# Patient Record
Sex: FEMALE | NOT HISPANIC OR LATINO | Employment: OTHER | URBAN - METROPOLITAN AREA
[De-identification: names, ages, dates, MRNs, and addresses within clinical notes are randomized per-mention and may not be internally consistent; named-entity substitution may affect disease eponyms.]

---

## 2016-11-25 LAB
CHOLEST SERPL-MCNC: 139 MG/DL (ref 0–199)
HDLC SERPL-MCNC: 31 MG/DL
LDLC SERPL CALC-MCNC: 61 MG/DL (ref 0–129)
TRIGL SERPL-MCNC: 237 MG/DL (ref 0–149)

## 2018-02-16 LAB — HBA1C MFR BLD: 6.9 % (ref 4.3–5.6)

## 2018-05-04 LAB — LDLC SERPL CALC-MCNC: 62 MG/D (ref 0–129)

## 2018-05-16 LAB
CREAT SERPL-MCNC: 0.87 MG/DL (ref 0.57–1.11)
GFR SERPL CREATININE-BSD FRML MDRD: >60 ML/MIN/1.73M2
GLUCOSE SERPL-MCNC: 123 MG/DL (ref 65–100)
POTASSIUM SERPL-SCNC: 3.7 MMOL/L (ref 3.5–5)

## 2018-06-08 ENCOUNTER — TRANSFERRED RECORDS (OUTPATIENT)
Dept: HEALTH INFORMATION MANAGEMENT | Facility: CLINIC | Age: 73
End: 2018-06-08

## 2018-06-08 LAB
ALT SERPL-CCNC: 15 U/L (ref 0–55)
HBA1C MFR BLD: 6.7 % (ref 4.3–5.6)
INR PPP: 2.1 (ref 0.9–1.1)
POTASSIUM SERPL-SCNC: 3.7 MMOL/L (ref 3.5–5.1)

## 2018-06-21 ENCOUNTER — RADIANT APPOINTMENT (OUTPATIENT)
Dept: GENERAL RADIOLOGY | Facility: CLINIC | Age: 73
End: 2018-06-21
Attending: PHYSICIAN ASSISTANT
Payer: MEDICARE

## 2018-06-21 ENCOUNTER — OFFICE VISIT (OUTPATIENT)
Dept: ORTHOPEDICS | Facility: CLINIC | Age: 73
End: 2018-06-21
Payer: MEDICARE

## 2018-06-21 VITALS
BODY MASS INDEX: 49.39 KG/M2 | HEIGHT: 59 IN | RESPIRATION RATE: 18 BRPM | TEMPERATURE: 97.3 F | SYSTOLIC BLOOD PRESSURE: 163 MMHG | DIASTOLIC BLOOD PRESSURE: 104 MMHG | WEIGHT: 245 LBS

## 2018-06-21 DIAGNOSIS — M25.562 PAIN IN BOTH KNEES, UNSPECIFIED CHRONICITY: ICD-10-CM

## 2018-06-21 DIAGNOSIS — Z96.652 STATUS POST TOTAL LEFT KNEE REPLACEMENT: ICD-10-CM

## 2018-06-21 DIAGNOSIS — M25.561 PAIN IN BOTH KNEES, UNSPECIFIED CHRONICITY: Primary | ICD-10-CM

## 2018-06-21 DIAGNOSIS — Z96.651 STATUS POST TOTAL RIGHT KNEE REPLACEMENT: ICD-10-CM

## 2018-06-21 DIAGNOSIS — M25.561 PAIN IN BOTH KNEES, UNSPECIFIED CHRONICITY: ICD-10-CM

## 2018-06-21 DIAGNOSIS — M25.562 PAIN IN BOTH KNEES, UNSPECIFIED CHRONICITY: Primary | ICD-10-CM

## 2018-06-21 PROCEDURE — 99213 OFFICE O/P EST LOW 20 MIN: CPT | Performed by: ORTHOPAEDIC SURGERY

## 2018-06-21 PROCEDURE — 73562 X-RAY EXAM OF KNEE 3: CPT | Mod: RT

## 2018-06-21 NOTE — LETTER
6/21/2018         RE: Selvin Rockwell  [address] [place] [id number]        Dear Colleague,    Thank you for referring your patient, Selvin Rockwell, to the [facility]. Please see a copy of my visit note below.    Selvin Rockwell is seen for follow up left total knee arthroplasty from 7/21/2010 and right total knee arthroplasty from 12/12/2012.  She complains of left lateral knee pain.  Pain is especially along the posterolateral hamstrings.  Exercise:  limited walking.    Past Medical History:   Diagnosis Date     Diabetes mellitus, type 2 (H)      HTN (hypertension) 12/5/2012     Hyperlipidemia LDL goal < 100      OA (OSTEOARTHRITIS) OF KNEE - right 7/13/2010     OA (OSTEOARTHRITIS) OF KNEE - right      TACHO (obstructive sleep apnea) 12/7/2012       Past Surgical History:   Procedure Laterality Date     C HAND/FINGER SURGERY UNLISTED       C REMOVAL OF KIDNEY STONE       C TOTAL KNEE ARTHROPLASTY  7/21/10    left     C TOTAL KNEE ARTHROPLASTY  12/12/12    Right     CARPAL TUNNEL RELEASE RT/LT       TONSILLECTOMY         History reviewed. No pertinent family history.    Social History     Social History     Marital status:      Spouse name: N/A     Number of children: N/A     Years of education: N/A     Occupational History     Not on file.     Social History Main Topics     Smoking status: Never Smoker     Smokeless tobacco: Never Used     Alcohol use No     Drug use: No     Sexual activity: Yes     Partners: Male     Birth control/ protection: Post-menopausal     Other Topics Concern     Not on file     Social History Narrative       Current Outpatient Prescriptions   Medication Sig Dispense Refill     aspirin 81 MG tablet Take  by mouth daily.       ATENOLOL 50 MG PO TABS 1 TABLET DAILY       FISH OIL 1000 MG PO CAPS 3 CAPSULES DAILY WITH A MEAL       GLIPIZIDE 5 MG PO TABS 1 TABLET DAILY BEFORE A MEAL       GLUCOSAMINE HCL 1500 MG PO TABS 1 TABLET DAILY        "HYDROCHLOROTHIAZIDE 25 MG PO TABS 1 TABLET DAILY       metFORMIN (GLUCOPHAGE) 500 MG tablet Take 500 mg by mouth 2 times daily (with meals).       mirtazapine (REMERON) 30 MG tablet Take 0.5 mg by mouth At Bedtime.       OTHER MEDICAL SUPPLIES 1 Package by Other route daily. 1 Package 0     oxyCODONE-acetaminophen (PERCOCET) 5-325 MG per tablet Take 1 tablet by mouth every 4 hours as needed for pain. 50 tablet 0     PRAVASTATIN SODIUM 10 MG PO TABS 1 TABLET DAILY         Allergies   Allergen Reactions     Sulfa Drugs        REVIEW OF SYSTEMS:  CONSTITUTIONAL:  NEGATIVE for fever, chills, change in weight, not feeling tired  SKIN:  NEGATIVE for worrisome rashes, no skin lumps, no skin ulcers and no non-healing wounds  EYES:  NEGATIVE for vision changes or irritation.  ENT/MOUTH:  NEGATIVE.  No hearing loss, no hoarseness, no difficulty swallowing.  RESP:  NEGATIVE. No cough or shortness of breath.  BREAST:  NEGATIVE for masses, tenderness or discharge  CV:  NEGATIVE for chest pain, palpitations or peripheral edema  GI:  NEGATIVE for nausea, abdominal pain, heartburn, or change in bowel habits  :  Negative. No dysuria, no hematuria  MUSCULOSKELETAL:  See HPI above  NEURO:  NEGATIVE . No headaches, no dizziness,  no numbness  ENDOCRINE:  NEGATIVE for temperature intolerance, skin/hair changes  HEME/ALLERGY/IMMUNE:  NEGATIVE for bleeding problems  PSYCHIATRIC:  NEGATIVE. no anxiety, no depression.      Xray:  Xray images were independently visualized with the patient.  This shows good position of components of both total knee arthroplasty .  No sign of loosening or wear.     Exam:  Vitals: BP (!) 163/104  Temp 97.3  F (36.3  C)  Resp 18  Ht 1.499 m (4' 11\")  Wt 111.1 kg (245 lb)  BMI 49.48 kg/m2  BMI= Body mass index is 49.48 kg/(m^2).  Range of motion right 0-110 degrees, left 0-110 degrees.  Alignment  Normal.  Stability:   Right       Lateral collateral ligament no laxity       Medial collateral ligament no " laxity  Left:       Lateral collateral ligament no laxity       Medial collateral ligament moderate laxity.  This is new since 2011.  Wound:  Well healed.  Edema: None  Effusion: None  Tenderness: Right None       Left:  Minor - lateral hamstrings.  Sensation, motor, and circulation intact.    Assessment:  right total knee arthroplasty doing well.  Left total knee arthroplasty with medial collateral ligament laxity.  She has lateral hamstrings tenderness which might be related to medial collateral ligament laxity.  Plan:  She should work on strengthening.  We will fit her with a double upright hinged brace.  Resume activity as tolerated.  Return to clinic 2-4 years with xray bilateral knee.  Continue antibiotics for dental work.      Again, thank you for allowing me to participate in the care of your patient.        Sincerely,        Gurinder Martinez MD

## 2018-06-21 NOTE — PROGRESS NOTES
Selvin Rockwell is seen for follow up left total knee arthroplasty from 7/21/2010 and right total knee arthroplasty from 12/12/2012.  She complains of left lateral knee pain.  Pain is especially along the posterolateral hamstrings.  Exercise:  limited walking.    Past Medical History:   Diagnosis Date     Diabetes mellitus, type 2 (H)      HTN (hypertension) 12/5/2012     Hyperlipidemia LDL goal < 100      OA (OSTEOARTHRITIS) OF KNEE - right 7/13/2010     OA (OSTEOARTHRITIS) OF KNEE - right      TACHO (obstructive sleep apnea) 12/7/2012       Past Surgical History:   Procedure Laterality Date     C HAND/FINGER SURGERY UNLISTED       C REMOVAL OF KIDNEY STONE       C TOTAL KNEE ARTHROPLASTY  7/21/10    left     C TOTAL KNEE ARTHROPLASTY  12/12/12    Right     CARPAL TUNNEL RELEASE RT/LT       TONSILLECTOMY         History reviewed. No pertinent family history.    Social History     Social History     Marital status:      Spouse name: N/A     Number of children: N/A     Years of education: N/A     Occupational History     Not on file.     Social History Main Topics     Smoking status: Never Smoker     Smokeless tobacco: Never Used     Alcohol use No     Drug use: No     Sexual activity: Yes     Partners: Male     Birth control/ protection: Post-menopausal     Other Topics Concern     Not on file     Social History Narrative       Current Outpatient Prescriptions   Medication Sig Dispense Refill     aspirin 81 MG tablet Take  by mouth daily.       ATENOLOL 50 MG PO TABS 1 TABLET DAILY       FISH OIL 1000 MG PO CAPS 3 CAPSULES DAILY WITH A MEAL       GLIPIZIDE 5 MG PO TABS 1 TABLET DAILY BEFORE A MEAL       GLUCOSAMINE HCL 1500 MG PO TABS 1 TABLET DAILY       HYDROCHLOROTHIAZIDE 25 MG PO TABS 1 TABLET DAILY       metFORMIN (GLUCOPHAGE) 500 MG tablet Take 500 mg by mouth 2 times daily (with meals).       mirtazapine (REMERON) 30 MG tablet Take 0.5 mg by mouth At Bedtime.       OTHER MEDICAL SUPPLIES 1 Package by  "Other route daily. 1 Package 0     oxyCODONE-acetaminophen (PERCOCET) 5-325 MG per tablet Take 1 tablet by mouth every 4 hours as needed for pain. 50 tablet 0     PRAVASTATIN SODIUM 10 MG PO TABS 1 TABLET DAILY         Allergies   Allergen Reactions     Sulfa Drugs        REVIEW OF SYSTEMS:  CONSTITUTIONAL:  NEGATIVE for fever, chills, change in weight, not feeling tired  SKIN:  NEGATIVE for worrisome rashes, no skin lumps, no skin ulcers and no non-healing wounds  EYES:  NEGATIVE for vision changes or irritation.  ENT/MOUTH:  NEGATIVE.  No hearing loss, no hoarseness, no difficulty swallowing.  RESP:  NEGATIVE. No cough or shortness of breath.  BREAST:  NEGATIVE for masses, tenderness or discharge  CV:  NEGATIVE for chest pain, palpitations or peripheral edema  GI:  NEGATIVE for nausea, abdominal pain, heartburn, or change in bowel habits  :  Negative. No dysuria, no hematuria  MUSCULOSKELETAL:  See HPI above  NEURO:  NEGATIVE . No headaches, no dizziness,  no numbness  ENDOCRINE:  NEGATIVE for temperature intolerance, skin/hair changes  HEME/ALLERGY/IMMUNE:  NEGATIVE for bleeding problems  PSYCHIATRIC:  NEGATIVE. no anxiety, no depression.      Xray:  Xray images were independently visualized with the patient.  This shows good position of components of both total knee arthroplasty .  No sign of loosening or wear.     Exam:  Vitals: BP (!) 163/104  Temp 97.3  F (36.3  C)  Resp 18  Ht 1.499 m (4' 11\")  Wt 111.1 kg (245 lb)  BMI 49.48 kg/m2  BMI= Body mass index is 49.48 kg/(m^2).  Range of motion right 0-110 degrees, left 0-110 degrees.  Alignment  Normal.  Stability:   Right       Lateral collateral ligament no laxity       Medial collateral ligament no laxity  Left:       Lateral collateral ligament no laxity       Medial collateral ligament moderate laxity.  This is new since 2011.  Wound:  Well healed.  Edema: None  Effusion: None  Tenderness: Right None       Left:  Minor - lateral " hamstrings.  Sensation, motor, and circulation intact.    Assessment:  right total knee arthroplasty doing well.  Left total knee arthroplasty with medial collateral ligament laxity.  She has lateral hamstrings tenderness which might be related to medial collateral ligament laxity.  Plan:  She should work on strengthening.  We will fit her with a double upright hinged brace.  Resume activity as tolerated.  Return to clinic 2-4 years with xray bilateral knee.  Continue antibiotics for dental work.

## 2018-06-21 NOTE — MR AVS SNAPSHOT
"              After Visit Summary   6/21/2018    Selvin Rockwell    MRN: [id number]           Patient Information     Date Of Birth          1945        Visit Information        Provider Department      6/21/2018 2:00 PM Gurinder Mratinez MD [facility]        Today's Diagnoses     Pain in both knees, unspecified chronicity    -  1    Status post total right knee replacement        Status post total left knee replacement           Follow-ups after your visit        Who to contact     If you have questions or need follow up information about today's clinic visit or your schedule please contact [facility] directly at [phone].  Normal or non-critical lab and imaging results will be communicated to you by MyChart, letter or phone within 4 business days after the clinic has received the results. If you do not hear from us within 7 days, please contact the clinic through MyChart or phone. If you have a critical or abnormal lab result, we will notify you by phone as soon as possible.  Submit refill requests through [employer] or call your pharmacy and they will forward the refill request to us. Please allow 3 business days for your refill to be completed.          Additional Information About Your Visit        Care EveryWhere ID     This is your Care EveryWhere ID. This could be used by other organizations to access your [facility] medical records  [id number]        Your Vitals Were     Temperature Respirations Height BMI (Body Mass Index)          97.3  F (36.3  C) 18 1.499 m (4' 11\") 49.48 kg/m2         Blood Pressure from Last 3 Encounters:   06/21/18 (!) 163/104   12/05/12 126/80    Weight from Last 3 Encounters:   06/21/18 111.1 kg (245 lb)   01/22/13 112.5 kg (248 lb)   12/20/12 112 kg (247 lb)               Primary Care Provider Office Phone # Fax #    Braulio Altamirano [phone] [address] [facility]  [place] [address]        Equal Access to " Services     [facility]: Hadii aad ku hadkrisschico Michelledoug, waaxda luqadaha, qaybta kaalmada sanjuanacierrapam, davida moralez . So [facility] [phone].    ATENCIÓN: Si habla david, tiene a rosas disposición servicios gratuitos de asistencia lingüística. Llame al [phone].    We comply with applicable federal civil rights laws and [place] laws. We do not discriminate on the basis of race, color, national origin, age, disability, sex, sexual orientation, or gender identity.            Thank you!     Thank you for choosing [facility]  for your care. Our goal is always to provide you with excellent care. Hearing back from our patients is one way we can continue to improve our services. Please take a few minutes to complete the written survey that you may receive in the mail after your visit with us. Thank you!             Your Updated Medication List - Protect others around you: Learn how to safely use, store and throw away your medicines at www.disposemymeds.org.          This list is accurate as of 6/21/18  5:50 PM.  Always use your most recent med list.                   Brand Name Dispense Instructions for use Diagnosis    aspirin 81 MG tablet      Take  by mouth daily.        atenolol 50 MG tablet    TENORMIN     1 TABLET DAILY        fish oil-omega-3 fatty acids 1000 MG capsule      3 CAPSULES DAILY WITH A MEAL        glipiZIDE 5 MG tablet    GLUCOTROL     1 TABLET DAILY BEFORE A MEAL        Glucosamine HCl 1500 MG Tabs      1 TABLET DAILY        hydrochlorothiazide 25 MG tablet    HYDRODIURIL     1 TABLET DAILY        metFORMIN 500 MG tablet    GLUCOPHAGE     Take 500 mg by mouth 2 times daily (with meals).        other medical supplies     1 Package    1 Package by Other route daily.    Hx of total knee arthroplasty       oxyCODONE-acetaminophen 5-325 MG per tablet    PERCOCET    50 tablet    Take 1 tablet by mouth every 4 hours as needed for pain.    Hx of total knee  arthroplasty       pravastatin 10 MG tablet    PRAVACHOL     1 TABLET DAILY        REMERON 30 MG tablet   Generic drug:  mirtazapine      Take 0.5 mg by mouth At Bedtime.

## 2018-07-05 ENCOUNTER — TELEPHONE (OUTPATIENT)
Dept: ORTHOPEDICS | Facility: CLINIC | Age: 73
End: 2018-07-05

## 2018-07-05 NOTE — TELEPHONE ENCOUNTER
Reason for Call:  Switch knee brace    Detailed comments: Daughter is calling. She would like to switch out the left knee brace for a smaller size. The one she has now is too big. Please call.    Phone Number Patient can be reached at: [phone]    Best Time: any    Can we leave a detailed message on this number? YES    Call taken on 7/5/2018 at 11:07 AM by Sarai Wong

## 2018-07-06 NOTE — TELEPHONE ENCOUNTER
Left a message stating that we will be back in the office on Monday 7/9/18 and I will call them then to arrange picking up a new brace.    Connor Alex PA-C  Supervising physician: Gurinder Martinez MD  Dept. of Orthopedics  [facility]

## 2018-07-10 NOTE — PATIENT INSTRUCTIONS
[facility]    If you have any questions regarding to your visit please contact your care team:       Team Red:   Clinic Hours Telephone Number   Dr. Mindy Stevens, NP   7am-7pm  Monday - Thursday   7am-5pm  Fridays  [phone]  (Appointment scheduling available 24/7)    Questions about your recent visit?   Team Line  [phone]   Urgent Care - [place] and [facility] - 11am-9pm Monday-Friday Saturday-Sunday- 9am-5pm   [place] - 5pm-9pm Monday-Friday Saturday-Sunday- 9am-5pm  [phone] - [place]  [phone] - [place]       What options do I have for a visit other than an office visit? We offer electronic visits (e-visits) and telephone visits, when medically appropriate.  Please check with your medical insurance to see if these types of visits are covered, as you will be responsible for any charges that are not paid by your insurance.      You can use [employer] (secure electronic communication) to access to your chart, send your primary care provider a message, or make an appointment. Ask a team member how to get started.     For a price quote for your services, please call our Consumer Price Line at [phone] or our Imaging Cost estimation line at [phone] (for imaging tests).

## 2018-07-10 NOTE — PROGRESS NOTES
SUBJECTIVE:   Selvin Rockwell is a 73 year old female who presents to clinic today for the following health issues:      Chief Complaint   Patient presents with     Musculoskeletal Problem     bilateral calves         Pt come in with enlarged veins in her legs  No swelling  No pain  Pt has known Diabetes and [facility] to [facility] and it is well controlled    Problem list and histories reviewed & adjusted, as indicated.  Additional history: as documented    Patient Active Problem List   Diagnosis     OA (OSTEOARTHRITIS) OF KNEE - right     Status Post Total Knee Replacement - bilateral     TACHO (obstructive sleep apnea)     Hyperlipidemia with target LDL less than 100     Hypertension goal BP (blood pressure) < 140/90     Type 2 diabetes, HbA1c goal < 7% (H)     Morbid obesity (H)     Past Surgical History:   Procedure Laterality Date     C HAND/FINGER SURGERY UNLISTED       C REMOVAL OF KIDNEY STONE       C TOTAL KNEE ARTHROPLASTY  7/21/10    left     C TOTAL KNEE ARTHROPLASTY  12/12/12    Right     CARPAL TUNNEL RELEASE RT/LT       TONSILLECTOMY         Social History   Substance Use Topics     Smoking status: Never Smoker     Smokeless tobacco: Never Used     Alcohol use No     History reviewed. No pertinent family history.      Current Outpatient Prescriptions   Medication Sig Dispense Refill     ATENOLOL 50 MG PO TABS 1 TABLET DAILY       busPIRone (BUSPAR) 10 MG tablet Take 10 mg by mouth 3 times daily       carvedilol (COREG) 6.25 MG tablet Take 6.25 mg by mouth 2 times daily       FISH OIL 1000 MG PO CAPS 3 CAPSULES DAILY WITH A MEAL       isosorbide mononitrate (IMDUR) 30 MG 24 hr tablet Take 30 mg by mouth daily       order for DME Vinh hose stockings 1 Device 0     OTHER MEDICAL SUPPLIES 1 Package by Other route daily. 1 Package 0     PARoxetine (PAXIL) 10 MG tablet Take 10 mg by mouth daily       simvastatin (ZOCOR) 10 MG tablet 10 mg daily       GLIPIZIDE 5 MG PO TABS 1 TABLET DAILY BEFORE A  "MEAL       GLUCOSAMINE HCL 1500 MG PO TABS 1 TABLET DAILY       metFORMIN (GLUCOPHAGE) 500 MG tablet Take 500 mg by mouth 2 times daily (with meals).       Allergies   Allergen Reactions     Sulfa Drugs      Recent Labs   Lab Test  12/05/12   1139   CR  0.95   GFRESTIMATED  59*   GFRESTBLACK  71   POTASSIUM  3.5      BP Readings from Last 3 Encounters:   07/11/18 132/82   06/21/18 (!) 163/104   12/05/12 126/80    Wt Readings from Last 3 Encounters:   07/11/18 205 lb (93 kg)   06/21/18 245 lb (111.1 kg)   01/22/13 248 lb (112.5 kg)                  Labs reviewed in EPIC    Reviewed and updated as needed this visit by clinical staff       Reviewed and updated as needed this visit by Provider         ROS:  CONSTITUTIONAL: NEGATIVE for fever, chills, change in weight  RESP: NEGATIVE for significant cough or SOB  CV: NEGATIVE for chest pain, palpitations or peripheral edema  GI: NEGATIVE for nausea, abdominal pain, heartburn, or change in bowel habits  PSYCHIATRIC: has Depression and anxiety which she says is getting Treated at [facility] and she is getting better    OBJECTIVE:     /82  Pulse 68  Temp 97.1  F (36.2  C) (Oral)  Resp 24  Ht 4' 11\" (1.499 m)  Wt 205 lb (93 kg)  SpO2 96%  Breastfeeding? No  BMI 41.4 kg/m2  Body mass index is 41.4 kg/(m^2).  GENERAL: healthy, alert and no distress  GENERAL: obese  RESP: lungs clear to auscultation - no rales, rhonchi or wheezes  CV: regular rate and rhythm, normal S1 S2, no S3 or S4, no murmur, click or rub, no peripheral edema and peripheral pulses strong  ABDOMEN: soft, nontender, no hepatosplenomegaly, no masses and bowel sounds normal  Bilateral varicose veins    Diagnostic Test Results:  none     ASSESSMENT/PLAN:         BMI:   Estimated body mass index is 41.4 kg/(m^2) as calculated from the following:    Height as of this encounter: 4' 11\" (1.499 m).    Weight as of this encounter: 205 lb (93 kg).   Weight management plan: low anne diet      1. " Varicose veins of legs  Advised keep legs elevated  Vinh Hose stockings  Follow up if not better  - order for DME; Vinh hose stockings  Dispense: 1 Device; Refill: 0    2. Type 2 diabetes mellitus without complication, without long-term current use of insulin (H)  Goes to [facility] and doing well    3. Morbid obesity (H)  As above    4. Screen for colon cancer  Advised   - Fecal colorectal cancer screen (FIT); Future    5. Asymptomatic postmenopausal status  Advised   - DEXA HIP/PELVIS/SPINE - Future; Future    6. Visit for screening mammogram  Advised   Discussed Importance of early detection of Breast and colon cancer  - MA SCREENING DIGITAL BILAT - Future  (s+30); Future  Follow up PMD if not better    Leny Govea MD  [facility]

## 2018-07-11 ENCOUNTER — OFFICE VISIT (OUTPATIENT)
Dept: FAMILY MEDICINE | Facility: CLINIC | Age: 73
End: 2018-07-11
Payer: MEDICARE

## 2018-07-11 VITALS
HEIGHT: 59 IN | OXYGEN SATURATION: 96 % | SYSTOLIC BLOOD PRESSURE: 132 MMHG | WEIGHT: 205 LBS | DIASTOLIC BLOOD PRESSURE: 82 MMHG | RESPIRATION RATE: 24 BRPM | BODY MASS INDEX: 41.33 KG/M2 | TEMPERATURE: 97.1 F | HEART RATE: 68 BPM

## 2018-07-11 DIAGNOSIS — Z23 NEED FOR PROPHYLACTIC VACCINATION AGAINST STREPTOCOCCUS PNEUMONIAE (PNEUMOCOCCUS): ICD-10-CM

## 2018-07-11 DIAGNOSIS — E11.9 TYPE 2 DIABETES MELLITUS WITHOUT COMPLICATION, WITHOUT LONG-TERM CURRENT USE OF INSULIN (H): ICD-10-CM

## 2018-07-11 DIAGNOSIS — Z78.0 ASYMPTOMATIC POSTMENOPAUSAL STATUS: ICD-10-CM

## 2018-07-11 DIAGNOSIS — I83.93 VARICOSE VEINS OF LEGS: Primary | ICD-10-CM

## 2018-07-11 DIAGNOSIS — Z12.31 VISIT FOR SCREENING MAMMOGRAM: ICD-10-CM

## 2018-07-11 DIAGNOSIS — Z12.11 SCREEN FOR COLON CANCER: ICD-10-CM

## 2018-07-11 DIAGNOSIS — Z23 NEED FOR PROPHYLACTIC VACCINATION WITH TETANUS-DIPHTHERIA (TD): ICD-10-CM

## 2018-07-11 DIAGNOSIS — E66.01 MORBID OBESITY (H): ICD-10-CM

## 2018-07-11 PROBLEM — N26.1 ATROPHY OF LEFT KIDNEY: Status: ACTIVE | Noted: 2017-03-27

## 2018-07-11 PROBLEM — I48.0 PAROXYSMAL ATRIAL FIBRILLATION (H): Status: ACTIVE | Noted: 2018-07-11

## 2018-07-11 PROBLEM — K22.0 ACHALASIA: Status: ACTIVE | Noted: 2018-05-02

## 2018-07-11 PROCEDURE — 99213 OFFICE O/P EST LOW 20 MIN: CPT | Performed by: FAMILY MEDICINE

## 2018-07-11 RX ORDER — BUSPIRONE HYDROCHLORIDE 10 MG/1
10 TABLET ORAL 3 TIMES DAILY
COMMUNITY
End: 2019-09-20

## 2018-07-11 RX ORDER — PAROXETINE 10 MG/1
10 TABLET, FILM COATED ORAL DAILY
COMMUNITY
End: 2019-11-29

## 2018-07-11 RX ORDER — CARVEDILOL 6.25 MG/1
6.25 TABLET ORAL 2 TIMES DAILY
COMMUNITY
End: 2019-10-28

## 2018-07-11 RX ORDER — SIMVASTATIN 10 MG
10 TABLET ORAL DAILY
COMMUNITY
Start: 2018-06-25 | End: 2020-05-22

## 2018-07-11 RX ORDER — ISOSORBIDE MONONITRATE 30 MG/1
30 TABLET, EXTENDED RELEASE ORAL DAILY
COMMUNITY
Start: 2018-06-22 | End: 2019-10-14

## 2018-07-11 ASSESSMENT — ANXIETY QUESTIONNAIRES
5. BEING SO RESTLESS THAT IT IS HARD TO SIT STILL: NOT AT ALL
1. FEELING NERVOUS, ANXIOUS, OR ON EDGE: MORE THAN HALF THE DAYS
IF YOU CHECKED OFF ANY PROBLEMS ON THIS QUESTIONNAIRE, HOW DIFFICULT HAVE THESE PROBLEMS MADE IT FOR YOU TO DO YOUR WORK, TAKE CARE OF THINGS AT HOME, OR GET ALONG WITH OTHER PEOPLE: NOT DIFFICULT AT ALL
3. WORRYING TOO MUCH ABOUT DIFFERENT THINGS: NEARLY EVERY DAY
6. BECOMING EASILY ANNOYED OR IRRITABLE: MORE THAN HALF THE DAYS
2. NOT BEING ABLE TO STOP OR CONTROL WORRYING: NEARLY EVERY DAY
7. FEELING AFRAID AS IF SOMETHING AWFUL MIGHT HAPPEN: NOT AT ALL
GAD7 TOTAL SCORE: 12

## 2018-07-11 ASSESSMENT — PATIENT HEALTH QUESTIONNAIRE - PHQ9: 5. POOR APPETITE OR OVEREATING: MORE THAN HALF THE DAYS

## 2018-07-11 NOTE — MR AVS SNAPSHOT
After Visit Summary   7/11/2018    Selvin Rockwell    MRN: [id number]           Patient Information     Date Of Birth          1945        Visit Information        Provider Department      7/11/2018 11:40 AM Leny Govea MD [facility]        Today's Diagnoses     Varicose veins of legs    -  1    Screen for colon cancer        Asymptomatic postmenopausal status        Visit for screening mammogram        Need for prophylactic vaccination against Streptococcus pneumoniae (pneumococcus)        Need for prophylactic vaccination with tetanus-diphtheria (TD)          Care Instructions    [facility]    If you have any questions regarding to your visit please contact your care team:       Team Red:   Clinic Hours Telephone Number   Dr. Mindy Stevens, NP   7am-7pm  Monday - Thursday   7am-5pm  Fridays  [phone]  (Appointment scheduling available 24/7)    Questions about your recent visit?   Team Line  [phone]   Urgent Care - [place] and [facility] - 11am-9pm Monday-Friday Saturday-Sunday- 9am-5pm   [place] - 5pm-9pm Monday-Friday Saturday-Sunday- 9am-5pm  [phone] - [place]  [phone] - [place]       What options do I have for a visit other than an office visit? We offer electronic visits (e-visits) and telephone visits, when medically appropriate.  Please check with your medical insurance to see if these types of visits are covered, as you will be responsible for any charges that are not paid by your insurance.      You can use [employer] (secure electronic communication) to access to your chart, send your primary care provider a message, or make an appointment. Ask a team member how to get started.     For a price quote for your services, please call our Consumer Price Line at [phone] or our Imaging Cost estimation line at [phone] (for imaging tests).               "Follow-ups after your visit        Future tests that were ordered for you today     Open Future Orders        Priority Expected Expires Ordered    DEXA HIP/PELVIS/SPINE - Future Routine  7/10/2019 7/11/2018    MA SCREENING DIGITAL BILAT - Future  (s+30) Routine  7/10/2019 7/11/2018    Fecal colorectal cancer screen (FIT) Routine 8/1/2018 10/3/2018 7/11/2018            Who to contact     If you have questions or need follow up information about today's clinic visit or your schedule please contact [facility] directly at [phone].  Normal or non-critical lab and imaging results will be communicated to you by MyChart, letter or phone within 4 business days after the clinic has received the results. If you do not hear from us within 7 days, please contact the clinic through MyChart or phone. If you have a critical or abnormal lab result, we will notify you by phone as soon as possible.  Submit refill requests through [employer] or call your pharmacy and they will forward the refill request to us. Please allow 3 business days for your refill to be completed.          Additional Information About Your Visit        Care EveryWhere ID     This is your Care EveryWhere ID. This could be used by other organizations to access your [place] medical records  [id number]        Your Vitals Were     Pulse Temperature Respirations Height Pulse Oximetry Breastfeeding?    68 97.1  F (36.2  C) (Oral) 24 4' 11\" (1.499 m) 96% No    BMI (Body Mass Index)                   41.4 kg/m2            Blood Pressure from Last 3 Encounters:   07/11/18 132/82   06/21/18 (!) 163/104   12/05/12 126/80    Weight from Last 3 Encounters:   07/11/18 205 lb (93 kg)   06/21/18 245 lb (111.1 kg)   01/22/13 248 lb (112.5 kg)                 Today's Medication Changes          These changes are accurate as of 7/11/18 12:51 PM.  If you have any questions, ask your nurse or doctor.               Start taking these medicines.        " Dose/Directions    order for DME   Used for:  Varicose veins of legs   Started by:  Leny Govea MD Ted hose stockings   Quantity:  1 Device   Refills:  0            Where to get your medicines      Some of these will need a paper prescription and others can be bought over the counter.  Ask your nurse if you have questions.     Bring a paper prescription for each of these medications     order for DME                Primary Care Provider Office Phone # Fax #    Braulio Altamirano [phone]       [address] [facility] [address]        Equal Access to Services     [facility]: Hadii aad ku hadasho Soomaali, waaxda luqadaha, qaybta kaalmada adeegyada, waxbrittney cooper hayaan sanjuana moralez . So [facility] [phone].    ATENCIÓN: Si habla español, tiene a rosas disposición servicios gratuitos de asistencia lingüística. [facility] [phone].    We comply with applicable federal civil rights laws and [place] laws. We do not discriminate on the basis of race, color, national origin, age, disability, sex, sexual orientation, or gender identity.            Thank you!     Thank you for choosing [facility]  for your care. Our goal is always to provide you with excellent care. Hearing back from our patients is one way we can continue to improve our services. Please take a few minutes to complete the written survey that you may receive in the mail after your visit with us. Thank you!             Your Updated Medication List - Protect others around you: Learn how to safely use, store and throw away your medicines at www.disposemymeds.org.          This list is accurate as of 7/11/18 12:51 PM.  Always use your most recent med list.                   Brand Name Dispense Instructions for use Diagnosis    atenolol 50 MG tablet    TENORMIN     1 TABLET DAILY        busPIRone 10 MG tablet    BUSPAR     Take 10 mg by mouth 3 times daily        carvedilol 6.25 MG tablet    COREG      Take 6.25 mg by mouth 2 times daily        fish oil-omega-3 fatty acids 1000 MG capsule      3 CAPSULES DAILY WITH A MEAL        glipiZIDE 5 MG tablet    GLUCOTROL     1 TABLET DAILY BEFORE A MEAL        Glucosamine HCl 1500 MG Tabs      1 TABLET DAILY        isosorbide mononitrate 30 MG 24 hr tablet    IMDUR     Take 30 mg by mouth daily        metFORMIN 500 MG tablet    GLUCOPHAGE     Take 500 mg by mouth 2 times daily (with meals).        order for DME     1 Device    Vinh hose stockings    Varicose veins of legs       other medical supplies     1 Package    1 Package by Other route daily.    Hx of total knee arthroplasty       PARoxetine 10 MG tablet    PAXIL     Take 10 mg by mouth daily        simvastatin 10 MG tablet    ZOCOR     10 mg daily

## 2018-07-12 ASSESSMENT — PATIENT HEALTH QUESTIONNAIRE - PHQ9: SUM OF ALL RESPONSES TO PHQ QUESTIONS 1-9: 12

## 2018-07-12 ASSESSMENT — ANXIETY QUESTIONNAIRES: GAD7 TOTAL SCORE: 12

## 2018-07-16 ENCOUNTER — TELEPHONE (OUTPATIENT)
Dept: ORTHOPEDICS | Facility: CLINIC | Age: 73
End: 2018-07-16

## 2018-07-16 NOTE — TELEPHONE ENCOUNTER
Daughter calling. She received a knee brace that is not working. Per the medical company ( [employer] - [phone]) is to drop off a new brace and she is to pick it up on Wednesday.     Does she need to make an appointment? Please call and let know

## 2018-07-16 NOTE — TELEPHONE ENCOUNTER
Reason for Call:  Note for knee brace    Detailed comments: caller states that patient will be coming into the clinic either tomorrow or Wednesday to be fitted for a smaller knee brace and that the larger brace can be returned to them with the  and a note attached on this Wednesday. Call if any questions. Thank you.    Phone Number Patient can be reached at: Other phone number:  [phone]    Best Time: 8 am to 4:30 pm    Can we leave a detailed message on this number? YES    Call taken on 7/16/2018 at 3:52 PM by Sarai Wong

## 2018-07-17 NOTE — TELEPHONE ENCOUNTER
The patient's daughter will be picking up the new brace today at the [facility] and will be leaving the old brace that does not fit at the [facility] . Patient has agreed to arrive at 12:45PM. Spoke with Isiah at [facility] that a  will be coming to this clinic to  the brace after the exchange has been made.    PANFILO MortonC  Supervising physician: Gurinder Martinez MD  Dept. of Orthopedics  [facility]

## 2018-07-25 NOTE — TELEPHONE ENCOUNTER
Reason for Call:  Knee brace keeps falling off    Detailed comments: Please call daughter to advise on the next step. Is there a different brace that she can try?    Phone Number Patient can be reached at: [phone] (H)    Best Time: any    Can we leave a detailed message on this number? YES    Call taken on 7/25/2018 at 1:37 PM by Sarai Wong

## 2018-07-30 NOTE — TELEPHONE ENCOUNTER
Tried calling but was unable to reach patient. Left a message with our callback number.    PANFILO MortonC  Supervising physician: Gurinder Martinez MD  Dept. of Orthopedics  [facility]

## 2018-07-31 ENCOUNTER — TELEPHONE (OUTPATIENT)
Dept: ORTHOPEDICS | Facility: CLINIC | Age: 73
End: 2018-07-31

## 2018-07-31 DIAGNOSIS — Z96.652 HISTORY OF TOTAL KNEE ARTHROPLASTY, LEFT: Primary | ICD-10-CM

## 2018-07-31 DIAGNOSIS — M23.8X2 KNEE JOINT LAXITY, LEFT: ICD-10-CM

## 2018-07-31 NOTE — TELEPHONE ENCOUNTER
Reason for Call:  Other returning call    Detailed comments:  Daughter returning your call.  Please give her a call back.    Phone Number Patient can be reached at: [phone]    Best Time:  anytime    Can we leave a detailed message on this number? YES    Call taken on 7/31/2018 at 10:36 AM by Bonnie Martinez

## 2018-07-31 NOTE — TELEPHONE ENCOUNTER
Tried calling but no answer. Left a message with our callback number.    PANFILO MortonC  Supervising physician: Gurinder Martinez MD  Dept. of Orthopedics  [facility]

## 2018-11-08 ENCOUNTER — TELEPHONE (OUTPATIENT)
Dept: FAMILY MEDICINE | Facility: CLINIC | Age: 73
End: 2018-11-08

## 2018-11-08 NOTE — LETTER
November 8, 2018        Selvin Rockwell,  [address] [facility] [address]          Dear Selvin Rockwell      Monitoring and managing your preventative and chronic health conditions are very important to us. Our records indicate that you have not scheduled for Mammogram, Colonoscopy, Diabetic Check and HgbA1C  which was recommended by Dr. Govea      If you have received your health care elsewhere, please call the clinic so the information can be documented in your chart.    Please call [phone] or message us through your [employer] account to schedule an appointment or provide information for your chart.     Feel free to contact us if you have any questions or concerns!    I look forward to seeing you and working with you on your health care needs.     Sincerely,       Your [place] Care Team/HV

## 2018-11-08 NOTE — TELEPHONE ENCOUNTER
Panel Management Review      Patient has the following on her problem list:     Diabetes    ASA: Failed    Last A1C  Lab Results   Component Value Date    A1C 6.7 06/08/2018    A1C 6.9 02/16/2018     A1C tested: Passed    Last LDL:    Lab Results   Component Value Date    CHOL 139 11/25/2016     Lab Results   Component Value Date    HDL 31 11/25/2016     Lab Results   Component Value Date    LDL 62 05/04/2018    LDL 61 11/25/2016     Lab Results   Component Value Date    TRIG 237 11/25/2016     No results found for: CHOLHDLRATIO  No results found for: NHDL    Is the patient on a Statin? YES             Is the patient on Aspirin? NO    Medications     HMG CoA Reductase Inhibitors    simvastatin (ZOCOR) 10 MG tablet          Last three blood pressure readings:  BP Readings from Last 3 Encounters:   07/11/18 132/82   06/21/18 (!) 163/104   12/05/12 126/80       Date of last diabetes office visit:      Tobacco History:     History   Smoking Status     Never Smoker   Smokeless Tobacco     Never Used         Hypertension   Last three blood pressure readings:  BP Readings from Last 3 Encounters:   07/11/18 132/82   06/21/18 (!) 163/104   12/05/12 126/80     Blood pressure: Passed    HTN Guidelines:  Age 18-59 BP range:  Less than 140/90  Age 60-85 with Diabetes:  Less than 140/90  Age 60-85 without Diabetes:  less than 150/90      Composite cancer screening  Chart review shows that this patient is due/due soon for the following Mammogram and Colonoscopy  Summary:    Patient is due/failing the following:   A1C, COLONOSCOPY and MAMMOGRAM    Action needed:   Patient needs office visit for Diabetes.    Type of outreach:    Sent letter.    Questions for provider review:    None                                                                                                                                    Desirae Mendoza CMA on 11/8/2018 at 3:01 PM       Chart routed to NONE .

## 2019-01-04 LAB
CHOLEST SERPL-MCNC: 117 MG/DL (ref 0–199)
HDLC SERPL-MCNC: 36 MG/DL
LDLC SERPL CALC-MCNC: 49 MG/DL (ref 0–129)
NONHDLC SERPL-MCNC: 81 MG/DL (ref 0–159)
TRIGL SERPL-MCNC: 159 MG/DL (ref 0–149)

## 2019-04-24 ENCOUNTER — TRANSFERRED RECORDS (OUTPATIENT)
Dept: HEALTH INFORMATION MANAGEMENT | Facility: CLINIC | Age: 74
End: 2019-04-24

## 2019-04-24 LAB — HBA1C MFR BLD: 6.7 % (ref 0–5.7)

## 2019-05-06 ENCOUNTER — TELEPHONE (OUTPATIENT)
Dept: FAMILY MEDICINE | Facility: CLINIC | Age: 74
End: 2019-05-06

## 2019-05-06 NOTE — LETTER
May 6, 2019          Selvin Rockwell,  [address] [facility] [address]        Dear Selvin Rockwell      Monitoring and managing your preventative and chronic health conditions are very important to us. Our records indicate that you have not scheduled for Mammogram, Colonoscopy, HgbA1C and Annual physical exam  which was recommended by Dr. Govea      If you have received your health care elsewhere, please call the clinic so the information can be documented in your chart.    Please call [phone] or message us through your [employer] account to schedule an appointment or provide information for your chart.     Feel free to contact us if you have any questions or concerns!    I look forward to seeing you and working with you on your health care needs.     Sincerely,       Your [place] Care Team/LW

## 2019-05-06 NOTE — TELEPHONE ENCOUNTER
Panel Management Review      Patient has the following on her problem list:     Hypertension   Last three blood pressure readings:  BP Readings from Last 3 Encounters:   07/11/18 132/82   06/21/18 (!) 163/104   12/05/12 126/80     Blood pressure: FAILED    HTN Guidelines:  Less than 140/90      Composite cancer screening  Chart review shows that this patient is due/due soon for the following Mammogram and Colonoscopy  Summary:    Patient is due/failing the following:   A1C, COLONOSCOPY, MAMMOGRAM and PHYSICAL    Action needed:   Patient needs office visit for physical and a1c. and Patient needs referral/order: colonoscopy, mammogram    Type of outreach:    Sent letter. and Routed to provider    Questions for provider review:    None                                                                                                                                    PEYTON Agustin CMA ([facility])       Chart routed to Provider .

## 2019-07-02 ENCOUNTER — OFFICE VISIT (OUTPATIENT)
Dept: ORTHOPEDICS | Facility: CLINIC | Age: 74
End: 2019-07-02
Payer: MEDICARE

## 2019-07-02 ENCOUNTER — ANCILLARY PROCEDURE (OUTPATIENT)
Dept: GENERAL RADIOLOGY | Facility: CLINIC | Age: 74
End: 2019-07-02
Attending: ORTHOPAEDIC SURGERY
Payer: MEDICARE

## 2019-07-02 VITALS
RESPIRATION RATE: 13 BRPM | OXYGEN SATURATION: 97 % | WEIGHT: 205 LBS | DIASTOLIC BLOOD PRESSURE: 75 MMHG | BODY MASS INDEX: 41.33 KG/M2 | SYSTOLIC BLOOD PRESSURE: 123 MMHG | HEART RATE: 85 BPM | HEIGHT: 59 IN

## 2019-07-02 DIAGNOSIS — Z96.653 STATUS POST TOTAL BILATERAL KNEE REPLACEMENT: Primary | ICD-10-CM

## 2019-07-02 DIAGNOSIS — Z96.653 STATUS POST TOTAL BILATERAL KNEE REPLACEMENT: ICD-10-CM

## 2019-07-02 DIAGNOSIS — M48.061 SPINAL STENOSIS OF LUMBAR REGION, UNSPECIFIED WHETHER NEUROGENIC CLAUDICATION PRESENT: ICD-10-CM

## 2019-07-02 PROCEDURE — 73562 X-RAY EXAM OF KNEE 3: CPT | Mod: RT

## 2019-07-02 PROCEDURE — 99214 OFFICE O/P EST MOD 30 MIN: CPT | Performed by: ORTHOPAEDIC SURGERY

## 2019-07-02 ASSESSMENT — MIFFLIN-ST. JEOR: SCORE: 1340.5

## 2019-07-02 NOTE — PATIENT INSTRUCTIONS
For varicose veins, see Dr Braulio Reinoso.  If legs remain weak, we could order MRI of the low back and send to a spine surgeon.  The knees look good.    We did end up putting in an MRI.  Patient will follow-up with a spine provider.

## 2019-07-02 NOTE — LETTER
7/2/2019         RE: Selvin Rockwell  [address] [facility] [address]        Dear Colleague,    Thank you for referring your patient, Selvin Rockwell, to the [facility]. Please see a copy of my visit note below.    Selvin Rockwell is seen for follow up left total knee arthroplasty from 7/21/2010 and right total knee arthroplasty from 12/12/2012.  She complains of right medial knee pain. Last year she had mainly left lateral knee pain. She reports feeling like the legs are weak and want to give way with walking.  She does not report numbness.  Exercise:  limited walking.    Past Medical History:   Diagnosis Date     Atrophy of left kidney 3/27/2017     Diabetes mellitus, type 2 (H)      HTN (hypertension) 12/5/2012     Hyperlipidemia LDL goal < 100      OA (OSTEOARTHRITIS) OF KNEE - right 7/13/2010     OA (OSTEOARTHRITIS) OF KNEE - right      TACHO (obstructive sleep apnea) 12/7/2012     Paroxysmal atrial fibrillation (H) 7/11/2018       Past Surgical History:   Procedure Laterality Date     C HAND/FINGER SURGERY UNLISTED       C REMOVAL OF KIDNEY STONE       C TOTAL KNEE ARTHROPLASTY  7/21/10    left     C TOTAL KNEE ARTHROPLASTY  12/12/12    Right     CARPAL TUNNEL RELEASE RT/LT       TONSILLECTOMY         History reviewed. No pertinent family history.    Social History     Socioeconomic History     Marital status:      Spouse name: Not on file     Number of children: Not on file     Years of education: Not on file     Highest education level: Not on file   Occupational History     Not on file   Social Needs     Financial resource strain: Not on file     Food insecurity:     Worry: Not on file     Inability: Not on file     Transportation needs:     Medical: Not on file     Non-medical: Not on file   Tobacco Use     Smoking status: Never Smoker     Smokeless tobacco: Never Used   Substance and Sexual Activity     Alcohol use: No     Drug use: No     Sexual  activity: Yes     Partners: Male     Birth control/protection: Post-menopausal   Lifestyle     Physical activity:     Days per week: Not on file     Minutes per session: Not on file     Stress: Not on file   Relationships     Social connections:     Talks on phone: Not on file     Gets together: Not on file     Attends Baptism service: Not on file     Active member of club or organization: Not on file     Attends meetings of clubs or organizations: Not on file     Relationship status: Not on file     Intimate partner violence:     Fear of current or ex partner: Not on file     Emotionally abused: Not on file     Physically abused: Not on file     Forced sexual activity: Not on file   Other Topics Concern     Parent/sibling w/ CABG, MI or angioplasty before 65F 55M? Not Asked   Social History Narrative     Not on file       Current Outpatient Medications   Medication Sig Dispense Refill     ATENOLOL 50 MG PO TABS 1 TABLET DAILY       busPIRone (BUSPAR) 10 MG tablet Take 10 mg by mouth 3 times daily       carvedilol (COREG) 6.25 MG tablet Take 6.25 mg by mouth 2 times daily       FISH OIL 1000 MG PO CAPS 3 CAPSULES DAILY WITH A MEAL       GLIPIZIDE 5 MG PO TABS 1 TABLET DAILY BEFORE A MEAL       GLUCOSAMINE HCL 1500 MG PO TABS 1 TABLET DAILY       isosorbide mononitrate (IMDUR) 30 MG 24 hr tablet Take 30 mg by mouth daily       metFORMIN (GLUCOPHAGE) 500 MG tablet Take 500 mg by mouth 2 times daily (with meals).       order for DME Vinh hose stockings 1 Device 0     OTHER MEDICAL SUPPLIES 1 Package by Other route daily. 1 Package 0     PARoxetine (PAXIL) 10 MG tablet Take 10 mg by mouth daily       simvastatin (ZOCOR) 10 MG tablet 10 mg daily         Allergies   Allergen Reactions     Sulfa Drugs        REVIEW OF SYSTEMS:  CONSTITUTIONAL:  NEGATIVE for fever, chills, change in weight, not feeling tired  SKIN:  NEGATIVE for worrisome rashes, no skin lumps, no skin ulcers and no non-healing wounds  EYES:  NEGATIVE for  "vision changes or irritation.  ENT/MOUTH:  NEGATIVE.  No hearing loss, no hoarseness, no difficulty swallowing.  RESP:  NEGATIVE. No cough or shortness of breath.  BREAST:  NEGATIVE for masses, tenderness or discharge  CV:  NEGATIVE for chest pain, palpitations or peripheral edema  GI:  NEGATIVE for nausea, abdominal pain, heartburn, or change in bowel habits  :  Negative. No dysuria, no hematuria  MUSCULOSKELETAL:  See HPI above  NEURO:  NEGATIVE . No headaches, no dizziness,  no numbness  ENDOCRINE:  NEGATIVE for temperature intolerance, skin/hair changes  HEME/ALLERGY/IMMUNE:  NEGATIVE for bleeding problems  PSYCHIATRIC:  NEGATIVE. no anxiety, no depression.      Xray:  Xray images were independently visualized with the patient.  This shows good position of components of both total knee arthroplasty .  No sign of loosening or wear.     Exam:  Vitals: /75   Pulse 85   Resp 13   Ht 1.499 m (4' 11\")   Wt 93 kg (205 lb)   SpO2 97%   BMI 41.40 kg/m     BMI= Body mass index is 41.4 kg/m .  Range of motion right 0-114 degrees, left 0-116 degrees.  Alignment  Normal.  Stability:   Right       Lateral collateral ligament no laxity       Medial collateral ligament mild laxity.  She has pain with this test.  Left:       Lateral collateral ligament no laxity       Medial collateral ligament moderate laxity.  This is new since 2011.  Wound:  Well healed.  Edema: None  Effusion: None  Tenderness: Right None       Left:  Minor - lateral hamstrings.  Sensation, motor, and circulation intact.  Spine has tenderness across the low back.  She has tight hamstrings on straight leg raise at 70 degrees.  No asymmetry.    Assessment:  bilateral  total knee arthroplasty with medial collateral ligament laxity.  She now has right knee pain.  Last year was left knee.  No xray abnormalities found in either knee.  Left knee sleeve has not helped much.  May have lumbar spinal stenosis as cause of weakness in legs.  Plan:  She " should work on strengthening.  We will order lumbar MRI to look for spinal stenosis.  Probable epidural steroid injection if positive.        Again, thank you for allowing me to participate in the care of your patient.        Sincerely,        Gurinder Martinez MD

## 2019-07-03 NOTE — PROGRESS NOTES
Selvin Rockwell is seen for follow up left total knee arthroplasty from 7/21/2010 and right total knee arthroplasty from 12/12/2012.  She complains of right medial knee pain. Last year she had mainly left lateral knee pain. She reports feeling like the legs are weak and want to give way with walking.  She does not report numbness.  Exercise:  limited walking.    Past Medical History:   Diagnosis Date     Atrophy of left kidney 3/27/2017     Diabetes mellitus, type 2 (H)      HTN (hypertension) 12/5/2012     Hyperlipidemia LDL goal < 100      OA (OSTEOARTHRITIS) OF KNEE - right 7/13/2010     OA (OSTEOARTHRITIS) OF KNEE - right      TACHO (obstructive sleep apnea) 12/7/2012     Paroxysmal atrial fibrillation (H) 7/11/2018       Past Surgical History:   Procedure Laterality Date     C HAND/FINGER SURGERY UNLISTED       C REMOVAL OF KIDNEY STONE       C TOTAL KNEE ARTHROPLASTY  7/21/10    left     C TOTAL KNEE ARTHROPLASTY  12/12/12    Right     CARPAL TUNNEL RELEASE RT/LT       TONSILLECTOMY         History reviewed. No pertinent family history.    Social History     Socioeconomic History     Marital status:      Spouse name: Not on file     Number of children: Not on file     Years of education: Not on file     Highest education level: Not on file   Occupational History     Not on file   Social Needs     Financial resource strain: Not on file     Food insecurity:     Worry: Not on file     Inability: Not on file     Transportation needs:     Medical: Not on file     Non-medical: Not on file   Tobacco Use     Smoking status: Never Smoker     Smokeless tobacco: Never Used   Substance and Sexual Activity     Alcohol use: No     Drug use: No     Sexual activity: Yes     Partners: Male     Birth control/protection: Post-menopausal   Lifestyle     Physical activity:     Days per week: Not on file     Minutes per session: Not on file     Stress: Not on file   Relationships     Social connections:     Talks on phone:  Not on file     Gets together: Not on file     Attends Restorationist service: Not on file     Active member of club or organization: Not on file     Attends meetings of clubs or organizations: Not on file     Relationship status: Not on file     Intimate partner violence:     Fear of current or ex partner: Not on file     Emotionally abused: Not on file     Physically abused: Not on file     Forced sexual activity: Not on file   Other Topics Concern     Parent/sibling w/ CABG, MI or angioplasty before 65F 55M? Not Asked   Social History Narrative     Not on file       Current Outpatient Medications   Medication Sig Dispense Refill     ATENOLOL 50 MG PO TABS 1 TABLET DAILY       busPIRone (BUSPAR) 10 MG tablet Take 10 mg by mouth 3 times daily       carvedilol (COREG) 6.25 MG tablet Take 6.25 mg by mouth 2 times daily       FISH OIL 1000 MG PO CAPS 3 CAPSULES DAILY WITH A MEAL       GLIPIZIDE 5 MG PO TABS 1 TABLET DAILY BEFORE A MEAL       GLUCOSAMINE HCL 1500 MG PO TABS 1 TABLET DAILY       isosorbide mononitrate (IMDUR) 30 MG 24 hr tablet Take 30 mg by mouth daily       metFORMIN (GLUCOPHAGE) 500 MG tablet Take 500 mg by mouth 2 times daily (with meals).       order for DME Vinh hose stockings 1 Device 0     OTHER MEDICAL SUPPLIES 1 Package by Other route daily. 1 Package 0     PARoxetine (PAXIL) 10 MG tablet Take 10 mg by mouth daily       simvastatin (ZOCOR) 10 MG tablet 10 mg daily         Allergies   Allergen Reactions     Sulfa Drugs        REVIEW OF SYSTEMS:  CONSTITUTIONAL:  NEGATIVE for fever, chills, change in weight, not feeling tired  SKIN:  NEGATIVE for worrisome rashes, no skin lumps, no skin ulcers and no non-healing wounds  EYES:  NEGATIVE for vision changes or irritation.  ENT/MOUTH:  NEGATIVE.  No hearing loss, no hoarseness, no difficulty swallowing.  RESP:  NEGATIVE. No cough or shortness of breath.  BREAST:  NEGATIVE for masses, tenderness or discharge  CV:  NEGATIVE for chest pain, palpitations or  "peripheral edema  GI:  NEGATIVE for nausea, abdominal pain, heartburn, or change in bowel habits  :  Negative. No dysuria, no hematuria  MUSCULOSKELETAL:  See HPI above  NEURO:  NEGATIVE . No headaches, no dizziness,  no numbness  ENDOCRINE:  NEGATIVE for temperature intolerance, skin/hair changes  HEME/ALLERGY/IMMUNE:  NEGATIVE for bleeding problems  PSYCHIATRIC:  NEGATIVE. no anxiety, no depression.      Xray:  Xray images were independently visualized with the patient.  This shows good position of components of both total knee arthroplasty .  No sign of loosening or wear.     Exam:  Vitals: /75   Pulse 85   Resp 13   Ht 1.499 m (4' 11\")   Wt 93 kg (205 lb)   SpO2 97%   BMI 41.40 kg/m    BMI= Body mass index is 41.4 kg/m .  Range of motion right 0-114 degrees, left 0-116 degrees.  Alignment  Normal.  Stability:   Right       Lateral collateral ligament no laxity       Medial collateral ligament mild laxity.  She has pain with this test.  Left:       Lateral collateral ligament no laxity       Medial collateral ligament moderate laxity.  This is new since 2011.  Wound:  Well healed.  Edema: None  Effusion: None  Tenderness: Right None       Left:  Minor - lateral hamstrings.  Sensation, motor, and circulation intact.  Spine has tenderness across the low back.  She has tight hamstrings on straight leg raise at 70 degrees.  No asymmetry.    Assessment:  bilateral  total knee arthroplasty with medial collateral ligament laxity.  She now has right knee pain.  Last year was left knee.  No xray abnormalities found in either knee.  Left knee sleeve has not helped much.  May have lumbar spinal stenosis as cause of weakness in legs.  Plan:  She should work on strengthening.  We will order lumbar MRI to look for spinal stenosis.  Probable epidural steroid injection if positive.      "

## 2019-07-11 ENCOUNTER — TELEPHONE (OUTPATIENT)
Dept: ORTHOPEDICS | Facility: CLINIC | Age: 74
End: 2019-07-11

## 2019-07-11 NOTE — TELEPHONE ENCOUNTER
Reason for Call: Request for an order or referral:    Order or referral being requested: referral for an MRI     Date needed: as soon as possible    Has the patient been seen by the PCP for this problem? YES    Additional comments: Patient's daughter called stating she talked to someone about faxing over an order for an MRI, but [facility] hasn't received it.    Phone number Patient can be reached at:  Other phone number: [phone]    Best Time:  anytime    Can we leave a detailed message on this number?  YES    Call taken on 7/11/2019 at 9:21 AM by Laura Terrazas

## 2019-07-11 NOTE — TELEPHONE ENCOUNTER
Order faxed to [facility] [phone]    Dewayne Daigle PA-C  [facility] - Surgery

## 2019-07-12 ENCOUNTER — TELEPHONE (OUTPATIENT)
Dept: FAMILY MEDICINE | Facility: CLINIC | Age: 74
End: 2019-07-12

## 2019-07-12 NOTE — TELEPHONE ENCOUNTER
Panel Management Review      Patient has the following on her problem list:     Diabetes    ASA: Failed    Last A1C  Lab Results   Component Value Date    A1C 6.7 06/08/2018    A1C 6.9 02/16/2018     A1C tested: FAILED    Last LDL:    Lab Results   Component Value Date    CHOL 139 11/25/2016     Lab Results   Component Value Date    HDL 31 11/25/2016     Lab Results   Component Value Date    LDL 62 05/04/2018    LDL 61 11/25/2016     Lab Results   Component Value Date    TRIG 237 11/25/2016     No results found for: CHOLHDLRATIO  No results found for: NHDL    Is the patient on a Statin? YES             Is the patient on Aspirin? NO    Medications     HMG CoA Reductase Inhibitors     simvastatin (ZOCOR) 10 MG tablet             Last three blood pressure readings:  BP Readings from Last 3 Encounters:   07/02/19 123/75   07/11/18 132/82   06/21/18 (!) 163/104       Date of last diabetes office visit: 2018     Tobacco History:     History   Smoking Status     Never Smoker   Smokeless Tobacco     Never Used         Hypertension   Last three blood pressure readings:  BP Readings from Last 3 Encounters:   07/02/19 123/75   07/11/18 132/82   06/21/18 (!) 163/104     Blood pressure: Passed    HTN Guidelines:  Less than 140/90      Composite cancer screening  Chart review shows that this patient is due/due soon for the following Mammogram and Colonoscopy  Summary:    Patient is due/failing the following:   See HM    Action needed:   None patient transferred care. Care everywhere results sent to abstracting.    Type of outreach:    none    Questions for provider review:    None                                                                                                                                    Puja Goodwin MA       Chart routed to none .

## 2019-07-18 ENCOUNTER — TRANSFERRED RECORDS (OUTPATIENT)
Dept: HEALTH INFORMATION MANAGEMENT | Facility: CLINIC | Age: 74
End: 2019-07-18

## 2019-07-23 ENCOUNTER — TRANSFERRED RECORDS (OUTPATIENT)
Dept: HEALTH INFORMATION MANAGEMENT | Facility: CLINIC | Age: 74
End: 2019-07-23

## 2019-08-02 ENCOUNTER — TRANSFERRED RECORDS (OUTPATIENT)
Dept: HEALTH INFORMATION MANAGEMENT | Facility: CLINIC | Age: 74
End: 2019-08-02

## 2019-09-20 ENCOUNTER — OFFICE VISIT (OUTPATIENT)
Dept: FAMILY MEDICINE | Facility: CLINIC | Age: 74
End: 2019-09-20
Payer: MEDICARE

## 2019-09-20 ENCOUNTER — TELEPHONE (OUTPATIENT)
Dept: FAMILY MEDICINE | Facility: CLINIC | Age: 74
End: 2019-09-20

## 2019-09-20 VITALS
BODY MASS INDEX: 42.01 KG/M2 | SYSTOLIC BLOOD PRESSURE: 108 MMHG | TEMPERATURE: 98.5 F | OXYGEN SATURATION: 98 % | HEART RATE: 75 BPM | DIASTOLIC BLOOD PRESSURE: 69 MMHG | WEIGHT: 208 LBS

## 2019-09-20 DIAGNOSIS — I48.0 PAROXYSMAL ATRIAL FIBRILLATION (H): ICD-10-CM

## 2019-09-20 DIAGNOSIS — E11.9 TYPE 2 DIABETES, HBA1C GOAL < 7% (H): ICD-10-CM

## 2019-09-20 DIAGNOSIS — Z79.01 LONG TERM (CURRENT) USE OF ANTICOAGULANTS: ICD-10-CM

## 2019-09-20 DIAGNOSIS — E66.01 MORBID OBESITY (H): ICD-10-CM

## 2019-09-20 DIAGNOSIS — Z79.01 ENCOUNTER FOR CURRENT LONG-TERM USE OF ANTICOAGULANTS: Primary | ICD-10-CM

## 2019-09-20 DIAGNOSIS — K22.0 ACHALASIA: ICD-10-CM

## 2019-09-20 DIAGNOSIS — L30.9 DERMATITIS: ICD-10-CM

## 2019-09-20 DIAGNOSIS — G47.33 OSA (OBSTRUCTIVE SLEEP APNEA): ICD-10-CM

## 2019-09-20 LAB
ANION GAP SERPL CALCULATED.3IONS-SCNC: 10 MMOL/L (ref 3–14)
BUN SERPL-MCNC: 22 MG/DL (ref 7–30)
CALCIUM SERPL-MCNC: 9.2 MG/DL (ref 8.5–10.1)
CHLORIDE SERPL-SCNC: 109 MMOL/L (ref 94–109)
CHOLEST SERPL-MCNC: 125 MG/DL
CO2 SERPL-SCNC: 22 MMOL/L (ref 20–32)
CREAT SERPL-MCNC: 0.9 MG/DL (ref 0.52–1.04)
CREAT UR-MCNC: 114 MG/DL
GFR SERPL CREATININE-BSD FRML MDRD: 64 ML/MIN/{1.73_M2}
GLUCOSE SERPL-MCNC: 101 MG/DL (ref 70–99)
HBA1C MFR BLD: 6.5 % (ref 0–5.6)
HDLC SERPL-MCNC: 46 MG/DL
INR PPP: 2 (ref 0.86–1.14)
LDLC SERPL CALC-MCNC: 49 MG/DL
MICROALBUMIN UR-MCNC: 80 MG/L
MICROALBUMIN/CREAT UR: 70.61 MG/G CR (ref 0–25)
NONHDLC SERPL-MCNC: 79 MG/DL
POTASSIUM SERPL-SCNC: 4.3 MMOL/L (ref 3.4–5.3)
SODIUM SERPL-SCNC: 141 MMOL/L (ref 133–144)
TRIGL SERPL-MCNC: 151 MG/DL
TSH SERPL DL<=0.005 MIU/L-ACNC: 1.04 MU/L (ref 0.4–4)

## 2019-09-20 PROCEDURE — 85610 PROTHROMBIN TIME: CPT | Performed by: NURSE PRACTITIONER

## 2019-09-20 PROCEDURE — 84443 ASSAY THYROID STIM HORMONE: CPT | Performed by: NURSE PRACTITIONER

## 2019-09-20 PROCEDURE — 36415 COLL VENOUS BLD VENIPUNCTURE: CPT | Performed by: NURSE PRACTITIONER

## 2019-09-20 PROCEDURE — 83036 HEMOGLOBIN GLYCOSYLATED A1C: CPT | Performed by: NURSE PRACTITIONER

## 2019-09-20 PROCEDURE — 80061 LIPID PANEL: CPT | Performed by: NURSE PRACTITIONER

## 2019-09-20 PROCEDURE — 99203 OFFICE O/P NEW LOW 30 MIN: CPT | Performed by: NURSE PRACTITIONER

## 2019-09-20 PROCEDURE — 80048 BASIC METABOLIC PNL TOTAL CA: CPT | Performed by: NURSE PRACTITIONER

## 2019-09-20 PROCEDURE — 82043 UR ALBUMIN QUANTITATIVE: CPT | Performed by: NURSE PRACTITIONER

## 2019-09-20 RX ORDER — TRIAMCINOLONE ACETONIDE 1 MG/G
CREAM TOPICAL
Qty: 45 G | Refills: 1 | Status: SHIPPED | OUTPATIENT
Start: 2019-09-20 | End: 2020-11-16

## 2019-09-20 RX ORDER — WARFARIN SODIUM 2 MG/1
3 TABLET ORAL DAILY
Refills: 2 | COMMUNITY
Start: 2019-08-08 | End: 2020-02-20

## 2019-09-20 RX ORDER — OMEPRAZOLE 40 MG/1
CAPSULE, DELAYED RELEASE ORAL
COMMUNITY
Start: 2017-10-10 | End: 2021-04-13

## 2019-09-20 RX ORDER — AMLODIPINE BESYLATE 5 MG/1
5 TABLET ORAL DAILY
Refills: 3 | COMMUNITY
Start: 2019-07-27 | End: 2019-10-14

## 2019-09-20 ASSESSMENT — PAIN SCALES - GENERAL: PAINLEVEL: NO PAIN (0)

## 2019-09-20 NOTE — TELEPHONE ENCOUNTER
Spoke with Daughter Alicia and gave below information from provider.  She is agreeable to plan and appt scheduled with  INR Clinic on Thursday 9/26.    Patient seen in Clinic today by Keily Beckman transferring care from [facility] as her [facility] provider retired. She has been on coumadin which was managed by . She will be transferring to [facility].    ACC Episode created and INR Clinic Referral already placed by PCP.  Tana Parker, RN  Anticoagulation Nurse - [facility]

## 2019-09-20 NOTE — LETTER
[facility]  [address] [place]  [address]  [phone]        September 23, 2019    Selvin Rockwell  [address] [facility] [address]        Dear Selvin,    The results of your recent labs are enclosed.   Your labs appear stable when I try to compare them to labs you had through [facility].   Your A1c is 6.5% which is good. While we want to keep it below 7%, I don't want it to drop too low. Please remember to bring all medications with you to your next appointment so that we know we have an updated medication list.   Please call the clinic if you have any concerns.     Results for orders placed or performed in visit on 09/20/19   INR   Result Value Ref Range    INR 2.00 (H) 0.86 - 1.14   Hemoglobin A1c   Result Value Ref Range    Hemoglobin A1C 6.5 (H) 0 - 5.6 %   Lipid panel reflex to direct LDL Fasting   Result Value Ref Range    Cholesterol 125 <200 mg/dL    Triglycerides 151 (H) <150 mg/dL    HDL Cholesterol 46 (L) >49 mg/dL    LDL Cholesterol Calculated 49 <100 mg/dL    Non HDL Cholesterol 79 <130 mg/dL   Albumin Random Urine Quantitative with Creat Ratio   Result Value Ref Range    Creatinine Urine 114 mg/dL    Albumin Urine mg/L 80 mg/L    Albumin Urine mg/g Cr 70.61 (H) 0 - 25 mg/g Cr   TSH with free T4 reflex   Result Value Ref Range    TSH 1.04 0.40 - 4.00 mU/L   Basic metabolic panel   Result Value Ref Range    Sodium 141 133 - 144 mmol/L    Potassium 4.3 3.4 - 5.3 mmol/L    Chloride 109 94 - 109 mmol/L    Carbon Dioxide 22 20 - 32 mmol/L    Anion Gap 10 3 - 14 mmol/L    Glucose 101 (H) 70 - 99 mg/dL    Urea Nitrogen 22 7 - 30 mg/dL    Creatinine 0.90 0.52 - 1.04 mg/dL    GFR Estimate 64 >60 mL/min/[1.73_m2]    GFR Estimate If Black 74 >60 mL/min/[1.73_m2]    Calcium 9.2 8.5 - 10.1 mg/dL       If you have any questions please call the clinic at [phone].    Sincerely,    Keily ANAND CNP  LMD

## 2019-09-20 NOTE — PATIENT INSTRUCTIONS
You will stay off of the Gabapentin  If the rash is from the Gabapentin, it should be getting better within the next 1-2 weeks    Apply a thin layer of Kenalog cream to rash on bilateral legs twice daily for no longer than 2 weeks    You should be wearing your cpap    Please schedule a visit within a new provider within 1 month to establish care. Bring ALL medications you are taking to that appointment    I referred you to our INR clinic but in the future you will be followed with our INR clinic. There is an INR clinic at [place] as well. Please schedule an appointment

## 2019-09-20 NOTE — PROGRESS NOTES
"Subjective     Selvin Rockwell is a 74 year old female who presents to clinic today for the following health issues:    HPI   Rash  Onset: 1 week    Description:   Location: right ankle and foot  Character: blotchy  Itching (Pruritis): YES    Progression of Symptoms:  improving    Accompanying Signs & Symptoms:  Fever: no   Body aches or joint pain: no   Sore throat symptoms: no   Recent cold symptoms: no     History:   Previous similar rash: no     Precipitating factors:   Exposure to similar rash: no   New exposures: medication Gabapentin when it dose was up from 300 to 600 then her foot started to itch and a rash .   Recent travel: no     Alleviating factors:  Stopped medication  and it is getting better.    Therapies Tried and outcome:     She is a new patient to our clinic  Normally follows with [facility]  Her provider retired and she would like to establish care at our clinic    Follows with [facility] for achalasia, esophagitis and Schatzki ring  She was recently started on gabapentin by GI and after dose increased to 600 mg she developed generalized body itch and rash on bilateral feet   She did not find the medication helpful  She stopped taking this medication 1-2 weeks ago  Generalized body itch has resolved and rash on feet appears to be improving but is still present    Diabetes well controlled on metformin and glipizide    Does not wear cpap daily  Wears \"as needed\" though could not specify what this means  She is on warfarin for Afib  Last INR check may have been in July   She is taking 3 mg daily  Per daughter, she was having INR checked every 6 weeks though review of Care Everywhere shows it was not stable within goal range 2-3    She has some of her medications with her today but not all of them  She declines refills today  She does not know what medications she is on          Patient Active Problem List   Diagnosis     OA (OSTEOARTHRITIS) OF KNEE - right     Status Post Total Knee Replacement " - bilateral     TACHO (obstructive sleep apnea)     Hyperlipidemia with target LDL less than 100     Hypertension goal BP (blood pressure) < 140/90     Type 2 diabetes, HbA1c goal < 7% (H)     Morbid obesity (H)     Achalasia     Adrenal adenoma     Atrophy of left kidney     Calculus of kidney     Chronic low back pain     Gout     Paroxysmal atrial fibrillation (H)     Past Surgical History:   Procedure Laterality Date     C HAND/FINGER SURGERY UNLISTED       C REMOVAL OF KIDNEY STONE       C TOTAL KNEE ARTHROPLASTY  7/21/10    left     C TOTAL KNEE ARTHROPLASTY  12/12/12    Right     CARPAL TUNNEL RELEASE RT/LT       TONSILLECTOMY         Social History     Tobacco Use     Smoking status: Never Smoker     Smokeless tobacco: Never Used   Substance Use Topics     Alcohol use: No     History reviewed. No pertinent family history.      Current Outpatient Medications   Medication Sig Dispense Refill     amLODIPine (NORVASC) 5 MG tablet Take 5 mg by mouth daily  3     carvedilol (COREG) 6.25 MG tablet Take 6.25 mg by mouth 2 times daily       FISH OIL 1000 MG PO CAPS 3 CAPSULES DAILY WITH A MEAL       isosorbide mononitrate (IMDUR) 30 MG 24 hr tablet Take 30 mg by mouth daily       metFORMIN (GLUCOPHAGE) 500 MG tablet Take 500 mg by mouth 2 times daily (with meals).       omeprazole (PRILOSEC) 40 MG DR capsule        PARoxetine (PAXIL) 10 MG tablet Take 10 mg by mouth daily       simvastatin (ZOCOR) 10 MG tablet 10 mg daily       triamcinolone (KENALOG) 0.1 % external cream Apply sparingly to bilateral legs three times daily for 14 days. 45 g 1     warfarin ANTICOAGULANT (COUMADIN) 2 MG tablet Take 3 mg by mouth daily   2     ATENOLOL 50 MG PO TABS 1 TABLET DAILY       GLIPIZIDE 5 MG PO TABS 1 TABLET DAILY BEFORE A MEAL       GLUCOSAMINE HCL 1500 MG PO TABS 1 TABLET DAILY       order for DME Vinh hose stockings (Patient not taking: Reported on 9/20/2019) 1 Device 0     OTHER MEDICAL SUPPLIES 1 Package by Other route  daily. 1 Package 0     BP Readings from Last 3 Encounters:   09/20/19 108/69   07/02/19 123/75   07/11/18 132/82    Wt Readings from Last 3 Encounters:   09/20/19 94.3 kg (208 lb)   07/02/19 93 kg (205 lb)   07/11/18 93 kg (205 lb)                  Reviewed and updated as needed this visit by Provider         Review of Systems   ROS COMP: Constitutional, HEENT, cardiovascular, pulmonary, gi and gu systems are negative, except as otherwise noted.      Objective    /69 (BP Location: Right arm, Patient Position: Chair, Cuff Size: Adult Large)   Pulse 75   Temp 98.5  F (36.9  C) (Oral)   Wt 94.3 kg (208 lb)   SpO2 98%   Breastfeeding? No   BMI 42.01 kg/m    Body mass index is 42.01 kg/m .  Physical Exam   GENERAL: healthy, alert and no distress  RESP: lungs clear to auscultation - no rales, rhonchi or wheezes  CV: regular rate and rhythm, normal S1 S2, no S3 or S4, no murmur, click or rub, moderate non pitting edema bilateral legs   ABDOMEN: soft, nontender, no hepatosplenomegaly, no masses and bowel sounds normal  SKIN: Hyperpigmentation bilateral lower extremities consistent with venous stasis. Few scattered erythematous papules bilateral lower legs, anterior    Diagnostic Test Results:  Labs reviewed in Epic  Results for orders placed or performed in visit on 09/20/19 (from the past 24 hour(s))   INR   Result Value Ref Range    INR 2.00 (H) 0.86 - 1.14   Hemoglobin A1c   Result Value Ref Range    Hemoglobin A1C 6.5 (H) 0 - 5.6 %           Assessment & Plan       ICD-10-CM    1. Encounter for current long-term use of anticoagulants Z79.01 INR CLINIC REFERRAL     INR   2. Paroxysmal atrial fibrillation (H) I48.0 INR CLINIC REFERRAL     INR   3. Morbid obesity (H) E66.01    4. Type 2 diabetes, HbA1c goal < 7% (H) E11.9 Hemoglobin A1c     Lipid panel reflex to direct LDL Fasting     Albumin Random Urine Quantitative with Creat Ratio     TSH with free T4 reflex     Basic metabolic panel   5. TACHO (obstructive  "sleep apnea) G47.33    6. Dermatitis L30.9 triamcinolone (KENALOG) 0.1 % external cream   7. Achalasia K22.0         BMI:   Estimated body mass index is 42.01 kg/m  as calculated from the following:    Height as of 7/2/19: 1.499 m (4' 11\").    Weight as of this encounter: 94.3 kg (208 lb).   Weight management plan: Discussed healthy diet and exercise guidelines      I am not convinced the rash is from Gabapentin. Not a typical drug reaction. Ok to hold off on medication for and should expect it to resolve within next 1-2 weeks  Also consider stasis dermatitis as she has skin changes consistent with venous stasis  Will get baseline labs today to establish care at clinic  Needs to establish with our INR clinic  I advised I will be transferring to our [place] clinic so I recommend finding a provider and scheduling visit to establish care within 1 month      Patient Instructions   You will stay off of the Gabapentin  If the rash is from the Gabapentin, it should be getting better within the next 1-2 weeks    Apply a thin layer of Kenalog cream to rash on bilateral legs twice daily for no longer than 2 weeks    You should be wearing your cpap    Please schedule a visit within a new provider within 1 month to establish care. Bring ALL medications you are taking to that appointment    I referred you to our INR clinic but in the future you will be followed with our INR clinic. There is an INR clinic at [place] as well. Please schedule an appointment       Return in about 1 month (around 10/20/2019) for establish care.    More than 25 minutes spent with patient, more than 50% of which was spent on counseling and coordination of care.    KIKA Jon [facility]      "

## 2019-09-20 NOTE — TELEPHONE ENCOUNTER
Please call patient and let her know that INR is 2.0  I recommend taking 2 tablets today (4 mg) and continue with 3 mg every other day of the week. Please help to schedule first visit with our INR clinic in 1 week.

## 2019-09-23 NOTE — RESULT ENCOUNTER NOTE
[address] [address]  Phone: [phone]  Fax: [phone]      09/23/19    Selvin Rockwell  [address] [facility] [address]      Dear Selivn,    The results of your recent labs are enclosed.  Your labs appear stable when I try to compare them to labs you had through [facility].   Your A1c is 6.5% which is good. While we want to keep it below 7%, I don't want it to drop too low. Please remember to bring all medications with you to your next appointment so that we know we have an updated medication list.   Please call the clinic if you have any concerns.    Sincerely,    KIKA Jon CNP    Your [facility] Care Team

## 2019-09-26 ENCOUNTER — TELEPHONE (OUTPATIENT)
Dept: FAMILY MEDICINE | Facility: CLINIC | Age: 74
End: 2019-09-26

## 2019-09-26 ENCOUNTER — ANTICOAGULATION THERAPY VISIT (OUTPATIENT)
Dept: NURSING | Facility: CLINIC | Age: 74
End: 2019-09-26
Payer: MEDICARE

## 2019-09-26 DIAGNOSIS — M79.89 RIGHT LEG SWELLING: Primary | ICD-10-CM

## 2019-09-26 DIAGNOSIS — I48.0 PAROXYSMAL ATRIAL FIBRILLATION (H): ICD-10-CM

## 2019-09-26 DIAGNOSIS — Z79.01 LONG TERM (CURRENT) USE OF ANTICOAGULANTS: ICD-10-CM

## 2019-09-26 LAB — INR POINT OF CARE: 2.7 (ref 0.86–1.14)

## 2019-09-26 PROCEDURE — 99207 ZZC NO CHARGE NURSE ONLY: CPT

## 2019-09-26 PROCEDURE — 85610 PROTHROMBIN TIME: CPT | Mod: QW

## 2019-09-26 PROCEDURE — 36416 COLLJ CAPILLARY BLOOD SPEC: CPT

## 2019-09-26 NOTE — TELEPHONE ENCOUNTER
I called home number listed for patient, daughter answered, she is with Gem.   She says Gem's leg is exactly the same as it was when Keily saw it on Friday.   Denies spreading area or worsening intensity.  Denies shortness of breath or chest pain.    Discussed with Keily who was passing by during call.   Keily advised she can order US to be done non-emergently in the next day or so; daughter states this would be fine.    Routed to Keily to place order.    Georgina Mcdermott RN  [facility]

## 2019-09-26 NOTE — PROGRESS NOTES
ANTICOAGULATION FOLLOW-UP CLINIC VISIT    Patient Name:  Selvin Rockwell  Date:  2019  Contact Type:  Face to Face    SUBJECTIVE:  Patient Findings     Positives:   Change in medications (med rec done to update list since new patient to ), Other complaints (Right leg quite swollen compared to left, and edemous.)    Comments:   Assessed for S/S bleeding, clotting, medication, diet, health, activity and alcohol changes.          Clinical Outcomes     Negatives:   Major bleeding event, Thromboembolic event, Anticoagulation-related hospital admission, Anticoagulation-related ED visit, Anticoagulation-related fatality    Comments:   Assessed for S/S bleeding, clotting, medication, diet, health, activity and alcohol changes.             OBJECTIVE    INR Protime   Date Value Ref Range Status   2019 2.7 (A) 0.86 - 1.14 Final       ASSESSMENT / PLAN  INR assessment THER    Recheck INR In: 3 WEEKS    INR Location Clinic      Anticoagulation Summary  As of 2019    INR goal:   2.0-3.0   TTR:   --   INR used for dosin.7 (2019)   Warfarin maintenance plan:   3 mg (2 mg x 1.5) every day   Full warfarin instructions:   3 mg every day   Weekly warfarin total:   21 mg   No change documented:   Kiera Casanova [facility]   Plan last modified:   Tana Parker, RN (2019)   Next INR check:   10/17/2019   Target end date:   Indefinite    Indications    Paroxysmal atrial fibrillation (H) [I48.0]  Long term (current) use of anticoagulants [Z79.01]             Anticoagulation Episode Summary     INR check location:   Anticoagulation Clinic    Preferred lab:   [facility]    Send INR reminders to:   [facility]    Comments:   Transfer from       Anticoagulation Care Providers     Provider Role Specialty Phone number    Keily Beckman APRN CNP Responsible Nurse Practitioner - Adult Health [phone]            See the Encounter Report to view Anticoagulation  Flowsheet and Dosing Calendar (Go to Encounters tab in chart review, and find the Anticoagulation Therapy Visit)    New patient to [facility].  Reviewed options for face to face and phone management.  Also did med rec to update med list.  Due to continued swelling on right leg, although redness has improved, message sent to Keily Beckman.  Did review S/S clot with Gem and her daughter.  Also given vascular provider information for [place] (closest location) per daughter's request.     Kiera Casanova [facility]

## 2019-09-26 NOTE — TELEPHONE ENCOUNTER
I just spoke with our INR nurse who saw patient in clinic. Sounds like patient's leg is red, swollen and painful. Concern for DVT as her INR was previously subtherapeutic. Also consider cellulitis. I recommend that she be seen today. If there are no openings in [place] clinics, should be seen in UC to rule out infection

## 2019-09-26 NOTE — TELEPHONE ENCOUNTER
She did not have signs of clot when I saw her in clinic, per patient and daughter, right leg more edematous than left at baseline. If leg unchanged from when I saw her last week, then no concern for cellulitis. And unlikely to have DVT, though INR was subtherapeutic in June. 2.1 last week. Will get US. Please have TC call patient and ok to schedule for tomorrow (Friday)

## 2019-09-30 ENCOUNTER — ANCILLARY PROCEDURE (OUTPATIENT)
Dept: ULTRASOUND IMAGING | Facility: CLINIC | Age: 74
End: 2019-09-30
Attending: NURSE PRACTITIONER
Payer: MEDICARE

## 2019-09-30 ENCOUNTER — TELEPHONE (OUTPATIENT)
Dept: FAMILY MEDICINE | Facility: CLINIC | Age: 74
End: 2019-09-30

## 2019-09-30 DIAGNOSIS — M79.89 RIGHT LEG SWELLING: ICD-10-CM

## 2019-09-30 PROCEDURE — 93971 EXTREMITY STUDY: CPT | Mod: RT

## 2019-09-30 NOTE — TELEPHONE ENCOUNTER
RN called Kat, reviewed US results. Kat verbalized understanding.     Daisy Olivas RN on 9/30/2019 at 2:22 PM

## 2019-09-30 NOTE — TELEPHONE ENCOUNTER
RN reviewed results with patient's daughter, Kat. Kat verbalized understanding.     Daisy Olivas RN on 9/30/2019 at 2:22 PM

## 2019-09-30 NOTE — TELEPHONE ENCOUNTER
Attempt #1 to call patient.     Patient did not answer, RN left voicemail at home number. RN requested patient return call to Nurse Triage line at [phone].     Daisy Olivas RN

## 2019-09-30 NOTE — TELEPHONE ENCOUNTER
Received the following message from Keily ANAND CNP:    Please let her know that US was negative for blood clots

## 2019-09-30 NOTE — TELEPHONE ENCOUNTER
Reason for Call:  Other     Detailed comments: Alicia, patient emergency contact requesting for nurse to call regarding plan of care after ultrasound visit. Please advise.     Phone Number Patient can be reached at: Home number on file [phone] (home)    Best Time: Anytime    Can we leave a detailed message on this number? YES    Call taken on 9/30/2019 at 2:07 PM by Padmini Cantor

## 2019-10-04 ENCOUNTER — OFFICE VISIT (OUTPATIENT)
Dept: FAMILY MEDICINE | Facility: CLINIC | Age: 74
End: 2019-10-04
Payer: MEDICARE

## 2019-10-04 ENCOUNTER — NURSE TRIAGE (OUTPATIENT)
Dept: NURSING | Facility: CLINIC | Age: 74
End: 2019-10-04

## 2019-10-04 VITALS
DIASTOLIC BLOOD PRESSURE: 59 MMHG | BODY MASS INDEX: 42.5 KG/M2 | SYSTOLIC BLOOD PRESSURE: 91 MMHG | HEART RATE: 80 BPM | TEMPERATURE: 98.1 F | WEIGHT: 210.4 LBS

## 2019-10-04 DIAGNOSIS — L03.119 CELLULITIS AND ABSCESS OF LEG: Primary | ICD-10-CM

## 2019-10-04 DIAGNOSIS — L02.419 CELLULITIS AND ABSCESS OF LEG: Primary | ICD-10-CM

## 2019-10-04 LAB
BASOPHILS # BLD AUTO: 0 10E9/L (ref 0–0.2)
BASOPHILS NFR BLD AUTO: 0.1 %
DIFFERENTIAL METHOD BLD: ABNORMAL
EOSINOPHIL # BLD AUTO: 0 10E9/L (ref 0–0.7)
EOSINOPHIL NFR BLD AUTO: 0.1 %
ERYTHROCYTE [DISTWIDTH] IN BLOOD BY AUTOMATED COUNT: 18.4 % (ref 10–15)
HCT VFR BLD AUTO: 32.6 % (ref 35–47)
HGB BLD-MCNC: 9.6 G/DL (ref 11.7–15.7)
LYMPHOCYTES # BLD AUTO: 1.9 10E9/L (ref 0.8–5.3)
LYMPHOCYTES NFR BLD AUTO: 12.5 %
MCH RBC QN AUTO: 21.8 PG (ref 26.5–33)
MCHC RBC AUTO-ENTMCNC: 29.4 G/DL (ref 31.5–36.5)
MCV RBC AUTO: 74 FL (ref 78–100)
MONOCYTES # BLD AUTO: 0.7 10E9/L (ref 0–1.3)
MONOCYTES NFR BLD AUTO: 4.7 %
NEUTROPHILS # BLD AUTO: 12.4 10E9/L (ref 1.6–8.3)
NEUTROPHILS NFR BLD AUTO: 82.6 %
PLATELET # BLD AUTO: 228 10E9/L (ref 150–450)
RBC # BLD AUTO: 4.41 10E12/L (ref 3.8–5.2)
WBC # BLD AUTO: 15 10E9/L (ref 4–11)

## 2019-10-04 PROCEDURE — 85025 COMPLETE CBC W/AUTO DIFF WBC: CPT | Performed by: FAMILY MEDICINE

## 2019-10-04 PROCEDURE — 99214 OFFICE O/P EST MOD 30 MIN: CPT | Performed by: FAMILY MEDICINE

## 2019-10-04 PROCEDURE — 36415 COLL VENOUS BLD VENIPUNCTURE: CPT | Performed by: FAMILY MEDICINE

## 2019-10-04 RX ORDER — CLINDAMYCIN HCL 150 MG
450 CAPSULE ORAL 3 TIMES DAILY
Qty: 90 CAPSULE | Refills: 0 | Status: SHIPPED | OUTPATIENT
Start: 2019-10-04 | End: 2019-10-14

## 2019-10-04 NOTE — LETTER
[facility]   [address] [place]  [address]  [phone]                                   October 7, 2019    Selvin Rockwell  [address] [facility] [address]        Dear Selvin,    Patient given results of hemoglobin and white blood cell count in the office.  This shows an improvement in the white blood cell count and a continued decrease in your hemoglobin    Results for orders placed or performed in visit on 10/04/19   CBC with platelets differential   Result Value Ref Range    WBC 15.0 (H) 4.0 - 11.0 10e9/L    RBC Count 4.41 3.8 - 5.2 10e12/L    Hemoglobin 9.6 (L) 11.7 - 15.7 g/dL    Hematocrit 32.6 (L) 35.0 - 47.0 %    MCV 74 (L) 78 - 100 fl    MCH 21.8 (L) 26.5 - 33.0 pg    MCHC 29.4 (L) 31.5 - 36.5 g/dL    RDW 18.4 (H) 10.0 - 15.0 %    Platelet Count 228 150 - 450 10e9/L    % Neutrophils 82.6 %    % Lymphocytes 12.5 %    % Monocytes 4.7 %    % Eosinophils 0.1 %    % Basophils 0.1 %    Absolute Neutrophil 12.4 (H) 1.6 - 8.3 10e9/L    Absolute Lymphocytes 1.9 0.8 - 5.3 10e9/L    Absolute Monocytes 0.7 0.0 - 1.3 10e9/L    Absolute Eosinophils 0.0 0.0 - 0.7 10e9/L    Absolute Basophils 0.0 0.0 - 0.2 10e9/L    Diff Method Automated Method    If you have any questions please call the clinic at [phone]    Sincerely,    Yvon Leonardo MD  bmd

## 2019-10-04 NOTE — PROGRESS NOTES
Subjective     Selvin Rockwell is a 74 year old female who presents to clinic today for the following health issues:    HPI     Right Lower leg swelling follow up  Had U/S done 9/30/19  Negative for DVT     Past Medical History:   Diagnosis Date     Atrophy of left kidney 3/27/2017     Diabetes mellitus, type 2 (H)      HTN (hypertension) 12/5/2012     Hyperlipidemia LDL goal < 100      OA (OSTEOARTHRITIS) OF KNEE - right 7/13/2010     OA (OSTEOARTHRITIS) OF KNEE - right      TACHO (obstructive sleep apnea) 12/7/2012     Paroxysmal atrial fibrillation (H) 7/11/2018       Past Surgical History:   Procedure Laterality Date     C HAND/FINGER SURGERY UNLISTED       C REMOVAL OF KIDNEY STONE       C TOTAL KNEE ARTHROPLASTY  7/21/10    left     C TOTAL KNEE ARTHROPLASTY  12/12/12    Right     CARPAL TUNNEL RELEASE RT/LT       TONSILLECTOMY         History reviewed. No pertinent family history.    Social History     Tobacco Use     Smoking status: Never Smoker     Smokeless tobacco: Never Used   Substance Use Topics     Alcohol use: No     She states she has pin in her knee and they felt this was the cause     amLODIPine (NORVASC) 5 MG tablet, Take 5 mg by mouth daily  carvedilol (COREG) 6.25 MG tablet, Take 6.25 mg by mouth 2 times daily  FISH OIL 1000 MG PO CAPS, 3 CAPSULES DAILY WITH A MEAL  isosorbide mononitrate (IMDUR) 30 MG 24 hr tablet, Take 30 mg by mouth daily  metFORMIN (GLUCOPHAGE) 500 MG tablet, Take 500 mg by mouth 2 times daily (with meals).  omeprazole (PRILOSEC) 40 MG DR capsule,   order for [facility], Vinh lam stockings  OTHER MEDICAL SUPPLIES, 1 Package by Other route daily.  PARoxetine (PAXIL) 10 MG tablet, Take 10 mg by mouth daily  simvastatin (ZOCOR) 10 MG tablet, 10 mg daily  triamcinolone (KENALOG) 0.1 % external cream, Apply sparingly to bilateral legs three times daily for 14 days.  warfarin ANTICOAGULANT (COUMADIN) 2 MG tablet, Take 3 mg by mouth daily     No current facility-administered  medications on file prior to visit.             Objective    BP 91/59 (BP Location: Right arm, Patient Position: Sitting, Cuff Size: Adult Large)   Pulse 80   Temp 98.1  F (36.7  C) (Oral)   Wt 95.4 kg (210 lb 6.4 oz)   Breastfeeding? No   BMI 42.50 kg/m    There is no height or weight on file to calculate BMI.  Physical Exam   Chest wall normal to inspection and palpation. Good excursion bilaterally. Lungs clear to auscultation. Good air movement bilaterally without rales, wheezes, or rhonchi.   Regular rate and  rhythm. S1 and S2 normal, no murmurs, clicks, gallops or rubs. or JVD.    Left leg no edema     Right leg 3+ edema off the leg below the knee   Mild indiscreet redness   No calf tenderness   Neg Peña's nish       ICD-10-CM    1. Cellulitis and abscess of leg L03.119 CBC with platelets differential    L02.419 clindamycin (CLEOCIN) 150 MG capsule     Patient is a diabetic   She has had progressive swelling of the leg with negative us   She has no sob or chest pain   Elevated wbc     Start on antibiotic   Recheck in 3 days and repeat wbc then   Repeat us if worsening   To ER if worsening or sob or chest pain

## 2019-10-05 NOTE — TELEPHONE ENCOUNTER
Daughter calling, per notes consent on file to speak with her. Wanting to know mg of clindamycin prescribed today as patient does not have RX coverage and wants to find out if can get cheaper on good rx. Gave her information.

## 2019-10-07 ENCOUNTER — OFFICE VISIT (OUTPATIENT)
Dept: FAMILY MEDICINE | Facility: CLINIC | Age: 74
End: 2019-10-07
Payer: MEDICARE

## 2019-10-07 ENCOUNTER — TRANSFERRED RECORDS (OUTPATIENT)
Dept: HEALTH INFORMATION MANAGEMENT | Facility: CLINIC | Age: 74
End: 2019-10-07

## 2019-10-07 VITALS
SYSTOLIC BLOOD PRESSURE: 128 MMHG | OXYGEN SATURATION: 98 % | BODY MASS INDEX: 42.78 KG/M2 | WEIGHT: 211.8 LBS | DIASTOLIC BLOOD PRESSURE: 76 MMHG | TEMPERATURE: 98.4 F | HEART RATE: 81 BPM

## 2019-10-07 DIAGNOSIS — L03.119 CELLULITIS AND ABSCESS OF LEG: ICD-10-CM

## 2019-10-07 DIAGNOSIS — E11.9 TYPE 2 DIABETES, HBA1C GOAL < 7% (H): ICD-10-CM

## 2019-10-07 DIAGNOSIS — L02.419 CELLULITIS AND ABSCESS OF LEG: ICD-10-CM

## 2019-10-07 DIAGNOSIS — R60.0 LEG EDEMA, RIGHT: Primary | ICD-10-CM

## 2019-10-07 DIAGNOSIS — D62 ANEMIA DUE TO BLOOD LOSS, ACUTE: ICD-10-CM

## 2019-10-07 LAB
BASOPHILS # BLD AUTO: 0 10E9/L (ref 0–0.2)
BASOPHILS NFR BLD AUTO: 0.2 %
DIFFERENTIAL METHOD BLD: ABNORMAL
EOSINOPHIL # BLD AUTO: 0.1 10E9/L (ref 0–0.7)
EOSINOPHIL NFR BLD AUTO: 1.1 %
ERYTHROCYTE [DISTWIDTH] IN BLOOD BY AUTOMATED COUNT: 18.4 % (ref 10–15)
HCT VFR BLD AUTO: 28.9 % (ref 35–47)
HGB BLD-MCNC: 8.5 G/DL (ref 11.7–15.7)
LYMPHOCYTES # BLD AUTO: 1.3 10E9/L (ref 0.8–5.3)
LYMPHOCYTES NFR BLD AUTO: 21.2 %
MCH RBC QN AUTO: 21.5 PG (ref 26.5–33)
MCHC RBC AUTO-ENTMCNC: 29.4 G/DL (ref 31.5–36.5)
MCV RBC AUTO: 73 FL (ref 78–100)
MONOCYTES # BLD AUTO: 0.5 10E9/L (ref 0–1.3)
MONOCYTES NFR BLD AUTO: 8.9 %
NEUTROPHILS # BLD AUTO: 4.2 10E9/L (ref 1.6–8.3)
NEUTROPHILS NFR BLD AUTO: 68.6 %
PLATELET # BLD AUTO: 272 10E9/L (ref 150–450)
RBC # BLD AUTO: 3.95 10E12/L (ref 3.8–5.2)
WBC # BLD AUTO: 6.1 10E9/L (ref 4–11)

## 2019-10-07 PROCEDURE — 85025 COMPLETE CBC W/AUTO DIFF WBC: CPT | Performed by: FAMILY MEDICINE

## 2019-10-07 PROCEDURE — 99214 OFFICE O/P EST MOD 30 MIN: CPT | Performed by: FAMILY MEDICINE

## 2019-10-07 PROCEDURE — 36415 COLL VENOUS BLD VENIPUNCTURE: CPT | Performed by: FAMILY MEDICINE

## 2019-10-07 NOTE — RESULT ENCOUNTER NOTE
Patient given results of hemoglobin and white blood cell count in the office.  This shows an improvement in the white blood cell count and a continued decrease in your hemoglobin

## 2019-10-07 NOTE — LETTER
[facility]   [address] [place]  [address]  [phone]                                   October 7, 2019    Selvin Rockwell  [address] [facility] [address]        Dear Selvin,    White blood cell has improved but decrease in hemoglobin continues     Results for orders placed or performed in visit on 10/07/19   CBC with platelets differential   Result Value Ref Range    WBC 6.1 4.0 - 11.0 10e9/L    RBC Count 3.95 3.8 - 5.2 10e12/L    Hemoglobin 8.5 (L) 11.7 - 15.7 g/dL    Hematocrit 28.9 (L) 35.0 - 47.0 %    MCV 73 (L) 78 - 100 fl    MCH 21.5 (L) 26.5 - 33.0 pg    MCHC 29.4 (L) 31.5 - 36.5 g/dL    RDW 18.4 (H) 10.0 - 15.0 %    Platelet Count 272 150 - 450 10e9/L    % Neutrophils 68.6 %    % Lymphocytes 21.2 %    % Monocytes 8.9 %    % Eosinophils 1.1 %    % Basophils 0.2 %    Absolute Neutrophil 4.2 1.6 - 8.3 10e9/L    Absolute Lymphocytes 1.3 0.8 - 5.3 10e9/L    Absolute Monocytes 0.5 0.0 - 1.3 10e9/L    Absolute Eosinophils 0.1 0.0 - 0.7 10e9/L    Absolute Basophils 0.0 0.0 - 0.2 10e9/L    Diff Method Automated Method        If you have any questions please call the clinic at [phone]    Sincerely,    Yvon Leonardo MD  bmd

## 2019-10-07 NOTE — PROGRESS NOTES
Subjective     Selvin Rockwell is a 74 year old female who presents to clinic today for the following health issues:    HPI   Patient is here for follow up cellulitis and abscess of leg.  She has not been taking her meds as directed   She has a poor appetite and feels dry   For this reason she has been using her clindamycin 2 x a day instead of tid   Even with this her wbc count has improved.   The leg looks about the same   She has tenderness and swelling and redness     O; /76 (BP Location: Left arm, Patient Position: Chair, Cuff Size: Adult Large)   Pulse 81   Temp 98.4  F (36.9  C) (Oral)   Wt 96.1 kg (211 lb 12.8 oz)   SpO2 98%   BMI 42.78 kg/m      Chest wall normal to inspection and palpation. Good excursion bilaterally. Lungs clear to auscultation. Good air movement bilaterally without rales, wheezes, or rhonchi.   Regular rate and  rhythm. S1 and S2 normal, no murmurs, clicks, gallops or rubs. No edema or JVD.    3+edema of the right leg below the knee     Results for orders placed or performed in visit on 10/07/19   CBC with platelets differential   Result Value Ref Range    WBC 6.1 4.0 - 11.0 10e9/L    RBC Count 3.95 3.8 - 5.2 10e12/L    Hemoglobin 8.5 (L) 11.7 - 15.7 g/dL    Hematocrit 28.9 (L) 35.0 - 47.0 %    MCV 73 (L) 78 - 100 fl    MCH 21.5 (L) 26.5 - 33.0 pg    MCHC 29.4 (L) 31.5 - 36.5 g/dL    RDW 18.4 (H) 10.0 - 15.0 %    Platelet Count 272 150 - 450 10e9/L    % Neutrophils 68.6 %    % Lymphocytes 21.2 %    % Monocytes 8.9 %    % Eosinophils 1.1 %    % Basophils 0.2 %    Absolute Neutrophil 4.2 1.6 - 8.3 10e9/L    Absolute Lymphocytes 1.3 0.8 - 5.3 10e9/L    Absolute Monocytes 0.5 0.0 - 1.3 10e9/L    Absolute Eosinophils 0.1 0.0 - 0.7 10e9/L    Absolute Basophils 0.0 0.0 - 0.2 10e9/L    Diff Method Automated Method        ICD-10-CM    1. Leg edema, right R60.0 US Lower Extremity Venous Duplex Right   2. Cellulitis and abscess of leg L03.119     L02.419    3. Type 2 diabetes, HbA1c  goal < 7% (H) E11.9    4. Anemia due to blood loss, acute D62       Hemoglobin has dropped 1.1 grams over the weekend   No source of bleeding at this point   Denies Melena   She had a negative upper and lower endoscopy done about 6 months ago.  They could not tell me why it was done   She was seen at [facility] for this     Because of unclear etiology of leg swelling and dropping hemoglobin and the patient not taking her medication correctly I feel she is best being evaluated in the ER.  She is not symptomatic of her low hemoglobin according to her but when she stood up she wavered and almost fell over.    I do feel she is stable to be transported by car and she is going to [facility]   Report has been called to them.

## 2019-10-08 ENCOUNTER — MEDICAL CORRESPONDENCE (OUTPATIENT)
Dept: HEALTH INFORMATION MANAGEMENT | Facility: CLINIC | Age: 74
End: 2019-10-08

## 2019-10-14 ENCOUNTER — OFFICE VISIT (OUTPATIENT)
Dept: FAMILY MEDICINE | Facility: CLINIC | Age: 74
End: 2019-10-14
Payer: MEDICARE

## 2019-10-14 VITALS
HEART RATE: 81 BPM | SYSTOLIC BLOOD PRESSURE: 116 MMHG | TEMPERATURE: 97.3 F | HEIGHT: 58 IN | WEIGHT: 210 LBS | OXYGEN SATURATION: 97 % | BODY MASS INDEX: 44.08 KG/M2 | DIASTOLIC BLOOD PRESSURE: 66 MMHG

## 2019-10-14 DIAGNOSIS — D50.9 IRON DEFICIENCY ANEMIA, UNSPECIFIED IRON DEFICIENCY ANEMIA TYPE: ICD-10-CM

## 2019-10-14 DIAGNOSIS — Z09 HOSPITAL DISCHARGE FOLLOW-UP: Primary | ICD-10-CM

## 2019-10-14 DIAGNOSIS — I10 HYPERTENSION GOAL BP (BLOOD PRESSURE) < 140/90: ICD-10-CM

## 2019-10-14 DIAGNOSIS — E78.5 HYPERLIPIDEMIA WITH TARGET LDL LESS THAN 100: ICD-10-CM

## 2019-10-14 DIAGNOSIS — I48.0 PAROXYSMAL ATRIAL FIBRILLATION (H): ICD-10-CM

## 2019-10-14 DIAGNOSIS — E11.9 TYPE 2 DIABETES, HBA1C GOAL < 7% (H): ICD-10-CM

## 2019-10-14 DIAGNOSIS — G47.33 OSA (OBSTRUCTIVE SLEEP APNEA): ICD-10-CM

## 2019-10-14 DIAGNOSIS — M79.89 RIGHT LEG SWELLING: ICD-10-CM

## 2019-10-14 PROCEDURE — 99214 OFFICE O/P EST MOD 30 MIN: CPT | Performed by: FAMILY MEDICINE

## 2019-10-14 RX ORDER — CLOTRIMAZOLE 1 %
CREAM (GRAM) TOPICAL
COMMUNITY
Start: 2019-10-09 | End: 2022-10-12

## 2019-10-14 RX ORDER — CEPHALEXIN 500 MG/1
1000 CAPSULE ORAL
COMMUNITY
Start: 2019-10-09 | End: 2019-11-29

## 2019-10-14 RX ORDER — FERROUS SULFATE 325(65) MG
325 TABLET ORAL
Qty: 90 TABLET | Refills: 3 | Status: SHIPPED | OUTPATIENT
Start: 2019-10-14 | End: 2020-11-16

## 2019-10-14 RX ORDER — FUROSEMIDE 20 MG
20 TABLET ORAL DAILY
Qty: 5 TABLET | Refills: 0 | Status: SHIPPED | OUTPATIENT
Start: 2019-10-14 | End: 2020-10-21

## 2019-10-14 RX ORDER — ISOSORBIDE MONONITRATE 30 MG/1
30 TABLET, EXTENDED RELEASE ORAL DAILY
Qty: 90 TABLET | Refills: 3 | Status: SHIPPED | OUTPATIENT
Start: 2019-10-14 | End: 2020-10-02

## 2019-10-14 ASSESSMENT — MIFFLIN-ST. JEOR: SCORE: 1345.3

## 2019-10-14 NOTE — PROGRESS NOTES
Subjective     Selvin Rockwell is a 74 year old female who presents to clinic today for the following health issues:    HPI   Patient comes in with her daughter as a hospital visit follow-up.  She was treated for right leg cellulitis.  She does have history of A. fib she is chronically on Coumadin.  History of anemia.  Her previous work-up been negative.  She had upper or lower endoscopies back in July, 2019 both were negative.  Patient being treated with Keflex, in which she has been using.    She does have swelling in her left leg, she had a lower extremity Doppler which was negative for DVT.    Otherwise, she has no fever no chills.    Diabetes been stable she is on metformin.  Blood pressure has been stable.    Patient has declined flu vaccine, she declined mammogram and DEXA scan.    Hospital Follow-up Visit:    Hospital/Nursing Home/IP Rehab Facility: [facility]  Date of Admission: 10/07/2019  Date of Discharge: 10/09/2019  Reason(s) for Admission: right leg            Problems taking medications regularly:  None       Medication changes since discharge: Cephalexin       Problems adhering to non-medication therapy:  None    Summary of hospitalization:  CareEverywhere information obtained and reviewed  Diagnostic Tests/Treatments reviewed.  Follow up needed: none  Other Healthcare Providers Involved in Patient s Care:         None  Update since discharge: improved.     Post Discharge Medication Reconciliation: discharge medications reconciled and changed, per note/orders (see AVS).  Plan of care communicated with patient and family     Coding guidelines for this visit:  Type of Medical   Decision Making Face-to-Face Visit       within 7 Days of discharge Face-to-Face Visit        within 14 days of discharge   Moderate Complexity [id number]   High Complexity [id number]              Patient Active Problem List   Diagnosis     OA (OSTEOARTHRITIS) OF KNEE - right     Status Post Total Knee Replacement - bilateral      TACHO (obstructive sleep apnea)     Hyperlipidemia with target LDL less than 100     Hypertension goal BP (blood pressure) < 140/90     Type 2 diabetes, HbA1c goal < 7% (H)     Morbid obesity (H)     Achalasia     Adrenal adenoma     Atrophy of left kidney     Calculus of kidney     Chronic low back pain     Gout     Paroxysmal atrial fibrillation (H)     Long term (current) use of anticoagulants     Past Surgical History:   Procedure Laterality Date     AS ESOPHAGOSCOPY, DIAGNOSTIC  07/2019     C HAND/FINGER SURGERY UNLISTED       C REMOVAL OF KIDNEY STONE       C TOTAL KNEE ARTHROPLASTY  7/21/10    left     C TOTAL KNEE ARTHROPLASTY  12/12/12    Right     CARPAL TUNNEL RELEASE RT/LT       COLONOSCOPY  07/2019     TONSILLECTOMY         Social History     Tobacco Use     Smoking status: Never Smoker     Smokeless tobacco: Never Used   Substance Use Topics     Alcohol use: No     History reviewed. No pertinent family history.      Current Outpatient Medications   Medication Sig Dispense Refill     carvedilol (COREG) 6.25 MG tablet Take 6.25 mg by mouth 2 times daily       cephALEXin (KEFLEX) 500 MG capsule Take 1,000 mg by mouth       clotrimazole (LOTRIMIN) 1 % external cream        ferrous sulfate (FEROSUL) 325 (65 Fe) MG tablet Take 1 tablet (325 mg) by mouth daily (with breakfast) 90 tablet 3     furosemide (LASIX) 20 MG tablet Take 1 tablet (20 mg) by mouth daily for 5 days 5 tablet 0     isosorbide mononitrate (IMDUR) 30 MG 24 hr tablet Take 1 tablet (30 mg) by mouth daily 90 tablet 3     metFORMIN (GLUCOPHAGE) 500 MG tablet Take 500 mg by mouth 2 times daily (with meals).       omeprazole (PRILOSEC) 40 MG DR capsule        order for [facility] Vinh hose stockings 1 Device 0     OTHER MEDICAL SUPPLIES 1 Package by Other route daily. 1 Package 0     PARoxetine (PAXIL) 10 MG tablet Take 10 mg by mouth daily       simvastatin (ZOCOR) 10 MG tablet 10 mg daily       triamcinolone (KENALOG) 0.1 % external cream Apply  sparingly to bilateral legs three times daily for 14 days. 45 g 1     warfarin ANTICOAGULANT (COUMADIN) 2 MG tablet Take 3 mg by mouth daily   2     FISH OIL 1000 MG PO CAPS 3 CAPSULES DAILY WITH A MEAL       Allergies   Allergen Reactions     Gabapentin      Rash and itching     Nsaids      Other reaction(s): Gastrointestinal  Former PPI user, EGD 2/2018 showed LA Grade D esophagitis, plus duodenitis and gastritis.  Famotidine on med list but may not have been taking - rx was 3 years old.      Sulfa Drugs Other (See Comments) and Rash     Per 11-4-13 H&P by Dr. Fei Arias.  RASH ALL OVER FOUND IN DR. RAMIREZ DICTATION OF 10/11/11  BACTRIM-  RASH ALL OVER     Recent Labs   Lab Test 09/20/19  1205 04/24/19 01/04/19 06/08/18 05/16/18 05/04/18 11/25/16   A1C 6.5* 6.7*  --  6.7*  --   --    < >  --    LDL 49  --  49  --   --  62  --  61   HDL 46*  --  36*  --   --   --   --  31*   TRIG 151*  --  159*  --   --   --   --  237*   ALT  --   --   --  15  --   --   --   --    CR 0.90  --   --   --  0.87  --   --   --    GFRESTIMATED 64  --   --   --  >60  --   --   --    GFRESTBLACK 74  --   --   --  >60  --   --   --    POTASSIUM 4.3  --   --  3.7 3.7  --   --   --    TSH 1.04  --   --   --   --   --   --   --     < > = values in this interval not displayed.        Reviewed and updated as needed this visit by Provider         Review of Systems   ROS COMP: Constitutional, HEENT, cardiovascular, pulmonary, gi and gu systems are negative, except as otherwise noted.      Objective    There were no vitals taken for this visit.  There is no height or weight on file to calculate BMI.  Physical Exam   GENERAL: healthy, alert and no distress  RESP: lungs clear to auscultation - no rales, rhonchi or wheezes  CV: regular rate and rhythm, normal S1 S2, no S3 or S4, no murmur, click or rub, no peripheral edema and peripheral pulses strong  ABDOMEN: soft, nontender, no hepatosplenomegaly, no masses and bowel sounds normal  SKIN: right  "leg, red, swelling and warm to touch.    Diagnostic Test Results:  Labs reviewed in Epic  none         Assessment & Plan     1. Hospital discharge follow-up  Review of discharge instruction, she is to continue with her current medication.  In addition, advised to have her legs elevated, apply AC bandage.    2. Right leg swelling  Of the leg elevated, apply AC bandage.  Take furosemide daily for 5 days.  - furosemide (LASIX) 20 MG tablet; Take 1 tablet (20 mg) by mouth daily for 5 days  Dispense: 5 tablet; Refill: 0    3. Iron deficiency anemia, unspecified iron deficiency anemia type  Previous endoscopies were negative.  - ferrous sulfate (FEROSUL) 325 (65 Fe) MG tablet; Take 1 tablet (325 mg) by mouth daily (with breakfast)  Dispense: 90 tablet; Refill: 3    4. Hypertension goal BP (blood pressure) < 140/90    - isosorbide mononitrate (IMDUR) 30 MG 24 hr tablet; Take 1 tablet (30 mg) by mouth daily  Dispense: 90 tablet; Refill: 3    5. Hyperlipidemia with target LDL less than 100      6. Type 2 diabetes, HbA1c goal < 7% (H)  Been stable continue with her current medication.    7. Paroxysmal atrial fibrillation (H)      8. TACHO (obstructive sleep apnea)      BMI:   Estimated body mass index is 44.08 kg/m  as calculated from the following:    Height as of this encounter: 1.47 m (4' 9.87\").    Weight as of this encounter: 95.3 kg (210 lb).   Weight management plan: Discussed healthy diet and exercise guidelines    See Patient Instructions  There are no Patient Instructions on file for this visit.    Return in about 6 months (around 4/14/2020) for Physical Exam.    Shelby Montelongo MD  [facility]    "

## 2019-10-17 ENCOUNTER — ANTICOAGULATION THERAPY VISIT (OUTPATIENT)
Dept: NURSING | Facility: CLINIC | Age: 74
End: 2019-10-17
Payer: MEDICARE

## 2019-10-17 DIAGNOSIS — I48.0 PAROXYSMAL ATRIAL FIBRILLATION (H): ICD-10-CM

## 2019-10-17 DIAGNOSIS — Z79.01 LONG TERM (CURRENT) USE OF ANTICOAGULANTS: ICD-10-CM

## 2019-10-17 LAB — INR POINT OF CARE: 2.8 (ref 0.86–1.14)

## 2019-10-17 PROCEDURE — 85610 PROTHROMBIN TIME: CPT | Mod: QW

## 2019-10-17 PROCEDURE — 36416 COLLJ CAPILLARY BLOOD SPEC: CPT

## 2019-10-17 PROCEDURE — 99207 ZZC NO CHARGE NURSE ONLY: CPT

## 2019-10-17 NOTE — PROGRESS NOTES
ANTICOAGULATION FOLLOW-UP CLINIC VISIT    Patient Name:  Selvin Rockwell  Date:  10/17/2019  Contact Type:  Face to Face - with care giver / daughter.  Reports she has been on coumadin about 2-3 years.  She is a transfer last month from [facility].  She reports she has the light purple / gray 2MG warfarin tablet.  She has been doing 1 1 /2 tablets or 3 MG daily for years.  INR is stable.  States her previous schedule was rechecking every 6 weeks.  We will work towards that goal.  She is currently dealing with some cellulitis, see below.  She shows me her LE's and they are improving a lot per patient.    SUBJECTIVE:  Patient Findings     Positives:   Change in medications (keflex for cellulitis.  LE improving already.)    Comments:   Assessment negative for symptoms of bleeding or clotting this visit.        Clinical Outcomes     Negatives:   Major bleeding event, Thromboembolic event, Anticoagulation-related hospital admission, Anticoagulation-related ED visit, Anticoagulation-related fatality    Comments:   Assessment negative for symptoms of bleeding or clotting this visit.           OBJECTIVE    INR Protime   Date Value Ref Range Status   10/17/2019 2.8 (A) 0.86 - 1.14 Final       ASSESSMENT / PLAN  INR assessment THER    Recheck INR In: 4 WEEKS    INR Location Clinic      Anticoagulation Summary  As of 10/17/2019    INR goal:   2.0-3.0   TTR:   100.0 % (3.9 wk)   INR used for dosin.8 (10/17/2019)   Warfarin maintenance plan:   3 mg (2 mg x 1.5) every day   Full warfarin instructions:   3 mg every day   Weekly warfarin total:   21 mg   No change documented:   Rosamaria Levine, RN   Plan last modified:   Tana Parker RN (2019)   Next INR check:   2019   Target end date:   Indefinite    Indications    Paroxysmal atrial fibrillation (H) [I48.0]             Anticoagulation Episode Summary     INR check location:   Anticoagulation Clinic    Preferred lab:   [facility]     Send INR reminders to:   [facility]    Comments:   Transfer from [facility] - Patient states has been on coumadin for 2-3 years      Anticoagulation Care Providers     Provider Role Specialty Phone number    Keily Beckman APRN CNP Responsible Nurse Practitioner - [facility] [phone]            See the Encounter Report to view Anticoagulation Flowsheet and Dosing Calendar (Go to Encounters tab in chart review, and find the Anticoagulation Therapy Visit)    Dosage adjustment made based on physician directed care plan.    Rosamaria Levine RN

## 2019-10-28 ENCOUNTER — TELEPHONE (OUTPATIENT)
Dept: FAMILY MEDICINE | Facility: CLINIC | Age: 74
End: 2019-10-28

## 2019-10-28 DIAGNOSIS — I10 HYPERTENSION GOAL BP (BLOOD PRESSURE) < 140/90: ICD-10-CM

## 2019-10-28 DIAGNOSIS — I48.0 PAROXYSMAL ATRIAL FIBRILLATION (H): Primary | ICD-10-CM

## 2019-10-28 RX ORDER — CARVEDILOL 6.25 MG/1
6.25 TABLET ORAL 2 TIMES DAILY
Qty: 180 TABLET | Refills: 3 | Status: SHIPPED | OUTPATIENT
Start: 2019-10-28 | End: 2020-11-02

## 2019-10-28 NOTE — TELEPHONE ENCOUNTER
Reason for Call:  Medication or medication refill:    Do you use a [facility]?  Name of the pharmacy and phone number for the current request:  [facility]: [id number] [facility], [place]    Name of the medication requested: carvedilol (COREG)     Other request: Alicia, patient's daughter, called and stated they have been trying to get this prescription refilled through the pharmacy for a week now. Patient is completely out of this medication. They just found out today that the pharmacy was sending this request to patient's previous provider, not Dr. Montelongo. Alicia asked if a message could be sent to Dr. Montelnogo to get this refilled as soon as possible. Please call back if any questions.    Can we leave a detailed message on this number? YES    Phone number patient can be reached at: Alicia: [phone]    Best Time: Anytime    Call taken on 10/28/2019 at 1:41 PM by Staci Saunders

## 2019-10-28 NOTE — TELEPHONE ENCOUNTER
Routing refill request to provider for review/approval because:  Medication is reported/historical    Stefani Veliz RN,BSN  [facility]

## 2019-10-29 DIAGNOSIS — E11.9 TYPE 2 DIABETES, HBA1C GOAL < 7% (H): Primary | ICD-10-CM

## 2019-10-29 NOTE — TELEPHONE ENCOUNTER
Requested Prescriptions   Pending Prescriptions Disp Refills     metFORMIN (GLUCOPHAGE) 500 MG tablet       Sig: Take 1 tablet (500 mg) by mouth 2 times daily (with meals)       There is no refill protocol information for this order         Last Written Prescription Date:  na  Last Fill Quantity: na,   # refills: na  Last Office Visit: na  Future Office visit:       Routing refill request to provider for review/approval because:  Medication is reported/historical

## 2019-10-30 ENCOUNTER — TRANSFERRED RECORDS (OUTPATIENT)
Dept: HEALTH INFORMATION MANAGEMENT | Facility: CLINIC | Age: 74
End: 2019-10-30

## 2019-11-04 ENCOUNTER — TRANSFERRED RECORDS (OUTPATIENT)
Dept: HEALTH INFORMATION MANAGEMENT | Facility: CLINIC | Age: 74
End: 2019-11-04

## 2019-11-07 ENCOUNTER — TELEPHONE (OUTPATIENT)
Dept: FAMILY MEDICINE | Facility: CLINIC | Age: 74
End: 2019-11-07

## 2019-11-07 NOTE — LETTER
November 7, 2019    Selvin Rockwell  [address] [place] [id number]    Dear Selvin,    We care about your health and have reviewed your health plan. We have reviewed your medical conditions, medication list, and lab results and are making recommendations based on this review, to better manage your health.    You are in particular need of attention regarding:  - Diabetes Care - Eye Exam. If this has been done within the past 12 months, please respond with the date of exam, where it was done, and the result. If you would like to schedule an appointment with [facility], please call: [phone].  - Scheduling a Breast Cancer Screening (Mammography) [phone]. If this has been done elsewhere within the last 2 years, please let us know when, where, and the result so we can add it to your medical history  - Scheduling a Colon Cancer Screening (Colonoscopy only) [phone] for [facility], call clinic for referral elsewhere  - Scheduling an Annual Physical / Wellness Visit with your primary care provider  - Influenza vaccine    Please call us at [phone] (or use Soum) to address the above recommendations.     Thank you for trusting [facility] with your healthcare needs. We appreciate the opportunity to serve you and look forward to supporting you in the future.    Healthy Regards,    Your Care Team    (Team 1)

## 2019-11-07 NOTE — TELEPHONE ENCOUNTER
Panel Management Review      Patient has the following on her problem list: None      Composite cancer screening  Chart review shows that this patient is due/due soon for the following Mammogram and Colonoscopy  Summary:    Patient is due/failing the following:   Diabetic eye exam, flu vaccine, COLONOSCOPY, MAMMOGRAM and PHYSICAL    Action needed:   Patient needs office visit for annual wellness, due for eye exam, flu shot, and cancer screenings.    Type of outreach:    Sent letter.    Questions for provider review:    None                                                                                                                                    Debby Gipson        Chart routed to Care Team .

## 2019-11-14 ENCOUNTER — TELEPHONE (OUTPATIENT)
Dept: FAMILY MEDICINE | Facility: CLINIC | Age: 74
End: 2019-11-14

## 2019-11-18 NOTE — TELEPHONE ENCOUNTER
Panel Management Review  Summary:    Type of outreach:    Pateint has an upcoming appointment, care team will address HM items at that time.    Encounter routed to No Action Needed.                                                                                                                               Debby Gipson,

## 2019-11-21 ENCOUNTER — OFFICE VISIT (OUTPATIENT)
Dept: FAMILY MEDICINE | Facility: CLINIC | Age: 74
End: 2019-11-21
Payer: MEDICARE

## 2019-11-21 VITALS
WEIGHT: 200.6 LBS | SYSTOLIC BLOOD PRESSURE: 137 MMHG | HEART RATE: 72 BPM | BODY MASS INDEX: 42.11 KG/M2 | DIASTOLIC BLOOD PRESSURE: 83 MMHG | OXYGEN SATURATION: 97 % | TEMPERATURE: 98.4 F

## 2019-11-21 DIAGNOSIS — I48.0 PAROXYSMAL ATRIAL FIBRILLATION (H): ICD-10-CM

## 2019-11-21 DIAGNOSIS — Z79.01 LONG TERM (CURRENT) USE OF ANTICOAGULANTS: Primary | ICD-10-CM

## 2019-11-21 LAB
CAPILLARY BLOOD COLLECTION: NORMAL
INR PPP: 2.9 (ref 0.86–1.14)

## 2019-11-21 PROCEDURE — 99214 OFFICE O/P EST MOD 30 MIN: CPT | Performed by: FAMILY MEDICINE

## 2019-11-21 PROCEDURE — 36416 COLLJ CAPILLARY BLOOD SPEC: CPT | Performed by: FAMILY MEDICINE

## 2019-11-21 PROCEDURE — 85610 PROTHROMBIN TIME: CPT | Performed by: FAMILY MEDICINE

## 2019-11-21 NOTE — LETTER
[facility]   [address] [place]  [address]  [phone]                                   November 22, 2019    Selvin Rockwell  [address] [facility] [address]        Dear Selvin,    Your INR was in the normal range.  No change in dose of your medication based on the lab test done on November 21    Results for orders placed or performed in visit on 11/21/19   INR     Status: Abnormal   Result Value Ref Range    INR 2.90 (H) 0.86 - 1.14   Capillary Blood Collection     Status: None   Result Value Ref Range    Capillary Blood Collection Capillary collection performed        If you have any questions please call the clinic at [phone]    Sincerely,    Yvon Leonardo MD  bmd

## 2019-11-22 ENCOUNTER — TELEPHONE (OUTPATIENT)
Dept: FAMILY MEDICINE | Facility: CLINIC | Age: 74
End: 2019-11-22

## 2019-11-22 DIAGNOSIS — I48.0 PAROXYSMAL ATRIAL FIBRILLATION (H): ICD-10-CM

## 2019-11-22 NOTE — TELEPHONE ENCOUNTER
Spoke with [facility] doctor who saw her, Dr Serrano. The trouble swallowing has resolved   Pain seems to be neurogenic and the treatment would be either to send her to pain clinic to see if they can do some additional non medication therapies or to work on getting her on a therapeutic dose of gabapentin or similar drug.      Left message  for daughter on her cell phone. Told  to call me.

## 2019-11-22 NOTE — TELEPHONE ENCOUNTER
ANTICOAGULATION MANAGEMENT     Patient Name:  Selvin Rockwell  Date:  11/22/2019    ASSESSMENT /SUBJECTIVE:      Today's INR result of 2.9 is therapeutic. Goal INR of 2.0-3.0      Warfarin dose taken: Warfarin taken as previously instructed    Diet: No new diet changes affecting INR    Medication changes/ interactions: No new medications/supplements affecting INR    Previous INR: Therapeutic     S/S of bleeding or thromboembolism: No    New injury or illness:  No    Upcoming surgery, procedure or cardioversion:  No    Additional findings: N/A      PLAN:    Left detailed message for daughter Kat regarding INR result and instructed:     Warfarin Dosing Instructions: Continue your current warfarin dose (was instructed this at [facility] 11/21/2019)    Instructed patient to follow up no later than: 6 weeks    Education provided: No    Left VM for recheck in 6 weeks, instructed to call back to clinic to set appointment or call central number.    Instructed to call the Anticoagulation Clinic for any changes, questions or concerns. (#[phone])      Kiera Casanova, PharmD BCACP  Anticoagulation Clinical Pharmacist      OBJECTIVE:  INR   Date Value Ref Range Status   11/21/2019 2.90 (H) 0.86 - 1.14 Final

## 2019-11-22 NOTE — RESULT ENCOUNTER NOTE
Your INR was in the normal range.  No change in dose of your medication based on the lab test done on November 21.

## 2019-11-22 NOTE — PROGRESS NOTES
Subjective     Selvin Rockwell is a 74 year old female who presents to clinic today for the following health issues:    HPI      Follow up swallowing difficulties     INR    She apparently has achalasia.  She has a sphincter tone problem.  She has had myomectomies in this area.  She has had Botox injections into this area without complete relief though she got some improvement.  At least after the myomectomy she improved.  She told me that the gastroenterologist recommended some type of medication for nerves, and try gabapentin.  He said that I probably should continue some form of therapy in the same regard.    O: /83 (BP Location: Right arm, Patient Position: Sitting, Cuff Size: Adult Large)   Pulse 72   Temp 98.4  F (36.9  C) (Oral)   Wt 91 kg (200 lb 9.6 oz)   SpO2 97%   Breastfeeding No   BMI 42.11 kg/m      Chest wall normal to inspection and palpation. Good excursion bilaterally. Lungs clear to auscultation. Good air movement bilaterally without rales, wheezes, or rhonchi.   Regular rate and  rhythm. S1 and S2 normal, no murmurs, clicks, gallops or rubs. No edema or JVD.The abdomen is soft without tenderness, guarding, mass, rebound or organomegaly. Bowel sounds are normal. No CVA tenderness or inguinal adenopathy noted.        ICD-10-CM    1. Long term (current) use of anticoagulants Z79.01 INR     Capillary Blood Collection   2. Paroxysmal atrial fibrillation (H) I48.0 INR     I will contact her physician since I would have a notes from here.  And see what his plan is.  If he basically just wants me to taper dose of gabapentin up we could certainly do that.  Not sure what else he has in mind.  Not sure the family understood why he sent the patient back to me.  They have not been tried on Reglan and not sure that would be helpful anyways but we will certainly discuss this with.    INR value therapeutic no change in dose of medication.

## 2019-11-25 ENCOUNTER — NURSE TRIAGE (OUTPATIENT)
Dept: NURSING | Facility: CLINIC | Age: 74
End: 2019-11-25

## 2019-11-25 ENCOUNTER — TELEPHONE (OUTPATIENT)
Dept: FAMILY MEDICINE | Facility: CLINIC | Age: 74
End: 2019-11-25

## 2019-11-25 NOTE — TELEPHONE ENCOUNTER
I tried calling the number the daughter left and I cannot get through, states that I have to dial a 1 first and then this does not work.  Message to daughter is that I talked to GI and that he said mom's problem is a nerve problem and that the esophagus problem was found to be fixed.  She either needs the gabapentin or needs to go to the pain specialist to see if they have any other type of therapy to block her pain.

## 2019-11-25 NOTE — TELEPHONE ENCOUNTER
Attempt # 1  Called   [facility] (EC) [phone] (H)       Did she  answer the phone: No, left a message on voicemail to return call to triage line at [phone].    Stefani Veliz,RN,BSN  [facility]

## 2019-11-25 NOTE — TELEPHONE ENCOUNTER
Pt's daughter called back to speak with Dr Leonardo as per request below. Daughter may be reached at [phone] preferably before 1pm.Claudette Orourke,

## 2019-11-26 NOTE — TELEPHONE ENCOUNTER
Daughter Alicia is calling back about Metformin prescription.  FNA relayed message from SERA Olivas and daughter Alicia agreed and will phone Darymarperico.

## 2019-11-26 NOTE — TELEPHONE ENCOUNTER
Attempt # 2  Called [phone] (home)     Did patient answer the phone: No, left a message on voicemail to return call to triage line at [phone].    Stefani VelizRN,BSN  [facility]

## 2019-11-26 NOTE — TELEPHONE ENCOUNTER
Patient's daughter returned call to triage line.     Is requesting to be contacted between noon-1pm to relay message.     Daisy Olivas RN, BSN, PHN  [facility]: [place]

## 2019-11-26 NOTE — TELEPHONE ENCOUNTER
I have no other recommendations for the patient other than 2 500 mg tylenol every 8 hours prn until she is seen

## 2019-11-26 NOTE — TELEPHONE ENCOUNTER
Patient's daughter is calling to speak with the care team.    She would like a call back before 1pm today because that is when her lunch is over and she does not want to get in trouble for being on the phone. She doesn't have cell reception, so please call her direct work line: [phone]    She would like to coordinate getting patient switched to a new primary provider before Dr. JHA retires because patient is not doing well right now and will be needing refills of her medications.    Romero Grimes

## 2019-11-26 NOTE — TELEPHONE ENCOUNTER
Reason for Call:  Medication or medication refill:    Do you use a [facility]?  Name of the pharmacy and phone number for the current request:  [facility] [phone]    Name of the medication requested:  metFORMIN (GLUCOPHAGE) 500 MG tablet    Other request:  No     Can we leave a detailed message on this number? YES    Phone number patient can be reached at:  [phone]    Best Time:  Anytime     Call taken on 11/25/2019 at 6:23 PM by Bonnie Martinez

## 2019-11-26 NOTE — TELEPHONE ENCOUNTER
RN sees 90 day supply with 1 refill sent 10/31/19 to [facility] pharmacy.     Attempt #1 to call patient.     Patient did not answer, RN left voicemail at mobile number. RN requested patient return call to Nurse Triage line at [phone].     Patient needs to call [facility] pharmacy, and request Rx transfer.    Daisy Olivas RN, BSN, PHN  [facility]: [place]

## 2019-11-26 NOTE — TELEPHONE ENCOUNTER
Talked to daughter and she stated that the Gabepentin did not work for her at all.  She had been on it in the past.    Started with 300 then 600 mg and then she stopped it.  She stated it wasn't working for her.    Daughter stated that patient is afraid to sleep at night and having a lot of esophogeal type pain.    Patient does have an appointment as daughter wanted patient to be seen this week with a new provider as Dr. Leonardo is retiring.    Routed to provider to advise on what else to do in the meantime.    Stefani Veliz, RN,BSN  [facility]

## 2019-11-27 NOTE — TELEPHONE ENCOUNTER
Patient's daughter returned call. RN relayed provider's message below. Patient verbalized understanding.     Daisy Olivas RN, BSN, PHN  [facility]: [place]

## 2019-11-27 NOTE — TELEPHONE ENCOUNTER
Attempt # 1  Called   [facility] NGO () [phone]     Did patient answer the phone: No, left a message on voicemail to return call to triage line at [phone].    Stefani Veliz,RN,BSN  [facility]

## 2019-11-29 ENCOUNTER — OFFICE VISIT (OUTPATIENT)
Dept: FAMILY MEDICINE | Facility: CLINIC | Age: 74
End: 2019-11-29
Payer: MEDICARE

## 2019-11-29 VITALS
BODY MASS INDEX: 42.09 KG/M2 | HEART RATE: 76 BPM | WEIGHT: 200.5 LBS | SYSTOLIC BLOOD PRESSURE: 130 MMHG | DIASTOLIC BLOOD PRESSURE: 82 MMHG | TEMPERATURE: 97.9 F

## 2019-11-29 DIAGNOSIS — K21.9 GASTROESOPHAGEAL REFLUX DISEASE, ESOPHAGITIS PRESENCE NOT SPECIFIED: Primary | ICD-10-CM

## 2019-11-29 DIAGNOSIS — R07.89 CHEST TIGHTNESS: ICD-10-CM

## 2019-11-29 DIAGNOSIS — E11.9 TYPE 2 DIABETES, HBA1C GOAL < 7% (H): ICD-10-CM

## 2019-11-29 DIAGNOSIS — I10 HYPERTENSION GOAL BP (BLOOD PRESSURE) < 140/90: ICD-10-CM

## 2019-11-29 DIAGNOSIS — F41.9 ANXIETY: ICD-10-CM

## 2019-11-29 PROCEDURE — 99214 OFFICE O/P EST MOD 30 MIN: CPT | Performed by: FAMILY MEDICINE

## 2019-11-29 RX ORDER — FAMOTIDINE 20 MG/1
20 TABLET, FILM COATED ORAL 2 TIMES DAILY
Qty: 60 TABLET | Refills: 1 | Status: SHIPPED | OUTPATIENT
Start: 2019-11-29 | End: 2020-01-08

## 2019-11-29 RX ORDER — PAROXETINE 20 MG/1
20 TABLET, FILM COATED ORAL EVERY MORNING
Qty: 30 TABLET | Refills: 1 | Status: SHIPPED | OUTPATIENT
Start: 2019-11-29 | End: 2020-01-08

## 2019-11-29 NOTE — PROGRESS NOTES
Subjective     Selvin Rockwell is a 74 year old female who presents to clinic today for the following health issues:    HPI   Discuss esophageal problems     Omeprazole daily 40 mg    Still taking it     No particular foods make it worse  Maybe onions?       last egd in July this year , looked okay      Had ablation in past for fibrillation     Reviewed and updated as needed this visit by Provider    No family hist heart problems     Lots of stress             Review of Systems   ROS COMP:  Symptoms quite bothersome    Has seen GI doctor     Feels acid coming up all the time          Objective    BP (!) 152/93 (BP Location: Left arm, Patient Position: Chair, Cuff Size: Adult Regular)   Pulse 76   Temp 97.9  F (36.6  C) (Oral)   Wt 90.9 kg (200 lb 8 oz)   Breastfeeding No   BMI 42.09 kg/m    Body mass index is 42.09 kg/m .  Physical Exam   Full physical not done     Mentation and affect are fine    No tremor of speech or extremity    No back/ costovertebral angle tenderness    abd soft nontender except for epigastrium has some subj discomfort    Bowels okay    Bladder okay        Diagnostic Test Results:  Labs reviewed in Epic           ASSESSMENT / PLAN:  (K21.9) Gastroesophageal reflux disease, esophagitis presence not specified  (primary encounter diagnosis)  Comment: patient on ppi. Stay on that, but add h2 blocker.   Plan: famotidine (PEPCID) 20 MG tablet             (F41.9) Anxiety  Comment: lots of anxiety recently.    Plan: PARoxetine (PAXIL) 20 MG tablet        Increase dose from 10 to 20 mg     (E11.9) Type 2 diabetes, HbA1c goal < 7% (H)  Comment: last hemoglobin a1c okay   Plan: no change in meds for diab    (I10) Hypertension goal BP (blood pressure) < 140/90  Comment: on recheck blood pressure okay   Plan: monitor     (R07.89) Chest tightness  Comment: of note patient had normal lexiscan a couple years ago.  I suggested doing another one but she thinks may be stress or GI related.    Plan:  monitor symptoms.  Be seen promptly if symptoms acutely worsen     See us back in a month or so, sooner if needed       I reviewed the patient's medications, allergies, medical history, family history, and social history.    Justin Rojas MD

## 2019-11-29 NOTE — PATIENT INSTRUCTIONS
Increase paroxetine to 20 mg daily    Stay on prilosec/ omeprazole    Add the famotidine ( pepcid ) 20 mg 2x daily    See us back in a month    Be seen promptly if symptoms acutely worsen

## 2019-12-04 PROBLEM — F41.9 ANXIETY: Status: ACTIVE | Noted: 2019-12-04

## 2020-01-02 ENCOUNTER — TELEPHONE (OUTPATIENT)
Dept: PEDIATRICS | Facility: CLINIC | Age: 75
End: 2020-01-02

## 2020-01-02 NOTE — TELEPHONE ENCOUNTER
Reason for call:  Patient reporting a symptom    Symptom or request: tightness in feet    Duration (how long have symptoms been present): a few days    Have you been treated for this before? No    Additional comments: Patient daughter calling for mother. Patient is having tightness in her feet and she is wondering if this is a side effect of diabetes.     Phone Number patient can be reached at:  Other phone number:  [phone]    Best Time:  Before 1 pm, after 1 pm would be cell at [phone]    Can we leave a detailed message on this number:  YES, on cell after 1 pm    Call taken on 1/2/2020 at 12:13 PM by Jasmeet Burton

## 2020-01-02 NOTE — TELEPHONE ENCOUNTER
Attempt #1 to call patient's daughter.     Patient did not answer, RN left voicemail at mobile number. RN requested patient return call to Nurse Triage line at [phone].     Daisy Olivas RN, BSN, PHN  [facility]: [place]

## 2020-01-06 NOTE — TELEPHONE ENCOUNTER
RN sees patient scheduled to be seen for concern below 1/8/19.     Daisy Olivas RN, BSN, PHN  [facility]: [place]

## 2020-01-08 ENCOUNTER — OFFICE VISIT (OUTPATIENT)
Dept: FAMILY MEDICINE | Facility: CLINIC | Age: 75
End: 2020-01-08
Payer: MEDICARE

## 2020-01-08 VITALS
BODY MASS INDEX: 40.93 KG/M2 | DIASTOLIC BLOOD PRESSURE: 94 MMHG | WEIGHT: 195 LBS | OXYGEN SATURATION: 94 % | TEMPERATURE: 99 F | HEIGHT: 58 IN | SYSTOLIC BLOOD PRESSURE: 149 MMHG | HEART RATE: 76 BPM

## 2020-01-08 DIAGNOSIS — F41.9 ANXIETY: ICD-10-CM

## 2020-01-08 DIAGNOSIS — E66.01 MORBID OBESITY (H): ICD-10-CM

## 2020-01-08 DIAGNOSIS — I48.0 PAROXYSMAL ATRIAL FIBRILLATION (H): ICD-10-CM

## 2020-01-08 DIAGNOSIS — K21.9 GASTROESOPHAGEAL REFLUX DISEASE, ESOPHAGITIS PRESENCE NOT SPECIFIED: ICD-10-CM

## 2020-01-08 DIAGNOSIS — I10 HYPERTENSION GOAL BP (BLOOD PRESSURE) < 140/90: ICD-10-CM

## 2020-01-08 DIAGNOSIS — E11.9 TYPE 2 DIABETES, HBA1C GOAL < 7% (H): Primary | ICD-10-CM

## 2020-01-08 PROCEDURE — 99214 OFFICE O/P EST MOD 30 MIN: CPT | Performed by: FAMILY MEDICINE

## 2020-01-08 RX ORDER — PAROXETINE 20 MG/1
20 TABLET, FILM COATED ORAL EVERY MORNING
Qty: 30 TABLET | Refills: 2 | Status: SHIPPED | OUTPATIENT
Start: 2020-01-08 | End: 2020-05-11

## 2020-01-08 ASSESSMENT — MIFFLIN-ST. JEOR: SCORE: 1280.39

## 2020-01-09 NOTE — PROGRESS NOTES
Subjective     Selvin Rockwell is a 74 year old female who presents to clinic today for the following health issues:    HPI   Diabetes Follow-up    How often are you checking your blood sugar? One time daily  What time of day are you checking your blood sugars (select all that apply)?  Before meals  Have you had any blood sugars above 200?  No  Have you had any blood sugars below 70?  No    What symptoms do you notice when your blood sugar is low?  Headache, tired    What concerns do you have today about your diabetes? Other: Tightness in her feet     Do you have any of these symptoms? (Select all that apply)  No numbness or tingling in feet.  No redness, sores or blisters on feet.  No complaints of excessive thirst.  No reports of blurry vision.  No significant changes to weight.     Have you had a diabetic eye exam in the last 12 months? Yes- Date of last eye exam: Doesn't remember    Diabetes Management Resources    Hyperlipidemia Follow-Up      Are you regularly taking any medication or supplement to lower your cholesterol?   Yes- Simvastatin    Are you having muscle aches or other side effects that you think could be caused by your cholesterol lowering medication?  No    Hypertension Follow-up      Do you check your blood pressure regularly outside of the clinic? Yes     Are you following a low salt diet? Yes    Are your blood pressures ever more than 140 on the top number (systolic) OR more   than 90 on the bottom number (diastolic), for example 140/90? No, unsure if her machine is working correctly    BP Readings from Last 2 Encounters:   11/29/19 130/82   11/21/19 137/83     Hemoglobin A1C (%)   Date Value   09/20/2019 6.5 (H)   04/24/2019 6.7 (A)     LDL Cholesterol Calculated (mg/dL)   Date Value   09/20/2019 49   01/04/2019 49         How many servings of fruits and vegetables do you eat daily?  0-1    On average, how many sweetened beverages do you drink each day (Examples: soda, juice, sweet tea,  etc.  Do NOT count diet or artificially sweetened beverages)?   Once a week    How many days per week do you miss taking your medication? 0    Wondering if she should continue to take the famotidine?    She has brought in by her daughter today because the patient has experienced some tightness feeling in her feet.  They wondered if it was due to her diabetes.  They have no idea how long the patient has had diabetes.  They thought it was a few years.  I went back and reviewed her [facility] office notes over the years and it looks like she was diagnosed with diabetes back in 2006 or 2007.  She has been on metformin for this.  Her last hemoglobin A1c was 6.5 in September, just 3-1/2 months ago.  She does have a history of paroxysmal atrial fibrillation, obesity, hypertension, and anxiety.  She is on other medication as below.  She takes omeprazole 40 mg daily.  She was also given a prescription for famotidine 20 mg twice daily when she was seen by Dr. Rojas in the fall, but they were not sure if she was supposed to keep taking it.    Patient Active Problem List   Diagnosis     OA (OSTEOARTHRITIS) OF KNEE - right     Status Post Total Knee Replacement - bilateral     TACHO (obstructive sleep apnea)     Hyperlipidemia with target LDL less than 100     Hypertension goal BP (blood pressure) < 140/90     Type 2 diabetes, HbA1c goal < 7% (H)     Morbid obesity (H)     Achalasia     Adrenal adenoma     Atrophy of left kidney     Calculus of kidney     Chronic low back pain     Gout     Paroxysmal atrial fibrillation (H)     Long term (current) use of anticoagulants     Anxiety     Past Surgical History:   Procedure Laterality Date     AS ESOPHAGOSCOPY, DIAGNOSTIC  07/2019     C HAND/FINGER SURGERY UNLISTED       C REMOVAL OF KIDNEY STONE       C TOTAL KNEE ARTHROPLASTY  7/21/10    left     C TOTAL KNEE ARTHROPLASTY  12/12/12    Right     CARPAL TUNNEL RELEASE RT/LT       COLONOSCOPY  07/2019     TONSILLECTOMY          Social History     Tobacco Use     Smoking status: Never Smoker     Smokeless tobacco: Never Used   Substance Use Topics     Alcohol use: No     History reviewed. No pertinent family history.      Current Outpatient Medications   Medication Sig Dispense Refill     carvedilol (COREG) 6.25 MG tablet Take 1 tablet (6.25 mg) by mouth 2 times daily 180 tablet 3     famotidine (PEPCID) 20 MG tablet Take 1 tablet (20 mg) by mouth 2 times daily 60 tablet 1     isosorbide mononitrate (IMDUR) 30 MG 24 hr tablet Take 1 tablet (30 mg) by mouth daily 90 tablet 3     metFORMIN (GLUCOPHAGE) 500 MG tablet Take 1 tablet (500 mg) by mouth 2 times daily (with meals) 180 tablet 1     omeprazole (PRILOSEC) 40 MG DR capsule        PARoxetine (PAXIL) 20 MG tablet Take 1 tablet (20 mg) by mouth every morning 30 tablet 2     simvastatin (ZOCOR) 10 MG tablet 10 mg daily       warfarin ANTICOAGULANT (COUMADIN) 2 MG tablet Take 3 mg by mouth daily   2     clotrimazole (LOTRIMIN) 1 % external cream        ferrous sulfate (FEROSUL) 325 (65 Fe) MG tablet Take 1 tablet (325 mg) by mouth daily (with breakfast) (Patient not taking: Reported on 1/8/2020) 90 tablet 3     FISH OIL 1000 MG PO CAPS 3 CAPSULES DAILY WITH A MEAL       furosemide (LASIX) 20 MG tablet Take 1 tablet (20 mg) by mouth daily for 5 days 5 tablet 0     order for DME Vinh hose stockings (Patient not taking: Reported on 1/8/2020) 1 Device 0     OTHER MEDICAL SUPPLIES 1 Package by Other route daily. (Patient not taking: Reported on 1/8/2020) 1 Package 0     triamcinolone (KENALOG) 0.1 % external cream Apply sparingly to bilateral legs three times daily for 14 days. (Patient not taking: Reported on 1/8/2020) 45 g 1     Allergies   Allergen Reactions     Gabapentin      Rash and itching     Nsaids      Other reaction(s): Gastrointestinal  Former PPI user, EGD 2/2018 showed LA Grade D esophagitis, plus duodenitis and gastritis.  Famotidine on med list but may not have been taking - rx  "was 3 years old.      Sulfa Drugs Other (See Comments) and Rash     Per 11-4-13 H&P by Dr. Fei Arias.  RASH ALL OVER FOUND IN DR. RAMIREZ DICTATION OF 10/11/11  BACTRIM-  RASH ALL OVER         Reviewed and updated as needed this visit by Provider         Review of Systems   ROS COMP: Constitutional, HEENT, cardiovascular, pulmonary, gi and gu systems are negative, except as otherwise noted.      Objective    BP (!) 149/94 (BP Location: Right arm, Patient Position: Sitting, Cuff Size: Adult Small)   Pulse 76   Temp 99  F (37.2  C) (Oral)   Ht 1.475 m (4' 10.07\")   Wt 88.5 kg (195 lb)   SpO2 94%   BMI 40.66 kg/m    Body mass index is 40.66 kg/m .  Physical Exam   GENERAL: alert, no distress and obese  EXT: No swelling of the feet.  Peripheral pulses are intact.  No foot ulcers.  Sensation is intact to light touch.    Diagnostic Test Results:  Labs reviewed in Epic        Assessment & Plan       ICD-10-CM    1. Type 2 diabetes, HbA1c goal < 7% (H) E11.9    2. Gastroesophageal reflux disease, esophagitis presence not specified K21.9    3. Morbid obesity (H) E66.01    4. Paroxysmal atrial fibrillation (H) I48.0    5. Anxiety F41.9 PARoxetine (PAXIL) 20 MG tablet   6. Hypertension goal BP (blood pressure) < 140/90 I10      I suspect her feet sensation is related to her underlying diabetes  Her diabetes has been well controlled lately, however  I recommended she stay on her same metformin (and other medications)  She could use up her famotidine and then just continue with omeprazole alone    She has a variety of other health conditions that need ongoing care, including those listed above  She has seen a variety of primary care providers in the last year and I encouraged her to follow-up with one of them for ongoing care of these conditions  She was advised to come in for an INR check soon    Return in about 2 months (around 3/8/2020) for diabetes recheck.    Gurinder Ho MD  [facility] " [place]

## 2020-01-10 ENCOUNTER — TELEPHONE (OUTPATIENT)
Dept: PEDIATRICS | Facility: CLINIC | Age: 75
End: 2020-01-10

## 2020-01-10 NOTE — TELEPHONE ENCOUNTER
Reason for Call:  Other call back    Detailed comments: Pt's daughter Alicia would like a call back to discuss a JEROME that was filled out at appt on 1/8/2020.    Phone Number Patient can be reached at: Other phone number:  [phone]    Best Time: Before 1pm    Can we leave a detailed message on this number? NO    Call taken on 1/10/2020 at 12:22 PM by Claudette Orourke

## 2020-01-10 NOTE — TELEPHONE ENCOUNTER
TC attempted to contact patient's daughter at number below and someone picked up the phone and immediately hung up x 2.

## 2020-01-13 ENCOUNTER — ANTICOAGULATION THERAPY VISIT (OUTPATIENT)
Dept: NURSING | Facility: CLINIC | Age: 75
End: 2020-01-13
Payer: MEDICARE

## 2020-01-13 DIAGNOSIS — I48.0 PAROXYSMAL ATRIAL FIBRILLATION (H): ICD-10-CM

## 2020-01-13 LAB — INR POINT OF CARE: 2.6 (ref 0.86–1.14)

## 2020-01-13 PROCEDURE — 99207 ZZC NO CHARGE NURSE ONLY: CPT

## 2020-01-13 PROCEDURE — 36416 COLLJ CAPILLARY BLOOD SPEC: CPT

## 2020-01-13 PROCEDURE — 85610 PROTHROMBIN TIME: CPT | Mod: QW

## 2020-01-13 NOTE — PROGRESS NOTES
ANTICOAGULATION FOLLOW-UP CLINIC VISIT    Patient Name:  Selvin Rockwell  Date:  2020  Contact Type:  Face to Face    SUBJECTIVE: same plan of care  Patient Findings     Comments:   No symptoms of concern today        Clinical Outcomes     Negatives:   Major bleeding event, Thromboembolic event, Anticoagulation-related hospital admission, Anticoagulation-related ED visit, Anticoagulation-related fatality    Comments:   No symptoms of concern today           OBJECTIVE    INR Protime   Date Value Ref Range Status   2020 2.6 (A) 0.86 - 1.14 Final       ASSESSMENT / PLAN  INR assessment THER    Recheck INR In: 6 WEEKS    INR Location Clinic      Anticoagulation Summary  As of 2020    INR goal:   2.0-3.0   TTR:   100.0 % (3.8 mo)   INR used for dosin.6 (2020)   Warfarin maintenance plan:   3 mg (2 mg x 1.5) every day   Full warfarin instructions:   3 mg every day   Weekly warfarin total:   21 mg   No change documented:   Rosamaria Levine RN   Plan last modified:   Tana Parker RN (2019)   Next INR check:   2020   Priority:   Maintenance   Target end date:   Indefinite    Indications    Paroxysmal atrial fibrillation (H) [I48.0]             Anticoagulation Episode Summary     INR check location:   Anticoagulation Clinic    Preferred lab:   [facility]    Send INR reminders to:   [facility]    Comments:   [facility] states has been on coumadin for 2-3 years      Anticoagulation Care Providers     Provider Role Specialty Phone number    Gurinder Ho MD  Family Practice [phone]            See the Encounter Report to view Anticoagulation Flowsheet and Dosing Calendar (Go to Encounters tab in chart review, and find the Anticoagulation Therapy Visit)    Dosage adjustment made based on physician directed care plan.    Rosamaria Levine RN

## 2020-01-14 NOTE — TELEPHONE ENCOUNTER
TC contacted number below and someone answered and hung up without saying hello.  Letter printed and mailed to patient's home address.

## 2020-02-06 ENCOUNTER — OFFICE VISIT (OUTPATIENT)
Dept: FAMILY MEDICINE | Facility: CLINIC | Age: 75
End: 2020-02-06
Payer: MEDICARE

## 2020-02-06 VITALS
HEART RATE: 87 BPM | OXYGEN SATURATION: 98 % | WEIGHT: 195.5 LBS | TEMPERATURE: 98.2 F | SYSTOLIC BLOOD PRESSURE: 138 MMHG | DIASTOLIC BLOOD PRESSURE: 86 MMHG | HEIGHT: 58 IN | RESPIRATION RATE: 14 BRPM | BODY MASS INDEX: 41.04 KG/M2

## 2020-02-06 DIAGNOSIS — Z96.653 HISTORY OF TOTAL KNEE ARTHROPLASTY, BILATERAL: ICD-10-CM

## 2020-02-06 DIAGNOSIS — M25.562 ACUTE PAIN OF BOTH KNEES: Primary | ICD-10-CM

## 2020-02-06 DIAGNOSIS — M25.561 ACUTE PAIN OF BOTH KNEES: Primary | ICD-10-CM

## 2020-02-06 PROCEDURE — 99214 OFFICE O/P EST MOD 30 MIN: CPT | Performed by: FAMILY MEDICINE

## 2020-02-06 ASSESSMENT — MIFFLIN-ST. JEOR: SCORE: 1282.66

## 2020-02-07 NOTE — PROGRESS NOTES
"Subjective     Selvin Rockwell is a 74 year old female who presents to clinic today for the following health issues:    HPI   Musculoskeletal problem/pain      Duration: Ongoing ; hx of knee replacement Left knee 7/21/2010 Right knee 12/12/12    Description  Location: Both knees    Intensity:  moderate    Accompanying signs and symptoms: knee \"caves in\", tingling sometimes    History  Previous similar problem: YES  Previous evaluation:  orthopedic evaluation before knee replacement     Precipitating or alleviating factors:  Trauma or overuse: no   Aggravating factors include: Seems to be in pain no matter what activity  Therapies tried and outcome: rest/inactivity and acetaminophen ; no relief    Patient presents with chronic bilateral knee pain L>R s/p bilateral TKA  Sometimes legs \"give out\" when ambulating as well  No noticeable swelling        BP Readings from Last 3 Encounters:   02/06/20 138/86   01/08/20 (!) 149/94   11/29/19 130/82    Wt Readings from Last 3 Encounters:   02/06/20 88.7 kg (195 lb 8 oz)   01/08/20 88.5 kg (195 lb)   11/29/19 90.9 kg (200 lb 8 oz)                      Reviewed and updated as needed this visit by Provider  Tobacco  Allergies  Meds  Problems  Med Hx  Surg Hx  Fam Hx         Review of Systems   ROS COMP: Constitutional, HEENT, cardiovascular, pulmonary, gi and gu systems are negative, except as otherwise noted.      Objective    /86   Pulse 87   Temp 98.2  F (36.8  C) (Oral)   Resp 14   Ht 1.475 m (4' 10.07\")   Wt 88.7 kg (195 lb 8 oz)   SpO2 98%   BMI 40.76 kg/m    Body mass index is 40.76 kg/m .  Physical Exam   GENERAL: healthy, alert and no distress  EYES: Eyes grossly normal to inspection, PERRL and conjunctivae and sclerae normal  NECK: no adenopathy, no asymmetry, masses, or scars and thyroid normal to palpation  MS: no gross musculoskeletal defects noted, no edema  SKIN: no suspicious lesions or rashes  NEURO: Normal strength and tone, mentation " intact and speech normal  PSYCH: mentation appears normal, affect normal/bright            Assessment & Plan       ICD-10-CM    1. Acute pain of both knees M25.561 MR Knee Left w/o Contrast    M25.562 MR Knee Right w/o Contrast   2. History of total knee arthroplasty, bilateral Z96.653 MR Knee Left w/o Contrast     MR Knee Right w/o Contrast     Will obtain bilateral MRI, recommend seeing orthopedics in near future  Continue current pain control regimen       Follow-up with specialist within 2 weeks  Rehan Randolph MD  [facility]

## 2020-02-17 ENCOUNTER — OFFICE VISIT (OUTPATIENT)
Dept: ORTHOPEDICS | Facility: CLINIC | Age: 75
End: 2020-02-17
Payer: MEDICARE

## 2020-02-17 VITALS
WEIGHT: 196 LBS | OXYGEN SATURATION: 95 % | SYSTOLIC BLOOD PRESSURE: 144 MMHG | HEART RATE: 87 BPM | HEIGHT: 58 IN | BODY MASS INDEX: 41.14 KG/M2 | DIASTOLIC BLOOD PRESSURE: 84 MMHG

## 2020-02-17 DIAGNOSIS — M48.062 SPINAL STENOSIS OF LUMBAR REGION WITH NEUROGENIC CLAUDICATION: ICD-10-CM

## 2020-02-17 DIAGNOSIS — Z96.653 STATUS POST TOTAL BILATERAL KNEE REPLACEMENT: Primary | ICD-10-CM

## 2020-02-17 PROCEDURE — 99213 OFFICE O/P EST LOW 20 MIN: CPT | Performed by: ORTHOPAEDIC SURGERY

## 2020-02-17 ASSESSMENT — MIFFLIN-ST. JEOR: SCORE: 1283.8

## 2020-02-17 NOTE — PATIENT INSTRUCTIONS
Gem to follow up with Primary Care provider regarding elevated blood pressure.    Return to clinic 2-4 years for knees.  Check with spine doctor and Physical Therapy about the leg pain.  Compression socks for varicose veins.

## 2020-02-17 NOTE — LETTER
2/17/2020         RE: Selvin Rockwell  [address] [place] [id number]        Dear Colleague,    Thank you for referring your patient, Selvin Rockwell, to the [facility]. Please see a copy of my visit note below.    Selvin Rockwell is seen for follow up left total knee arthroplasty from 7/21/2010 and right total knee arthroplasty from 12/12/2012.  She complains of right medial knee pain, right leg weakness, left medial thigh varicose veins, left foot venous distension, right calf swelling. She reports feeling like the legs are weak and want to give way with walking.  She does not report numbness.    In evaluation in June 2019 I felt her primary problem was from the spine.  At that time we ordered MRI scan of the lumbar spine.  She says she obtained this, but not through the [facility] system.  She has been subsequently treated with physical therapy but not consistently, so we have no access to these studies were treatments.  Exercise:  limited walking.    Past Medical History:   Diagnosis Date     Atrophy of left kidney 3/27/2017     Diabetes mellitus, type 2 (H) 2006     HTN (hypertension) 12/5/2012     Hyperlipidemia LDL goal < 100      OA (OSTEOARTHRITIS) OF KNEE - right 7/13/2010     OA (OSTEOARTHRITIS) OF KNEE - right      TACHO (obstructive sleep apnea) 12/7/2012     Paroxysmal atrial fibrillation (H) 7/11/2018       Past Surgical History:   Procedure Laterality Date     AS ESOPHAGOSCOPY, DIAGNOSTIC  07/2019     C HAND/FINGER SURGERY UNLISTED       C REMOVAL OF KIDNEY STONE       C TOTAL KNEE ARTHROPLASTY  7/21/10    left     C TOTAL KNEE ARTHROPLASTY  12/12/12    Right     CARPAL TUNNEL RELEASE RT/LT       COLONOSCOPY  07/2019     TONSILLECTOMY         History reviewed. No pertinent family history.    Social History     Socioeconomic History     Marital status:      Spouse name: Not on file     Number of children: Not on file     Years of education: Not on file      Highest education level: Not on file   Occupational History     Not on file   Social Needs     Financial resource strain: Not on file     Food insecurity:     Worry: Not on file     Inability: Not on file     Transportation needs:     Medical: Not on file     Non-medical: Not on file   Tobacco Use     Smoking status: Never Smoker     Smokeless tobacco: Never Used   Substance and Sexual Activity     Alcohol use: No     Drug use: No     Sexual activity: Yes     Partners: Male     Birth control/protection: Post-menopausal   Lifestyle     Physical activity:     Days per week: Not on file     Minutes per session: Not on file     Stress: Not on file   Relationships     Social connections:     Talks on phone: Not on file     Gets together: Not on file     Attends Scientologist service: Not on file     Active member of club or organization: Not on file     Attends meetings of clubs or organizations: Not on file     Relationship status: Not on file     Intimate partner violence:     Fear of current or ex partner: Not on file     Emotionally abused: Not on file     Physically abused: Not on file     Forced sexual activity: Not on file   Other Topics Concern     Parent/sibling w/ CABG, MI or angioplasty before 65F 55M? Not Asked   Social History Narrative     Not on file       Current Outpatient Medications   Medication Sig Dispense Refill     carvedilol (COREG) 6.25 MG tablet Take 1 tablet (6.25 mg) by mouth 2 times daily 180 tablet 3     clotrimazole (LOTRIMIN) 1 % external cream        ferrous sulfate (FEROSUL) 325 (65 Fe) MG tablet Take 1 tablet (325 mg) by mouth daily (with breakfast) (Patient not taking: Reported on 1/8/2020) 90 tablet 3     FISH OIL 1000 MG PO CAPS 3 CAPSULES DAILY WITH A MEAL       furosemide (LASIX) 20 MG tablet Take 1 tablet (20 mg) by mouth daily for 5 days 5 tablet 0     isosorbide mononitrate (IMDUR) 30 MG 24 hr tablet Take 1 tablet (30 mg) by mouth daily 90 tablet 3     metFORMIN (GLUCOPHAGE) 500  MG tablet Take 1 tablet (500 mg) by mouth 2 times daily (with meals) 180 tablet 1     omeprazole (PRILOSEC) 40 MG DR capsule        order for DME Vinh hose stockings (Patient not taking: Reported on 1/8/2020) 1 Device 0     OTHER MEDICAL SUPPLIES 1 Package by Other route daily. (Patient not taking: Reported on 1/8/2020) 1 Package 0     PARoxetine (PAXIL) 20 MG tablet Take 1 tablet (20 mg) by mouth every morning 30 tablet 2     simvastatin (ZOCOR) 10 MG tablet 10 mg daily       triamcinolone (KENALOG) 0.1 % external cream Apply sparingly to bilateral legs three times daily for 14 days. (Patient not taking: Reported on 1/8/2020) 45 g 1     warfarin ANTICOAGULANT (COUMADIN) 2 MG tablet Take 3 mg by mouth daily   2       Allergies   Allergen Reactions     Gabapentin      Rash and itching     Nsaids      Other reaction(s): Gastrointestinal  Former PPI user, EGD 2/2018 showed LA Grade D esophagitis, plus duodenitis and gastritis.  Famotidine on med list but may not have been taking - rx was 3 years old.      Sulfa Drugs Other (See Comments) and Rash     Per 11-4-13 H&P by Dr. Fei Arias.  RASH ALL OVER FOUND IN DR. RAMIREZ DICTATION OF 10/11/11  BACTRIM-  RASH ALL OVER       REVIEW OF SYSTEMS:  CONSTITUTIONAL:  NEGATIVE for fever, chills, change in weight, not feeling tired  SKIN:  NEGATIVE for worrisome rashes, no skin lumps, no skin ulcers and no non-healing wounds  EYES:  NEGATIVE for vision changes or irritation.  ENT/MOUTH:  NEGATIVE.  No hearing loss, no hoarseness, no difficulty swallowing.  RESP:  NEGATIVE. No cough or shortness of breath.  BREAST:  NEGATIVE for masses, tenderness or discharge  CV:  NEGATIVE for chest pain, palpitations or peripheral edema  GI:  NEGATIVE for nausea, abdominal pain, heartburn, or change in bowel habits  :  Negative. No dysuria, no hematuria  MUSCULOSKELETAL:  See HPI above  NEURO:  NEGATIVE . No headaches, no dizziness,  no numbness  ENDOCRINE:  NEGATIVE for temperature  "intolerance, skin/hair changes  HEME/ALLERGY/IMMUNE:  NEGATIVE for bleeding problems  PSYCHIATRIC:  NEGATIVE. no anxiety, no depression.      Xray:  Xray images were independently visualized with the patient.  This shows good position of components of both total knee arthroplasty .  No sign of loosening or wear.     Exam:  Vitals: BP (!) 144/84   Pulse 87   Ht 1.473 m (4' 10\")   Wt 88.9 kg (196 lb)   SpO2 95%   BMI 40.96 kg/m     BMI= Body mass index is 40.96 kg/m .  Range of motion right 0-114 degrees, left 0-116 degrees.  Alignment  Normal.  Stability:   Right       Lateral collateral ligament no laxity       Medial collateral ligament mild laxity.    Left:       Lateral collateral ligament no laxity       Medial collateral ligament mild laxity.    Wound:  Well healed.  Edema: moderate in right lower leg, mild in left lower leg.  She is using compression hose.  Effusion: None  Tenderness: Right positive medial joint line        Left:  Minor - lateral hamstrings.  Sensation, motor, and circulation intact.  Spine has tenderness across the low back.  She has tight hamstrings on straight leg raise at 70 degrees.  No asymmetry.    Assessment:  bilateral  total knee arthroplasty with mild medial collateral ligament laxity.    No xray abnormalities found in either knee.    Most likely has lumbar spinal stenosis as cause of weakness in legs.  Plan:  She can work on strengthening.  She should again see her spine doctors to see if anything can or should be done for the leg weakness and pains..  She has been told to use compression socks for the swelling in the lower legs.        Again, thank you for allowing me to participate in the care of your patient.        Sincerely,        Gurinder Martinez MD    "

## 2020-02-18 NOTE — PROGRESS NOTES
Selvin Rockwell is seen for follow up left total knee arthroplasty from 7/21/2010 and right total knee arthroplasty from 12/12/2012.  She complains of right medial knee pain, right leg weakness, left medial thigh varicose veins, left foot venous distension, right calf swelling. She reports feeling like the legs are weak and want to give way with walking.  She does not report numbness.    In evaluation in June 2019 I felt her primary problem was from the spine.  At that time we ordered MRI scan of the lumbar spine.  She says she obtained this, but not through the [facility] system.  She has been subsequently treated with physical therapy but not consistently, so we have no access to these studies were treatments.  Exercise:  limited walking.    Past Medical History:   Diagnosis Date     Atrophy of left kidney 3/27/2017     Diabetes mellitus, type 2 (H) 2006     HTN (hypertension) 12/5/2012     Hyperlipidemia LDL goal < 100      OA (OSTEOARTHRITIS) OF KNEE - right 7/13/2010     OA (OSTEOARTHRITIS) OF KNEE - right      TACHO (obstructive sleep apnea) 12/7/2012     Paroxysmal atrial fibrillation (H) 7/11/2018       Past Surgical History:   Procedure Laterality Date     AS ESOPHAGOSCOPY, DIAGNOSTIC  07/2019     C HAND/FINGER SURGERY UNLISTED       C REMOVAL OF KIDNEY STONE       C TOTAL KNEE ARTHROPLASTY  7/21/10    left     C TOTAL KNEE ARTHROPLASTY  12/12/12    Right     CARPAL TUNNEL RELEASE RT/LT       COLONOSCOPY  07/2019     TONSILLECTOMY         History reviewed. No pertinent family history.    Social History     Socioeconomic History     Marital status:      Spouse name: Not on file     Number of children: Not on file     Years of education: Not on file     Highest education level: Not on file   Occupational History     Not on file   Social Needs     Financial resource strain: Not on file     Food insecurity:     Worry: Not on file     Inability: Not on file     Transportation needs:     Medical: Not on  file     Non-medical: Not on file   Tobacco Use     Smoking status: Never Smoker     Smokeless tobacco: Never Used   Substance and Sexual Activity     Alcohol use: No     Drug use: No     Sexual activity: Yes     Partners: Male     Birth control/protection: Post-menopausal   Lifestyle     Physical activity:     Days per week: Not on file     Minutes per session: Not on file     Stress: Not on file   Relationships     Social connections:     Talks on phone: Not on file     Gets together: Not on file     Attends Nondenominational service: Not on file     Active member of club or organization: Not on file     Attends meetings of clubs or organizations: Not on file     Relationship status: Not on file     Intimate partner violence:     Fear of current or ex partner: Not on file     Emotionally abused: Not on file     Physically abused: Not on file     Forced sexual activity: Not on file   Other Topics Concern     Parent/sibling w/ CABG, MI or angioplasty before 65F 55M? Not Asked   Social History Narrative     Not on file       Current Outpatient Medications   Medication Sig Dispense Refill     carvedilol (COREG) 6.25 MG tablet Take 1 tablet (6.25 mg) by mouth 2 times daily 180 tablet 3     clotrimazole (LOTRIMIN) 1 % external cream        ferrous sulfate (FEROSUL) 325 (65 Fe) MG tablet Take 1 tablet (325 mg) by mouth daily (with breakfast) (Patient not taking: Reported on 1/8/2020) 90 tablet 3     FISH OIL 1000 MG PO CAPS 3 CAPSULES DAILY WITH A MEAL       furosemide (LASIX) 20 MG tablet Take 1 tablet (20 mg) by mouth daily for 5 days 5 tablet 0     isosorbide mononitrate (IMDUR) 30 MG 24 hr tablet Take 1 tablet (30 mg) by mouth daily 90 tablet 3     metFORMIN (GLUCOPHAGE) 500 MG tablet Take 1 tablet (500 mg) by mouth 2 times daily (with meals) 180 tablet 1     omeprazole (PRILOSEC) 40 MG DR capsule        order for DME Vinh lam stocknavdeep (Patient not taking: Reported on 1/8/2020) 1 Device 0     OTHER MEDICAL SUPPLIES 1  Package by Other route daily. (Patient not taking: Reported on 1/8/2020) 1 Package 0     PARoxetine (PAXIL) 20 MG tablet Take 1 tablet (20 mg) by mouth every morning 30 tablet 2     simvastatin (ZOCOR) 10 MG tablet 10 mg daily       triamcinolone (KENALOG) 0.1 % external cream Apply sparingly to bilateral legs three times daily for 14 days. (Patient not taking: Reported on 1/8/2020) 45 g 1     warfarin ANTICOAGULANT (COUMADIN) 2 MG tablet Take 3 mg by mouth daily   2       Allergies   Allergen Reactions     Gabapentin      Rash and itching     Nsaids      Other reaction(s): Gastrointestinal  Former PPI user, EGD 2/2018 showed LA Grade D esophagitis, plus duodenitis and gastritis.  Famotidine on med list but may not have been taking - rx was 3 years old.      Sulfa Drugs Other (See Comments) and Rash     Per 11-4-13 H&P by Dr. Fei Arias.  RASH ALL OVER FOUND IN DR. RAMIREZ DICTATION OF 10/11/11  BACTRIM-  RASH ALL OVER       REVIEW OF SYSTEMS:  CONSTITUTIONAL:  NEGATIVE for fever, chills, change in weight, not feeling tired  SKIN:  NEGATIVE for worrisome rashes, no skin lumps, no skin ulcers and no non-healing wounds  EYES:  NEGATIVE for vision changes or irritation.  ENT/MOUTH:  NEGATIVE.  No hearing loss, no hoarseness, no difficulty swallowing.  RESP:  NEGATIVE. No cough or shortness of breath.  BREAST:  NEGATIVE for masses, tenderness or discharge  CV:  NEGATIVE for chest pain, palpitations or peripheral edema  GI:  NEGATIVE for nausea, abdominal pain, heartburn, or change in bowel habits  :  Negative. No dysuria, no hematuria  MUSCULOSKELETAL:  See HPI above  NEURO:  NEGATIVE . No headaches, no dizziness,  no numbness  ENDOCRINE:  NEGATIVE for temperature intolerance, skin/hair changes  HEME/ALLERGY/IMMUNE:  NEGATIVE for bleeding problems  PSYCHIATRIC:  NEGATIVE. no anxiety, no depression.      Xray:  Xray images were independently visualized with the patient.  This shows good position of components of  "both total knee arthroplasty .  No sign of loosening or wear.     Exam:  Vitals: BP (!) 144/84   Pulse 87   Ht 1.473 m (4' 10\")   Wt 88.9 kg (196 lb)   SpO2 95%   BMI 40.96 kg/m    BMI= Body mass index is 40.96 kg/m .  Range of motion right 0-114 degrees, left 0-116 degrees.  Alignment  Normal.  Stability:   Right       Lateral collateral ligament no laxity       Medial collateral ligament mild laxity.    Left:       Lateral collateral ligament no laxity       Medial collateral ligament mild laxity.    Wound:  Well healed.  Edema: moderate in right lower leg, mild in left lower leg.  She is using compression hose.  Effusion: None  Tenderness: Right positive medial joint line        Left:  Minor - lateral hamstrings.  Sensation, motor, and circulation intact.  Spine has tenderness across the low back.  She has tight hamstrings on straight leg raise at 70 degrees.  No asymmetry.    Assessment:  bilateral  total knee arthroplasty with mild medial collateral ligament laxity.    No xray abnormalities found in either knee.    Most likely has lumbar spinal stenosis as cause of weakness in legs.  Plan:  She can work on strengthening.  She should again see her spine doctors to see if anything can or should be done for the leg weakness and pains..  She has been told to use compression socks for the swelling in the lower legs.      "

## 2020-02-20 ENCOUNTER — TELEPHONE (OUTPATIENT)
Dept: FAMILY MEDICINE | Facility: CLINIC | Age: 75
End: 2020-02-20
Payer: MEDICARE

## 2020-02-20 DIAGNOSIS — I48.0 PAROXYSMAL ATRIAL FIBRILLATION (H): Primary | ICD-10-CM

## 2020-02-20 DIAGNOSIS — Z53.9 ERRONEOUS ENCOUNTER--DISREGARD: Primary | ICD-10-CM

## 2020-02-20 DIAGNOSIS — Z79.01 LONG TERM (CURRENT) USE OF ANTICOAGULANTS: ICD-10-CM

## 2020-02-20 RX ORDER — WARFARIN SODIUM 2 MG/1
TABLET ORAL
Qty: 140 TABLET | Refills: 0 | Status: SHIPPED | OUTPATIENT
Start: 2020-02-20 | End: 2020-05-22

## 2020-02-20 NOTE — TELEPHONE ENCOUNTER
Reason for Call:  Medication or medication refill:    Do you use a [facility]?  Name of the pharmacy and phone number for the current request:  [facility] [phone] [address] [place] [address]    Name of the medication requested:  warfarin ANTICOAGULANT (COUMADIN) 2 MG tablet    Other request:  Asap patient all out.    Can we leave a detailed message on this number? YES    Phone number patient can be reached at: Other phone number:  [phone]    Best Time:  Anytime     Call taken on 2/20/2020 at 12:38 PM by Bonnie Martinez

## 2020-02-24 ENCOUNTER — ANTICOAGULATION THERAPY VISIT (OUTPATIENT)
Dept: NURSING | Facility: CLINIC | Age: 75
End: 2020-02-24
Payer: MEDICARE

## 2020-02-24 DIAGNOSIS — Z79.01 LONG TERM CURRENT USE OF ANTICOAGULANT THERAPY: Primary | ICD-10-CM

## 2020-02-24 DIAGNOSIS — I48.0 PAROXYSMAL ATRIAL FIBRILLATION (H): ICD-10-CM

## 2020-02-24 LAB — INR POINT OF CARE: 2.9 (ref 0.86–1.14)

## 2020-02-24 PROCEDURE — 36416 COLLJ CAPILLARY BLOOD SPEC: CPT

## 2020-02-24 PROCEDURE — 99207 ZZC NO CHARGE NURSE ONLY: CPT

## 2020-02-24 PROCEDURE — 85610 PROTHROMBIN TIME: CPT | Mod: QW

## 2020-02-25 NOTE — PROGRESS NOTES
ANTICOAGULATION FOLLOW-UP CLINIC VISIT    Patient Name:  Selvin Rockwell  Date:  2020  Contact Type:  Face to Face    SUBJECTIVE: same plan of care  Patient Findings     Comments:   No concerns identified today        Clinical Outcomes     Negatives:   Major bleeding event, Thromboembolic event, Anticoagulation-related hospital admission, Anticoagulation-related ED visit, Anticoagulation-related fatality    Comments:   No concerns identified today           OBJECTIVE    INR Protime   Date Value Ref Range Status   2020 2.9 (A) 0.86 - 1.14 Final       ASSESSMENT / PLAN  INR assessment THER    Recheck INR In: 6 WEEKS    INR Location Clinic      Anticoagulation Summary  As of 2020    INR goal:   2.0-3.0   TTR:   100.0 % (5.2 mo)   INR used for dosin.9 (2020)   Warfarin maintenance plan:   3 mg (2 mg x 1.5) every day   Full warfarin instructions:   3 mg every day   Weekly warfarin total:   21 mg   No change documented:   Rosamaria Levine RN   Plan last modified:   Tana Parker RN (2019)   Next INR check:   2020   Priority:   Maintenance   Target end date:   Indefinite    Indications    Paroxysmal atrial fibrillation (H) [I48.0]             Anticoagulation Episode Summary     INR check location:   Anticoagulation Clinic    Preferred lab:   [facility]    Send INR reminders to:   [facility]    Comments:   Transfer from [facility] states has been on coumadin for 2-3 years      Anticoagulation Care Providers     Provider Role Specialty Phone number    Gurinder Ho MD  Family Practice [phone]            See the Encounter Report to view Anticoagulation Flowsheet and Dosing Calendar (Go to Encounters tab in chart review, and find the Anticoagulation Therapy Visit)    Dosage adjustment made based on physician directed care plan.    Rosamaria Levine RN

## 2020-03-02 ENCOUNTER — TELEPHONE (OUTPATIENT)
Dept: FAMILY MEDICINE | Facility: CLINIC | Age: 75
End: 2020-03-02

## 2020-03-02 DIAGNOSIS — I83.93 VARICOSE VEINS OF BOTH LOWER EXTREMITIES, UNSPECIFIED WHETHER COMPLICATED: Primary | ICD-10-CM

## 2020-03-02 NOTE — TELEPHONE ENCOUNTER
Okay, referral made as requested.  She can go ahead and schedule an appointment to see Dr. Marques at the [facility] for this.

## 2020-03-02 NOTE — TELEPHONE ENCOUNTER
Order or referral being requested: Patient's daughter Alicia calling stating they would like a referral to the [facility] to have her varicose veins in her legs evaluated. She states they stick out, are hard, feel tight, and cause itchiness. There is consent to communicate with Alicia on file.     Routed to PCP to review and advise.     Daisy Olivas, RN, BSN, PHN  [facility] [place]

## 2020-03-02 NOTE — TELEPHONE ENCOUNTER
Reason for Call: Request for an order or referral:    Order or referral being requested: Patient's daughter Alicia calling stating they would like a referral to the [facility] to have her varicose veins in her legs evaluated. She states they stick out, are hard, feel tight, and cause itchiness. There is consent to communicate with Alicia on file.     Date needed: as soon as possible    Has the patient been seen by the PCP for this problem? Not Applicable    Additional comments:     Phone number Patient can be reached at:  Other phone number:  [phone]    Best Time:  any    Can we leave a detailed message on this number?  YES    Call taken on 3/2/2020 at 12:56 PM by Rosalie Miller

## 2020-03-03 ENCOUNTER — TELEPHONE (OUTPATIENT)
Dept: ORTHOPEDICS | Facility: CLINIC | Age: 75
End: 2020-03-03

## 2020-03-03 NOTE — TELEPHONE ENCOUNTER
Detailed message left for patient's daughter Alicia that requested referral has been placed. Scheduling number provided on message.

## 2020-03-03 NOTE — TELEPHONE ENCOUNTER
Reason for Call:  Other call back    Detailed comments: Daughter Kat is calling checking to see you Dr. Martinez received chart notes from patient's chiropractor. Please call and advise Thank you     Phone Number Patient can be reached at: Other phone number:  [phone]    Best Time: any    Can we leave a detailed message on this number? YES    Call taken on 3/3/2020 at 12:33 PM by Kiera Bruno

## 2020-03-04 ENCOUNTER — TELEPHONE (OUTPATIENT)
Dept: ORTHOPEDICS | Facility: CLINIC | Age: 75
End: 2020-03-04

## 2020-03-04 NOTE — TELEPHONE ENCOUNTER
Reason for call:  Other   Patient called regarding (reason for call): call back  Additional comments: Patients daughter is calling because she wants to know if a fax was received from her mom's chiropractor. Please call back     Phone number to reach patient:  Home number on file [phone] (home)    Best Time:  any    Can we leave a detailed message on this number?  YES    Travel screening: Not Applicable

## 2020-03-04 NOTE — TELEPHONE ENCOUNTER
Patient's daughter calling back stating she didn't understand Ben Casanova's message.  The 14 page fax daughter was concerned about is in the patient's chart under the media tab.  Please have Jason call patient's daughter back to discuss at [phone].  Thank you.

## 2020-03-05 NOTE — TELEPHONE ENCOUNTER
Attempted to call Alicia on the number listed of [phone] (home) that was requested. There was no answer twice and the system was not set up or able to leave messages.

## 2020-03-06 ENCOUNTER — TELEPHONE (OUTPATIENT)
Dept: SURGERY | Facility: CLINIC | Age: 75
End: 2020-03-06

## 2020-03-06 NOTE — TELEPHONE ENCOUNTER
Reason for Call:  Other appointment    Detailed comments: Patients daughter calling and wanted to know if Dr. Reinoso specializes in Varicose veins of both lower extremities, unspecified whether complicated [I83.93] and if there are any other doctors that do as well. Please call back to clarify.    Phone Number Patient can be reached at: Cell number on file:    Telephone Information:   Mobile [phone]       Best Time: any    Can we leave a detailed message on this number? YES    Call taken on 3/6/2020 at 12:28 PM by Nikolas Mendoza

## 2020-03-06 NOTE — TELEPHONE ENCOUNTER
Returned call, left vm. I will check into this non urgent matter and call back when I know the answer.    Bia Ovalles RN Specialty Triage 3/6/2020 3:27 PM

## 2020-03-09 NOTE — TELEPHONE ENCOUNTER
Called pt back and left VM. Dr Reinoso does take care of varicose veins. Left call back number.    Bia Ovalles, SERA Specialty Triage 3/9/2020 2:08 PM

## 2020-04-06 ENCOUNTER — TELEPHONE (OUTPATIENT)
Dept: LAB | Facility: CLINIC | Age: 75
End: 2020-04-06

## 2020-04-06 NOTE — TELEPHONE ENCOUNTER
.Left message for patient to call in regards to 24 hour pre-appointment COVID screening. Patient has an appointment at [place] lab on 4/7/2020 Please ask infection screening questions and update appointment notes on [place] lab schedule.    Delmi Licona on 4/6/2020 at 3:08 PM

## 2020-04-07 ENCOUNTER — TELEPHONE (OUTPATIENT)
Dept: FAMILY MEDICINE | Facility: CLINIC | Age: 75
End: 2020-04-07

## 2020-04-07 ENCOUNTER — ANTICOAGULATION THERAPY VISIT (OUTPATIENT)
Dept: FAMILY MEDICINE | Facility: CLINIC | Age: 75
End: 2020-04-07

## 2020-04-07 DIAGNOSIS — Z79.01 LONG TERM CURRENT USE OF ANTICOAGULANT THERAPY: ICD-10-CM

## 2020-04-07 DIAGNOSIS — I48.0 PAROXYSMAL ATRIAL FIBRILLATION (H): ICD-10-CM

## 2020-04-07 LAB — INR PPP: 2.6 (ref 0.86–1.14)

## 2020-04-07 PROCEDURE — 85610 PROTHROMBIN TIME: CPT | Performed by: FAMILY MEDICINE

## 2020-04-07 PROCEDURE — 36416 COLLJ CAPILLARY BLOOD SPEC: CPT | Performed by: FAMILY MEDICINE

## 2020-04-07 NOTE — TELEPHONE ENCOUNTER
Anticoagulation Management    Unable to reach [place] today.    Today's INR result of 2.6 is therapeutic (goal INR of 2.0-3.0).  Result received from: Clinic Lab    Follow up required to follow up with lab apt 6 weeks    Left message to continue current dose of warfarin 3 mg tonight.      Anticoagulation clinic to follow up    Danielle Vera RN  Transfer return call to: [phone] home working remote. [place] number left for patient

## 2020-04-07 NOTE — PROGRESS NOTES
Message left on daughters phone dosing instructions to stay on current dose. And schedule apt in 6 weeks.  Danielle Vera,Clinic Rn  [place]

## 2020-04-07 NOTE — TELEPHONE ENCOUNTER
Forms received from: [employer]   Phone number listed: [phone]   Fax listed: [phone]  Date received: 04/07/2020  Form description: Physician Order for Diabetes Testing Supplies  Once forms are completed, please return to [employer] via fax.  Is patient requesting to be contacted when forms are completed: NA    Form placed: In provider's basket  Rosalie Miller

## 2020-04-14 NOTE — TELEPHONE ENCOUNTER
Christine with [employer] calling to check on status of form. TC never received form back from PCP. Will route to PCP to address the week of 04/20.     Dr Ho, will you be signing this form? If not, route back to TC to communicate message to [employer] at [phone], ext [phone].

## 2020-04-20 NOTE — TELEPHONE ENCOUNTER
"I am out of the office this week, but will look at the form next week when I return.  I could potentially signed a form for the patient.  I think I received the form but thought it may have been a \"junk/phishing\" form and I was not sure who the patient was going to be seeing in the future for her primary care.  She will still need to determine that at some point.  "

## 2020-04-30 ENCOUNTER — TELEPHONE (OUTPATIENT)
Dept: VASCULAR SURGERY | Facility: CLINIC | Age: 75
End: 2020-04-30

## 2020-04-30 NOTE — TELEPHONE ENCOUNTER
April 30, 2020    Patient's daughter called on behalf of the patient regarding a referral that was submitted in March for Varicose Veins.      She has attempted to contact  [facility] multiple times since her initial contact with them, and per the instruction of staff in light of COVID-19.     She is wanting to get this issue addressed because her mother is expressing that her symptoms are worsening.     I attempted to explain to her what directives our clinics have been given in regards to scheduling and staffing in light of COVID-19.     Alicia can be reached at # [phone].  She is hoping to receive a call ASA.     Routing to  [employer] NURSE [facility].     Elidia Dorsey    [facility]  Office: [phone]  Fax [phone]

## 2020-04-30 NOTE — TELEPHONE ENCOUNTER
Called pt's daughter Alicia dominguez. She stated pt has been having painful bilateral varicose veins and itching as well. Instructed her to start with a good moisturizer, such as Eucerin and then if still itching, can try Cortisone 10 cream lightly, twice daily and Benadryl cream may help as well. Also instructed pt's daughter that pt should try wearing compression hose everyday during waking hours only to see if they alleviate her leg pain. Explained to her that compression hose is considered conservative therapy that is required to be completed for at least 3 months per majority of insurance companies before they will consider or approve any procedures for painful varicose veins.    Pt's daughter is aware that our Vein clinic is closed at this time due to COVID-19 and so we are unable to do ultrasounds or see any patients in person. Offered virtual visit for her mother with  as he is available anytime next week. Pt's daughter said she would start with these conservative measures listed above and call back if she'd like a virtual appt at this time.    Katherine Eason, BRIANAN, RN  [facility]  [facility] [employer]

## 2020-05-09 DIAGNOSIS — F41.9 ANXIETY: ICD-10-CM

## 2020-05-11 RX ORDER — PAROXETINE 20 MG/1
TABLET, FILM COATED ORAL
Qty: 90 TABLET | Refills: 0 | Status: SHIPPED | OUTPATIENT
Start: 2020-05-11 | End: 2020-08-10

## 2020-05-19 ENCOUNTER — ANTICOAGULATION THERAPY VISIT (OUTPATIENT)
Dept: FAMILY MEDICINE | Facility: CLINIC | Age: 75
End: 2020-05-19

## 2020-05-19 DIAGNOSIS — I48.0 PAROXYSMAL ATRIAL FIBRILLATION (H): ICD-10-CM

## 2020-05-19 DIAGNOSIS — Z79.01 LONG TERM CURRENT USE OF ANTICOAGULANT THERAPY: ICD-10-CM

## 2020-05-19 LAB
CAPILLARY BLOOD COLLECTION: NORMAL
INR PPP: 2.2 (ref 0.86–1.14)

## 2020-05-19 PROCEDURE — 36416 COLLJ CAPILLARY BLOOD SPEC: CPT | Performed by: FAMILY MEDICINE

## 2020-05-19 PROCEDURE — 99207 ZZC NO CHARGE NURSE ONLY: CPT | Performed by: FAMILY MEDICINE

## 2020-05-19 PROCEDURE — 85610 PROTHROMBIN TIME: CPT | Performed by: FAMILY MEDICINE

## 2020-05-19 NOTE — PROGRESS NOTES
ANTICOAGULATION FOLLOW-UP TELEPHONE VISIT    Patient Name:  Selvin Rockwell  Date:  2020  Contact Type:  Telephone/ I spoke to daughter regarding therapeutic INR result.  report per below.  same plan of care.  she would like to call clinic to arrange appointment at another time.    SUBJECTIVE: 6 wk jodie  Patient Findings     Comments:   No concerns identified today        Clinical Outcomes     Negatives:   Major bleeding event, Thromboembolic event, Anticoagulation-related hospital admission, Anticoagulation-related ED visit, Anticoagulation-related fatality    Comments:   No concerns identified today           OBJECTIVE    Recent labs: (last 7 days)     20  1152   INR 2.20*       ASSESSMENT / PLAN  INR assessment THER    Recheck INR In: 6 WEEKS    INR Location Outside lab      Anticoagulation Summary  As of 2020    INR goal:   2.0-3.0   TTR:   100.0 % (8 mo)   INR used for dosin.20 (2020)   Warfarin maintenance plan:   3 mg (2 mg x 1.5) every day   Full warfarin instructions:   3 mg every day   Weekly warfarin total:   21 mg   No change documented:   Rosamaria Levine RN   Plan last modified:   Tana Parker RN (2019)   Next INR check:   2020   Priority:   Maintenance   Target end date:   Indefinite    Indications    Paroxysmal atrial fibrillation (H) [I48.0]             Anticoagulation Episode Summary     INR check location:   Anticoagulation Clinic    Preferred lab:   [facility]    Send INR reminders to:   [facility]    Comments:   Transfer from [facility] - Patient states has been on coumadin for 2-3 years      Anticoagulation Care Providers     Provider Role Specialty Phone number    Gurinder Ho MD  Family Practice [phone]            See the Encounter Report to view Anticoagulation Flowsheet and Dosing Calendar (Go to Encounters tab in chart review, and find the Anticoagulation Therapy Visit)    Dosage adjustment made  based on physician directed care plan.    Rosamaria Levine RN

## 2020-05-21 ENCOUNTER — TELEPHONE (OUTPATIENT)
Dept: SURGERY | Facility: CLINIC | Age: 75
End: 2020-05-21

## 2020-05-21 NOTE — TELEPHONE ENCOUNTER
Reason for call:  Symptom   Symptom or request: veins in legs     Duration (how long have symptoms been present):   Have you been treated for this before? No    Additional comments: Patient's daughter stating that the veins are popping out & patient states they're tight. Please call to advise.     Phone number to reach patient:  Home number on file [phone] (home)    Best Time:  Any     Can we leave a detailed message on this number?  YES    Travel screening: Not Applicable

## 2020-05-21 NOTE — TELEPHONE ENCOUNTER
Returned call to daughter, appt made FOR IN PERSON/ FACE to FACE visit.    Bia Ovalles RN Specialty Triage 5/21/2020 11:01 AM

## 2020-05-22 ENCOUNTER — TELEPHONE (OUTPATIENT)
Dept: FAMILY MEDICINE | Facility: CLINIC | Age: 75
End: 2020-05-22

## 2020-05-22 DIAGNOSIS — E11.9 TYPE 2 DIABETES, HBA1C GOAL < 7% (H): ICD-10-CM

## 2020-05-22 DIAGNOSIS — E78.5 HYPERLIPIDEMIA WITH TARGET LDL LESS THAN 100: Primary | ICD-10-CM

## 2020-05-22 DIAGNOSIS — I48.0 PAROXYSMAL ATRIAL FIBRILLATION (H): ICD-10-CM

## 2020-05-22 DIAGNOSIS — Z79.01 LONG TERM (CURRENT) USE OF ANTICOAGULANTS: ICD-10-CM

## 2020-05-22 RX ORDER — SIMVASTATIN 10 MG
10 TABLET ORAL DAILY
Status: CANCELLED | OUTPATIENT
Start: 2020-05-22

## 2020-05-22 RX ORDER — WARFARIN SODIUM 2 MG/1
TABLET ORAL
Qty: 94 TABLET | Refills: 0 | Status: SHIPPED | OUTPATIENT
Start: 2020-05-22 | End: 2020-08-10

## 2020-05-22 RX ORDER — SIMVASTATIN 10 MG
10 TABLET ORAL AT BEDTIME
Qty: 90 TABLET | Refills: 1 | Status: SHIPPED | OUTPATIENT
Start: 2020-05-22 | End: 2020-11-16

## 2020-05-22 NOTE — TELEPHONE ENCOUNTER
Patient was last seen 1/8/20 by Dr. Ho for routine visit, saw Stephanie 2/6/20 for knee pain.    I see note today relayed by MA to patient/family that she needs to establish with new PCP.   Routed to Dr. Ho, appears they consider Dr. Ho PCP?      Georgina Mcdermott RN  [facility]

## 2020-05-22 NOTE — TELEPHONE ENCOUNTER
NO answer, LM that meds were refilled and that she should establish care with  a new PCP and get her warfarin refills from the INR clinic in the future.

## 2020-05-22 NOTE — TELEPHONE ENCOUNTER
Patient's Daughter called to check on status of refill she states patient has been out of both medications for a couple days, please call Alicia to let her know if this is sent today [phone], ok to detailed msg if no answer.      metFORMIN (GLUCOPHAGE) 500 MG tablet     simvastatin (ZOCOR) 10 MG tablet      Thank you,  Debora KWONG  [facility] [place]  Team [place] Coordinator

## 2020-05-22 NOTE — TELEPHONE ENCOUNTER
Her simvastatin, metformin, and warfarin meds were refilled.  She needs to establish care at some point with a new PCP and she should get further warfarin refills from the INR clinic in the future.

## 2020-05-22 NOTE — TELEPHONE ENCOUNTER
Reason for Call:  Other call back    Detailed comments: PT's daughter would like to know the reason they need to change PCP.    Phone Number Patient can be reached at: [phone]    Best Time: Anytime    Can we leave a detailed message on this number? YES    Call taken on 5/22/2020 at 4:10 PM by Mari Riojas

## 2020-05-26 NOTE — TELEPHONE ENCOUNTER
Left message to call clinic and schedule appointment with Dr. Ho if Gem would like Dr. Ho to be her PCP.   Jaida Chandra CMA

## 2020-05-26 NOTE — TELEPHONE ENCOUNTER
"She can see me if she would like.  By a \"new PCP\", I just meant that as of January of this year she had been seeing multiple different PCPs, including Keily Beckman and Dr. JHA, who are no longer in the clinic, and she had not established care with anybody.  If she would like to establish care with me, then that would be fine.  "

## 2020-05-27 ENCOUNTER — OFFICE VISIT (OUTPATIENT)
Dept: SURGERY | Facility: CLINIC | Age: 75
End: 2020-05-27
Payer: MEDICARE

## 2020-05-27 VITALS
BODY MASS INDEX: 41.77 KG/M2 | WEIGHT: 199 LBS | SYSTOLIC BLOOD PRESSURE: 129 MMHG | HEART RATE: 91 BPM | HEIGHT: 58 IN | DIASTOLIC BLOOD PRESSURE: 82 MMHG

## 2020-05-27 DIAGNOSIS — I87.2 VENOUS STASIS DERMATITIS OF BOTH LOWER EXTREMITIES: ICD-10-CM

## 2020-05-27 DIAGNOSIS — I83.813 VARICOSE VEINS OF BILATERAL LOWER EXTREMITIES WITH PAIN: Primary | ICD-10-CM

## 2020-05-27 DIAGNOSIS — R60.0 BILATERAL LOWER EXTREMITY EDEMA: ICD-10-CM

## 2020-05-27 PROCEDURE — 99204 OFFICE O/P NEW MOD 45 MIN: CPT | Performed by: SURGERY

## 2020-05-27 ASSESSMENT — MIFFLIN-ST. JEOR: SCORE: 1292.28

## 2020-05-27 NOTE — PROGRESS NOTES
"Dear Gurinder Villafuerte  I have seen Selvin Rockwell and as you know his chief complaint is bilateral leg painful varicose veins.  Has diabetes, her feet feel very full.    HPI:  Patient is a 75 year old .female  with complaints of leg pain where the varicose veins are.  It is described as a pulling.   and wants to massage the area especially when at rest or in the evening.  Patient has not worn compression stockings has trouble putting them on. , has not deep vein thrombosis or trauma to the leg  Patient has  family hx of varicose veins.  Elevating legs does help symptoms.  Patient has lower extremity edema.  Patient has needed pain medications for vein pain.  Patient has had veins interfere with activities of daily living   Patient has not venous stasis ulcers.  Patient has not had bleeding from veins  Review Of Systems  Cardiovascular: coumadin for atrial fibulation  Respiratory: Shortness of breath- with stairs  Endocrine: diabetes  10 point review of systems are negative other than above.   /82   Pulse 91   Ht 1.473 m (4' 9.99\")   Wt 90.3 kg (199 lb)   BMI 41.60 kg/m      Past Medical History:   Diagnosis Date     Atrophy of left kidney 3/27/2017     Diabetes mellitus, type 2 (H) 2006     HTN (hypertension) 12/5/2012     Hyperlipidemia LDL goal < 100      OA (OSTEOARTHRITIS) OF KNEE - right 7/13/2010     OA (OSTEOARTHRITIS) OF KNEE - right      TACHO (obstructive sleep apnea) 12/7/2012     Paroxysmal atrial fibrillation (H) 7/11/2018        Past Surgical History:   Procedure Laterality Date     AS ESOPHAGOSCOPY, DIAGNOSTIC  07/2019     C HAND/FINGER SURGERY UNLISTED       C REMOVAL OF KIDNEY STONE       C TOTAL KNEE ARTHROPLASTY  7/21/10    left     C TOTAL KNEE ARTHROPLASTY  12/12/12    Right     CARPAL TUNNEL RELEASE RT/LT       COLONOSCOPY  07/2019     TONSILLECTOMY         Social History     Socioeconomic History     Marital status:      Spouse name: Not on file     Number of children: " Not on file     Years of education: Not on file     Highest education level: Not on file   Occupational History     Not on file   Social Needs     Financial resource strain: Not on file     Food insecurity     Worry: Not on file     Inability: Not on file     Transportation needs     Medical: Not on file     Non-medical: Not on file   Tobacco Use     Smoking status: Never Smoker     Smokeless tobacco: Never Used   Substance and Sexual Activity     Alcohol use: No     Drug use: No     Sexual activity: Yes     Partners: Male     Birth control/protection: Post-menopausal   Lifestyle     Physical activity     Days per week: Not on file     Minutes per session: Not on file     Stress: Not on file   Relationships     Social connections     Talks on phone: Not on file     Gets together: Not on file     Attends Hindu service: Not on file     Active member of club or organization: Not on file     Attends meetings of clubs or organizations: Not on file     Relationship status: Not on file     Intimate partner violence     Fear of current or ex partner: Not on file     Emotionally abused: Not on file     Physically abused: Not on file     Forced sexual activity: Not on file   Other Topics Concern     Parent/sibling w/ CABG, MI or angioplasty before 65F 55M? Not Asked   Social History Narrative     Not on file       Current Outpatient Medications   Medication Sig Dispense Refill     carvedilol (COREG) 6.25 MG tablet Take 1 tablet (6.25 mg) by mouth 2 times daily 180 tablet 3     clotrimazole (LOTRIMIN) 1 % external cream        ferrous sulfate (FEROSUL) 325 (65 Fe) MG tablet Take 1 tablet (325 mg) by mouth daily (with breakfast) (Patient not taking: Reported on 1/8/2020) 90 tablet 3     FISH OIL 1000 MG PO CAPS 3 CAPSULES DAILY WITH A MEAL       furosemide (LASIX) 20 MG tablet Take 1 tablet (20 mg) by mouth daily for 5 days 5 tablet 0     isosorbide mononitrate (IMDUR) 30 MG 24 hr tablet Take 1 tablet (30 mg) by mouth daily  90 tablet 3     metFORMIN (GLUCOPHAGE) 500 MG tablet Take 1 tablet (500 mg) by mouth 2 times daily (with meals) 180 tablet 1     omeprazole (PRILOSEC) 40 MG DR capsule        order for DME Vinh hose stockings (Patient not taking: Reported on 1/8/2020) 1 Device 0     OTHER MEDICAL SUPPLIES 1 Package by Other route daily. (Patient not taking: Reported on 1/8/2020) 1 Package 0     PARoxetine (PAXIL) 20 MG tablet TAKE 1 TABLET BY MOUTH IN THE MORNING 90 tablet 0     simvastatin (ZOCOR) 10 MG tablet Take 1 tablet (10 mg) by mouth At Bedtime 90 tablet 1     triamcinolone (KENALOG) 0.1 % external cream Apply sparingly to bilateral legs three times daily for 14 days. (Patient not taking: Reported on 1/8/2020) 45 g 1     warfarin ANTICOAGULANT (COUMADIN) 2 MG tablet TAKE 1 & 1/2 (ONE & ONE-HALF) TABLETS ( 3 MG) BY MOUTH ONCE DAILY OR AS DIRECTED BY INR CLINIC 94 tablet 0        Physical exam:  Patient able to get up on table without difficulty.  Head eyes, nose and mouth within normal limits.  No supraclavicular or cervical adenopathy palpated.  Thyroid within normal limits.  No carotid bruits auscultated.  Lungs are clear to auscultation  Heart is regular rate and rhythm with mid systolic murmur or thrills noted.  No costal vertebral angle tenderness noted.  Abdomen is abdomen is soft without significant tenderness, masses, organomegaly or guarding  bowel sounds are positive and no caput medusa noted.  No obvious hernias noted.  Easily palpable posterior tibial pulse or dorsalis pedis pulse bilaterally.  Lower extremity edema is  present.    Using the vein light on the bilateral leg there are numerous varicose veins following the greater saphenous vein distribution started in the mid thigh and going posterior.  Then at the lesser saphenous vein site see  Were the veins going to the lateral malleolus.  Most of the veins are posterior.  Has lower extremity edema edema and venous stasis dermatitis.   She complains of  More pain  in the left lateral foot.      Assessment is painful bilateral leg varicose veins.  Plan is ultrasound of the bilateral leg.  If the ultrasound shows that the bilateral greater saphenous vein is incompetent then will need greater saphenous vein ligation and stripping and can remove secondary veins at time of surgery or with sclerotherapy.  Or perhaps ablation.    Options were discussed including wearing of compression stockings, surgery and sclerotherapy.  Script for compression stockings was given.  Surgery with risks and complications were briefly discussed along with that of sclerotherapy.    Risks of surgery include damage to nerves, bleeding, infection, not getting all the veins and multiple punch biopsies over the veins that will drain over several days after surgery.  These punch hole usually do heal well but may leave some scarring.  Also discussed what to expect after surgery and when to follow up with me in clinic and to bring their compression stockings with them to clinic.    Risks of sclerotherapy include multiple episodes of the procedure, insurance may not cover it, bleeding and bruising.  Less likely risk include infection.    Please wear compression stockings and see if they help your discomfort.  Then when you come back to see me let me know.  You will need to bring these with you for sclerotherapy or for your post op visit after surgery.  I would suggest going to either the [employer] medical supply store next to [facility] or to [employer] ([phone]) on [address].  They will need to measure your legs to get the right fit.  If you go somewhere else and they do not measure your legs do not get them there.  It is important that you get the right size.  Please allow several days after you are measured to get your stockings.  They may not have your size in stock and will have to order them.    Please wear your compression stocking as some insurance  companies will want to wear them for at least 3 months before they even consider paying for your surgery or sclerotherapy.    CALL [phone]  to get the utrasound set up.        You must follow up with me in the clinic to go over your utrasound results.  I will not be calling you with the results.   It is very difficult to do this over  the phone.  I am not able to show you while looking at your leg what the next step should be.      Time spent with the patient with greater that 50% of the time in discussion was 30 minutes.  In discussing the veins and the plan.      Braulio Reinoso MD

## 2020-05-27 NOTE — PATIENT INSTRUCTIONS
Easily palpable posterior tibial pulse or dorsalis pedis pulse bilaterally.  Lower extremity edema is  present.    Using the vein light on the bilateral leg there are numerous varicose veins following the greater saphenous vein distribution started in the mid thigh and going posterior.  Then at the lesser saphenous vein site see  Were the veins going to the lateral malleolus.  Most of the veins are posterior.  Has lower extremity edema edema and venous stasis dermatitis.   She complains of  More pain in the left lateral foot.      Assessment is painful bilateral leg varicose veins.  Plan is ultrasound of the bilateral leg.  If the ultrasound shows that the bilateral greater saphenous vein is incompetent then will need greater saphenous vein ligation and stripping and can remove secondary veins at time of surgery or with sclerotherapy.  Or perhaps ablation.    Options were discussed including wearing of compression stockings, surgery and sclerotherapy.  Script for compression stockings was given.  Surgery with risks and complications were briefly discussed along with that of sclerotherapy.    Risks of surgery include damage to nerves, bleeding, infection, not getting all the veins and multiple punch biopsies over the veins that will drain over several days after surgery.  These punch hole usually do heal well but may leave some scarring.  Also discussed what to expect after surgery and when to follow up with me in clinic and to bring their compression stockings with them to clinic.    Risks of sclerotherapy include multiple episodes of the procedure, insurance may not cover it, bleeding and bruising.  Less likely risk include infection.    Please wear compression stockings and see if they help your discomfort.  Then when you come back to see me let me know.  You will need to bring these with you for sclerotherapy or for your post op visit after surgery.  I would suggest going to either the [employer]  medical supply store next to [facility] or to [employer] ([phone]) on [address].  They will need to measure your legs to get the right fit.  If you go somewhere else and they do not measure your legs do not get them there.  It is important that you get the right size.  Please allow several days after you are measured to get your stockings.  They may not have your size in stock and will have to order them.    Please wear your compression stocking as some insurance companies will want to wear them for at least 3 months before they even consider paying for your surgery or sclerotherapy.    CALL [phone]  to get the utrasound set up.        You must follow up with me in the clinic to go over your utrasound results.  I will not be calling you with the results.   It is very difficult to do this over  the phone.  I am not able to show you while looking at your leg what the next step should be.

## 2020-05-27 NOTE — LETTER
"    5/27/2020         RE: Selvin Rockwell  [address] [place] [id number]        Dear Colleague,    Thank you for referring your patient, Selvin Rockwell, to the [facility]. Please see a copy of my visit note below.    Dear Gurinder Villafuerte  I have seen Selvin Rockwell and as you know his chief complaint is bilateral leg painful varicose veins.  Has diabetes, her feet feel very full.    HPI:  Patient is a 75 year old .female  with complaints of leg pain where the varicose veins are.  It is described as a pulling.   and wants to massage the area especially when at rest or in the evening.  Patient has not worn compression stockings has trouble putting them on. , has not deep vein thrombosis or trauma to the leg  Patient has  family hx of varicose veins.  Elevating legs does help symptoms.  Patient has lower extremity edema.  Patient has needed pain medications for vein pain.  Patient has had veins interfere with activities of daily living   Patient has not venous stasis ulcers.  Patient has not had bleeding from veins  Review Of Systems  Cardiovascular: coumadin for atrial fibulation  Respiratory: Shortness of breath- with stairs  Endocrine: diabetes  10 point review of systems are negative other than above.   /82   Pulse 91   Ht 1.473 m (4' 9.99\")   Wt 90.3 kg (199 lb)   BMI 41.60 kg/m      Past Medical History:   Diagnosis Date     Atrophy of left kidney 3/27/2017     Diabetes mellitus, type 2 (H) 2006     HTN (hypertension) 12/5/2012     Hyperlipidemia LDL goal < 100      OA (OSTEOARTHRITIS) OF KNEE - right 7/13/2010     OA (OSTEOARTHRITIS) OF KNEE - right      TACHO (obstructive sleep apnea) 12/7/2012     Paroxysmal atrial fibrillation (H) 7/11/2018        Past Surgical History:   Procedure Laterality Date     AS ESOPHAGOSCOPY, DIAGNOSTIC  07/2019     C HAND/FINGER SURGERY UNLISTED       C REMOVAL OF KIDNEY STONE       C TOTAL KNEE ARTHROPLASTY  7/21/10    left "     C TOTAL KNEE ARTHROPLASTY  12/12/12    Right     CARPAL TUNNEL RELEASE RT/LT       COLONOSCOPY  07/2019     TONSILLECTOMY         Social History     Socioeconomic History     Marital status:      Spouse name: Not on file     Number of children: Not on file     Years of education: Not on file     Highest education level: Not on file   Occupational History     Not on file   Social Needs     Financial resource strain: Not on file     Food insecurity     Worry: Not on file     Inability: Not on file     Transportation needs     Medical: Not on file     Non-medical: Not on file   Tobacco Use     Smoking status: Never Smoker     Smokeless tobacco: Never Used   Substance and Sexual Activity     Alcohol use: No     Drug use: No     Sexual activity: Yes     Partners: Male     Birth control/protection: Post-menopausal   Lifestyle     Physical activity     Days per week: Not on file     Minutes per session: Not on file     Stress: Not on file   Relationships     Social connections     Talks on phone: Not on file     Gets together: Not on file     Attends [employer] service: Not on file     Active member of club or organization: Not on file     Attends meetings of clubs or organizations: Not on file     Relationship status: Not on file     Intimate partner violence     Fear of current or ex partner: Not on file     Emotionally abused: Not on file     Physically abused: Not on file     Forced sexual activity: Not on file   Other Topics Concern     Parent/sibling w/ CABG, MI or angioplasty before 65F 55M? Not Asked   Social History Narrative     Not on file       Current Outpatient Medications   Medication Sig Dispense Refill     carvedilol (COREG) 6.25 MG tablet Take 1 tablet (6.25 mg) by mouth 2 times daily 180 tablet 3     clotrimazole (LOTRIMIN) 1 % external cream        ferrous sulfate (FEROSUL) 325 (65 Fe) MG tablet Take 1 tablet (325 mg) by mouth daily (with breakfast) (Patient not taking: Reported on 1/8/2020)  90 tablet 3     FISH OIL 1000 MG PO CAPS 3 CAPSULES DAILY WITH A MEAL       furosemide (LASIX) 20 MG tablet Take 1 tablet (20 mg) by mouth daily for 5 days 5 tablet 0     isosorbide mononitrate (IMDUR) 30 MG 24 hr tablet Take 1 tablet (30 mg) by mouth daily 90 tablet 3     metFORMIN (GLUCOPHAGE) 500 MG tablet Take 1 tablet (500 mg) by mouth 2 times daily (with meals) 180 tablet 1     omeprazole (PRILOSEC) 40 MG DR capsule        order for DME Vinh hospriyanka stockings (Patient not taking: Reported on 1/8/2020) 1 Device 0     OTHER MEDICAL SUPPLIES 1 Package by Other route daily. (Patient not taking: Reported on 1/8/2020) 1 Package 0     PARoxetine (PAXIL) 20 MG tablet TAKE 1 TABLET BY MOUTH IN THE MORNING 90 tablet 0     simvastatin (ZOCOR) 10 MG tablet Take 1 tablet (10 mg) by mouth At Bedtime 90 tablet 1     triamcinolone (KENALOG) 0.1 % external cream Apply sparingly to bilateral legs three times daily for 14 days. (Patient not taking: Reported on 1/8/2020) 45 g 1     warfarin ANTICOAGULANT (COUMADIN) 2 MG tablet TAKE 1 & 1/2 (ONE & ONE-HALF) TABLETS ( 3 MG) BY MOUTH ONCE DAILY OR AS DIRECTED BY INR CLINIC 94 tablet 0        Physical exam:  Patient able to get up on table without difficulty.  Head eyes, nose and mouth within normal limits.  No supraclavicular or cervical adenopathy palpated.  Thyroid within normal limits.  No carotid bruits auscultated.  Lungs are clear to auscultation  Heart is regular rate and rhythm with mid systolic murmur or thrills noted.  No costal vertebral angle tenderness noted.  Abdomen is abdomen is soft without significant tenderness, masses, organomegaly or guarding  bowel sounds are positive and no caput medusa noted.  No obvious hernias noted.  Easily palpable posterior tibial pulse or dorsalis pedis pulse bilaterally.  Lower extremity edema is  present.    Using the vein light on the bilateral leg there are numerous varicose veins following the greater saphenous vein distribution  started in the mid thigh and going posterior.  Then at the lesser saphenous vein site see  Were the veins going to the lateral malleolus.  Most of the veins are posterior.  Has lower extremity edema edema and venous stasis dermatitis.   She complains of  More pain in the left lateral foot.      Assessment is painful bilateral leg varicose veins.  Plan is ultrasound of the bilateral leg.  If the ultrasound shows that the bilateral greater saphenous vein is incompetent then will need greater saphenous vein ligation and stripping and can remove secondary veins at time of surgery or with sclerotherapy.  Or perhaps ablation.    Options were discussed including wearing of compression stockings, surgery and sclerotherapy.  Script for compression stockings was given.  Surgery with risks and complications were briefly discussed along with that of sclerotherapy.    Risks of surgery include damage to nerves, bleeding, infection, not getting all the veins and multiple punch biopsies over the veins that will drain over several days after surgery.  These punch hole usually do heal well but may leave some scarring.  Also discussed what to expect after surgery and when to follow up with me in clinic and to bring their compression stockings with them to clinic.    Risks of sclerotherapy include multiple episodes of the procedure, insurance may not cover it, bleeding and bruising.  Less likely risk include infection.    Please wear compression stockings and see if they help your discomfort.  Then when you come back to see me let me know.  You will need to bring these with you for sclerotherapy or for your post op visit after surgery.  I would suggest going to either the [employer] medical supply store next to [facility] or to [employer] ([phone]) on [address].  They will need to measure your legs to get the right fit.  If you go somewhere else and they do not measure your legs do not  get them there.  It is important that you get the right size.  Please allow several days after you are measured to get your stockings.  They may not have your size in stock and will have to order them.    Please wear your compression stocking as some insurance companies will want to wear them for at least 3 months before they even consider paying for your surgery or sclerotherapy.    CALL [phone]  to get the utrasound set up.        You must follow up with me in the clinic to go over your utrasound results.  I will not be calling you with the results.   It is very difficult to do this over  the phone.  I am not able to show you while looking at your leg what the next step should be.      Time spent with the patient with greater that 50% of the time in discussion was 30 minutes.  In discussing the veins and the plan.      Braulio Reinoso MD          Again, thank you for allowing me to participate in the care of your patient.        Sincerely,        Braulio Reinoso MD

## 2020-06-04 ENCOUNTER — ANCILLARY PROCEDURE (OUTPATIENT)
Dept: GENERAL RADIOLOGY | Facility: CLINIC | Age: 75
End: 2020-06-04
Attending: PREVENTIVE MEDICINE
Payer: MEDICARE

## 2020-06-04 ENCOUNTER — DOCUMENTATION ONLY (OUTPATIENT)
Dept: LAB | Facility: CLINIC | Age: 75
End: 2020-06-04

## 2020-06-04 ENCOUNTER — OFFICE VISIT (OUTPATIENT)
Dept: ORTHOPEDICS | Facility: CLINIC | Age: 75
End: 2020-06-04
Payer: MEDICARE

## 2020-06-04 ENCOUNTER — ANCILLARY PROCEDURE (OUTPATIENT)
Dept: ULTRASOUND IMAGING | Facility: CLINIC | Age: 75
End: 2020-06-04
Attending: SURGERY
Payer: MEDICARE

## 2020-06-04 VITALS — WEIGHT: 199 LBS | HEIGHT: 57 IN | BODY MASS INDEX: 42.93 KG/M2

## 2020-06-04 DIAGNOSIS — M25.512 ACUTE PAIN OF LEFT SHOULDER: ICD-10-CM

## 2020-06-04 DIAGNOSIS — I83.813 VARICOSE VEINS OF BILATERAL LOWER EXTREMITIES WITH PAIN: ICD-10-CM

## 2020-06-04 DIAGNOSIS — I87.2 VENOUS STASIS DERMATITIS OF BOTH LOWER EXTREMITIES: ICD-10-CM

## 2020-06-04 DIAGNOSIS — R60.0 BILATERAL LOWER EXTREMITY EDEMA: ICD-10-CM

## 2020-06-04 DIAGNOSIS — M25.512 ACUTE PAIN OF LEFT SHOULDER: Primary | ICD-10-CM

## 2020-06-04 ASSESSMENT — MIFFLIN-ST. JEOR: SCORE: 1276.54

## 2020-06-04 NOTE — PROGRESS NOTES
SPORTS & ORTHOPEDIC WALK-IN VISIT 6/4/2020    Primary Care Physician: Dr. Ho    She fell when getting onto a bed for an US. She fell on her left shoulder and left side of her back. The majority of her pain is on the lateral shoulder.     Reason for visit:     What part of your body is injured / painful?  left shoulder    What caused the injury /pain? Fall    How long ago did your injury occur or pain begin? today    What are your most bothersome symptoms? Pain    How would you characterize your symptom?      What makes your symptoms better? Ice    What makes your symptoms worse? Movement and Overhead motion    Have you been previously seen for this problem? No    Medical History:    Any recent changes to your medical history? No    Any new medication prescribed since last visit? No    Have you had surgery on this body part before? No    Social History:    Occupation:     Handedness: Right    Exercise:     Review of Systems:    Do you have fever, chills, weight loss? No    Do you have any vision problems? No    Do you have any chest pain or edema? No    Do you have any shortness of breath or wheezing?  No    Do you have stomach problems? No    Do you have any numbness or focal weakness? No    Do you have diabetes? Yes    Do you have problems with bleeding or clotting? Yes, warfrin    Do you have an rashes or other skin lesions? No       HISTORY OF PRESENT ILLNESS  Ms. Rockwell is a pleasant 75 year old year old female who presents to clinic today with left shoulder injury  Selvin explains that she accidentally fell downstairs in radiology while having an imaging study completed on her legs, the table she was attempting to sit on 'was no locked in place' per the patient and her daughter, and she fell when the table gave out  She landed on her left shoulder, she did not hit her head  Location: left shoulder  Quality:  achy pain    Severity: 8/10 at worst    Duration: a few hours  Timing: occurs  intermittently  Context: occurs while trying to lift her shoulder  Modifying factors:  resting and non-use makes it better, movement and use makes it worse  Associated signs & symptoms: some radiation into upper arm from shoulder and into neck    MEDICAL HISTORY  Patient Active Problem List   Diagnosis     OA (OSTEOARTHRITIS) OF KNEE - right     Status Post Total Knee Replacement - bilateral     TACHO (obstructive sleep apnea)     Hyperlipidemia with target LDL less than 100     Hypertension goal BP (blood pressure) < 140/90     Type 2 diabetes, HbA1c goal < 7% (H)     Morbid obesity (H)     Achalasia     Adrenal adenoma     Atrophy of left kidney     Calculus of kidney     Chronic low back pain     Gout     Paroxysmal atrial fibrillation (H)     Long term (current) use of anticoagulants     Anxiety     Spinal stenosis of lumbar region with neurogenic claudication       Current Outpatient Medications   Medication Sig Dispense Refill     carvedilol (COREG) 6.25 MG tablet Take 1 tablet (6.25 mg) by mouth 2 times daily 180 tablet 3     clotrimazole (LOTRIMIN) 1 % external cream        FISH OIL 1000 MG PO CAPS 3 CAPSULES DAILY WITH A MEAL       isosorbide mononitrate (IMDUR) 30 MG 24 hr tablet Take 1 tablet (30 mg) by mouth daily 90 tablet 3     metFORMIN (GLUCOPHAGE) 500 MG tablet Take 1 tablet (500 mg) by mouth 2 times daily (with meals) 180 tablet 1     omeprazole (PRILOSEC) 40 MG DR capsule        order for DME 15-20 mm mercury thigh high compression stockings 1-2 pairs  Or waist high if that will work for patient.   Also patient has trouble putting socks on so equipment that would help with that would be great. 2 Package 5     PARoxetine (PAXIL) 20 MG tablet TAKE 1 TABLET BY MOUTH IN THE MORNING 90 tablet 0     simvastatin (ZOCOR) 10 MG tablet Take 1 tablet (10 mg) by mouth At Bedtime 90 tablet 1     tiZANidine (ZANAFLEX) 4 MG tablet Take 1 tablet (4 mg) by mouth 3 times daily as needed for muscle spasms (Patient not  taking: Reported on 6/29/2020) 30 tablet 1     warfarin ANTICOAGULANT (COUMADIN) 2 MG tablet TAKE 1 & 1/2 (ONE & ONE-HALF) TABLETS ( 3 MG) BY MOUTH ONCE DAILY OR AS DIRECTED BY INR CLINIC 94 tablet 0     ferrous sulfate (FEROSUL) 325 (65 Fe) MG tablet Take 1 tablet (325 mg) by mouth daily (with breakfast) (Patient not taking: Reported on 6/29/2020) 90 tablet 3     furosemide (LASIX) 20 MG tablet Take 1 tablet (20 mg) by mouth daily for 5 days 5 tablet 0     methocarbamol (ROBAXIN) 500 MG tablet Take 1 tablet (500 mg) by mouth 4 times daily as needed for muscle spasms 40 tablet 0     order for DME Vinh hose stockings 1 Device 0     OTHER MEDICAL SUPPLIES 1 Package by Other route daily. 1 Package 0     triamcinolone (KENALOG) 0.1 % external cream Apply sparingly to bilateral legs three times daily for 14 days. (Patient not taking: Reported on 6/29/2020) 45 g 1       Allergies   Allergen Reactions     Gabapentin      Rash and itching     Nsaids      Other reaction(s): Gastrointestinal  Former PPI user, EGD 2/2018 showed LA Grade D esophagitis, plus duodenitis and gastritis.  Famotidine on med list but may not have been taking - rx was 3 years old.      Sulfa Drugs Other (See Comments) and Rash     Per 11-4-13 H&P by Dr. Fei Arias.  RASH ALL OVER FOUND IN DR. RAMIREZ DICTATION OF 10/11/11  BACTRIM-  RASH ALL OVER       No family history on file.  Social History     Socioeconomic History     Marital status:      Spouse name: Not on file     Number of children: Not on file     Years of education: Not on file     Highest education level: Not on file   Occupational History     Not on file   Social Needs     Financial resource strain: Not on file     Food insecurity     Worry: Not on file     Inability: Not on file     Transportation needs     Medical: Not on file     Non-medical: Not on file   Tobacco Use     Smoking status: Never Smoker     Smokeless tobacco: Never Used   Substance and Sexual Activity     Alcohol  "use: No     Drug use: No     Sexual activity: Yes     Partners: Male     Birth control/protection: Post-menopausal   Lifestyle     Physical activity     Days per week: Not on file     Minutes per session: Not on file     Stress: Not on file   Relationships     Social connections     Talks on phone: Not on file     Gets together: Not on file     Attends [employer] service: Not on file     Active member of club or organization: Not on file     Attends meetings of clubs or organizations: Not on file     Relationship status: Not on file     Intimate partner violence     Fear of current or ex partner: Not on file     Emotionally abused: Not on file     Physically abused: Not on file     Forced sexual activity: Not on file   Other Topics Concern     Parent/sibling w/ CABG, MI or angioplasty before 65F 55M? Not Asked   Social History Narrative     Not on file       Additional medical/Social/Surgical histories reviewed in [facility] and updated as appropriate.     REVIEW OF SYSTEMS (7/6/2020)  10 point ROS of systems including Constitutional, Eyes, Respiratory, Cardiovascular, Gastroenterology, Genitourinary, Integumentary, Musculoskeletal, Psychiatric, Allergic/Immunologic were all negative except for pertinent positives noted in my HPI.     PHYSICAL EXAM  Vitals:    06/04/20 1459   Weight: 90.3 kg (199 lb)   Height: 1.448 m (4' 9\")     Vital Signs: Ht 1.448 m (4' 9\")   Wt 90.3 kg (199 lb)   BMI 43.06 kg/m   Patient declined being weighed. Body mass index is 43.06 kg/m .    General  - normal appearance, in no obvious distress  HEENT  - conjunctivae not injected, moist mucous membranes, normocephalic/atraumatic head, ears normal appearance, no lesions, mouth normal appearance, no scars, normal dentition and teeth present  CV  - normal radial pulse  Pulm  - normal respiratory pattern, non-labored  Musculoskeletal - left shoulder  - inspection: normal bone and joint alignment, no obvious deformity, no scapular winging, no AC " step-off  - palpation: no bony or soft tissue tenderness, normal clavicle, non-tender AC, some pain in posterior GH joint, no acute tenderness over scapula  - ROM:  limited flexion, IR, ER, abduction, painful at end range  - strength: 5/5  strength, 5/5 in all shoulder planes  - special tests:  (-) Speed's  (-) Neer  (-) Hawkin's  (-) Anne's  (+) Neavitt's  (+) load & shift, supine  (-) apprehension  (-) subscap lift-off  Neuro  - no sensory or motor deficit, grossly normal coordination, normal muscle tone  Skin  - no ecchymosis, erythema, warmth, or induration, no obvious rash  Psych  - interactive, appropriate, normal mood and affect        ASSESSMENT & PLAN  76 yo female with left shoulder glenohumeral arthritis, injury from fall, contusion  Reviewed xrays of shoulder: shows no fractures or dislocations, just severe GH arthritis  Given HEP and tizanadine, did not want to try any other medications  Have some small amount of suspicion for scapular injury, but no fracture identifiable on xray  Will f/u in 1-2 weeks and consider Ct imaging vs. Injection for gH joint        Gurinder Castellon MD, CAQSM

## 2020-06-04 NOTE — PROGRESS NOTES
Rapid Response Epic Documentation     Situation:   Female Pt. Was in the process of sitting on the bed when it moved and she slid down to the floor, hit her left shoulder on the bed.Pt is c/o pain to the left shoulder, denies hitting her head.     Objective:    Female Pt. Con and Alert Ox3 found lying on the bed, Skin is PWD, Tenderness noted to the left shoulder but appears to be intact, The pt. Will continue on with her Appt. And staff will contact Ortho for further follow up on the shoulder.    Assessment:            BP:  112/60    Pulse: 72  Respiration: 16  SPO2:  %  Glucose:mg/dl  Mental Status: Alert  CMS: Intact  Stroke Scale: Not Applicable  EKG: Not Performed      Treatment:          Location:      1 st floor Imaging    Disposition:      continue with her Appt.    Protocol Used:     Other fall

## 2020-06-04 NOTE — LETTER
6/4/2020     RE: Selvin Rockwell  [address] [place] [address]    Dear Colleague,    Thank you for referring your patient, Selvin Rockwell, to the [facility] SPORTS AND ORTHOPAEDIC WALK IN CLINIC. Please see a copy of my visit note below.          SPORTS & ORTHOPEDIC WALK-IN VISIT 6/4/2020    Primary Care Physician: Dr. Ho    She fell when getting onto a bed for an US. She fell on her left shoulder and left side of her back. The majority of her pain is on the lateral shoulder.     Reason for visit:     What part of your body is injured / painful?  left shoulder    What caused the injury /pain? Fall    How long ago did your injury occur or pain begin? today    What are your most bothersome symptoms? Pain    How would you characterize your symptom?      What makes your symptoms better? Ice    What makes your symptoms worse? Movement and Overhead motion    Have you been previously seen for this problem? No    Medical History:    Any recent changes to your medical history? No    Any new medication prescribed since last visit? No    Have you had surgery on this body part before? No    Social History:    Occupation:     Handedness: Right    Exercise:     Review of Systems:    Do you have fever, chills, weight loss? No    Do you have any vision problems? No    Do you have any chest pain or edema? No    Do you have any shortness of breath or wheezing?  No    Do you have stomach problems? No    Do you have any numbness or focal weakness? No    Do you have diabetes? Yes    Do you have problems with bleeding or clotting? Yes, warfrin    Do you have an rashes or other skin lesions? No       Again, thank you for allowing me to participate in the care of your patient.      Sincerely,    Gurinder Castellon MD

## 2020-06-05 ENCOUNTER — TELEPHONE (OUTPATIENT)
Dept: SURGERY | Facility: CLINIC | Age: 75
End: 2020-06-05

## 2020-06-05 NOTE — TELEPHONE ENCOUNTER
Reason for Call:  Other Results    Detailed comments: Patient's daughter calling, she would like to know if the results for the imaging they had done in [place] have been received yet. If so they would like to know what the next plan of action will be. Please call back.    Phone Number Patient can be reached at: Cell number on file:    Telephone Information:   Mobile [phone]       Best Time: any    Can we leave a detailed message on this number? YES    Call taken on 6/5/2020 at 12:33 PM by Nikolas Mendoza

## 2020-06-08 ENCOUNTER — TELEPHONE (OUTPATIENT)
Dept: SURGERY | Facility: CLINIC | Age: 75
End: 2020-06-08

## 2020-06-08 NOTE — TELEPHONE ENCOUNTER
Reason for call:  Other   Patient called regarding (reason for call): appointment  Additional comments: Patient's daughter calling regarding appointment if she can come with to the appointment 06/11/2020. Please call to advise.     Phone number to reach patient:  Other phone number: [phone]    Best Time:  Any     Can we leave a detailed message on this number?  YES    Travel screening: Not Applicable

## 2020-06-09 NOTE — TELEPHONE ENCOUNTER
Message left on  voicemail that it is fine for her to be in the room with her Mom.  She will need to wear a mask.    Leslye Casanova MA

## 2020-06-09 NOTE — TELEPHONE ENCOUNTER
Patients daughter is calling wondering if she can be in the room with her mom during her appt. She says her mom has a hard time understanding. Please call back [phone]

## 2020-06-11 ENCOUNTER — TELEPHONE (OUTPATIENT)
Dept: ORTHOPEDICS | Facility: CLINIC | Age: 75
End: 2020-06-11

## 2020-06-11 ENCOUNTER — TELEPHONE (OUTPATIENT)
Dept: SURGERY | Facility: CLINIC | Age: 75
End: 2020-06-11

## 2020-06-11 ENCOUNTER — OFFICE VISIT (OUTPATIENT)
Dept: SURGERY | Facility: CLINIC | Age: 75
End: 2020-06-11
Payer: MEDICARE

## 2020-06-11 VITALS
HEIGHT: 57 IN | SYSTOLIC BLOOD PRESSURE: 146 MMHG | WEIGHT: 195 LBS | BODY MASS INDEX: 42.07 KG/M2 | HEART RATE: 78 BPM | DIASTOLIC BLOOD PRESSURE: 84 MMHG

## 2020-06-11 DIAGNOSIS — M79.669 PAIN IN SHIN, UNSPECIFIED LATERALITY: Primary | ICD-10-CM

## 2020-06-11 DIAGNOSIS — I83.813 VARICOSE VEINS OF BILATERAL LOWER EXTREMITIES WITH PAIN: ICD-10-CM

## 2020-06-11 PROCEDURE — 99215 OFFICE O/P EST HI 40 MIN: CPT | Performed by: SURGERY

## 2020-06-11 ASSESSMENT — MIFFLIN-ST. JEOR: SCORE: 1258.51

## 2020-06-11 NOTE — TELEPHONE ENCOUNTER
Called and let patient know that we are slightly behind in clinic but Dr. Castellon will call shortly.     - Herrera HORNER ATC

## 2020-06-11 NOTE — TELEPHONE ENCOUNTER
[facility]    Phone Message    May a detailed message be left on voicemail: yes     Reason for Call: Other: Pt's Daughter Alicia called and stated that Dr. Castellon was supposed to be calling the pt today at 3:00pm to f/u on the pt's appt from last week and they have not heard anything yet     Action Taken: Message routed to:  [facility] ortho

## 2020-06-11 NOTE — PROGRESS NOTES
Patient is here to go over the ultrasound.    She did wear the compression stockings off and on for a week. She did not notice any difference.  Also tried an ace wrap and it did not help either.   Patient feel a the ultrasound site and is going to follow up with the doctor that saw then with a phone call today.   I offered to have her see ortho but they will wait for the other doctor to call.         Right leg her pain is more anterior where I see 3 veins lat look like they are perforators. Hard to tell if the varicose veins in the back of the leg that seem to go anterior are connected.        Incompetent 4 mm diameter varicosities in the  anterior calf are  related to the  .  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      vein #1: Diameter 10 x 0.2 mm,  Location proximal calf,  22  cm from medial malleolus.    Probably a greater saphenous vein ligation and stripping or ablation, but it is only incompetent  From mid thigh to the knee.   But the perforators may be separate.  And this is where she has the pain at.     So a chance of sclerotherapy  At these sites. Starting in the posterior knee area and including the perforators that I can feel on the anterior shin area.   And these are tender when pressed.     Left leg:    Great saphenous vein (GSV)      sapheno-femoral junction: Competent      prox thigh: Competent      mid thigh: Competent      dist thigh: Competent      knee: Incompetent      prox calf: Competent      mid calf: Competent      ankle: Competent    Incompetent 2 mm diameter varicosities in the  posterior calf are  isolated.      There are no incompetent perforators identified.    She does like to elevate the legs with pillows   I  do not see any perforators on the left shins.     She does have some veins along the greater saphenous vein starting at the knee and going mostly posterior.   There is a vein on the lateral side but this is not where she is complains of pain.  It is both shins and all anterior and although she has some venous stasis dermatitis starting in the mid shin and going inferior her main pain is a little higher than this.   So since she is not having pain where most of the veins are and her age and health issues that maybe doing sclerotherapy on both legs would be less invasive and if it helps would be nice for the patient.    We can also do ablation in both greater saphenous veins as it is only incompetent in the knee up from knee to mid thigh on the right side and even less on the left side.   Also her symptoms could also be due to nerves so will have her see the neurologist.   While waiting for approval for the sclerotherapy  Will make sure that she sees the neruologist first.       Exam: US VENOUS COMPENTENCY BILATERAL 6/4/2020 3:56 PM     Comparison study: 9/30/2019     Clinical History: bilateral lower extremity edema and pain; Varicose  veins of bilateral lower extremities with pain; Bilateral lower  extremity edema; Venous stasis dermatitis of both lower extremities     Technique: B-mode (grayscale) and Duplex Doppler ultrasound of the  lower extremity veins (superficial and deep), including incompetency  reflux time and compression for thrombus. Additional Duplex doppler  assessment of the PTA and EMILIANA at the ankles. Superficial incompetency  exam performed upright, non-weight bearing with distal compression  using rapid inflation/deflation cuffs.     Scan position for evaluation: Reverse Trendelenburg of the deep venous  system and standing for the superficial venous system     Ordering provider: STEFAN HARRIS      Venous Competency Diagnostic Criteria Adopted 11/29/11.     Venous competency criteria  defining abnormal reflux duration:  Femoral - popliteal reflux > 1.0 sec.  Deep femoral, deep calf veins, and superficial vein reflux > 0.5 sec.   vein reflux > 0.35 sec.     Supporting document: J Vasc Surg 2003; 38:793-8. Definition of reflux  in lower extremity veins.     Findings:      Right ankle:  Posterior Tibial artery waveform: tri/biphasic  Dorsalis Pedis artery waveform: tri/biphasic     Left ankle:  Posterior Tibial artery waveform: tri/biphasic  Dorsalis Pedis artery waveform: tri/biphasic     Deep Venous Incompetency Study:      Right lower extremity:     Duplex Evaluation for Deep Vein Thrombus (which includes CFV, FV,  popliteal vein, and posterior tibial veins): Negative.     Common femoral vein: Competent     Deep femoral vein: Competent     Femoral vein:      proximal thigh: Competent      mid thigh: Competent      distal thigh: Competent     Popliteal vein: Competent     Posterior tibial veins at the ankle: Competent       Right lower extremity:     Duplex Evaluation for Superficial Vein Thrombus (which includes GSV,  SSV, AASV, and PASV): Negative.      Anterior accessory saphenous (AASV): Not visualized     Posterior accessory saphenous (PASV): Competent     Great saphenous vein (GSV)      sapheno-femoral junction: Competent      prox thigh: Competent      mid thigh: Incompetent      dist thigh: Incompetent      knee: Incompetent      prox calf: Competent      mid calf: Competent      ankle: Competent     Vein of Giacomini (V of G):      distal thigh: Competent     Small saphenous vein:      sapheno-popliteal junction: Competent      prox calf: Competent      mid calf: Competent  Diameters of superficial veins:     SFJ: Diameter* 9.8 mm  GSV prox thigh*: Diameter 8.3 mm   GSV mid thigh: Diameter 4.5 mm  GSV dist thigh: Diameter 4.4 mm  GSV knee*: Diameter 4.5 mm     SSV SPJ*: Diameter 3.5 mm     Incompetent 4 mm diameter varicosities in the  anterior calf are  related to the   .     There are incompetent perforators identified.      vein #1: Diameter 10 x 0.2 mm,  Location proximal calf,  22  cm from medial malleolus.     Left lower extremity:    Left lower extremity:     Duplex Evaluation for Deep Vein Thrombus (which includes CFV, FV,  popliteal vein, and posterior tibial veins): Negative.     Common femoral vein: Competent     Deep femoral vein: Competent     Femoral vein:      proximal thigh: Competent      mid thigh: Competent      distal thigh: Competent     Popliteal vein: Competent     Posterior tibial veins at the ankle: Competent     Superficial Venous Incompetency Study:          Duplex Evaluation for Superficial Vein Thrombus (which includes GSV,  SSV, AASV, and PASV): Negative.      Anterior accessory saphenous (AASV): Not visualized     Posterior accessory saphenous (PASV): Competent     Great saphenous vein (GSV)      sapheno-femoral junction: Competent      prox thigh: Competent      mid thigh: Competent      dist thigh: Competent      knee: Incompetent      prox calf: Competent      mid calf: Competent      ankle: Competent     Vein of Giacomini (V of G): Not visualized     Small saphenous vein:      sapheno-popliteal junction: Competent      prox calf: Competent      mid calf: Competent  Diameters of superficial veins:     SFJ: Diameter* 7.7 mm  GSV prox thigh*: Diameter 4.7 mm      GSV knee*: Diameter 3.5 mm     SSV SPJ*: Diameter 1.7 mm     Incompetent 2 mm diameter varicosities in the  posterior calf are  isolated.      There are no incompetent perforators identified.                                                                         Impression:     1. Right lea. No deep or superficial venous thrombosis or occlusion.  1b. No deep venous incompetence  1c. Superficial venous incompetence of the great saphenous vein from  the mid thigh to the knee.  1d. Incompetent varicosity and perforators as detailed above.     2. Left lea.  "No deep or superficial venous thrombosis or occlusion.  2b. No deep venous incompetence  2c. Incompetence of the great saphenous vein at the knee.  2d. Incompetent varicosity as detailed above.        Reference: \"Duplex Ultrasound in the Diagnosis of Lower-Extremity Deep  Venous Thrombosis\"- Gabrielle Orozco MD, S; Ronen Walsh MD  (Circulation. 2014;129:917-921. http://circ.ahajournals.org )     I have personally reviewed the examination and initial interpretation  and I agree with the findings.     ELLIOT JOHNS MD    Time spent with the patient with greater that 50% of the time in discussion was 45 minutes.  In discussing the plan and going over the ultrasound .      Braulio Reinoso MD      Office Visit     [facility]/2020  [facility]   Varicose veins of bilateral lower extremities with pain +2 more   Dx   Consult     Reason for Visit    Progress Notes     Expand All Collapse All  []Expand All by Default  Dear Gurinder Villafuerte  I have seen Selvin Rockwell and as you know his chief complaint is bilateral leg painful varicose veins.  Has diabetes, her feet feel very full.    HPI:  Patient is a 75 year old .female  with complaints of leg pain where the varicose veins are.  It is described as a pulling.   and wants to massage the area especially when at rest or in the evening.  Patient has not worn compression stockings has trouble putting them on. , has not deep vein thrombosis or trauma to the leg  Patient has  family hx of varicose veins.  Elevating legs does help symptoms.  Patient has lower extremity edema.  Patient has needed pain medications for vein pain.  Patient has had veins interfere with activities of daily living   Patient has not venous stasis ulcers.  Patient has not had bleeding from veins  Review Of Systems  Cardiovascular: coumadin for atrial fibulation  Respiratory: Shortness of breath- with stairs  Endocrine: diabetes  10 point review " "of systems are negative other than above.   /82   Pulse 91   Ht 1.473 m (4' 9.99\")   Wt 90.3 kg (199 lb)   BMI 41.60 kg/m       Past Medical History        Past Medical History:   Diagnosis Date     Atrophy of left kidney 3/27/2017     Diabetes mellitus, type 2 (H) 2006     HTN (hypertension) 12/5/2012     Hyperlipidemia LDL goal < 100       OA (OSTEOARTHRITIS) OF KNEE - right 7/13/2010     OA (OSTEOARTHRITIS) OF KNEE - right       TACHO (obstructive sleep apnea) 12/7/2012     Paroxysmal atrial fibrillation (H) 7/11/2018            Past Surgical History         Past Surgical History:   Procedure Laterality Date     AS ESOPHAGOSCOPY, DIAGNOSTIC   07/2019     C HAND/FINGER SURGERY UNLISTED         C REMOVAL OF KIDNEY STONE         C TOTAL KNEE ARTHROPLASTY   7/21/10     left     C TOTAL KNEE ARTHROPLASTY   12/12/12     Right     CARPAL TUNNEL RELEASE RT/LT         COLONOSCOPY   07/2019     TONSILLECTOMY               Social History   Social History            Socioeconomic History     Marital status:        Spouse name: Not on file     Number of children: Not on file     Years of education: Not on file     Highest education level: Not on file   Occupational History     Not on file   Social Needs     Financial resource strain: Not on file     Food insecurity       Worry: Not on file       Inability: Not on file     Transportation needs       Medical: Not on file       Non-medical: Not on file   Tobacco Use     Smoking status: Never Smoker     Smokeless tobacco: Never Used   Substance and Sexual Activity     Alcohol use: No     Drug use: No     Sexual activity: Yes       Partners: Male       Birth control/protection: Post-menopausal   Lifestyle     Physical activity       Days per week: Not on file       Minutes per session: Not on file     Stress: Not on file   Relationships     Social connections       Talks on phone: Not on file       Gets together: Not on file       Attends Oriental orthodox service: Not on " file       Active member of club or organization: Not on file       Attends meetings of clubs or organizations: Not on file       Relationship status: Not on file     Intimate partner violence       Fear of current or ex partner: Not on file       Emotionally abused: Not on file       Physically abused: Not on file       Forced sexual activity: Not on file   Other Topics Concern     Parent/sibling w/ CABG, MI or angioplasty before 65F 55M? Not Asked   Social History Narrative     Not on file           Current Outpatient Prescriptions          Current Outpatient Medications   Medication Sig Dispense Refill     carvedilol (COREG) 6.25 MG tablet Take 1 tablet (6.25 mg) by mouth 2 times daily 180 tablet 3     clotrimazole (LOTRIMIN) 1 % external cream           ferrous sulfate (FEROSUL) 325 (65 Fe) MG tablet Take 1 tablet (325 mg) by mouth daily (with breakfast) (Patient not taking: Reported on 1/8/2020) 90 tablet 3     FISH OIL 1000 MG PO CAPS 3 CAPSULES DAILY WITH A MEAL         furosemide (LASIX) 20 MG tablet Take 1 tablet (20 mg) by mouth daily for 5 days 5 tablet 0     isosorbide mononitrate (IMDUR) 30 MG 24 hr tablet Take 1 tablet (30 mg) by mouth daily 90 tablet 3     metFORMIN (GLUCOPHAGE) 500 MG tablet Take 1 tablet (500 mg) by mouth 2 times daily (with meals) 180 tablet 1     omeprazole (PRILOSEC) 40 MG DR capsule           order for DME [employer] stockings (Patient not taking: Reported on 1/8/2020) 1 Device 0     OTHER MEDICAL SUPPLIES 1 Package by Other route daily. (Patient not taking: Reported on 1/8/2020) 1 Package 0     PARoxetine (PAXIL) 20 MG tablet TAKE 1 TABLET BY MOUTH IN THE MORNING 90 tablet 0     simvastatin (ZOCOR) 10 MG tablet Take 1 tablet (10 mg) by mouth At Bedtime 90 tablet 1     triamcinolone (KENALOG) 0.1 % external cream Apply sparingly to bilateral legs three times daily for 14 days. (Patient not taking: Reported on 1/8/2020) 45 g 1     warfarin ANTICOAGULANT (COUMADIN) 2 MG tablet TAKE  1 & 1/2 (ONE & ONE-HALF) TABLETS ( 3 MG) BY MOUTH ONCE DAILY OR AS DIRECTED BY INR CLINIC 94 tablet 0            Physical exam:  Patient able to get up on table without difficulty.  Head eyes, nose and mouth within normal limits.  No supraclavicular or cervical adenopathy palpated.  Thyroid within normal limits.  No carotid bruits auscultated.  Lungs are clear to auscultation  Heart is regular rate and rhythm with mid systolic murmur or thrills noted.  No costal vertebral angle tenderness noted.  Abdomen is abdomen is soft without significant tenderness, masses, organomegaly or guarding  bowel sounds are positive and no caput medusa noted.  No obvious hernias noted.  Easily palpable posterior tibial pulse or dorsalis pedis pulse bilaterally.  Lower extremity edema is  present.     Using the vein light on the bilateral leg there are numerous varicose veins following the greater saphenous vein distribution started in the mid thigh and going posterior.  Then at the lesser saphenous vein site see  Were the veins going to the lateral malleolus.  Most of the veins are posterior.  Has lower extremity edema edema and venous stasis dermatitis.   She complains of  More pain in the left lateral foot.       Assessment is painful bilateral leg varicose veins.  Plan is ultrasound of the bilateral leg.  If the ultrasound shows that the bilateral greater saphenous vein is incompetent then will need greater saphenous vein ligation and stripping and can remove secondary veins at time of surgery or with sclerotherapy.  Or perhaps ablation.  as it is just form the knees up.  But do not think it will help with the right leg perforators I see.    Options were discussed including wearing of compression stockings, surgery and sclerotherapy.  Script for compression stockings was given.  Surgery with risks and complications were briefly discussed along with that of sclerotherapy.     Risks of surgery include damage to nerves, bleeding,  infection, not getting all the veins and multiple punch biopsies over the veins that will drain over several days after surgery.  These punch hole usually do heal well but may leave some scarring.  Also discussed what to expect after surgery and when to follow up with me in clinic and to bring their compression stockings with them to clinic.     Risks of sclerotherapy include multiple episodes of the procedure, insurance may not cover it, bleeding and bruising.  Less likely risk include infection.     Please wear compression stockings and see if they help your discomfort.  Then when you come back to see me let me know.  You will need to bring these with you for sclerotherapy or for your post op visit after surgery.  I would suggest going to either the [employer] medical supply store next to [facility] or to [employer] ([phone]) on [address] [address] and [address].  They will need to measure your legs to get the right fit.  If you go somewhere else and they do not measure your legs do not get them there.  It is important that you get the right size.  Please allow several days after you are measured to get your stockings.  They may not have your size in stock and will have to order them.     Please wear your compression stocking as some insurance companies will want to wear them for at least 3 months before they even consider paying for your surgery or sclerotherapy.     CALL [phone]  to get the utrasound set up.          You must follow up with me in the clinic to go over your utrasound results.  I will not be calling you with the results.   It is very difficult to do this over  the phone.  I am not able to show you while looking at your leg what the next step should be.       Time spent with the patient with greater that 50% of the time in discussion was 30 minutes.  In discussing the veins and the plan.        Braulio Reinoso MD

## 2020-06-11 NOTE — LETTER
6/11/2020         RE: Selvin Rockwell  [address] [place] [id number]        Dear Colleague,    Thank you for referring your patient, Selvin Rockwell, to the [facility]. Please see a copy of my visit note below.    Patient is here to go over the ultrasound.    She did wear the compression stockings off and on for a week. She did not notice any difference.  Also tried an ace wrap and it did not help either.   Patient feel a the ultrasound site and is going to follow up with the doctor that saw then with a phone call today.   I offered to have her see ortho but they will wait for the other doctor to call.         Right leg her pain is more anterior where I see 3 veins lat look like they are perforators. Hard to tell if the varicose veins in the back of the leg that seem to go anterior are connected.        Incompetent 4 mm diameter varicosities in the  anterior calf are  related to the  .  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      vein #1: Diameter 10 x 0.2 mm,  Location proximal calf,  22  cm from medial malleolus.    Probably a greater saphenous vein ligation and stripping or ablation, but it is only incompetent  From mid thigh to the knee.   But the perforators may be separate.  And this is where she has the pain at.     So a chance of sclerotherapy  At these sites. Starting in the posterior knee area and including the perforators that I can feel on the anterior shin area.   And these are tender when pressed.     Left leg:    Great saphenous vein (GSV)      sapheno-femoral junction: Competent      prox thigh: Competent      mid thigh: Competent      dist thigh: Competent      knee: Incompetent       prox calf: Competent      mid calf: Competent      ankle: Competent    Incompetent 2 mm diameter varicosities in the  posterior calf are  isolated.      There are no incompetent perforators identified.    She does like to elevate the legs with pillows   I do not see any perforators on the left shins.     She does have some veins along the greater saphenous vein starting at the knee and going mostly posterior.   There is a vein on the lateral side but this is not where she is complains of pain.  It is both shins and all anterior and although she has some venous stasis dermatitis starting in the mid shin and going inferior her main pain is a little higher than this.   So since she is not having pain where most of the veins are and her age and health issues that maybe doing sclerotherapy on both legs would be less invasive and if it helps would be nice for the patient.    We can also do ablation in both greater saphenous veins as it is only incompetent in the knee up from knee to mid thigh on the right side and even less on the left side.   Also her symptoms could also be due to nerves so will have her see the neurologist.   While waiting for approval for the sclerotherapy  Will make sure that she sees the neruologist first.       Exam: US VENOUS COMPENTENCY BILATERAL 6/4/2020 3:56 PM     Comparison study: 9/30/2019     Clinical History: bilateral lower extremity edema and pain; Varicose  veins of bilateral lower extremities with pain; Bilateral lower  extremity edema; Venous stasis dermatitis of both lower extremities     Technique: B-mode (grayscale) and Duplex Doppler ultrasound of the  lower extremity veins (superficial and deep), including incompetency  reflux time and compression for thrombus. Additional Duplex doppler  assessment of the PTA and EMILIANA at the ankles. Superficial incompetency  exam performed upright, non-weight bearing with distal compression  using rapid inflation/deflation cuffs.     Scan position  for evaluation: Reverse Trendelenburg of the deep venous  system and standing for the superficial venous system     Ordering provider: STEFAN HARRIS      Venous Competency Diagnostic Criteria Adopted 11/29/11.     Venous competency criteria defining abnormal reflux duration:  Femoral - popliteal reflux > 1.0 sec.  Deep femoral, deep calf veins, and superficial vein reflux > 0.5 sec.   vein reflux > 0.35 sec.     Supporting document: J Vasc Surg 2003; 38:793-8. Definition of reflux  in lower extremity veins.     Findings:      Right ankle:  Posterior Tibial artery waveform: tri/biphasic  Dorsalis Pedis artery waveform: tri/biphasic     Left ankle:  Posterior Tibial artery waveform: tri/biphasic  Dorsalis Pedis artery waveform: tri/biphasic     Deep Venous Incompetency Study:      Right lower extremity:     Duplex Evaluation for Deep Vein Thrombus (which includes CFV, FV,  popliteal vein, and posterior tibial veins): Negative.     Common femoral vein: Competent     Deep femoral vein: Competent     Femoral vein:      proximal thigh: Competent      mid thigh: Competent      distal thigh: Competent     Popliteal vein: Competent     Posterior tibial veins at the ankle: Competent       Right lower extremity:     Duplex Evaluation for Superficial Vein Thrombus (which includes GSV,  SSV, AASV, and PASV): Negative.      Anterior accessory saphenous (AASV): Not visualized     Posterior accessory saphenous (PASV): Competent     Great saphenous vein (GSV)      sapheno-femoral junction: Competent      prox thigh: Competent      mid thigh: Incompetent      dist thigh: Incompetent      knee: Incompetent      prox calf: Competent      mid calf: Competent      ankle: Competent     Vein of Giacomini (V of G):      distal thigh: Competent     Small saphenous vein:      sapheno-popliteal junction: Competent      prox calf: Competent      mid calf: Competent  Diameters of superficial veins:     SFJ: Diameter* 9.8  mm  GSV prox thigh*: Diameter 8.3 mm   GSV mid thigh: Diameter 4.5 mm  GSV dist thigh: Diameter 4.4 mm  GSV knee*: Diameter 4.5 mm     SSV SPJ*: Diameter 3.5 mm     Incompetent 4 mm diameter varicosities in the  anterior calf are  related to the  .     There are incompetent perforators identified.      vein #1: Diameter 10 x 0.2 mm,  Location proximal calf,  22  cm from medial malleolus.     Left lower extremity:    Left lower extremity:     Duplex Evaluation for Deep Vein Thrombus (which includes CFV, FV,  popliteal vein, and posterior tibial veins): Negative.     Common femoral vein: Competent     Deep femoral vein: Competent     Femoral vein:      proximal thigh: Competent      mid thigh: Competent      distal thigh: Competent     Popliteal vein: Competent     Posterior tibial veins at the ankle: Competent     Superficial Venous Incompetency Study:          Duplex Evaluation for Superficial Vein Thrombus (which includes GSV,  SSV, AASV, and PASV): Negative.      Anterior accessory saphenous (AASV): Not visualized     Posterior accessory saphenous (PASV): Competent     Great saphenous vein (GSV)      sapheno-femoral junction: Competent      prox thigh: Competent      mid thigh: Competent      dist thigh: Competent      knee: Incompetent      prox calf: Competent      mid calf: Competent      ankle: Competent     Vein of Giacomini (V of G): Not visualized     Small saphenous vein:      sapheno-popliteal junction: Competent      prox calf: Competent      mid calf: Competent  Diameters of superficial veins:     SFJ: Diameter* 7.7 mm  GSV prox thigh*: Diameter 4.7 mm      GSV knee*: Diameter 3.5 mm     SSV SPJ*: Diameter 1.7 mm     Incompetent 2 mm diameter varicosities in the  posterior calf are  isolated.      There are no incompetent perforators identified.                                                                         Impression:     1. Right lea. No deep or superficial venous  "thrombosis or occlusion.  1b. No deep venous incompetence  1c. Superficial venous incompetence of the great saphenous vein from  the mid thigh to the knee.  1d. Incompetent varicosity and perforators as detailed above.     2. Left lea. No deep or superficial venous thrombosis or occlusion.  2b. No deep venous incompetence  2c. Incompetence of the great saphenous vein at the knee.  2d. Incompetent varicosity as detailed above.        Reference: \"Duplex Ultrasound in the Diagnosis of Lower-Extremity Deep  Venous Thrombosis\"- Gabrielle Orozco MD, S; Ronne Walsh MD  (Circulation. 2014;129:917-921. http://circ.ahajournals.org )     I have personally reviewed the examination and initial interpretation  and I agree with the findings.     ELLIOT JOHNS MD    Time spent with the patient with greater that 50% of the time in discussion was 45 minutes.  In discussing the plan and going over the ultrasound .      Braulio Reinoso MD      Office Visit     2020  [facility]   Varicose veins of bilateral lower extremities with pain +2 more   Dx   Consult     Reason for Visit    Progress Notes     Expand All Collapse All  []Expand All by Default  Dear Gurinder Villafuerte  I have seen Selvin Rockwell and as you know his chief complaint is bilateral leg painful varicose veins.  Has diabetes, her feet feel very full.    HPI:  Patient is a 75 year old .female  with complaints of leg pain where the varicose veins are.  It is described as a pulling.   and wants to massage the area especially when at rest or in the evening.  Patient has not worn compression stockings has trouble putting them on. , has not deep vein thrombosis or trauma to the leg  Patient has  family hx of varicose veins.  Elevating legs does help symptoms.  Patient has lower extremity edema.  Patient has needed pain medications for vein pain.  Patient has had veins interfere with activities of " "daily living   Patient has not venous stasis ulcers.  Patient has not had bleeding from veins  Review Of Systems  Cardiovascular: coumadin for atrial fibulation  Respiratory: Shortness of breath- with stairs  Endocrine: diabetes  10 point review of systems are negative other than above.   /82   Pulse 91   Ht 1.473 m (4' 9.99\")   Wt 90.3 kg (199 lb)   BMI 41.60 kg/m       Past Medical History        Past Medical History:   Diagnosis Date     Atrophy of left kidney 3/27/2017     Diabetes mellitus, type 2 (H) 2006     HTN (hypertension) 12/5/2012     Hyperlipidemia LDL goal < 100       OA (OSTEOARTHRITIS) OF KNEE - right 7/13/2010     OA (OSTEOARTHRITIS) OF KNEE - right       TACHO (obstructive sleep apnea) 12/7/2012     Paroxysmal atrial fibrillation (H) 7/11/2018            Past Surgical History         Past Surgical History:   Procedure Laterality Date     AS ESOPHAGOSCOPY, DIAGNOSTIC   07/2019     C HAND/FINGER SURGERY UNLISTED         C REMOVAL OF KIDNEY STONE         C TOTAL KNEE ARTHROPLASTY   7/21/10     left     C TOTAL KNEE ARTHROPLASTY   12/12/12     Right     CARPAL TUNNEL RELEASE RT/LT         COLONOSCOPY   07/2019     TONSILLECTOMY               Social History   Social History            Socioeconomic History     Marital status:        Spouse name: Not on file     Number of children: Not on file     Years of education: Not on file     Highest education level: Not on file   Occupational History     Not on file   Social Needs     Financial resource strain: Not on file     Food insecurity       Worry: Not on file       Inability: Not on file     Transportation needs       Medical: Not on file       Non-medical: Not on file   Tobacco Use     Smoking status: Never Smoker     Smokeless tobacco: Never Used   Substance and Sexual Activity     Alcohol use: No     Drug use: No     Sexual activity: Yes       Partners: Male       Birth control/protection: Post-menopausal   Lifestyle     Physical " activity       Days per week: Not on file       Minutes per session: Not on file     Stress: Not on file   Relationships     Social connections       Talks on phone: Not on file       Gets together: Not on file       Attends Temple service: Not on file       Active member of club or organization: Not on file       Attends meetings of clubs or organizations: Not on file       Relationship status: Not on file     Intimate partner violence       Fear of current or ex partner: Not on file       Emotionally abused: Not on file       Physically abused: Not on file       Forced sexual activity: Not on file   Other Topics Concern     Parent/sibling w/ CABG, MI or angioplasty before 65F 55M? Not Asked   Social History Narrative     Not on file           Current Outpatient Prescriptions          Current Outpatient Medications   Medication Sig Dispense Refill     carvedilol (COREG) 6.25 MG tablet Take 1 tablet (6.25 mg) by mouth 2 times daily 180 tablet 3     clotrimazole (LOTRIMIN) 1 % external cream           ferrous sulfate (FEROSUL) 325 (65 Fe) MG tablet Take 1 tablet (325 mg) by mouth daily (with breakfast) (Patient not taking: Reported on 1/8/2020) 90 tablet 3     FISH OIL 1000 MG PO CAPS 3 CAPSULES DAILY WITH A MEAL         furosemide (LASIX) 20 MG tablet Take 1 tablet (20 mg) by mouth daily for 5 days 5 tablet 0     isosorbide mononitrate (IMDUR) 30 MG 24 hr tablet Take 1 tablet (30 mg) by mouth daily 90 tablet 3     metFORMIN (GLUCOPHAGE) 500 MG tablet Take 1 tablet (500 mg) by mouth 2 times daily (with meals) 180 tablet 1     omeprazole (PRILOSEC) 40 MG DR capsule           order for DME Vinh lam stockings (Patient not taking: Reported on 1/8/2020) 1 Device 0     OTHER MEDICAL SUPPLIES 1 Package by Other route daily. (Patient not taking: Reported on 1/8/2020) 1 Package 0     PARoxetine (PAXIL) 20 MG tablet TAKE 1 TABLET BY MOUTH IN THE MORNING 90 tablet 0     simvastatin (ZOCOR) 10 MG tablet Take 1 tablet (10 mg)  by mouth At Bedtime 90 tablet 1     triamcinolone (KENALOG) 0.1 % external cream Apply sparingly to bilateral legs three times daily for 14 days. (Patient not taking: Reported on 1/8/2020) 45 g 1     warfarin ANTICOAGULANT (COUMADIN) 2 MG tablet TAKE 1 & 1/2 (ONE & ONE-HALF) TABLETS ( 3 MG) BY MOUTH ONCE DAILY OR AS DIRECTED BY INR CLINIC 94 tablet 0            Physical exam:  Patient able to get up on table without difficulty.  Head eyes, nose and mouth within normal limits.  No supraclavicular or cervical adenopathy palpated.  Thyroid within normal limits.  No carotid bruits auscultated.  Lungs are clear to auscultation  Heart is regular rate and rhythm with mid systolic murmur or thrills noted.  No costal vertebral angle tenderness noted.  Abdomen is abdomen is soft without significant tenderness, masses, organomegaly or guarding  bowel sounds are positive and no caput medusa noted.  No obvious hernias noted.  Easily palpable posterior tibial pulse or dorsalis pedis pulse bilaterally.  Lower extremity edema is  present.     Using the vein light on the bilateral leg there are numerous varicose veins following the greater saphenous vein distribution started in the mid thigh and going posterior.  Then at the lesser saphenous vein site see  Were the veins going to the lateral malleolus.  Most of the veins are posterior.  Has lower extremity edema edema and venous stasis dermatitis.   She complains of  More pain in the left lateral foot.       Assessment is painful bilateral leg varicose veins.  Plan is ultrasound of the bilateral leg.  If the ultrasound shows that the bilateral greater saphenous vein is incompetent then will need greater saphenous vein ligation and stripping and can remove secondary veins at time of surgery or with sclerotherapy.  Or perhaps ablation.  as it is just form the knees up.  But do not think it will help with the right leg perforators I see.    Options were discussed including wearing of  compression stockings, surgery and sclerotherapy.  Script for compression stockings was given.  Surgery with risks and complications were briefly discussed along with that of sclerotherapy.     Risks of surgery include damage to nerves, bleeding, infection, not getting all the veins and multiple punch biopsies over the veins that will drain over several days after surgery.  These punch hole usually do heal well but may leave some scarring.  Also discussed what to expect after surgery and when to follow up with me in clinic and to bring their compression stockings with them to clinic.     Risks of sclerotherapy include multiple episodes of the procedure, insurance may not cover it, bleeding and bruising.  Less likely risk include infection.     Please wear compression stockings and see if they help your discomfort.  Then when you come back to see me let me know.  You will need to bring these with you for sclerotherapy or for your post op visit after surgery.  I would suggest going to either the [employer] medical supply store next to [facility] [place] or to [employer] ([phone]) [facility] [place].  They will need to measure your legs to get the right fit.  If you go somewhere else and they do not measure your legs do not get them there.  It is important that you get the right size.  Please allow several days after you are measured to get your stockings.  They may not have your size in stock and will have to order them.     Please wear your compression stocking as some insurance companies will want to wear them for at least 3 months before they even consider paying for your surgery or sclerotherapy.     CALL [phone]  to get the utrasound set up.          You must follow up with me in the clinic to go over your utrasound results.  I will not be calling you with the results.   It is very difficult to do this over  the phone.  I am not able to show you while looking at your leg  what the next step should be.       Time spent with the patient with greater that 50% of the time in discussion was 30 minutes.  In discussing the veins and the plan.        Braulio Reinoso MD                         Again, thank you for allowing me to participate in the care of your patient.        Sincerely,        Braulio Reinoso MD

## 2020-06-11 NOTE — PATIENT INSTRUCTIONS
Patient is here to go over the ultrasound.    She did wear the compression stockings off and on for a week. She did not notice any difference.  Also tried an ace wrap and it did not help either.   Patient feel a the ultrasound site and is going to follow up with the doctor that saw then with a phone call today.   I offered to have her see ortho but they will wait for the other doctor to call.         Right leg her pain is more anterior where I see 3 veins lat look like they are perforators. Hard to tell if the varicose veins in the back of the leg that seem to go anterior are connected.        Incompetent 4 mm diameter varicosities in the  anterior calf are  related to the  .  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      vein #1: Diameter 10 x 0.2 mm,  Location proximal calf,  22  cm from medial malleolus.    Probably a greater saphenous vein ligation and stripping or ablation, but it is only incompetent  From mid thigh to the knee.   But the perforators may be separate.  And this is where she has the pain at.     So a chance of sclerotherapy  At these sites. Starting in the posterior knee area and including the perforators that I can feel on the anterior shin area.   And these are tender when pressed.     Left leg:    Great saphenous vein (GSV)      sapheno-femoral junction: Competent      prox thigh: Competent      mid thigh: Competent      dist thigh: Competent      knee: Incompetent      prox calf: Competent      mid calf: Competent      ankle: Competent    Incompetent 2 mm diameter varicosities in the  posterior calf are  isolated.      There are no incompetent perforators identified.    She does like to elevate the legs with pillows   I  do not see any perforators on the left shins.     She does have some veins along the greater saphenous vein starting at the knee and going mostly posterior.   There is a vein on the lateral side but this is not where she is complains of pain.  It is both shins and all anterior and although she has some venous stasis dermatitis starting in the mid shin and going inferior her main pain is a little higher than this.   So since she is not having pain where most of the veins are and her age and health issues that maybe doing sclerotherapy on both legs would be less invasive and if it helps would be nice for the patient.    We can also do ablation in both greater saphenous veins as it is only incompetent in the knee up from knee to mid thigh on the right side and even less on the left side.   Also her symptoms could also be due to nerves so will have her see the neurologist.   While waiting for approval for the sclerotherapy  Will make sure that she sees the neruologist first.       Exam: US VENOUS COMPENTENCY BILATERAL 6/4/2020 3:56 PM     Comparison study: 9/30/2019     Clinical History: bilateral lower extremity edema and pain; Varicose  veins of bilateral lower extremities with pain; Bilateral lower  extremity edema; Venous stasis dermatitis of both lower extremities     Technique: B-mode (grayscale) and Duplex Doppler ultrasound of the  lower extremity veins (superficial and deep), including incompetency  reflux time and compression for thrombus. Additional Duplex doppler  assessment of the PTA and EMILIANA at the ankles. Superficial incompetency  exam performed upright, non-weight bearing with distal compression  using rapid inflation/deflation cuffs.     Scan position for evaluation: Reverse Trendelenburg of the deep venous  system and standing for the superficial venous system     Ordering provider: STEFAN HARRIS      Venous Competency Diagnostic Criteria Adopted 11/29/11.     Venous competency criteria  defining abnormal reflux duration:  Femoral - popliteal reflux > 1.0 sec.  Deep femoral, deep calf veins, and superficial vein reflux > 0.5 sec.   vein reflux > 0.35 sec.     Supporting document: J Vasc Surg 2003; 38:793-8. Definition of reflux  in lower extremity veins.     Findings:      Right ankle:  Posterior Tibial artery waveform: tri/biphasic  Dorsalis Pedis artery waveform: tri/biphasic     Left ankle:  Posterior Tibial artery waveform: tri/biphasic  Dorsalis Pedis artery waveform: tri/biphasic     Deep Venous Incompetency Study:      Right lower extremity:     Duplex Evaluation for Deep Vein Thrombus (which includes CFV, FV,  popliteal vein, and posterior tibial veins): Negative.     Common femoral vein: Competent     Deep femoral vein: Competent     Femoral vein:      proximal thigh: Competent      mid thigh: Competent      distal thigh: Competent     Popliteal vein: Competent     Posterior tibial veins at the ankle: Competent       Right lower extremity:     Duplex Evaluation for Superficial Vein Thrombus (which includes GSV,  SSV, AASV, and PASV): Negative.      Anterior accessory saphenous (AASV): Not visualized     Posterior accessory saphenous (PASV): Competent     Great saphenous vein (GSV)      sapheno-femoral junction: Competent      prox thigh: Competent      mid thigh: Incompetent      dist thigh: Incompetent      knee: Incompetent      prox calf: Competent      mid calf: Competent      ankle: Competent     Vein of Giacomini (V of G):      distal thigh: Competent     Small saphenous vein:      sapheno-popliteal junction: Competent      prox calf: Competent      mid calf: Competent  Diameters of superficial veins:     SFJ: Diameter* 9.8 mm  GSV prox thigh*: Diameter 8.3 mm   GSV mid thigh: Diameter 4.5 mm  GSV dist thigh: Diameter 4.4 mm  GSV knee*: Diameter 4.5 mm     SSV SPJ*: Diameter 3.5 mm     Incompetent 4 mm diameter varicosities in the  anterior calf are  related to the   .     There are incompetent perforators identified.      vein #1: Diameter 10 x 0.2 mm,  Location proximal calf,  22  cm from medial malleolus.     Left lower extremity:    Left lower extremity:     Duplex Evaluation for Deep Vein Thrombus (which includes CFV, FV,  popliteal vein, and posterior tibial veins): Negative.     Common femoral vein: Competent     Deep femoral vein: Competent     Femoral vein:      proximal thigh: Competent      mid thigh: Competent      distal thigh: Competent     Popliteal vein: Competent     Posterior tibial veins at the ankle: Competent     Superficial Venous Incompetency Study:          Duplex Evaluation for Superficial Vein Thrombus (which includes GSV,  SSV, AASV, and PASV): Negative.      Anterior accessory saphenous (AASV): Not visualized     Posterior accessory saphenous (PASV): Competent     Great saphenous vein (GSV)      sapheno-femoral junction: Competent      prox thigh: Competent      mid thigh: Competent      dist thigh: Competent      knee: Incompetent      prox calf: Competent      mid calf: Competent      ankle: Competent     Vein of Giacomini (V of G): Not visualized     Small saphenous vein:      sapheno-popliteal junction: Competent      prox calf: Competent      mid calf: Competent  Diameters of superficial veins:     SFJ: Diameter* 7.7 mm  GSV prox thigh*: Diameter 4.7 mm      GSV knee*: Diameter 3.5 mm     SSV SPJ*: Diameter 1.7 mm     Incompetent 2 mm diameter varicosities in the  posterior calf are  isolated.      There are no incompetent perforators identified.                                                                         Impression:     1. Right lea. No deep or superficial venous thrombosis or occlusion.  1b. No deep venous incompetence  1c. Superficial venous incompetence of the great saphenous vein from  the mid thigh to the knee.  1d. Incompetent varicosity and perforators as detailed above.     2. Left lea.  "No deep or superficial venous thrombosis or occlusion.  2b. No deep venous incompetence  2c. Incompetence of the great saphenous vein at the knee.  2d. Incompetent varicosity as detailed above.        Reference: \"Duplex Ultrasound in the Diagnosis of Lower-Extremity Deep  Venous Thrombosis\"- Gabrielle Orozco MD, S; Ronen Walsh MD  (Circulation. 2014;129:917-921. http://circ.ahajournals.org )     I have personally reviewed the examination and initial interpretation  and I agree with the findings.     MD Braulio ARAUJO MD      "

## 2020-06-11 NOTE — TELEPHONE ENCOUNTER
** Patient should call Medicare to see how much if covered**    Type of surgery: Sclerotherapy both legs this is done at [facility] bilateral   CPT 17428   Pain in shin, unspecified laterality M79.669     Varicose veins of bilateral lower extremities with pain I83.813    Location of surgery: Other: [facility]  Date and time of surgery: tbd  Surgeon: Arleth  Pre-Op Appt Date: tbd  Post-Op Appt Date: tbd   Packet sent out: No  Pre-cert/Authorization completed:  Per [email or web address]:  If surgery is for a medical issue, Medicare may cover costs.    Some of the procedures Medicare covers include:    Sclerotherapy      Date: 06/11/2020    Thank you,   Oanh Pearson   Referral Department  [phone]

## 2020-06-11 NOTE — TELEPHONE ENCOUNTER
Left a voice message for patient to call medicare and to call us when she is ready to schedule at [phone]

## 2020-06-11 NOTE — TELEPHONE ENCOUNTER
[facility] Call Center    Phone Message    May a detailed message be left on voicemail: yes     Reason for Call: Other: The patient has been waiting for a call back for over an hour, she said if the clinic is still busy and running behind she's ok with doing the call back tomorrow please review and follow up with the patient thank you.     Action Taken: Message routed to:  [facility] ([facility] ortho    Travel Screening: Not Applicable

## 2020-06-12 NOTE — TELEPHONE ENCOUNTER
Spoke with daughter (Kat) and apologized for us not being able to call her yesterday. I let her know that Dr. Castellon is in clinic later today from about 1-4 and should be able to call her sometime then.     - Herrera HORNER ATC

## 2020-06-12 NOTE — TELEPHONE ENCOUNTER
Dr Castellon would like Amie (Ortho supervisor) to call patient and inform them that they need to be seen in clinic. The patient is very adamant that they want billing to be figured out ahead of time. Emailed Amie and routing encounter to Amie.

## 2020-06-23 ENCOUNTER — VIRTUAL VISIT (OUTPATIENT)
Dept: ORTHOPEDICS | Facility: CLINIC | Age: 75
End: 2020-06-23
Payer: MEDICARE

## 2020-06-23 VITALS — HEIGHT: 58 IN | BODY MASS INDEX: 41.25 KG/M2 | WEIGHT: 196.5 LBS | RESPIRATION RATE: 17 BRPM

## 2020-06-23 DIAGNOSIS — M19.012 GLENOHUMERAL ARTHRITIS, LEFT: Primary | ICD-10-CM

## 2020-06-23 RX ADMIN — LIDOCAINE HYDROCHLORIDE 3 ML: 10 INJECTION, SOLUTION EPIDURAL; INFILTRATION; INTRACAUDAL; PERINEURAL at 14:51

## 2020-06-23 RX ADMIN — METHYLPREDNISOLONE ACETATE 80 MG: 80 INJECTION, SUSPENSION INTRA-ARTICULAR; INTRALESIONAL; INTRAMUSCULAR; SOFT TISSUE at 14:51

## 2020-06-23 ASSESSMENT — MIFFLIN-ST. JEOR: SCORE: 1285.32

## 2020-06-23 ASSESSMENT — PATIENT HEALTH QUESTIONNAIRE - PHQ9: SUM OF ALL RESPONSES TO PHQ QUESTIONS 1-9: 7

## 2020-06-23 NOTE — LETTER
2020         RE: Selvin Rockwell  [address] [place]. \[facility] Outlook.\"" [id number]        Dear Colleague,    Thank you for referring your patient, Selvin Rockwell, to the [facility]. Please see a copy of my visit note below.    Large Joint Injection/Arthocentesis: L glenohumeral joint    Date/Time: 2020 2:51 PM  Performed by: Gurinder Castellon MD  Authorized by: Gurinder Castellon MD     Indications:  Pain  Needle Size:  22 G  Guidance: ultrasound    Location:  Shoulder      Site:  L glenohumeral joint  Medications:  80 mg methylPREDNISolone 80 MG/ML; 3 mL lidocaine (PF) 1 %  Outcome:  Tolerated well, no immediate complications  Procedure discussed: discussed risks, benefits, and alternatives    Consent Given by:  Patient  Timeout: timeout called immediately prior to procedure    Prep: patient was prepped and draped in usual sterile fashion          [facility]  [address] [address]  [phone]  Dept: [phone]  ______________________________________________________________________________    Patient: Selvin Rockwell   : 1945   MRN: [id number]   2020    INVASIVE PROCEDURE SAFETY CHECKLIST    Date: 2020   Procedure:L Glenohumeral steroid injection   Patient Name: Selvin Rockwell  MRN: [id number]  YOB: 1945    Action: Complete sections as appropriate. Any discrepancy results in a HARD COPY until resolved.     PRE PROCEDURE:  Patient ID verified with 2 identifiers (name and  or MRN): Yes  Procedure and site verified with patient/designee (when able): Yes  Accurate consent documentation in medical record: Yes  H&P (or appropriate assessment) documented in medical record: Yes  H&P must be up to 20 days prior to procedure and updates within 24 hours of procedure as applicable: Yes  Relevant diagnostic and radiology test results appropriately labeled and displayed as  applicable: Yes  Procedure site(s) marked with provider initials: Yes    TIMEOUT:  Time-Out performed immediately prior to starting procedure, including verbal and active participation of all team members addressing the following:Yes  * Correct patient identify  * Confirmed that the correct side and site are marked  * An accurate procedure consent form  * Agreement on the procedure to be done  * Correct patient position  * Relevant images and results are properly labeled and appropriately displayed  * The need to administer antibiotics or fluids for irrigation purposes during the procedure as applicable   * Safety precautions based on patient history or medication use    DURING PROCEDURE: Verification of correct person, site, and procedures any time the responsibility for care of the patient is transferred to another member of the care team.       The following medications were given:         Prior to injection, verified patient identity using patient's name and date of birth.  Due to injection administration, patient instructed to remain in clinic for 15 minutes  afterwards, and to report any adverse reaction to me immediately.    Joint injection was performed.    Medication Name: Methylprednisolone   NDC 2747-8095-63  Drug Amount Wasted:  None.  Vial/Syringe: Single dose vial  Expiration Date:  2/21    Medication Name Lidocaine 1%   NDC 16715-119-15  Drug AMount wasted: Yes. 1mg  Single Dose Vial  Expiration 7/22     Scribed by Gena Larry for Dr. Castellon on June 23, 2020 at 2:45pm based on the provider's   statements to me.     Gena Larry ATC

## 2020-06-23 NOTE — PROGRESS NOTES
Large Joint Injection/Arthocentesis: L glenohumeral joint    Date/Time: 2020 2:51 PM  Performed by: Gurinder Castellon MD  Authorized by: Gurinder Castellon MD     Indications:  Pain  Needle Size:  22 G  Guidance: ultrasound    Location:  Shoulder      Site:  L glenohumeral joint  Medications:  3 mL lidocaine (PF) 1 %; 80 mg methylPREDNISolone 80 MG/ML  Outcome:  Tolerated well, no immediate complications  Procedure discussed: discussed risks, benefits, and alternatives    Consent Given by:  Patient  Timeout: timeout called immediately prior to procedure    Prep: patient was prepped and draped in usual sterile fashion          [facility] [place] [id number] [address] [id number] [phone]  Dept: [phone]  ______________________________________________________________________________    Patient: Selvin Rockwell   : 1945   MRN: [id number]   2020    INVASIVE PROCEDURE SAFETY CHECKLIST    Date: 2020   Procedure:L Glenohumeral steroid injection   Patient Name: Selvin Rockwell  MRN: [id number]  YOB: 1945    Action: Complete sections as appropriate. Any discrepancy results in a HARD COPY until resolved.     PRE PROCEDURE:  Patient ID verified with 2 identifiers (name and  or MRN): Yes  Procedure and site verified with patient/designee (when able): Yes  Accurate consent documentation in medical record: Yes  H&P (or appropriate assessment) documented in medical record: Yes  H&P must be up to 20 days prior to procedure and updates within 24 hours of procedure as applicable: Yes  Relevant diagnostic and radiology test results appropriately labeled and displayed as applicable: Yes  Procedure site(s) marked with provider initials: Yes    TIMEOUT:  Time-Out performed immediately prior to starting procedure, including verbal and active participation of all team members addressing the following:Yes  * Correct patient identify  *  Confirmed that the correct side and site are marked  * An accurate procedure consent form  * Agreement on the procedure to be done  * Correct patient position  * Relevant images and results are properly labeled and appropriately displayed  * The need to administer antibiotics or fluids for irrigation purposes during the procedure as applicable   * Safety precautions based on patient history or medication use    DURING PROCEDURE: Verification of correct person, site, and procedures any time the responsibility for care of the patient is transferred to another member of the care team.       The following medications were given:         Prior to injection, verified patient identity using patient's name and date of birth.  Due to injection administration, patient instructed to remain in clinic for 15 minutes  afterwards, and to report any adverse reaction to me immediately.    Joint injection was performed.    Medication Name: Methylprednisolone   NDC 0305-8319-89  Drug Amount Wasted:  None.  Vial/Syringe: Single dose vial  Expiration Date:  2/21    Medication Name Lidocaine 1%   NDC 78225-187-68  Drug AMount wasted: Yes. 1mg  Single Dose Vial  Expiration 7/22     Scribed by Gena Larry for Dr. Castellon on June 23, 2020 at 2:45pm based on the provider's   statements to me.     Gena Larry ATC     HISTORY OF PRESENT ILLNESS  Ms. Rockwell is a pleasant 75 year old year old female who presents to clinic today with followup for left shoulder pain since fall  Selvin explains that she has not improved much with her discomfort with using her shoulder since her last visit  She stopped taking the medications I prescribed because she did not want to take them  Location: left shoulder  Quality:  achy pain    Severity:7/10 at worst    Duration: worse since fall/injury that occurred as documented here at the [facility]  Timing: occurs intermittently  Context: occurs while using her left shoulder  Modifying factors:  resting and  non-use makes it better, movement and use makes it worse  Associated signs & symptoms: some radiation into neck and arm from shoulder    MEDICAL HISTORY  Patient Active Problem List   Diagnosis     OA (OSTEOARTHRITIS) OF KNEE - right     Status Post Total Knee Replacement - bilateral     TACHO (obstructive sleep apnea)     Hyperlipidemia with target LDL less than 100     Hypertension goal BP (blood pressure) < 140/90     Type 2 diabetes, HbA1c goal < 7% (H)     Morbid obesity (H)     Achalasia     Adrenal adenoma     Atrophy of left kidney     Calculus of kidney     Chronic low back pain     Gout     Paroxysmal atrial fibrillation (H)     Long term (current) use of anticoagulants     Anxiety     Spinal stenosis of lumbar region with neurogenic claudication       Current Outpatient Medications   Medication Sig Dispense Refill     carvedilol (COREG) 6.25 MG tablet Take 1 tablet (6.25 mg) by mouth 2 times daily 180 tablet 3     clotrimazole (LOTRIMIN) 1 % external cream        ferrous sulfate (FEROSUL) 325 (65 Fe) MG tablet Take 1 tablet (325 mg) by mouth daily (with breakfast) (Patient not taking: Reported on 6/29/2020) 90 tablet 3     FISH OIL 1000 MG PO CAPS 3 CAPSULES DAILY WITH A MEAL       isosorbide mononitrate (IMDUR) 30 MG 24 hr tablet Take 1 tablet (30 mg) by mouth daily 90 tablet 3     metFORMIN (GLUCOPHAGE) 500 MG tablet Take 1 tablet (500 mg) by mouth 2 times daily (with meals) 180 tablet 1     omeprazole (PRILOSEC) 40 MG DR capsule        order for DME 15-20 mm mercury thigh high compression stockings 1-2 pairs  Or waist high if that will work for patient.   Also patient has trouble putting socks on so equipment that would help with that would be great. 2 Package 5     order for DME Vinh hose stockings 1 Device 0     OTHER MEDICAL SUPPLIES 1 Package by Other route daily. 1 Package 0     PARoxetine (PAXIL) 20 MG tablet TAKE 1 TABLET BY MOUTH IN THE MORNING 90 tablet 0     simvastatin (ZOCOR) 10 MG tablet Take  1 tablet (10 mg) by mouth At Bedtime 90 tablet 1     tiZANidine (ZANAFLEX) 4 MG tablet Take 1 tablet (4 mg) by mouth 3 times daily as needed for muscle spasms (Patient not taking: Reported on 6/29/2020) 30 tablet 1     triamcinolone (KENALOG) 0.1 % external cream Apply sparingly to bilateral legs three times daily for 14 days. (Patient not taking: Reported on 6/29/2020) 45 g 1     warfarin ANTICOAGULANT (COUMADIN) 2 MG tablet TAKE 1 & 1/2 (ONE & ONE-HALF) TABLETS ( 3 MG) BY MOUTH ONCE DAILY OR AS DIRECTED BY INR CLINIC 94 tablet 0     furosemide (LASIX) 20 MG tablet Take 1 tablet (20 mg) by mouth daily for 5 days 5 tablet 0     methocarbamol (ROBAXIN) 500 MG tablet Take 1 tablet (500 mg) by mouth 4 times daily as needed for muscle spasms 40 tablet 0       Allergies   Allergen Reactions     Gabapentin      Rash and itching     Nsaids      Other reaction(s): Gastrointestinal  Former PPI user, EGD 2/2018 showed LA Grade D esophagitis, plus duodenitis and gastritis.  Famotidine on med list but may not have been taking - rx was 3 years old.      Sulfa Drugs Other (See Comments) and Rash     Per 11-4-13 H&P by Dr. Fei Arias.  RASH ALL OVER FOUND IN DR. RAMIREZ DICTATION OF 10/11/11  BACTRIM-  RASH ALL OVER       History reviewed. No pertinent family history.  Social History     Socioeconomic History     Marital status:      Spouse name: None     Number of children: None     Years of education: None     Highest education level: None   Occupational History     None   Social Needs     Financial resource strain: None     Food insecurity     Worry: None     Inability: None     Transportation needs     Medical: None     Non-medical: None   Tobacco Use     Smoking status: Never Smoker     Smokeless tobacco: Never Used   Substance and Sexual Activity     Alcohol use: No     Drug use: No     Sexual activity: Yes     Partners: Male     Birth control/protection: Post-menopausal   Lifestyle     Physical activity     Days  "per week: None     Minutes per session: None     Stress: None   Relationships     Social connections     Talks on phone: None     Gets together: None     Attends [employer] service: None     Active member of club or organization: None     Attends meetings of clubs or organizations: None     Relationship status: None     Intimate partner violence     Fear of current or ex partner: None     Emotionally abused: None     Physically abused: None     Forced sexual activity: None   Other Topics Concern     Parent/sibling w/ CABG, MI or angioplasty before 65F 55M? Not Asked   Social History Narrative     None       Additional medical/Social/Surgical histories reviewed in [facility] and updated as appropriate.     REVIEW OF SYSTEMS (7/22/2020)  10 point ROS of systems including Constitutional, Eyes, Respiratory, Cardiovascular, Gastroenterology, Genitourinary, Integumentary, Musculoskeletal, Psychiatric, Allergic/Immunologic were all negative except for pertinent positives noted in my HPI.     PHYSICAL EXAM  Vitals:    06/23/20 1350   Resp: 17   Weight: 89.1 kg (196 lb 8 oz)   Height: 1.48 m (4' 10.27\")     Vital Signs: Resp 17   Ht 1.48 m (4' 10.27\")   Wt 89.1 kg (196 lb 8 oz)   BMI 40.69 kg/m   Patient declined being weighed. Body mass index is 40.69 kg/m .    General  - normal appearance, in no obvious distress  HEENT  - conjunctivae not injected, moist mucous membranes, normocephalic/atraumatic head, ears normal appearance, no lesions, mouth normal appearance, no scars, normal dentition and teeth present  CV  - normal radial pulse  Pulm  - normal respiratory pattern, non-labored  Musculoskeletal -left shoulder  - inspection: normal bone and joint alignment, no obvious deformity, no scapular winging, no AC step-off  - palpation: mildly tender RC insertion, normal clavicle, non-tender AC  - ROM:  painful and limited flexion and ER at end range, limited IR  - strength: 5/5  strength, 5/5 in all shoulder planes  - special " tests:  (-) Speed's  (+) Neer  (+) Hawkin's  (+) Anne's  (-) [place]'s  (-) apprehension  (-) subscap lift-off  Neuro  - no sensory or motor deficit, grossly normal coordination, normal muscle tone  Skin  - no ecchymosis, erythema, warmth, or induration, no obvious rash  Psych  - interactive, appropriate, normal mood and affect  ASSESSMENT & PLAN  74 yo female with ongoing left shoulder pain due to Glenohumeral arthritis, not improved  Reviewed xrays shoulder: shows GH arthritis  Given injection today  For diagnostic and therapeutic purpose  Will consider PT  Will consider CT shoulder if not improving  F/u in 2-3 weeks      Gurinder Castellon MD, CAQSM    LEFT Glenohumeral Injection - Ultrasound Guided  The patient was informed of the risks and the benefits of the procedure and a written consent was signed.  The patient s right shoulder was prepped with chlorhexidine in sterile fashion.   80 mg of methylprednisolone suspension was drawn up into a 5 mL syringe with 3 mL of 1% lidocaine w/o Epi.  Injection was performed using sterile technique.  Under ultrasound guidance a 3.5-inch 22-gauge needle was used to enter the right glenohumeral joint.  Posterior approach was used with the patient in lateral recumbent position, arm in neutral position at the side.  Needle placement was visualized and documented with ultrasound.  Ultrasound visualization was necessary due to the small joint space entered.  Injection performed long axis to the probe.  Injection solution visualized within the joint space.  Images were permanently stored for the patient's record.  There were no complications. The patient tolerated the procedure well. There was negligible bleeding.   Therapy scheduled to follow for mobilization.  The patient was instructed to call or go to the emergency room with any unusual pain, swelling, redness, or if otherwise concerned.

## 2020-06-23 NOTE — NURSING NOTE
"Reason For Visit:   Chief Complaint   Patient presents with     RECHECK     left shoulder, F/U after fall      Resp 17   Ht 1.48 m (4' 10.27\")   Wt 89.1 kg (196 lb 8 oz)   BMI 40.69 kg/m      Pain Assessment  Patient Currently in Pain: Yes  0-10 Pain Scale: 6  Primary Pain Location: Shoulder    Gena Larry ATC     "

## 2020-06-29 NOTE — PROGRESS NOTES
"Selvin Rockwell is a 75 year old female who is being evaluated via a billable telephone visit.      The patient has been notified of following:     \"This telephone visit will be conducted via a call between you and your physician/provider. We have found that certain health care needs can be provided without the need for a physical exam.  This service lets us provide the care you need with a short phone conversation.  If a prescription is necessary we can send it directly to your pharmacy.  If lab work is needed we can place an order for that and you can then stop by our lab to have the test done at a later time.    Telephone visits are billed at different rates depending on your insurance coverage. During this emergency period, for some insurers they may be billed the same as an in-person visit.  Please reach out to your insurance provider with any questions.    If during the course of the call the physician/provider feels a telephone visit is not appropriate, you will not be charged for this service.\"    Patient has given verbal consent for Telephone visit?  Yes    What phone number would you like to be contacted at? [phone]    How would you like to obtain your AVS? MyChart  HISTORY OF PRESENT ILLNESS  Ms. Rockwell is a pleasant 75 year old year old female who presents with her daughter on the phone to discuss her left shoulder pain  Selvin explains that her shoulder is not yet feeling much better since the injection I gave her in her GH joint last week  She is not taking any medication for pain, as she is on coumadin and is restricted due to that and allergies to gabapentin and other medications  Location: left shoulder  Quality:  achy pain    Severity: 7/10 at worst    Duration: 3 weeks since fall  Timing: occurs intermittently  Context: occurs while using her shoulder  Modifying factors:  resting and non-use makes it better, movement and use makes it worse  Associated signs & symptoms: left shoulder " and neck pain    MEDICAL HISTORY  Patient Active Problem List   Diagnosis     OA (OSTEOARTHRITIS) OF KNEE - right     Status Post Total Knee Replacement - bilateral     TACHO (obstructive sleep apnea)     Hyperlipidemia with target LDL less than 100     Hypertension goal BP (blood pressure) < 140/90     Type 2 diabetes, HbA1c goal < 7% (H)     Morbid obesity (H)     Achalasia     Adrenal adenoma     Atrophy of left kidney     Calculus of kidney     Chronic low back pain     Gout     Paroxysmal atrial fibrillation (H)     Long term (current) use of anticoagulants     Anxiety     Spinal stenosis of lumbar region with neurogenic claudication       Current Outpatient Medications   Medication Sig Dispense Refill     carvedilol (COREG) 6.25 MG tablet Take 1 tablet (6.25 mg) by mouth 2 times daily 180 tablet 3     isosorbide mononitrate (IMDUR) 30 MG 24 hr tablet Take 1 tablet (30 mg) by mouth daily 90 tablet 3     metFORMIN (GLUCOPHAGE) 500 MG tablet Take 1 tablet (500 mg) by mouth 2 times daily (with meals) 180 tablet 1     omeprazole (PRILOSEC) 40 MG DR capsule        PARoxetine (PAXIL) 20 MG tablet TAKE 1 TABLET BY MOUTH IN THE MORNING 90 tablet 0     simvastatin (ZOCOR) 10 MG tablet Take 1 tablet (10 mg) by mouth At Bedtime 90 tablet 1     warfarin ANTICOAGULANT (COUMADIN) 2 MG tablet TAKE 1 & 1/2 (ONE & ONE-HALF) TABLETS ( 3 MG) BY MOUTH ONCE DAILY OR AS DIRECTED BY INR CLINIC 94 tablet 0     clotrimazole (LOTRIMIN) 1 % external cream        ferrous sulfate (FEROSUL) 325 (65 Fe) MG tablet Take 1 tablet (325 mg) by mouth daily (with breakfast) (Patient not taking: Reported on 6/29/2020) 90 tablet 3     FISH OIL 1000 MG PO CAPS 3 CAPSULES DAILY WITH A MEAL       furosemide (LASIX) 20 MG tablet Take 1 tablet (20 mg) by mouth daily for 5 days 5 tablet 0     order for DME 15-20 mm mercury thigh high compression stockings 1-2 pairs  Or waist high if that will work for patient.   Also patient has trouble putting socks on so  equipment that would help with that would be great. 2 Package 5     order for DME Vinh hose stockings 1 Device 0     OTHER MEDICAL SUPPLIES 1 Package by Other route daily. 1 Package 0     tiZANidine (ZANAFLEX) 4 MG tablet Take 1 tablet (4 mg) by mouth 3 times daily as needed for muscle spasms (Patient not taking: Reported on 6/29/2020) 30 tablet 1     triamcinolone (KENALOG) 0.1 % external cream Apply sparingly to bilateral legs three times daily for 14 days. (Patient not taking: Reported on 6/29/2020) 45 g 1       Allergies   Allergen Reactions     Gabapentin      Rash and itching     Nsaids      Other reaction(s): Gastrointestinal  Former PPI user, EGD 2/2018 showed LA Grade D esophagitis, plus duodenitis and gastritis.  Famotidine on med list but may not have been taking - rx was 3 years old.      Sulfa Drugs Other (See Comments) and Rash     Per 11-4-13 H&P by Dr. Fei Arias.  RASH ALL OVER FOUND IN DR. RAMIREZ DICTATION OF 10/11/11  BACTRIM-  RASH ALL OVER       No family history on file.  Social History     Socioeconomic History     Marital status:      Spouse name: Not on file     Number of children: Not on file     Years of education: Not on file     Highest education level: Not on file   Occupational History     Not on file   Social Needs     Financial resource strain: Not on file     Food insecurity     Worry: Not on file     Inability: Not on file     Transportation needs     Medical: Not on file     Non-medical: Not on file   Tobacco Use     Smoking status: Never Smoker     Smokeless tobacco: Never Used   Substance and Sexual Activity     Alcohol use: No     Drug use: No     Sexual activity: Yes     Partners: Male     Birth control/protection: Post-menopausal   Lifestyle     Physical activity     Days per week: Not on file     Minutes per session: Not on file     Stress: Not on file   Relationships     Social connections     Talks on phone: Not on file     Gets together: Not on file     Attends  [employer] service: Not on file     Active member of club or organization: Not on file     Attends meetings of clubs or organizations: Not on file     Relationship status: Not on file     Intimate partner violence     Fear of current or ex partner: Not on file     Emotionally abused: Not on file     Physically abused: Not on file     Forced sexual activity: Not on file   Other Topics Concern     Parent/sibling w/ CABG, MI or angioplasty before 65F 55M? Not Asked   Social History Narrative     Not on file       Additional medical/Social/Surgical histories reviewed in [facility] and updated as appropriate.     REVIEW OF SYSTEMS (6/30/2020)  10 point ROS of systems including Constitutional, Eyes, Respiratory, Cardiovascular, Gastroenterology, Genitourinary, Integumentary, Musculoskeletal, Psychiatric, Allergic/Immunologic were all negative except for pertinent positives noted in my HPI.       ASSESSMENT & PLAN  76 yo female with left shoulder contusion, glenohumeral arthritis, not improved  Reviewed plan to discuss next week  Start robaxin  Give it more time  Will consider left shoulder CT scan if not improved by next week      Gurinder Castellon MD, CAQSM    Phone call duration: 15 minutes  Phone call start: 9:20am  Phone call end: 9:35am  Gurinder Castellon MD

## 2020-06-30 ENCOUNTER — VIRTUAL VISIT (OUTPATIENT)
Dept: ORTHOPEDICS | Facility: CLINIC | Age: 75
End: 2020-06-30
Payer: MEDICARE

## 2020-06-30 DIAGNOSIS — M19.012 GLENOHUMERAL ARTHRITIS, LEFT: ICD-10-CM

## 2020-06-30 DIAGNOSIS — M25.512 ACUTE PAIN OF LEFT SHOULDER: Primary | ICD-10-CM

## 2020-06-30 PROCEDURE — 99213 OFFICE O/P EST LOW 20 MIN: CPT | Mod: TEL | Performed by: PREVENTIVE MEDICINE

## 2020-06-30 RX ORDER — METHOCARBAMOL 500 MG/1
500 TABLET, FILM COATED ORAL 4 TIMES DAILY PRN
Qty: 40 TABLET | Refills: 0 | Status: SHIPPED | OUTPATIENT
Start: 2020-06-30 | End: 2020-11-16

## 2020-06-30 NOTE — LETTER
"    6/30/2020         RE: Selvin Rockwell  [address] [place] [id number]        Dear Colleague,    Thank you for referring your patient, Selvin Rockwell, to the [facility]. Please see a copy of my visit note below.    Selvin Rockwell is a 75 year old female who is being evaluated via a billable telephone visit.      The patient has been notified of following:     \"This telephone visit will be conducted via a call between you and your physician/provider. We have found that certain health care needs can be provided without the need for a physical exam.  This service lets us provide the care you need with a short phone conversation.  If a prescription is necessary we can send it directly to your pharmacy.  If lab work is needed we can place an order for that and you can then stop by our lab to have the test done at a later time.    Telephone visits are billed at different rates depending on your insurance coverage. During this emergency period, for some insurers they may be billed the same as an in-person visit.  Please reach out to your insurance provider with any questions.    If during the course of the call the physician/provider feels a telephone visit is not appropriate, you will not be charged for this service.\"    Patient has given verbal consent for Telephone visit?  Yes    What phone number would you like to be contacted at? [phone]    How would you like to obtain your AVS? MyChart  HISTORY OF PRESENT ILLNESS  Ms. Rockwell is a pleasant 75 year old year old female who presents with her daughter on the phone to discuss her left shoulder pain  Selvin explains that her shoulder is not yet feeling much better since the injection I gave her in her GH joint last week  She is not taking any medication for pain, as she is on coumadin and is restricted due to that and allergies to gabapentin and other medications  Location: left shoulder  Quality:  achy " pain    Severity: 7/10 at worst    Duration: 3 weeks since fall  Timing: occurs intermittently  Context: occurs while using her shoulder  Modifying factors:  resting and non-use makes it better, movement and use makes it worse  Associated signs & symptoms: left shoulder and neck pain    MEDICAL HISTORY  Patient Active Problem List   Diagnosis     OA (OSTEOARTHRITIS) OF KNEE - right     Status Post Total Knee Replacement - bilateral     TACHO (obstructive sleep apnea)     Hyperlipidemia with target LDL less than 100     Hypertension goal BP (blood pressure) < 140/90     Type 2 diabetes, HbA1c goal < 7% (H)     Morbid obesity (H)     Achalasia     Adrenal adenoma     Atrophy of left kidney     Calculus of kidney     Chronic low back pain     Gout     Paroxysmal atrial fibrillation (H)     Long term (current) use of anticoagulants     Anxiety     Spinal stenosis of lumbar region with neurogenic claudication       Current Outpatient Medications   Medication Sig Dispense Refill     carvedilol (COREG) 6.25 MG tablet Take 1 tablet (6.25 mg) by mouth 2 times daily 180 tablet 3     isosorbide mononitrate (IMDUR) 30 MG 24 hr tablet Take 1 tablet (30 mg) by mouth daily 90 tablet 3     metFORMIN (GLUCOPHAGE) 500 MG tablet Take 1 tablet (500 mg) by mouth 2 times daily (with meals) 180 tablet 1     omeprazole (PRILOSEC) 40 MG DR capsule        PARoxetine (PAXIL) 20 MG tablet TAKE 1 TABLET BY MOUTH IN THE MORNING 90 tablet 0     simvastatin (ZOCOR) 10 MG tablet Take 1 tablet (10 mg) by mouth At Bedtime 90 tablet 1     warfarin ANTICOAGULANT (COUMADIN) 2 MG tablet TAKE 1 & 1/2 (ONE & ONE-HALF) TABLETS ( 3 MG) BY MOUTH ONCE DAILY OR AS DIRECTED BY INR CLINIC 94 tablet 0     clotrimazole (LOTRIMIN) 1 % external cream        ferrous sulfate (FEROSUL) 325 (65 Fe) MG tablet Take 1 tablet (325 mg) by mouth daily (with breakfast) (Patient not taking: Reported on 6/29/2020) 90 tablet 3     FISH OIL 1000 MG PO CAPS 3 CAPSULES DAILY WITH A  MEAL       furosemide (LASIX) 20 MG tablet Take 1 tablet (20 mg) by mouth daily for 5 days 5 tablet 0     order for DME 15-20 mm mercury thigh high compression stockings 1-2 pairs  Or waist high if that will work for patient.   Also patient has trouble putting socks on so equipment that would help with that would be great. 2 Package 5     order for DME Vinh hose stockings 1 Device 0     OTHER MEDICAL SUPPLIES 1 Package by Other route daily. 1 Package 0     tiZANidine (ZANAFLEX) 4 MG tablet Take 1 tablet (4 mg) by mouth 3 times daily as needed for muscle spasms (Patient not taking: Reported on 6/29/2020) 30 tablet 1     triamcinolone (KENALOG) 0.1 % external cream Apply sparingly to bilateral legs three times daily for 14 days. (Patient not taking: Reported on 6/29/2020) 45 g 1       Allergies   Allergen Reactions     Gabapentin      Rash and itching     Nsaids      Other reaction(s): Gastrointestinal  Former PPI user, EGD 2/2018 showed LA Grade D esophagitis, plus duodenitis and gastritis.  Famotidine on med list but may not have been taking - rx was 3 years old.      Sulfa Drugs Other (See Comments) and Rash     Per 11-4-13 H&P by Dr. Fei Arias.  RASH ALL OVER FOUND IN DR. RAMIREZ DICTATION OF 10/11/11  BACTRIM-  RASH ALL OVER       No family history on file.  Social History     Socioeconomic History     Marital status:      Spouse name: Not on file     Number of children: Not on file     Years of education: Not on file     Highest education level: Not on file   Occupational History     Not on file   Social Needs     Financial resource strain: Not on file     Food insecurity     Worry: Not on file     Inability: Not on file     Transportation needs     Medical: Not on file     Non-medical: Not on file   Tobacco Use     Smoking status: Never Smoker     Smokeless tobacco: Never Used   Substance and Sexual Activity     Alcohol use: No     Drug use: No     Sexual activity: Yes     Partners: Male     Birth  control/protection: Post-menopausal   Lifestyle     Physical activity     Days per week: Not on file     Minutes per session: Not on file     Stress: Not on file   Relationships     Social connections     Talks on phone: Not on file     Gets together: Not on file     Attends Catholic service: Not on file     Active member of club or organization: Not on file     Attends meetings of clubs or organizations: Not on file     Relationship status: Not on file     Intimate partner violence     Fear of current or ex partner: Not on file     Emotionally abused: Not on file     Physically abused: Not on file     Forced sexual activity: Not on file   Other Topics Concern     Parent/sibling w/ CABG, MI or angioplasty before 65F 55M? Not Asked   Social History Narrative     Not on file       Additional medical/Social/Surgical histories reviewed in [facility] and updated as appropriate.     REVIEW OF SYSTEMS (6/30/2020)  10 point ROS of systems including Constitutional, Eyes, Respiratory, Cardiovascular, Gastroenterology, Genitourinary, Integumentary, Musculoskeletal, Psychiatric, Allergic/Immunologic were all negative except for pertinent positives noted in my HPI.       ASSESSMENT & PLAN  76 yo female with left shoulder contusion, glenohumeral arthritis, not improved  Reviewed plan to discuss next week  Start robaxin  Give it more time  Will consider left shoulder CT scan if not improved by next week      Gurinder Castellon MD, CAQSM    Phone call duration: 15 minutes  Phone call start: 9:20am  Phone call end: 9:35am  Gurinder Castellon MD        Again, thank you for allowing me to participate in the care of your patient.        Sincerely,        Gurinder Castellon MD

## 2020-06-30 NOTE — LETTER
"    6/30/2020         RE: Selvin Rockwell  [address] [place] [id number]        Dear Colleague,    Thank you for referring your patient, Selvin Rockwell, to the [facility]. Please see a copy of my visit note below.    Selvin Rockwell is a 75 year old female who is being evaluated via a billable telephone visit.      The patient has been notified of following:     \"This telephone visit will be conducted via a call between you and your physician/provider. We have found that certain health care needs can be provided without the need for a physical exam.  This service lets us provide the care you need with a short phone conversation.  If a prescription is necessary we can send it directly to your pharmacy.  If lab work is needed we can place an order for that and you can then stop by our lab to have the test done at a later time.    Telephone visits are billed at different rates depending on your insurance coverage. During this emergency period, for some insurers they may be billed the same as an in-person visit.  Please reach out to your insurance provider with any questions.    If during the course of the call the physician/provider feels a telephone visit is not appropriate, you will not be charged for this service.\"    Patient has given verbal consent for Telephone visit?  Yes    What phone number would you like to be contacted at? [phone]    How would you like to obtain your AVS? MyChart  HISTORY OF PRESENT ILLNESS  Ms. Rockwell is a pleasant 75 year old year old female who presents with her daughter on the phone to discuss her left shoulder pain  Selvin explains that her shoulder is not yet feeling much better since the injection I gave her in her GH joint last week  She is not taking any medication for pain, as she is on coumadin and is restricted due to that and allergies to gabapentin and other medications  Location: left shoulder  Quality:  achy " pain    Severity: 7/10 at worst    Duration: 3 weeks since fall  Timing: occurs intermittently  Context: occurs while using her shoulder  Modifying factors:  resting and non-use makes it better, movement and use makes it worse  Associated signs & symptoms: left shoulder and neck pain    MEDICAL HISTORY  Patient Active Problem List   Diagnosis     OA (OSTEOARTHRITIS) OF KNEE - right     Status Post Total Knee Replacement - bilateral     TACHO (obstructive sleep apnea)     Hyperlipidemia with target LDL less than 100     Hypertension goal BP (blood pressure) < 140/90     Type 2 diabetes, HbA1c goal < 7% (H)     Morbid obesity (H)     Achalasia     Adrenal adenoma     Atrophy of left kidney     Calculus of kidney     Chronic low back pain     Gout     Paroxysmal atrial fibrillation (H)     Long term (current) use of anticoagulants     Anxiety     Spinal stenosis of lumbar region with neurogenic claudication       Current Outpatient Medications   Medication Sig Dispense Refill     carvedilol (COREG) 6.25 MG tablet Take 1 tablet (6.25 mg) by mouth 2 times daily 180 tablet 3     isosorbide mononitrate (IMDUR) 30 MG 24 hr tablet Take 1 tablet (30 mg) by mouth daily 90 tablet 3     metFORMIN (GLUCOPHAGE) 500 MG tablet Take 1 tablet (500 mg) by mouth 2 times daily (with meals) 180 tablet 1     omeprazole (PRILOSEC) 40 MG DR capsule        PARoxetine (PAXIL) 20 MG tablet TAKE 1 TABLET BY MOUTH IN THE MORNING 90 tablet 0     simvastatin (ZOCOR) 10 MG tablet Take 1 tablet (10 mg) by mouth At Bedtime 90 tablet 1     warfarin ANTICOAGULANT (COUMADIN) 2 MG tablet TAKE 1 & 1/2 (ONE & ONE-HALF) TABLETS ( 3 MG) BY MOUTH ONCE DAILY OR AS DIRECTED BY INR CLINIC 94 tablet 0     clotrimazole (LOTRIMIN) 1 % external cream        ferrous sulfate (FEROSUL) 325 (65 Fe) MG tablet Take 1 tablet (325 mg) by mouth daily (with breakfast) (Patient not taking: Reported on 6/29/2020) 90 tablet 3     FISH OIL 1000 MG PO CAPS 3 CAPSULES DAILY WITH A  MEAL       furosemide (LASIX) 20 MG tablet Take 1 tablet (20 mg) by mouth daily for 5 days 5 tablet 0     order for DME 15-20 mm mercury thigh high compression stockings 1-2 pairs  Or waist high if that will work for patient.   Also patient has trouble putting socks on so equipment that would help with that would be great. 2 Package 5     order for DME Vinh hose stockings 1 Device 0     OTHER MEDICAL SUPPLIES 1 Package by Other route daily. 1 Package 0     tiZANidine (ZANAFLEX) 4 MG tablet Take 1 tablet (4 mg) by mouth 3 times daily as needed for muscle spasms (Patient not taking: Reported on 6/29/2020) 30 tablet 1     triamcinolone (KENALOG) 0.1 % external cream Apply sparingly to bilateral legs three times daily for 14 days. (Patient not taking: Reported on 6/29/2020) 45 g 1       Allergies   Allergen Reactions     Gabapentin      Rash and itching     Nsaids      Other reaction(s): Gastrointestinal  Former PPI user, EGD 2/2018 showed LA Grade D esophagitis, plus duodenitis and gastritis.  Famotidine on med list but may not have been taking - rx was 3 years old.      Sulfa Drugs Other (See Comments) and Rash     Per 11-4-13 H&P by Dr. Fei Arias.  RASH ALL OVER FOUND IN DR. RAMIREZ DICTATION OF 10/11/11  BACTRIM-  RASH ALL OVER       No family history on file.  Social History     Socioeconomic History     Marital status:      Spouse name: Not on file     Number of children: Not on file     Years of education: Not on file     Highest education level: Not on file   Occupational History     Not on file   Social Needs     Financial resource strain: Not on file     Food insecurity     Worry: Not on file     Inability: Not on file     Transportation needs     Medical: Not on file     Non-medical: Not on file   Tobacco Use     Smoking status: Never Smoker     Smokeless tobacco: Never Used   Substance and Sexual Activity     Alcohol use: No     Drug use: No     Sexual activity: Yes     Partners: Male     Birth  control/protection: Post-menopausal   Lifestyle     Physical activity     Days per week: Not on file     Minutes per session: Not on file     Stress: Not on file   Relationships     Social connections     Talks on phone: Not on file     Gets together: Not on file     Attends Jew service: Not on file     Active member of club or organization: Not on file     Attends meetings of clubs or organizations: Not on file     Relationship status: Not on file     Intimate partner violence     Fear of current or ex partner: Not on file     Emotionally abused: Not on file     Physically abused: Not on file     Forced sexual activity: Not on file   Other Topics Concern     Parent/sibling w/ CABG, MI or angioplasty before 65F 55M? Not Asked   Social History Narrative     Not on file       Additional medical/Social/Surgical histories reviewed in [facility] and updated as appropriate.     REVIEW OF SYSTEMS (6/30/2020)  10 point ROS of systems including Constitutional, Eyes, Respiratory, Cardiovascular, Gastroenterology, Genitourinary, Integumentary, Musculoskeletal, Psychiatric, Allergic/Immunologic were all negative except for pertinent positives noted in my HPI.       ASSESSMENT & PLAN  76 yo female with left shoulder contusion, glenohumeral arthritis, not improved  Reviewed plan to discuss next week  Start robaxin  Give it more time  Will consider left shoulder CT scan if not improved by next week      Gurinder Castellon MD, CAQSM    Phone call duration: 15 minutes  Phone call start: 9:20am  Phone call end: 9:35am  Gurinder Castellon MD        Again, thank you for allowing me to participate in the care of your patient.        Sincerely,        Gurinder Castellon MD

## 2020-07-09 ENCOUNTER — VIRTUAL VISIT (OUTPATIENT)
Dept: ORTHOPEDICS | Facility: CLINIC | Age: 75
End: 2020-07-09
Payer: MEDICARE

## 2020-07-09 ENCOUNTER — ANTICOAGULATION THERAPY VISIT (OUTPATIENT)
Dept: FAMILY MEDICINE | Facility: CLINIC | Age: 75
End: 2020-07-09

## 2020-07-09 DIAGNOSIS — M19.012 GLENOHUMERAL ARTHRITIS, LEFT: ICD-10-CM

## 2020-07-09 DIAGNOSIS — I48.0 PAROXYSMAL ATRIAL FIBRILLATION (H): ICD-10-CM

## 2020-07-09 DIAGNOSIS — Z79.01 LONG TERM CURRENT USE OF ANTICOAGULANT THERAPY: ICD-10-CM

## 2020-07-09 DIAGNOSIS — M25.512 ACUTE PAIN OF LEFT SHOULDER: Primary | ICD-10-CM

## 2020-07-09 LAB
CAPILLARY BLOOD COLLECTION: NORMAL
INR PPP: 2.1 (ref 0.86–1.14)

## 2020-07-09 PROCEDURE — 36416 COLLJ CAPILLARY BLOOD SPEC: CPT | Performed by: FAMILY MEDICINE

## 2020-07-09 PROCEDURE — 85610 PROTHROMBIN TIME: CPT | Performed by: FAMILY MEDICINE

## 2020-07-09 PROCEDURE — 99442 ZZC PHYSICIAN TELEPHONE EVALUATION 11-20 MIN: CPT | Performed by: PREVENTIVE MEDICINE

## 2020-07-09 PROCEDURE — 99207 ZZC NO CHARGE NURSE ONLY: CPT | Performed by: FAMILY MEDICINE

## 2020-07-09 NOTE — PROGRESS NOTES
"Selvin Rockwell is a 75 year old female who is being evaluated via a billable telephone visit.      The patient has been notified of following:     \"This telephone visit will be conducted via a call between you and your physician/provider. We have found that certain health care needs can be provided without the need for a physical exam.  This service lets us provide the care you need with a short phone conversation.  If a prescription is necessary we can send it directly to your pharmacy.  If lab work is needed we can place an order for that and you can then stop by our lab to have the test done at a later time.    Telephone visits are billed at different rates depending on your insurance coverage. During this emergency period, for some insurers they may be billed the same as an in-person visit.  Please reach out to your insurance provider with any questions.    If during the course of the call the physician/provider feels a telephone visit is not appropriate, you will not be charged for this service.\"    Patient has given verbal consent for Telephone visit?  Yes    What phone number would you like to be contacted at? cell    How would you like to obtain your AVS? MyChart  HISTORY OF PRESENT ILLNESS  Ms. Rockwell is a pleasant 75 year old year old female who presents to discuss her left shoulder injury/pain  Selvin explains that her injection I gave her recently in the shoulder helped a little for a few days, but still having pain with use of shoulder  Location: left shoulder  Quality:  achy pain    Severity: 7/10 at worst    Duration: 1 month  Timing: occurs intermittently  Context: occurs while using arm  Modifying factors:  resting and non-use makes it better, movement and use makes it worse  Associated signs & symptoms: neck pain and radiation into left shoulder/arm    MEDICAL HISTORY  Patient Active Problem List   Diagnosis     OA (OSTEOARTHRITIS) OF KNEE - right     Status Post Total Knee Replacement - " bilateral     TACHO (obstructive sleep apnea)     Hyperlipidemia with target LDL less than 100     Hypertension goal BP (blood pressure) < 140/90     Type 2 diabetes, HbA1c goal < 7% (H)     Morbid obesity (H)     Achalasia     Adrenal adenoma     Atrophy of left kidney     Calculus of kidney     Chronic low back pain     Gout     Paroxysmal atrial fibrillation (H)     Long term (current) use of anticoagulants     Anxiety     Spinal stenosis of lumbar region with neurogenic claudication     Varicose veins with pain     Venous stasis dermatitis of right lower extremity       Current Outpatient Medications   Medication Sig Dispense Refill     baclofen (LIORESAL) 10 MG tablet Take 1 tablet (10 mg) by mouth At Bedtime 30 tablet 0     carvedilol (COREG) 6.25 MG tablet Take 1 tablet (6.25 mg) by mouth 2 times daily 180 tablet 3     clotrimazole (LOTRIMIN) 1 % external cream        ferrous sulfate (FEROSUL) 325 (65 Fe) MG tablet Take 1 tablet (325 mg) by mouth daily (with breakfast) (Patient not taking: Reported on 6/29/2020) 90 tablet 3     FISH OIL 1000 MG PO CAPS 3 CAPSULES DAILY WITH A MEAL       furosemide (LASIX) 20 MG tablet Take 1 tablet (20 mg) by mouth daily for 5 days 5 tablet 0     isosorbide mononitrate (IMDUR) 30 MG 24 hr tablet Take 1 tablet (30 mg) by mouth daily 90 tablet 3     metFORMIN (GLUCOPHAGE) 500 MG tablet Take 1 tablet (500 mg) by mouth 2 times daily (with meals) 180 tablet 1     methocarbamol (ROBAXIN) 500 MG tablet Take 1 tablet (500 mg) by mouth 4 times daily as needed for muscle spasms 40 tablet 0     methylPREDNISolone (MEDROL) 4 MG tablet therapy pack Follow Package Directions 21 tablet 0     omeprazole (PRILOSEC) 40 MG DR capsule        order for DME 15-20 mm mercury thigh high compression stockings 1-2 pairs 2 Package 5     order for DME 15-20 mm mercury thigh high compression stockings 1-2 pairs  Or waist high if that will work for patient.   Also patient has trouble putting socks on so  equipment that would help with that would be great. 2 Package 5     order for DME Vinh hose stockings 1 Device 0     OTHER MEDICAL SUPPLIES 1 Package by Other route daily. 1 Package 0     PARoxetine (PAXIL) 20 MG tablet TAKE 1 TABLET BY MOUTH IN THE MORNING 90 tablet 0     simvastatin (ZOCOR) 10 MG tablet Take 1 tablet (10 mg) by mouth At Bedtime 90 tablet 1     tiZANidine (ZANAFLEX) 4 MG tablet Take 1 tablet (4 mg) by mouth 3 times daily as needed for muscle spasms (Patient not taking: Reported on 6/29/2020) 30 tablet 1     triamcinolone (KENALOG) 0.1 % external cream Apply sparingly to bilateral legs three times daily for 14 days. (Patient not taking: Reported on 6/29/2020) 45 g 1     warfarin ANTICOAGULANT (COUMADIN) 2 MG tablet TAKE 1 & 1/2 (ONE & ONE-HALF) TABLETS ( 3 MG) BY MOUTH ONCE DAILY OR AS DIRECTED BY INR CLINIC 94 tablet 0       Allergies   Allergen Reactions     Gabapentin      Rash and itching     Nsaids      Other reaction(s): Gastrointestinal  Former PPI user, EGD 2/2018 showed LA Grade D esophagitis, plus duodenitis and gastritis.  Famotidine on med list but may not have been taking - rx was 3 years old.      Sulfa Drugs Other (See Comments) and Rash     Per 11-4-13 H&P by Dr. Fei Arias.  RASH ALL OVER FOUND IN DR. RAMIREZ DICTATION OF 10/11/11  BACTRIM-  RASH ALL OVER       No family history on file.  Social History     Socioeconomic History     Marital status:      Spouse name: Not on file     Number of children: Not on file     Years of education: Not on file     Highest education level: Not on file   Occupational History     Not on file   Social Needs     Financial resource strain: Not on file     Food insecurity     Worry: Not on file     Inability: Not on file     Transportation needs     Medical: Not on file     Non-medical: Not on file   Tobacco Use     Smoking status: Never Smoker     Smokeless tobacco: Never Used   Substance and Sexual Activity     Alcohol use: No     Drug use:  No     Sexual activity: Yes     Partners: Male     Birth control/protection: Post-menopausal   Lifestyle     Physical activity     Days per week: Not on file     Minutes per session: Not on file     Stress: Not on file   Relationships     Social connections     Talks on phone: Not on file     Gets together: Not on file     Attends Yazidi service: Not on file     Active member of club or organization: Not on file     Attends meetings of clubs or organizations: Not on file     Relationship status: Not on file     Intimate partner violence     Fear of current or ex partner: Not on file     Emotionally abused: Not on file     Physically abused: Not on file     Forced sexual activity: Not on file   Other Topics Concern     Parent/sibling w/ CABG, MI or angioplasty before 65F 55M? Not Asked   Social History Narrative     Not on file       Additional medical/Social/Surgical histories reviewed in [facility] and updated as appropriate.     REVIEW OF SYSTEMS (8/6/2020)  10 point ROS of systems including Constitutional, Eyes, Respiratory, Cardiovascular, Gastroenterology, Genitourinary, Integumentary, Musculoskeletal, Psychiatric, Allergic/Immunologic were all negative except for pertinent positives noted in my HPI.       ASSESSMENT & PLAN  74 yo female with left shoulder glenohumeral arthritis, contusion, RC arthropathy, not resolved  Reviewed response to injection, will consider CT scan of shoulder  F/u after 1-2 weeks and will consider ordering CT scan  Cont. Tylenol PRN    Gurinder Castellon MD, CAQSM    Phone call duration: 15 minutes  Phone call start:11:20am  Phone call end:11:35am  Gurinder Castellon MD

## 2020-07-09 NOTE — LETTER
"    7/9/2020         RE: Selvin Rockwell  [address] [place] [id number]        Dear Colleague,    Thank you for referring your patient, Selvin Rockwell, to the [facility]. Please see a copy of my visit note below.    Selvin Rockwell is a 75 year old female who is being evaluated via a billable telephone visit.      The patient has been notified of following:     \"This telephone visit will be conducted via a call between you and your physician/provider. We have found that certain health care needs can be provided without the need for a physical exam.  This service lets us provide the care you need with a short phone conversation.  If a prescription is necessary we can send it directly to your pharmacy.  If lab work is needed we can place an order for that and you can then stop by our lab to have the test done at a later time.    Telephone visits are billed at different rates depending on your insurance coverage. During this emergency period, for some insurers they may be billed the same as an in-person visit.  Please reach out to your insurance provider with any questions.    If during the course of the call the physician/provider feels a telephone visit is not appropriate, you will not be charged for this service.\"    Patient has given verbal consent for Telephone visit?  Yes    What phone number would you like to be contacted at? cell    How would you like to obtain your AVS? MyChart  HISTORY OF PRESENT ILLNESS  Ms. Rockwell is a pleasant 75 year old year old female who presents to discuss her left shoulder injury/pain  Selvin explains that her injection I gave her recently in the shoulder helped a little for a few days, but still having pain with use of shoulder  Location: left shoulder  Quality:  achy pain    Severity: 7/10 at worst    Duration: 1 month  Timing: occurs intermittently  Context: occurs while using arm  Modifying factors:  resting and non-use makes it " better, movement and use makes it worse  Associated signs & symptoms: neck pain and radiation into left shoulder/arm    MEDICAL HISTORY  Patient Active Problem List   Diagnosis     OA (OSTEOARTHRITIS) OF KNEE - right     Status Post Total Knee Replacement - bilateral     TACHO (obstructive sleep apnea)     Hyperlipidemia with target LDL less than 100     Hypertension goal BP (blood pressure) < 140/90     Type 2 diabetes, HbA1c goal < 7% (H)     Morbid obesity (H)     Achalasia     Adrenal adenoma     Atrophy of left kidney     Calculus of kidney     Chronic low back pain     Gout     Paroxysmal atrial fibrillation (H)     Long term (current) use of anticoagulants     Anxiety     Spinal stenosis of lumbar region with neurogenic claudication     Varicose veins with pain     Venous stasis dermatitis of right lower extremity       Current Outpatient Medications   Medication Sig Dispense Refill     baclofen (LIORESAL) 10 MG tablet Take 1 tablet (10 mg) by mouth At Bedtime 30 tablet 0     carvedilol (COREG) 6.25 MG tablet Take 1 tablet (6.25 mg) by mouth 2 times daily 180 tablet 3     clotrimazole (LOTRIMIN) 1 % external cream        ferrous sulfate (FEROSUL) 325 (65 Fe) MG tablet Take 1 tablet (325 mg) by mouth daily (with breakfast) (Patient not taking: Reported on 6/29/2020) 90 tablet 3     FISH OIL 1000 MG PO CAPS 3 CAPSULES DAILY WITH A MEAL       furosemide (LASIX) 20 MG tablet Take 1 tablet (20 mg) by mouth daily for 5 days 5 tablet 0     isosorbide mononitrate (IMDUR) 30 MG 24 hr tablet Take 1 tablet (30 mg) by mouth daily 90 tablet 3     metFORMIN (GLUCOPHAGE) 500 MG tablet Take 1 tablet (500 mg) by mouth 2 times daily (with meals) 180 tablet 1     methocarbamol (ROBAXIN) 500 MG tablet Take 1 tablet (500 mg) by mouth 4 times daily as needed for muscle spasms 40 tablet 0     methylPREDNISolone (MEDROL) 4 MG tablet therapy pack Follow Package Directions 21 tablet 0     omeprazole (PRILOSEC) 40 MG DR capsule         order for DME 15-20 mm mercury thigh high compression stockings 1-2 pairs 2 Package 5     order for DME 15-20 mm mercury thigh high compression stockings 1-2 pairs  Or waist high if that will work for patient.   Also patient has trouble putting socks on so equipment that would help with that would be great. 2 Package 5     order for DME Vinh hose stockings 1 Device 0     OTHER MEDICAL SUPPLIES 1 Package by Other route daily. 1 Package 0     PARoxetine (PAXIL) 20 MG tablet TAKE 1 TABLET BY MOUTH IN THE MORNING 90 tablet 0     simvastatin (ZOCOR) 10 MG tablet Take 1 tablet (10 mg) by mouth At Bedtime 90 tablet 1     tiZANidine (ZANAFLEX) 4 MG tablet Take 1 tablet (4 mg) by mouth 3 times daily as needed for muscle spasms (Patient not taking: Reported on 6/29/2020) 30 tablet 1     triamcinolone (KENALOG) 0.1 % external cream Apply sparingly to bilateral legs three times daily for 14 days. (Patient not taking: Reported on 6/29/2020) 45 g 1     warfarin ANTICOAGULANT (COUMADIN) 2 MG tablet TAKE 1 & 1/2 (ONE & ONE-HALF) TABLETS ( 3 MG) BY MOUTH ONCE DAILY OR AS DIRECTED BY INR CLINIC 94 tablet 0       Allergies   Allergen Reactions     Gabapentin      Rash and itching     Nsaids      Other reaction(s): Gastrointestinal  Former PPI user, EGD 2/2018 showed LA Grade D esophagitis, plus duodenitis and gastritis.  Famotidine on med list but may not have been taking - rx was 3 years old.      Sulfa Drugs Other (See Comments) and Rash     Per 11-4-13 H&P by Dr. Fei Arias.  RASH ALL OVER FOUND IN DR. RAMIREZ DICTATION OF 10/11/11  BACTRIM-  RASH ALL OVER       No family history on file.  Social History     Socioeconomic History     Marital status:      Spouse name: Not on file     Number of children: Not on file     Years of education: Not on file     Highest education level: Not on file   Occupational History     Not on file   Social Needs     Financial resource strain: Not on file     Food insecurity     Worry: Not on  file     Inability: Not on file     Transportation needs     Medical: Not on file     Non-medical: Not on file   Tobacco Use     Smoking status: Never Smoker     Smokeless tobacco: Never Used   Substance and Sexual Activity     Alcohol use: No     Drug use: No     Sexual activity: Yes     Partners: Male     Birth control/protection: Post-menopausal   Lifestyle     Physical activity     Days per week: Not on file     Minutes per session: Not on file     Stress: Not on file   Relationships     Social connections     Talks on phone: Not on file     Gets together: Not on file     Attends [employer] service: Not on file     Active member of club or organization: Not on file     Attends meetings of clubs or organizations: Not on file     Relationship status: Not on file     Intimate partner violence     Fear of current or ex partner: Not on file     Emotionally abused: Not on file     Physically abused: Not on file     Forced sexual activity: Not on file   Other Topics Concern     Parent/sibling w/ CABG, MI or angioplasty before 65F 55M? Not Asked   Social History Narrative     Not on file       Additional medical/Social/Surgical histories reviewed in [facility] and updated as appropriate.     REVIEW OF SYSTEMS (8/6/2020)  10 point ROS of systems including Constitutional, Eyes, Respiratory, Cardiovascular, Gastroenterology, Genitourinary, Integumentary, Musculoskeletal, Psychiatric, Allergic/Immunologic were all negative except for pertinent positives noted in my HPI.       ASSESSMENT & PLAN  74 yo female with left shoulder glenohumeral arthritis, contusion, RC arthropathy, not resolved  Reviewed response to injection, will consider CT scan of shoulder  F/u after 1-2 weeks and will consider ordering CT scan  Cont. Tylenol PRN    Gurinder Castellon MD, CAQSM    Phone call duration: 15 minutes  Phone call start:11:20am  Phone call end:11:35am  Gurinder Castellon MD      Again, thank you for allowing me to participate in the care of  your patient.        Sincerely,        Gurinder Castellon MD

## 2020-07-09 NOTE — LETTER
"    7/9/2020         RE: Selvin Rockwell  [address] [place] [id number]        Dear Colleague,    Thank you for referring your patient, Selvin Rockwell, to the [facility]. Please see a copy of my visit note below.    Selvin Rockwell is a 75 year old female who is being evaluated via a billable telephone visit.      The patient has been notified of following:     \"This telephone visit will be conducted via a call between you and your physician/provider. We have found that certain health care needs can be provided without the need for a physical exam.  This service lets us provide the care you need with a short phone conversation.  If a prescription is necessary we can send it directly to your pharmacy.  If lab work is needed we can place an order for that and you can then stop by our lab to have the test done at a later time.    Telephone visits are billed at different rates depending on your insurance coverage. During this emergency period, for some insurers they may be billed the same as an in-person visit.  Please reach out to your insurance provider with any questions.    If during the course of the call the physician/provider feels a telephone visit is not appropriate, you will not be charged for this service.\"    Patient has given verbal consent for Telephone visit?  Yes    What phone number would you like to be contacted at? cell    How would you like to obtain your AVS? MyChart  HISTORY OF PRESENT ILLNESS  Ms. Rockwell is a pleasant 75 year old year old female who presents to discuss her left shoulder injury/pain  Selvin explains that her injection I gave her recently in the shoulder helped a little for a few days, but still having pain with use of shoulder  Location: left shoulder  Quality:  achy pain    Severity: 7/10 at worst    Duration: 1 month  Timing: occurs intermittently  Context: occurs while using arm  Modifying factors:  resting and non-use makes it " better, movement and use makes it worse  Associated signs & symptoms: neck pain and radiation into left shoulder/arm    MEDICAL HISTORY  Patient Active Problem List   Diagnosis     OA (OSTEOARTHRITIS) OF KNEE - right     Status Post Total Knee Replacement - bilateral     TACHO (obstructive sleep apnea)     Hyperlipidemia with target LDL less than 100     Hypertension goal BP (blood pressure) < 140/90     Type 2 diabetes, HbA1c goal < 7% (H)     Morbid obesity (H)     Achalasia     Adrenal adenoma     Atrophy of left kidney     Calculus of kidney     Chronic low back pain     Gout     Paroxysmal atrial fibrillation (H)     Long term (current) use of anticoagulants     Anxiety     Spinal stenosis of lumbar region with neurogenic claudication     Varicose veins with pain     Venous stasis dermatitis of right lower extremity       Current Outpatient Medications   Medication Sig Dispense Refill     baclofen (LIORESAL) 10 MG tablet Take 1 tablet (10 mg) by mouth At Bedtime 30 tablet 0     carvedilol (COREG) 6.25 MG tablet Take 1 tablet (6.25 mg) by mouth 2 times daily 180 tablet 3     clotrimazole (LOTRIMIN) 1 % external cream        ferrous sulfate (FEROSUL) 325 (65 Fe) MG tablet Take 1 tablet (325 mg) by mouth daily (with breakfast) (Patient not taking: Reported on 6/29/2020) 90 tablet 3     FISH OIL 1000 MG PO CAPS 3 CAPSULES DAILY WITH A MEAL       furosemide (LASIX) 20 MG tablet Take 1 tablet (20 mg) by mouth daily for 5 days 5 tablet 0     isosorbide mononitrate (IMDUR) 30 MG 24 hr tablet Take 1 tablet (30 mg) by mouth daily 90 tablet 3     metFORMIN (GLUCOPHAGE) 500 MG tablet Take 1 tablet (500 mg) by mouth 2 times daily (with meals) 180 tablet 1     methocarbamol (ROBAXIN) 500 MG tablet Take 1 tablet (500 mg) by mouth 4 times daily as needed for muscle spasms 40 tablet 0     methylPREDNISolone (MEDROL) 4 MG tablet therapy pack Follow Package Directions 21 tablet 0     omeprazole (PRILOSEC) 40 MG DR capsule         order for DME 15-20 mm mercury thigh high compression stockings 1-2 pairs 2 Package 5     order for DME 15-20 mm mercury thigh high compression stockings 1-2 pairs  Or waist high if that will work for patient.   Also patient has trouble putting socks on so equipment that would help with that would be great. 2 Package 5     order for DME Vinh hose stockings 1 Device 0     OTHER MEDICAL SUPPLIES 1 Package by Other route daily. 1 Package 0     PARoxetine (PAXIL) 20 MG tablet TAKE 1 TABLET BY MOUTH IN THE MORNING 90 tablet 0     simvastatin (ZOCOR) 10 MG tablet Take 1 tablet (10 mg) by mouth At Bedtime 90 tablet 1     tiZANidine (ZANAFLEX) 4 MG tablet Take 1 tablet (4 mg) by mouth 3 times daily as needed for muscle spasms (Patient not taking: Reported on 6/29/2020) 30 tablet 1     triamcinolone (KENALOG) 0.1 % external cream Apply sparingly to bilateral legs three times daily for 14 days. (Patient not taking: Reported on 6/29/2020) 45 g 1     warfarin ANTICOAGULANT (COUMADIN) 2 MG tablet TAKE 1 & 1/2 (ONE & ONE-HALF) TABLETS ( 3 MG) BY MOUTH ONCE DAILY OR AS DIRECTED BY INR CLINIC 94 tablet 0       Allergies   Allergen Reactions     Gabapentin      Rash and itching     Nsaids      Other reaction(s): Gastrointestinal  Former PPI user, EGD 2/2018 showed LA Grade D esophagitis, plus duodenitis and gastritis.  Famotidine on med list but may not have been taking - rx was 3 years old.      Sulfa Drugs Other (See Comments) and Rash     Per 11-4-13 H&P by Dr. Fei Arias.  RASH ALL OVER FOUND IN DR. RAMIREZ DICTATION OF 10/11/11  BACTRIM-  RASH ALL OVER       No family history on file.  Social History     Socioeconomic History     Marital status:      Spouse name: Not on file     Number of children: Not on file     Years of education: Not on file     Highest education level: Not on file   Occupational History     Not on file   Social Needs     Financial resource strain: Not on file     Food insecurity     Worry: Not on  file     Inability: Not on file     Transportation needs     Medical: Not on file     Non-medical: Not on file   Tobacco Use     Smoking status: Never Smoker     Smokeless tobacco: Never Used   Substance and Sexual Activity     Alcohol use: No     Drug use: No     Sexual activity: Yes     Partners: Male     Birth control/protection: Post-menopausal   Lifestyle     Physical activity     Days per week: Not on file     Minutes per session: Not on file     Stress: Not on file   Relationships     Social connections     Talks on phone: Not on file     Gets together: Not on file     Attends Gnosticism service: Not on file     Active member of club or organization: Not on file     Attends meetings of clubs or organizations: Not on file     Relationship status: Not on file     Intimate partner violence     Fear of current or ex partner: Not on file     Emotionally abused: Not on file     Physically abused: Not on file     Forced sexual activity: Not on file   Other Topics Concern     Parent/sibling w/ CABG, MI or angioplasty before 65F 55M? Not Asked   Social History Narrative     Not on file       Additional medical/Social/Surgical histories reviewed in [facility] and updated as appropriate.     REVIEW OF SYSTEMS (8/6/2020)  10 point ROS of systems including Constitutional, Eyes, Respiratory, Cardiovascular, Gastroenterology, Genitourinary, Integumentary, Musculoskeletal, Psychiatric, Allergic/Immunologic were all negative except for pertinent positives noted in my HPI.       ASSESSMENT & PLAN  74 yo female with left shoulder glenohumeral arthritis, contusion, RC arthropathy, not resolved  Reviewed response to injection, will consider CT scan of shoulder  F/u after 1-2 weeks and will consider ordering CT scan  Cont. Tylenol PRN    Gurinder Castellon MD, CAQSM    Phone call duration: 15 minutes  Phone call start:11:20am  Phone call end:11:35am  Gurinder Castellon MD      Again, thank you for allowing me to participate in the care of  your patient.        Sincerely,        Gurinder Castellon MD

## 2020-07-09 NOTE — PROGRESS NOTES
ANTICOAGULATION FOLLOW-UP TELEPHONE VISIT    Patient Name:  Selvin Rockwell  Date:  2020  Contact Type:  Telephone/ Report per below    SUBJECTIVE: 6 wk jodie  Patient Findings     Comments:   Unable to speak to [facility] today.  I left a detailed v.m. with results and same warfarin dose.  Call nurse line or clinic for six week recheck.  Prior to this visit arranged I had spoke to [facility] as patient was overdue for INR and she indicated everything was the same for patient, no changes, illness, or symptoms of concern.        Clinical Outcomes     Negatives:   Major bleeding event, Thromboembolic event, Anticoagulation-related hospital admission, Anticoagulation-related ED visit, Anticoagulation-related fatality    Comments:   Unable to speak to [facility] today.  I left a detailed v.m. with results and same warfarin dose.  Call nurse line or clinic for six week recheck.  Prior to this visit arranged I had spoke to [facility] as patient was overdue for INR and she indicated everything was the same for patient, no changes, illness, or symptoms of concern.           OBJECTIVE    Recent labs: (last 7 days)     20  1036   INR 2.10*       ASSESSMENT / PLAN  INR assessment THER    Recheck INR In: 6 WEEKS    INR Location Outside lab      Anticoagulation Summary  As of 2020    INR goal:   2.0-3.0   TTR:   100.0 % (9.7 mo)   INR used for dosin.10 (2020)   Warfarin maintenance plan:   3 mg (2 mg x 1.5) every day   Full warfarin instructions:   3 mg every day   Weekly warfarin total:   21 mg   No change documented:   Rosamaria Levine, RN   Plan last modified:   Tana Parker RN (2019)   Next INR check:   2020   Priority:   Maintenance   Target end date:   Indefinite    Indications    Paroxysmal atrial fibrillation (H) [I48.0]             Anticoagulation Episode Summary     INR check location:   Anticoagulation Clinic    Preferred lab:   [facility]    Send INR reminders to:    [facility]    Comments:   Transfer from [facility] states has been on coumadin for 2-3 years      Anticoagulation Care Providers     Provider Role Specialty Phone number    Gurinder Ho MD  Family Practice [phone]            See the Encounter Report to view Anticoagulation Flowsheet and Dosing Calendar (Go to Encounters tab in chart review, and find the Anticoagulation Therapy Visit)    Dosage adjustment made based on physician directed care plan.    Rosamaria Levine RN

## 2020-07-16 ENCOUNTER — TRANSFERRED RECORDS (OUTPATIENT)
Dept: HEALTH INFORMATION MANAGEMENT | Facility: CLINIC | Age: 75
End: 2020-07-16

## 2020-07-16 ENCOUNTER — TELEPHONE (OUTPATIENT)
Dept: SURGERY | Facility: CLINIC | Age: 75
End: 2020-07-16

## 2020-07-16 NOTE — TELEPHONE ENCOUNTER
Reason for call:  Other   Patient called regarding (reason for call): garcia   Additional comments: Patient's daughter calling stating that patient did go to her nueurology appointment and states its the veins. Wondering what next step is. Please call to advise.     Phone number to reach patient:  Other phone number:  [phone]    Best Time:  Any     Can we leave a detailed message on this number?  YES    Travel screening: Not Applicable

## 2020-07-17 NOTE — TELEPHONE ENCOUNTER
Alicia returned call regarding previous message and would like to know what would be the next step then?

## 2020-07-17 NOTE — TELEPHONE ENCOUNTER
Left message for Kat to call me back. Need to let her know that medicare will not cover the sclerotherapy. Nicole Casanova Cma

## 2020-07-20 ENCOUNTER — TELEPHONE (OUTPATIENT)
Dept: SURGERY | Facility: CLINIC | Age: 75
End: 2020-07-20

## 2020-07-20 NOTE — TELEPHONE ENCOUNTER
Daughter, Kat,called . Patient was seen at \[facility]\"". Neurology and they ruled out any neurological disorders in legs.      They would like to know what the next step would be .    Message sent to Provider to review    Leslye Casanova MA

## 2020-07-20 NOTE — TELEPHONE ENCOUNTER
Attempted yo contact Kat.  Message left stating: medicare will not cover the sclerotherapy.  Per previous note    Leslye Casanova MA

## 2020-07-21 RX ORDER — LIDOCAINE HYDROCHLORIDE 10 MG/ML
3 INJECTION, SOLUTION EPIDURAL; INFILTRATION; INTRACAUDAL; PERINEURAL
Status: DISCONTINUED | OUTPATIENT
Start: 2020-06-23 | End: 2024-07-03

## 2020-07-21 RX ORDER — METHYLPREDNISOLONE ACETATE 80 MG/ML
80 INJECTION, SUSPENSION INTRA-ARTICULAR; INTRALESIONAL; INTRAMUSCULAR; SOFT TISSUE
Status: DISCONTINUED | OUTPATIENT
Start: 2020-06-23 | End: 2024-07-03

## 2020-07-21 NOTE — TELEPHONE ENCOUNTER
Patients mother is returning a call regarding the messages below, she would like to speak with someone as soon as possible.

## 2020-07-21 NOTE — TELEPHONE ENCOUNTER
Returned call to [facility] at number listed below however there was no answer. Left message for patient to return call to clinic.    Horacio Rogers RN....7/21/2020 2:37 PM

## 2020-07-22 NOTE — TELEPHONE ENCOUNTER
You had mentioned something like stripping of her veins. Is that still a option? Nicole Casanova Cma

## 2020-07-23 ENCOUNTER — VIRTUAL VISIT (OUTPATIENT)
Dept: ORTHOPEDICS | Facility: CLINIC | Age: 75
End: 2020-07-23
Payer: MEDICARE

## 2020-07-23 DIAGNOSIS — M19.012 GLENOHUMERAL ARTHRITIS, LEFT: Primary | ICD-10-CM

## 2020-07-23 DIAGNOSIS — S40.012A CONTUSION OF LEFT SHOULDER, INITIAL ENCOUNTER: ICD-10-CM

## 2020-07-23 PROCEDURE — 99442 ZZC PHYSICIAN TELEPHONE EVALUATION 11-20 MIN: CPT | Performed by: PREVENTIVE MEDICINE

## 2020-07-23 RX ORDER — BACLOFEN 10 MG/1
10 TABLET ORAL AT BEDTIME
Qty: 30 TABLET | Refills: 0 | Status: SHIPPED | OUTPATIENT
Start: 2020-07-23 | End: 2020-11-16

## 2020-07-23 RX ORDER — METHYLPREDNISOLONE 4 MG
TABLET, DOSE PACK ORAL
Qty: 21 TABLET | Refills: 0 | Status: SHIPPED | OUTPATIENT
Start: 2020-07-23 | End: 2020-10-21

## 2020-07-23 NOTE — LETTER
"    7/23/2020         RE: Selvin Rockwell  [address] [facility] [address]        Dear Colleague,    Thank you for referring your patient, Selvin Rockwell, to the [facility]. Please see a copy of my visit note below.    Selvin Rockwell is a 75 year old female who is being evaluated via a billable telephone visit.      The patient has been notified of following:     \"This telephone visit will be conducted via a call between you and your physician/provider. We have found that certain health care needs can be provided without the need for a physical exam.  This service lets us provide the care you need with a short phone conversation.  If a prescription is necessary we can send it directly to your pharmacy.  If lab work is needed we can place an order for that and you can then stop by our lab to have the test done at a later time.    Telephone visits are billed at different rates depending on your insurance coverage. During this emergency period, for some insurers they may be billed the same as an in-person visit.  Please reach out to your insurance provider with any questions.    If during the course of the call the physician/provider feels a telephone visit is not appropriate, you will not be charged for this service.\"    Patient has given verbal consent for Telephone visit?  Yes    What phone number would you like to be contacted at? Cell with daughter    How would you like to obtain your AVS? MyChart  HISTORY OF PRESENT ILLNESS  Ms. Rockwell is a pleasant 75 year old year old female who presents to followup for left shoulder pain  Selvin explains that she got a little improvement from the injection I performed for her shoulder, but now is bothering her more again  Location: left shoulder, neck  Quality:  achy pain    Severity:7/10 at worst    Duration: since fall (accident that occurred at the [facility] on 6/4/20)  Timing: occurs intermittently  Context: occurs while using " her shoulder  Modifying factors:  resting and non-use makes it better, movement and use makes it worse  Associated signs & symptoms: some radiation from neck and into arm from shoulder    MEDICAL HISTORY  Patient Active Problem List   Diagnosis     OA (OSTEOARTHRITIS) OF KNEE - right     Status Post Total Knee Replacement - bilateral     TACHO (obstructive sleep apnea)     Hyperlipidemia with target LDL less than 100     Hypertension goal BP (blood pressure) < 140/90     Type 2 diabetes, HbA1c goal < 7% (H)     Morbid obesity (H)     Achalasia     Adrenal adenoma     Atrophy of left kidney     Calculus of kidney     Chronic low back pain     Gout     Paroxysmal atrial fibrillation (H)     Long term (current) use of anticoagulants     Anxiety     Spinal stenosis of lumbar region with neurogenic claudication     Varicose veins with pain     Venous stasis dermatitis of right lower extremity       Current Outpatient Medications   Medication Sig Dispense Refill     baclofen (LIORESAL) 10 MG tablet Take 1 tablet (10 mg) by mouth At Bedtime (Patient not taking: Reported on 8/18/2020) 30 tablet 0     methylPREDNISolone (MEDROL) 4 MG tablet therapy pack Follow Package Directions (Patient not taking: Reported on 8/18/2020) 21 tablet 0     amoxicillin (AMOXIL) 500 MG capsule Bring to clinic in original bottle where you will be instructed to take medication. 4 capsule 0     carvedilol (COREG) 6.25 MG tablet Take 1 tablet (6.25 mg) by mouth 2 times daily 180 tablet 3     cloNIDine (CATAPRES) 0.1 MG tablet Bring to clinic in original bottle where you will be instructed to take the medication. 1 tablet 0     clotrimazole (LOTRIMIN) 1 % external cream        diclofenac (VOLTAREN) 1 % topical gel Place 4 g onto the skin 4 times daily 1 Tube 3     ferrous sulfate (FEROSUL) 325 (65 Fe) MG tablet Take 1 tablet (325 mg) by mouth daily (with breakfast) (Patient not taking: Reported on 6/29/2020) 90 tablet 3     FISH OIL 1000 MG PO CAPS 3  CAPSULES DAILY WITH A MEAL       furosemide (LASIX) 20 MG tablet Take 1 tablet (20 mg) by mouth daily for 5 days 5 tablet 0     isosorbide mononitrate (IMDUR) 30 MG 24 hr tablet Take 1 tablet (30 mg) by mouth daily 90 tablet 3     LORazepam (ATIVAN) 1 MG tablet Bring to clinic in original bottle where you will be instructed to take the medication. 2 tablet 0     metFORMIN (GLUCOPHAGE) 500 MG tablet Take 1 tablet (500 mg) by mouth 2 times daily (with meals) 180 tablet 1     methocarbamol (ROBAXIN) 500 MG tablet Take 1 tablet (500 mg) by mouth 4 times daily as needed for muscle spasms (Patient not taking: Reported on 8/18/2020) 40 tablet 0     omeprazole (PRILOSEC) 40 MG DR capsule        order for DME 15-20 mm mercury thigh high compression stockings 1-2 pairs 2 Package 5     order for DME 15-20 mm mercury thigh high compression stockings 1-2 pairs  Or waist high if that will work for patient.   Also patient has trouble putting socks on so equipment that would help with that would be great. 2 Package 5     order for DME Vinh hose stockings 1 Device 0     OTHER MEDICAL SUPPLIES 1 Package by Other route daily. 1 Package 0     PARoxetine (PAXIL) 20 MG tablet TAKE 1 TABLET BY MOUTH IN THE MORNING 90 tablet 0     simvastatin (ZOCOR) 10 MG tablet Take 1 tablet (10 mg) by mouth At Bedtime 90 tablet 1     tiZANidine (ZANAFLEX) 4 MG tablet Take 1 tablet (4 mg) by mouth 3 times daily as needed for muscle spasms (Patient not taking: Reported on 6/29/2020) 30 tablet 1     triamcinolone (KENALOG) 0.1 % external cream Apply sparingly to bilateral legs three times daily for 14 days. (Patient not taking: Reported on 6/29/2020) 45 g 1     warfarin ANTICOAGULANT (COUMADIN) 2 MG tablet TAKE 1 & 1/2 (3 mg) TABLETS BY MOUTH ONCE DAILY OR  AS  DIRECTED  BY  INR  CLINIC 94 tablet 1       Allergies   Allergen Reactions     Gabapentin      Rash and itching     Nsaids      Other reaction(s): Gastrointestinal  Former PPI user, EGD 2/2018 showed  LA Grade D esophagitis, plus duodenitis and gastritis.  Famotidine on med list but may not have been taking - rx was 3 years old.      Sulfa Drugs Other (See Comments) and Rash     Per 11-4-13 H&P by Dr. Fei Arias.  RASH ALL OVER FOUND IN DR. RAMIREZ DICTATION OF 10/11/11  BACTRIM-  RASH ALL OVER       No family history on file.  Social History     Socioeconomic History     Marital status:      Spouse name: Not on file     Number of children: Not on file     Years of education: Not on file     Highest education level: Not on file   Occupational History     Not on file   Social Needs     Financial resource strain: Not on file     Food insecurity     Worry: Not on file     Inability: Not on file     Transportation needs     Medical: Not on file     Non-medical: Not on file   Tobacco Use     Smoking status: Never Smoker     Smokeless tobacco: Never Used   Substance and Sexual Activity     Alcohol use: No     Drug use: No     Sexual activity: Yes     Partners: Male     Birth control/protection: Post-menopausal   Lifestyle     Physical activity     Days per week: Not on file     Minutes per session: Not on file     Stress: Not on file   Relationships     Social connections     Talks on phone: Not on file     Gets together: Not on file     Attends Gnosticist service: Not on file     Active member of club or organization: Not on file     Attends meetings of clubs or organizations: Not on file     Relationship status: Not on file     Intimate partner violence     Fear of current or ex partner: Not on file     Emotionally abused: Not on file     Physically abused: Not on file     Forced sexual activity: Not on file   Other Topics Concern     Parent/sibling w/ CABG, MI or angioplasty before 65F 55M? Not Asked   Social History Narrative     Not on file       Additional medical/Social/Surgical histories reviewed in [facility] and updated as appropriate.     REVIEW OF SYSTEMS (8/20/2020)  10 point ROS of systems including  Constitutional, Eyes, Respiratory, Cardiovascular, Gastroenterology, Genitourinary, Integumentary, Musculoskeletal, Psychiatric, Allergic/Immunologic were all negative except for pertinent positives noted in my HPI.       ASSESSMENT & PLAN  76 yo female with left shoulder Glenohumeral arthritis, RC arthropathy, not improved  Reviewed her left shoulder CT: showing tearing in her RC and GH arthritis  Referred to Dr Rodriguez for further consultation  Start PT, ordered  Start baclofen, medrol pack  F/u after seeing Dr Michael Castellon MD, CAQSM    Phone call duration: 15 minutes  Phone call start: 9:10am  Phone call end: 9:25 am  Gurinder Castellon MD      Again, thank you for allowing me to participate in the care of your patient.        Sincerely,        Gurinder Castellon MD

## 2020-07-23 NOTE — TELEPHONE ENCOUNTER
Called daughter and left vm will forward to md to review.    Bia Ovalles RN Specialty Triage 7/23/2020 2:47 PM

## 2020-07-23 NOTE — LETTER
"    7/23/2020         RE: Selvin Rockwell  [address] [facility] [address]        Dear Colleague,    Thank you for referring your patient, Selvin Rockwell, to the [facility]. Please see a copy of my visit note below.    Selvin Rockwell is a 75 year old female who is being evaluated via a billable telephone visit.      The patient has been notified of following:     \"This telephone visit will be conducted via a call between you and your physician/provider. We have found that certain health care needs can be provided without the need for a physical exam.  This service lets us provide the care you need with a short phone conversation.  If a prescription is necessary we can send it directly to your pharmacy.  If lab work is needed we can place an order for that and you can then stop by our lab to have the test done at a later time.    Telephone visits are billed at different rates depending on your insurance coverage. During this emergency period, for some insurers they may be billed the same as an in-person visit.  Please reach out to your insurance provider with any questions.    If during the course of the call the physician/provider feels a telephone visit is not appropriate, you will not be charged for this service.\"    Patient has given verbal consent for Telephone visit?  Yes    What phone number would you like to be contacted at? Cell with daughter    How would you like to obtain your AVS? MyChart  HISTORY OF PRESENT ILLNESS  Ms. Rockwell is a pleasant 75 year old year old female who presents to followup for left shoulder pain  Selvin explains that she got a little improvement from the injection I performed for her shoulder, but now is bothering her more again  Location: left shoulder, neck  Quality:  achy pain    Severity:7/10 at worst    Duration: since fall (accident that occurred at the [facility] on 6/4/20)  Timing: occurs intermittently  Context: occurs while using " her shoulder  Modifying factors:  resting and non-use makes it better, movement and use makes it worse  Associated signs & symptoms: some radiation from neck and into arm from shoulder    MEDICAL HISTORY  Patient Active Problem List   Diagnosis     OA (OSTEOARTHRITIS) OF KNEE - right     Status Post Total Knee Replacement - bilateral     TACHO (obstructive sleep apnea)     Hyperlipidemia with target LDL less than 100     Hypertension goal BP (blood pressure) < 140/90     Type 2 diabetes, HbA1c goal < 7% (H)     Morbid obesity (H)     Achalasia     Adrenal adenoma     Atrophy of left kidney     Calculus of kidney     Chronic low back pain     Gout     Paroxysmal atrial fibrillation (H)     Long term (current) use of anticoagulants     Anxiety     Spinal stenosis of lumbar region with neurogenic claudication     Varicose veins with pain     Venous stasis dermatitis of right lower extremity       Current Outpatient Medications   Medication Sig Dispense Refill     baclofen (LIORESAL) 10 MG tablet Take 1 tablet (10 mg) by mouth At Bedtime (Patient not taking: Reported on 8/18/2020) 30 tablet 0     methylPREDNISolone (MEDROL) 4 MG tablet therapy pack Follow Package Directions (Patient not taking: Reported on 8/18/2020) 21 tablet 0     amoxicillin (AMOXIL) 500 MG capsule Bring to clinic in original bottle where you will be instructed to take medication. 4 capsule 0     carvedilol (COREG) 6.25 MG tablet Take 1 tablet (6.25 mg) by mouth 2 times daily 180 tablet 3     cloNIDine (CATAPRES) 0.1 MG tablet Bring to clinic in original bottle where you will be instructed to take the medication. 1 tablet 0     clotrimazole (LOTRIMIN) 1 % external cream        diclofenac (VOLTAREN) 1 % topical gel Place 4 g onto the skin 4 times daily 1 Tube 3     ferrous sulfate (FEROSUL) 325 (65 Fe) MG tablet Take 1 tablet (325 mg) by mouth daily (with breakfast) (Patient not taking: Reported on 6/29/2020) 90 tablet 3     FISH OIL 1000 MG PO CAPS 3  CAPSULES DAILY WITH A MEAL       furosemide (LASIX) 20 MG tablet Take 1 tablet (20 mg) by mouth daily for 5 days 5 tablet 0     isosorbide mononitrate (IMDUR) 30 MG 24 hr tablet Take 1 tablet (30 mg) by mouth daily 90 tablet 3     LORazepam (ATIVAN) 1 MG tablet Bring to clinic in original bottle where you will be instructed to take the medication. 2 tablet 0     metFORMIN (GLUCOPHAGE) 500 MG tablet Take 1 tablet (500 mg) by mouth 2 times daily (with meals) 180 tablet 1     methocarbamol (ROBAXIN) 500 MG tablet Take 1 tablet (500 mg) by mouth 4 times daily as needed for muscle spasms (Patient not taking: Reported on 8/18/2020) 40 tablet 0     omeprazole (PRILOSEC) 40 MG DR capsule        order for DME 15-20 mm mercury thigh high compression stockings 1-2 pairs 2 Package 5     order for DME 15-20 mm mercury thigh high compression stockings 1-2 pairs  Or waist high if that will work for patient.   Also patient has trouble putting socks on so equipment that would help with that would be great. 2 Package 5     order for DME Vinh hose stockings 1 Device 0     OTHER MEDICAL SUPPLIES 1 Package by Other route daily. 1 Package 0     PARoxetine (PAXIL) 20 MG tablet TAKE 1 TABLET BY MOUTH IN THE MORNING 90 tablet 0     simvastatin (ZOCOR) 10 MG tablet Take 1 tablet (10 mg) by mouth At Bedtime 90 tablet 1     tiZANidine (ZANAFLEX) 4 MG tablet Take 1 tablet (4 mg) by mouth 3 times daily as needed for muscle spasms (Patient not taking: Reported on 6/29/2020) 30 tablet 1     triamcinolone (KENALOG) 0.1 % external cream Apply sparingly to bilateral legs three times daily for 14 days. (Patient not taking: Reported on 6/29/2020) 45 g 1     warfarin ANTICOAGULANT (COUMADIN) 2 MG tablet TAKE 1 & 1/2 (3 mg) TABLETS BY MOUTH ONCE DAILY OR  AS  DIRECTED  BY  INR  CLINIC 94 tablet 1       Allergies   Allergen Reactions     Gabapentin      Rash and itching     Nsaids      Other reaction(s): Gastrointestinal  Former PPI user, EGD 2/2018 showed  LA Grade D esophagitis, plus duodenitis and gastritis.  Famotidine on med list but may not have been taking - rx was 3 years old.      Sulfa Drugs Other (See Comments) and Rash     Per 11-4-13 H&P by Dr. Fei Arias.  RASH ALL OVER FOUND IN DR. RAMIREZ DICTATION OF 10/11/11  BACTRIM-  RASH ALL OVER       No family history on file.  Social History     Socioeconomic History     Marital status:      Spouse name: Not on file     Number of children: Not on file     Years of education: Not on file     Highest education level: Not on file   Occupational History     Not on file   Social Needs     Financial resource strain: Not on file     Food insecurity     Worry: Not on file     Inability: Not on file     Transportation needs     Medical: Not on file     Non-medical: Not on file   Tobacco Use     Smoking status: Never Smoker     Smokeless tobacco: Never Used   Substance and Sexual Activity     Alcohol use: No     Drug use: No     Sexual activity: Yes     Partners: Male     Birth control/protection: Post-menopausal   Lifestyle     Physical activity     Days per week: Not on file     Minutes per session: Not on file     Stress: Not on file   Relationships     Social connections     Talks on phone: Not on file     Gets together: Not on file     Attends Sabianism service: Not on file     Active member of club or organization: Not on file     Attends meetings of clubs or organizations: Not on file     Relationship status: Not on file     Intimate partner violence     Fear of current or ex partner: Not on file     Emotionally abused: Not on file     Physically abused: Not on file     Forced sexual activity: Not on file   Other Topics Concern     Parent/sibling w/ CABG, MI or angioplasty before 65F 55M? Not Asked   Social History Narrative     Not on file       Additional medical/Social/Surgical histories reviewed in [facility] and updated as appropriate.     REVIEW OF SYSTEMS (8/20/2020)  10 point ROS of systems including  Constitutional, Eyes, Respiratory, Cardiovascular, Gastroenterology, Genitourinary, Integumentary, Musculoskeletal, Psychiatric, Allergic/Immunologic were all negative except for pertinent positives noted in my HPI.       ASSESSMENT & PLAN  76 yo female with left shoulder Glenohumeral arthritis, RC arthropathy, not improved  Reviewed her left shoulder CT: showing tearing in her RC and GH arthritis  Referred to Dr Rodriguez for further consultation  Start PT, ordered  Start baclofen, medrol pack  F/u after seeing Dr Michael Castellon MD, CAQSM    Phone call duration: 15 minutes  Phone call start: 9:10am  Phone call end: 9:25 am  Gurinder Castellon MD      Again, thank you for allowing me to participate in the care of your patient.        Sincerely,        Gurinder Castellon MD

## 2020-07-24 NOTE — TELEPHONE ENCOUNTER
Called daughter back, phone rang once and flipped to . Message left to call back.    Bia Ovalles RN Specialty Triage 7/24/2020 12:18 PM

## 2020-07-27 ENCOUNTER — TELEPHONE (OUTPATIENT)
Dept: ORTHOPEDICS | Facility: CLINIC | Age: 75
End: 2020-07-27

## 2020-07-27 NOTE — TELEPHONE ENCOUNTER
Daughter called back and verified that pt would like to proceed with surgery, but states their are two different types and they would which ever is the best option. She is also wondering if she will need to wear compression stockings after? I let daughter know I would send message to MD. Bia Ovalles RN Specialty Triage 7/27/2020 12:07 PM

## 2020-07-27 NOTE — TELEPHONE ENCOUNTER
Talked to Dr. Castellon, he is advising that they stop the medication. Called patients daughter and relayed message. They would like other options for medications. Let them know I will speak with Dr. Castellon and get back to them tomorrow.

## 2020-07-27 NOTE — TELEPHONE ENCOUNTER
[facility] Call Center    Phone Message    May a detailed message be left on voicemail: yes     Reason for Call: Medication Question or concern regarding medication   Prescription Clarification  Name of Medication: methylPREDNISolone (MEDROL) 4 MG tablet therapy pack, and baclofen (LIORESAL) 10 MG tablet   Prescribing Provider: Dr. Castellon   Pharmacy: [facility]   What on the order needs clarification? Pt's daughter called and stated that the pt just started taking these medications yesterday and she is experiencing nausea, tightening in the stomach and some dizziness          Action Taken: Message routed to:  [facility] ortho

## 2020-07-28 NOTE — TELEPHONE ENCOUNTER
Relayed to pts. Daughter that Gem can continue taking Baclofen. Daughter states that the patient has taken that and still has the symptoms listed above. They do have an upcoming appointment this Thursday 7/30, so if they have additional questions they will bring them up at this time.

## 2020-07-30 ENCOUNTER — DOCUMENTATION ONLY (OUTPATIENT)
Dept: CARE COORDINATION | Facility: CLINIC | Age: 75
End: 2020-07-30

## 2020-07-30 ENCOUNTER — ANCILLARY PROCEDURE (OUTPATIENT)
Dept: CT IMAGING | Facility: CLINIC | Age: 75
End: 2020-07-30
Attending: PREVENTIVE MEDICINE
Payer: MEDICARE

## 2020-07-30 DIAGNOSIS — S40.012A CONTUSION OF LEFT SHOULDER, INITIAL ENCOUNTER: ICD-10-CM

## 2020-07-30 DIAGNOSIS — M19.012 GLENOHUMERAL ARTHRITIS, LEFT: ICD-10-CM

## 2020-07-31 ENCOUNTER — TELEPHONE (OUTPATIENT)
Dept: SURGERY | Facility: CLINIC | Age: 75
End: 2020-07-31

## 2020-07-31 ENCOUNTER — OFFICE VISIT (OUTPATIENT)
Dept: SURGERY | Facility: CLINIC | Age: 75
End: 2020-07-31
Payer: MEDICARE

## 2020-07-31 VITALS
HEIGHT: 57 IN | SYSTOLIC BLOOD PRESSURE: 126 MMHG | DIASTOLIC BLOOD PRESSURE: 75 MMHG | WEIGHT: 195 LBS | BODY MASS INDEX: 42.07 KG/M2 | HEART RATE: 78 BPM

## 2020-07-31 DIAGNOSIS — I83.819 VARICOSE VEINS WITH PAIN: Primary | ICD-10-CM

## 2020-07-31 DIAGNOSIS — I83.893 VARICOSE VEINS OF BILATERAL LOWER EXTREMITIES WITH OTHER COMPLICATIONS: ICD-10-CM

## 2020-07-31 DIAGNOSIS — I87.2 VENOUS STASIS DERMATITIS OF RIGHT LOWER EXTREMITY: ICD-10-CM

## 2020-07-31 PROCEDURE — 99214 OFFICE O/P EST MOD 30 MIN: CPT | Performed by: SURGERY

## 2020-07-31 ASSESSMENT — MIFFLIN-ST. JEOR: SCORE: 1258.51

## 2020-07-31 NOTE — LETTER
7/31/2020         RE: Selvin Rockwell  [address] [place] [id number]        Dear Colleague,    Thank you for referring your patient, Selvin Rockwell, to the [facility]. Please see a copy of my visit note below.    Patient is here to re discuss the leg pain.    She is on a fixed income so wants to have the surgery as that is what will be covered.   So will plan to do as her right leg bothers her the most a right greater saphenous vein ablation and ablation or removal of the perforators that she has on the lateral shin area.           Progress Notes        Patient is here to go over the ultrasound.    She did wear the compression stockings off and on for a week. She did not notice any difference.  Also tried an ace wrap and it did not help either.   Patient feel a the ultrasound site and is going to follow up with the doctor that saw then with a phone call today.   I offered to have her see ortho but they will wait for the other doctor to call.           Right leg her pain is more anterior where I see 3 veins lat look like they are perforators. Hard to tell if the varicose veins in the back of the leg that seem to go anterior are connected.          Incompetent 4 mm diameter varicosities in the  anterior calf are  related to the  .  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      vein #1: Diameter 10 x 0.2 mm,  Location proximal calf,  22  cm from medial malleolus.     Probably a greater saphenous vein ligation and stripping or ablation, but it is only incompetent  From mid thigh to the knee.   But the perforators may be separate.  And this is where she has the pain at.     So a chance of sclerotherapy  At  these sites. Starting in the posterior knee area and including the perforators that I can feel on the anterior shin area.   And these are tender when pressed.                For the left leg:     she has some incompetence of the greater saphenous vein but very limited.   So ablation of the left greater saphenous vein form the knee up and then go after perforators as needed with either abaltion or removal in the OR.     Left leg:     Great saphenous vein (GSV)      sapheno-femoral junction: Competent      prox thigh: Competent      mid thigh: Competent      dist thigh: Competent      knee: Incompetent      prox calf: Competent      mid calf: Competent      ankle: Competent     Incompetent 2 mm diameter varicosities in the  posterior calf are  isolated.      There are no incompetent perforators identified.     She does like to elevate the legs with pillows   I do not see any perforators on the left shins.     She does have some veins along the greater saphenous vein starting at the knee and going mostly posterior.   There is a vein on the lateral side but this is not where she is complains of pain.  It is both shins and all anterior and although she has some venous stasis dermatitis starting in the mid shin and going inferior her main pain is a little higher than this.   So since she is not having pain where most of the veins are and her age and health issues that maybe doing sclerotherapy on both legs would be less invasive and if it helps would be nice for the patient.    We can also do ablation in both greater saphenous veins as it is only incompetent in the knee up from knee to mid thigh on the right side and even less on the left side.   Also her symptoms could also be due to nerves so will have her see the neurologist.   While waiting for approval for the sclerotherapy  Will make sure that she sees the neruologist first.     Exam: US VENOUS COMPENTENCY BILATERAL 6/4/2020 3:56 PM     Comparison study:  9/30/2019     Clinical History: bilateral lower extremity edema and pain; Varicose  veins of bilateral lower extremities with pain; Bilateral lower  extremity edema; Venous stasis dermatitis of both lower extremities     Technique: B-mode (grayscale) and Duplex Doppler ultrasound of the  lower extremity veins (superficial and deep), including incompetency  reflux time and compression for thrombus. Additional Duplex doppler  assessment of the PTA and EMILIANA at the ankles. Superficial incompetency  exam performed upright, non-weight bearing with distal compression  using rapid inflation/deflation cuffs.     Scan position for evaluation: Reverse Trendelenburg of the deep venous  system and standing for the superficial venous system     Ordering provider: STEFAN HARRIS      Venous Competency Diagnostic Criteria Adopted 11/29/11.     Venous competency criteria defining abnormal reflux duration:  Femoral - popliteal reflux > 1.0 sec.  Deep femoral, deep calf veins, and superficial vein reflux > 0.5 sec.   vein reflux > 0.35 sec.     Supporting document: J Vasc Surg 2003; 38:793-8. Definition of reflux  in lower extremity veins.     Findings:      Right ankle:  Posterior Tibial artery waveform: tri/biphasic  Dorsalis Pedis artery waveform: tri/biphasic     Left ankle:  Posterior Tibial artery waveform: tri/biphasic  Dorsalis Pedis artery waveform: tri/biphasic     Deep Venous Incompetency Study:      Right lower extremity:     Duplex Evaluation for Deep Vein Thrombus (which includes CFV, FV,  popliteal vein, and posterior tibial veins): Negative.     Common femoral vein: Competent     Deep femoral vein: Competent     Femoral vein:      proximal thigh: Competent      mid thigh: Competent      distal thigh: Competent     Popliteal vein: Competent     Posterior tibial veins at the ankle: Competent        Right lower extremity:     Duplex Evaluation for Superficial Vein Thrombus (which includes GSV,  SSV, AASV,  and PASV): Negative.      Anterior accessory saphenous (AASV): Not visualized     Posterior accessory saphenous (PASV): Competent     Great saphenous vein (GSV)      sapheno-femoral junction: Competent      prox thigh: Competent      mid thigh: Incompetent      dist thigh: Incompetent      knee: Incompetent      prox calf: Competent      mid calf: Competent      ankle: Competent     Vein of Giacomini (V of G):      distal thigh: Competent     Small saphenous vein:      sapheno-popliteal junction: Competent      prox calf: Competent      mid calf: Competent  Diameters of superficial veins:     SFJ: Diameter* 9.8 mm  GSV prox thigh*: Diameter 8.3 mm   GSV mid thigh: Diameter 4.5 mm  GSV dist thigh: Diameter 4.4 mm  GSV knee*: Diameter 4.5 mm     SSV SPJ*: Diameter 3.5 mm     Incompetent 4 mm diameter varicosities in the  anterior calf are  related to the  .     There are incompetent perforators identified.      vein #1: Diameter 10 x 0.2 mm,  Location proximal calf,  22  cm from medial malleolus.     Left lower extremity:     Left lower extremity:     Duplex Evaluation for Deep Vein Thrombus (which includes CFV, FV,  popliteal vein, and posterior tibial veins): Negative.     Common femoral vein: Competent     Deep femoral vein: Competent     Femoral vein:      proximal thigh: Competent      mid thigh: Competent      distal thigh: Competent     Popliteal vein: Competent     Posterior tibial veins at the ankle: Competent     Superficial Venous Incompetency Study:           Duplex Evaluation for Superficial Vein Thrombus (which includes GSV,  SSV, AASV, and PASV): Negative.      Anterior accessory saphenous (AASV): Not visualized     Posterior accessory saphenous (PASV): Competent     Great saphenous vein (GSV)      sapheno-femoral junction: Competent      prox thigh: Competent      mid thigh: Competent      dist thigh: Competent      knee: Incompetent      prox calf: Competent      mid calf:  "Competent      ankle: Competent     Vein of Giacomini (V of G): Not visualized     Small saphenous vein:      sapheno-popliteal junction: Competent      prox calf: Competent      mid calf: Competent  Diameters of superficial veins:     SFJ: Diameter* 7.7 mm  GSV prox thigh*: Diameter 4.7 mm      GSV knee*: Diameter 3.5 mm     SSV SPJ*: Diameter 1.7 mm     Incompetent 2 mm diameter varicosities in the  posterior calf are  isolated.      There are no incompetent perforators identified.                                                                         Impression:     1. Right lea. No deep or superficial venous thrombosis or occlusion.  1b. No deep venous incompetence  1c. Superficial venous incompetence of the great saphenous vein from  the mid thigh to the knee.  1d. Incompetent varicosity and perforators as detailed above.     2. Left lea. No deep or superficial venous thrombosis or occlusion.  2b. No deep venous incompetence  2c. Incompetence of the great saphenous vein at the knee.  2d. Incompetent varicosity as detailed above.        Reference: \"Duplex Ultrasound in the Diagnosis of Lower-Extremity Deep  Venous Thrombosis\"- Gabrielle Orozco MD, S; Ronen Walsh MD  (Circulation. 2014;129:917-921. http://circ.ahajournals.org )     I have personally reviewed the examination and initial interpretation  and I agree with the findings.     ELLIOT JOHNS MD     Time spent with the patient with greater that 50% of the time in discussion was 45 minutes.  In discussing the plan and going over the ultrasound .        Braulio Reinoso MD        Office Visit     2020  [facility] Braulio Haynes MD   Surgery   Varicose veins of bilateral lower extremities with pain +2 more   Dx   Consult     Reason for Visit    Progress Notes      Expand All Collapse All  []?Expand All by Default  Dear Gurinder Villafuerte  I have seen Selvin Rockwell and as you know his chief " "complaint is bilateral leg painful varicose veins.  Has diabetes, her feet feel very full.    HPI:  Patient is a 75 year old .female  with complaints of leg pain where the varicose veins are.  It is described as a pulling.   and wants to massage the area especially when at rest or in the evening.  Patient has not worn compression stockings has trouble putting them on. , has not deep vein thrombosis or trauma to the leg  Patient has  family hx of varicose veins.  Elevating legs does help symptoms.  Patient has lower extremity edema.  Patient has needed pain medications for vein pain.  Patient has had veins interfere with activities of daily living   Patient has not venous stasis ulcers.  Patient has not had bleeding from veins  Review Of Systems  Cardiovascular: coumadin for atrial fibulation  Respiratory: Shortness of breath- with stairs  Endocrine: diabetes  10 point review of systems are negative other than above.   /82   Pulse 91   Ht 1.473 m (4' 9.99\")   Wt 90.3 kg (199 lb)   BMI 41.60 kg/m       Past Medical History           Past Medical History:   Diagnosis Date     Atrophy of left kidney 3/27/2017     Diabetes mellitus, type 2 (H) 2006     HTN (hypertension) 12/5/2012     Hyperlipidemia LDL goal < 100       OA (OSTEOARTHRITIS) OF KNEE - right 7/13/2010     OA (OSTEOARTHRITIS) OF KNEE - right       TACHO (obstructive sleep apnea) 12/7/2012     Paroxysmal atrial fibrillation (H) 7/11/2018            Past Surgical History             Past Surgical History:   Procedure Laterality Date     AS ESOPHAGOSCOPY, DIAGNOSTIC   07/2019     C HAND/FINGER SURGERY UNLISTED         C REMOVAL OF KIDNEY STONE         C TOTAL KNEE ARTHROPLASTY   7/21/10     left     C TOTAL KNEE ARTHROPLASTY   12/12/12     Right     CARPAL TUNNEL RELEASE RT/LT         COLONOSCOPY   07/2019     TONSILLECTOMY                Social History   Social History                Socioeconomic History     Marital status:        Spouse name: " Not on file     Number of children: Not on file     Years of education: Not on file     Highest education level: Not on file   Occupational History     Not on file   Social Needs     Financial resource strain: Not on file     Food insecurity       Worry: Not on file       Inability: Not on file     Transportation needs       Medical: Not on file       Non-medical: Not on file   Tobacco Use     Smoking status: Never Smoker     Smokeless tobacco: Never Used   Substance and Sexual Activity     Alcohol use: No     Drug use: No     Sexual activity: Yes       Partners: Male       Birth control/protection: Post-menopausal   Lifestyle     Physical activity       Days per week: Not on file       Minutes per session: Not on file     Stress: Not on file   Relationships     Social connections       Talks on phone: Not on file       Gets together: Not on file       Attends [employer] service: Not on file       Active member of club or organization: Not on file       Attends meetings of clubs or organizations: Not on file       Relationship status: Not on file     Intimate partner violence       Fear of current or ex partner: Not on file       Emotionally abused: Not on file       Physically abused: Not on file       Forced sexual activity: Not on file   Other Topics Concern     Parent/sibling w/ CABG, MI or angioplasty before 65F 55M? Not Asked   Social History Narrative     Not on file            Current Outpatient Prescriptions               Current Outpatient Medications   Medication Sig Dispense Refill     carvedilol (COREG) 6.25 MG tablet Take 1 tablet (6.25 mg) by mouth 2 times daily 180 tablet 3     clotrimazole (LOTRIMIN) 1 % external cream           ferrous sulfate (FEROSUL) 325 (65 Fe) MG tablet Take 1 tablet (325 mg) by mouth daily (with breakfast) (Patient not taking: Reported on 1/8/2020) 90 tablet 3     FISH OIL 1000 MG PO CAPS 3 CAPSULES DAILY WITH A MEAL         furosemide (LASIX) 20 MG tablet Take 1 tablet (20  mg) by mouth daily for 5 days 5 tablet 0     isosorbide mononitrate (IMDUR) 30 MG 24 hr tablet Take 1 tablet (30 mg) by mouth daily 90 tablet 3     metFORMIN (GLUCOPHAGE) 500 MG tablet Take 1 tablet (500 mg) by mouth 2 times daily (with meals) 180 tablet 1     omeprazole (PRILOSEC) 40 MG DR capsule           order for DME Vinh hose stockings (Patient not taking: Reported on 1/8/2020) 1 Device 0     OTHER MEDICAL SUPPLIES 1 Package by Other route daily. (Patient not taking: Reported on 1/8/2020) 1 Package 0     PARoxetine (PAXIL) 20 MG tablet TAKE 1 TABLET BY MOUTH IN THE MORNING 90 tablet 0     simvastatin (ZOCOR) 10 MG tablet Take 1 tablet (10 mg) by mouth At Bedtime 90 tablet 1     triamcinolone (KENALOG) 0.1 % external cream Apply sparingly to bilateral legs three times daily for 14 days. (Patient not taking: Reported on 1/8/2020) 45 g 1     warfarin ANTICOAGULANT (COUMADIN) 2 MG tablet TAKE 1 & 1/2 (ONE & ONE-HALF) TABLETS ( 3 MG) BY MOUTH ONCE DAILY OR AS DIRECTED BY INR CLINIC 94 tablet 0            Physical exam:  Patient able to get up on table without difficulty.  Head eyes, nose and mouth within normal limits.  No supraclavicular or cervical adenopathy palpated.  Thyroid within normal limits.  No carotid bruits auscultated.  Lungs are clear to auscultation  Heart is regular rate and rhythm with mid systolic murmur or thrills noted.  No costal vertebral angle tenderness noted.  Abdomen is abdomen is soft without significant tenderness, masses, organomegaly or guarding  bowel sounds are positive and no caput medusa noted.  No obvious hernias noted.  Easily palpable posterior tibial pulse or dorsalis pedis pulse bilaterally.  Lower extremity edema is  present.     Using the vein light on the bilateral leg there are numerous varicose veins following the greater saphenous vein distribution started in the mid thigh and going posterior.  Then at the lesser saphenous vein site see  Were the veins going to the  lateral malleolus.  Most of the veins are posterior.  Has lower extremity edema edema and venous stasis dermatitis.   She complains of  More pain in the left lateral foot.       Assessment is painful bilateral leg varicose veins.  Plan is ultrasound of the bilateral leg.  If the ultrasound shows that the bilateral greater saphenous vein is incompetent then will need greater saphenous vein ligation and stripping and can remove secondary veins at time of surgery or with sclerotherapy.  Or perhaps ablation.  as it is just form the knees up.  But do not think it will help with the right leg perforators I see.    Options were discussed including wearing of compression stockings, surgery and sclerotherapy.  Script for compression stockings was given.  Surgery with risks and complications were briefly discussed along with that of sclerotherapy.     Risks of surgery include damage to nerves, bleeding, infection, not getting all the veins and multiple punch biopsies over the veins that will drain over several days after surgery.  These punch hole usually do heal well but may leave some scarring.  Also discussed what to expect after surgery and when to follow up with me in clinic and to bring their compression stockings with them to clinic.     Risks of sclerotherapy include multiple episodes of the procedure, insurance may not cover it, bleeding and bruising.  Less likely risk include infection.     Please wear compression stockings and see if they help your discomfort.  Then when you come back to see me let me know.  You will need to bring these with you for sclerotherapy or for your post op visit after surgery.  I would suggest going to either the [employer] medical supply store next to [facility] or to [facility] ([phone]) on [address].  They will need to measure your legs to get the right fit.  If you go somewhere else and they do not measure your legs do not get them  there.  It is important that you get the right size.  Please allow several days after you are measured to get your stockings.  They may not have your size in stock and will have to order them.     Please wear your compression stocking as some insurance companies will want to wear them for at least 3 months before they even consider paying for your surgery or sclerotherapy.     CALL [phone]  to get the utrasound set up.          You must follow up with me in the clinic to go over your utrasound results.  I will not be calling you with the results.   It is very difficult to do this over  the phone.  I am not able to show you while looking at your leg what the next step should be.       Time spent with the patient with greater that 50% of the time in discussion was 30 minutes.  In discussing the veins and the plan.        Braulio Reinoso MD                               Again, thank you for allowing me to participate in the care of your patient.        Sincerely,        Braulio Reinoso MD

## 2020-07-31 NOTE — PROGRESS NOTES
Patient is here to re discuss the leg pain.    She is on a fixed income so wants to have the surgery as that is what will be covered.   So will plan to do as her right leg bothers her the most a right greater saphenous vein ablation and ablation or removal of the perforators that she has on the lateral shin area.           Progress Notes        Patient is here to go over the ultrasound.    She did wear the compression stockings off and on for a week. She did not notice any difference.  Also tried an ace wrap and it did not help either.   Patient feel a the ultrasound site and is going to follow up with the doctor that saw then with a phone call today.   I offered to have her see ortho but they will wait for the other doctor to call.           Right leg her pain is more anterior where I see 3 veins lat look like they are perforators. Hard to tell if the varicose veins in the back of the leg that seem to go anterior are connected.          Incompetent 4 mm diameter varicosities in the  anterior calf are  related to the  .  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      vein #1: Diameter 10 x 0.2 mm,  Location proximal calf,  22  cm from medial malleolus.     Probably a greater saphenous vein ligation and stripping or ablation, but it is only incompetent  From mid thigh to the knee.   But the perforators may be separate.  And this is where she has the pain at.     So a chance of sclerotherapy  At these sites. Starting in the posterior knee area and including the perforators that I can feel on the anterior shin area.   And these are tender when pressed.                For the left leg:     she has some incompetence of the greater saphenous vein but  very limited.   So ablation of the left greater saphenous vein form the knee up and then go after perforators as needed with either abaltion or removal in the OR.     Left leg:     Great saphenous vein (GSV)      sapheno-femoral junction: Competent      prox thigh: Competent      mid thigh: Competent      dist thigh: Competent      knee: Incompetent      prox calf: Competent      mid calf: Competent      ankle: Competent     Incompetent 2 mm diameter varicosities in the  posterior calf are  isolated.      There are no incompetent perforators identified.     She does like to elevate the legs with pillows   I do not see any perforators on the left shins.     She does have some veins along the greater saphenous vein starting at the knee and going mostly posterior.   There is a vein on the lateral side but this is not where she is complains of pain.  It is both shins and all anterior and although she has some venous stasis dermatitis starting in the mid shin and going inferior her main pain is a little higher than this.   So since she is not having pain where most of the veins are and her age and health issues that maybe doing sclerotherapy on both legs would be less invasive and if it helps would be nice for the patient.    We can also do ablation in both greater saphenous veins as it is only incompetent in the knee up from knee to mid thigh on the right side and even less on the left side.   Also her symptoms could also be due to nerves so will have her see the neurologist.   While waiting for approval for the sclerotherapy  Will make sure that she sees the neruologist first.     Exam: US VENOUS COMPENTENCY BILATERAL 6/4/2020 3:56 PM     Comparison study: 9/30/2019     Clinical History: bilateral lower extremity edema and pain; Varicose  veins of bilateral lower extremities with pain; Bilateral lower  extremity edema; Venous stasis dermatitis of both lower extremities     Technique: B-mode (grayscale) and Duplex  Doppler ultrasound of the  lower extremity veins (superficial and deep), including incompetency  reflux time and compression for thrombus. Additional Duplex doppler  assessment of the PTA and EMILIANA at the ankles. Superficial incompetency  exam performed upright, non-weight bearing with distal compression  using rapid inflation/deflation cuffs.     Scan position for evaluation: Reverse Trendelenburg of the deep venous  system and standing for the superficial venous system     Ordering provider: STEFAN HARRIS      Venous Competency Diagnostic Criteria Adopted 11/29/11.     Venous competency criteria defining abnormal reflux duration:  Femoral - popliteal reflux > 1.0 sec.  Deep femoral, deep calf veins, and superficial vein reflux > 0.5 sec.   vein reflux > 0.35 sec.     Supporting document: J Vasc Surg 2003; 38:793-8. Definition of reflux  in lower extremity veins.     Findings:      Right ankle:  Posterior Tibial artery waveform: tri/biphasic  Dorsalis Pedis artery waveform: tri/biphasic     Left ankle:  Posterior Tibial artery waveform: tri/biphasic  Dorsalis Pedis artery waveform: tri/biphasic     Deep Venous Incompetency Study:      Right lower extremity:     Duplex Evaluation for Deep Vein Thrombus (which includes CFV, FV,  popliteal vein, and posterior tibial veins): Negative.     Common femoral vein: Competent     Deep femoral vein: Competent     Femoral vein:      proximal thigh: Competent      mid thigh: Competent      distal thigh: Competent     Popliteal vein: Competent     Posterior tibial veins at the ankle: Competent        Right lower extremity:     Duplex Evaluation for Superficial Vein Thrombus (which includes GSV,  SSV, AASV, and PASV): Negative.      Anterior accessory saphenous (AASV): Not visualized     Posterior accessory saphenous (PASV): Competent     Great saphenous vein (GSV)      sapheno-femoral junction: Competent      prox thigh: Competent      mid thigh:  Incompetent      dist thigh: Incompetent      knee: Incompetent      prox calf: Competent      mid calf: Competent      ankle: Competent     Vein of Giacomini (V of G):      distal thigh: Competent     Small saphenous vein:      sapheno-popliteal junction: Competent      prox calf: Competent      mid calf: Competent  Diameters of superficial veins:     SFJ: Diameter* 9.8 mm  GSV prox thigh*: Diameter 8.3 mm   GSV mid thigh: Diameter 4.5 mm  GSV dist thigh: Diameter 4.4 mm  GSV knee*: Diameter 4.5 mm     SSV SPJ*: Diameter 3.5 mm     Incompetent 4 mm diameter varicosities in the  anterior calf are  related to the  .     There are incompetent perforators identified.      vein #1: Diameter 10 x 0.2 mm,  Location proximal calf,  22  cm from medial malleolus.     Left lower extremity:     Left lower extremity:     Duplex Evaluation for Deep Vein Thrombus (which includes CFV, FV,  popliteal vein, and posterior tibial veins): Negative.     Common femoral vein: Competent     Deep femoral vein: Competent     Femoral vein:      proximal thigh: Competent      mid thigh: Competent      distal thigh: Competent     Popliteal vein: Competent     Posterior tibial veins at the ankle: Competent     Superficial Venous Incompetency Study:           Duplex Evaluation for Superficial Vein Thrombus (which includes GSV,  SSV, AASV, and PASV): Negative.      Anterior accessory saphenous (AASV): Not visualized     Posterior accessory saphenous (PASV): Competent     Great saphenous vein (GSV)      sapheno-femoral junction: Competent      prox thigh: Competent      mid thigh: Competent      dist thigh: Competent      knee: Incompetent      prox calf: Competent      mid calf: Competent      ankle: Competent     Vein of Giacomini (V of G): Not visualized     Small saphenous vein:      sapheno-popliteal junction: Competent      prox calf: Competent      mid calf: Competent  Diameters of superficial veins:     SFJ: Diameter*  "7.7 mm  GSV prox thigh*: Diameter 4.7 mm      GSV knee*: Diameter 3.5 mm     SSV SPJ*: Diameter 1.7 mm     Incompetent 2 mm diameter varicosities in the  posterior calf are  isolated.      There are no incompetent perforators identified.                                                                         Impression:     1. Right lea. No deep or superficial venous thrombosis or occlusion.  1b. No deep venous incompetence  1c. Superficial venous incompetence of the great saphenous vein from  the mid thigh to the knee.  1d. Incompetent varicosity and perforators as detailed above.     2. Left lea. No deep or superficial venous thrombosis or occlusion.  2b. No deep venous incompetence  2c. Incompetence of the great saphenous vein at the knee.  2d. Incompetent varicosity as detailed above.        Reference: \"Duplex Ultrasound in the Diagnosis of Lower-Extremity Deep  Venous Thrombosis\"- Gabrielle Orozco MD, S; Ronen Walsh MD  (Circulation. 2014;129:917-921. http://circ.ahajournals.org )     I have personally reviewed the examination and initial interpretation  and I agree with the findings.     ELLIOT JOHNS MD     Time spent with the patient with greater that 50% of the time in discussion was 45 minutes.  In discussing the plan and going over the ultrasound .        Braulio Reinoso MD        Office Visit     2020  [facility] Braulio Haynes MD   Surgery   Varicose veins of bilateral lower extremities with pain +2 more   Dx   Consult     Reason for Visit    Progress Notes      Expand All Collapse All  []?Expand All by Default  Dear Gurinder Villafuerte  I have seen Selvin Rockwell and as you know his chief complaint is bilateral leg painful varicose veins.  Has diabetes, her feet feel very full.    HPI:  Patient is a 75 year old .female  with complaints of leg pain where the varicose veins are.  It is described as a pulling.   and wants to massage the area " "especially when at rest or in the evening.  Patient has not worn compression stockings has trouble putting them on. , has not deep vein thrombosis or trauma to the leg  Patient has  family hx of varicose veins.  Elevating legs does help symptoms.  Patient has lower extremity edema.  Patient has needed pain medications for vein pain.  Patient has had veins interfere with activities of daily living   Patient has not venous stasis ulcers.  Patient has not had bleeding from veins  Review Of Systems  Cardiovascular: coumadin for atrial fibulation  Respiratory: Shortness of breath- with stairs  Endocrine: diabetes  10 point review of systems are negative other than above.   /82   Pulse 91   Ht 1.473 m (4' 9.99\")   Wt 90.3 kg (199 lb)   BMI 41.60 kg/m       Past Medical History           Past Medical History:   Diagnosis Date     Atrophy of left kidney 3/27/2017     Diabetes mellitus, type 2 (H) 2006     HTN (hypertension) 12/5/2012     Hyperlipidemia LDL goal < 100       OA (OSTEOARTHRITIS) OF KNEE - right 7/13/2010     OA (OSTEOARTHRITIS) OF KNEE - right       TACHO (obstructive sleep apnea) 12/7/2012     Paroxysmal atrial fibrillation (H) 7/11/2018            Past Surgical History             Past Surgical History:   Procedure Laterality Date     AS ESOPHAGOSCOPY, DIAGNOSTIC   07/2019     C HAND/FINGER SURGERY UNLISTED         C REMOVAL OF KIDNEY STONE         C TOTAL KNEE ARTHROPLASTY   7/21/10     left     C TOTAL KNEE ARTHROPLASTY   12/12/12     Right     CARPAL TUNNEL RELEASE RT/LT         COLONOSCOPY   07/2019     TONSILLECTOMY                Social History   Social History                Socioeconomic History     Marital status:        Spouse name: Not on file     Number of children: Not on file     Years of education: Not on file     Highest education level: Not on file   Occupational History     Not on file   Social Needs     Financial resource strain: Not on file     Food insecurity       Worry: " Not on file       Inability: Not on file     Transportation needs       Medical: Not on file       Non-medical: Not on file   Tobacco Use     Smoking status: Never Smoker     Smokeless tobacco: Never Used   Substance and Sexual Activity     Alcohol use: No     Drug use: No     Sexual activity: Yes       Partners: Male       Birth control/protection: Post-menopausal   Lifestyle     Physical activity       Days per week: Not on file       Minutes per session: Not on file     Stress: Not on file   Relationships     Social connections       Talks on phone: Not on file       Gets together: Not on file       Attends [employer] service: Not on file       Active member of club or organization: Not on file       Attends meetings of clubs or organizations: Not on file       Relationship status: Not on file     Intimate partner violence       Fear of current or ex partner: Not on file       Emotionally abused: Not on file       Physically abused: Not on file       Forced sexual activity: Not on file   Other Topics Concern     Parent/sibling w/ CABG, MI or angioplasty before 65F 55M? Not Asked   Social History Narrative     Not on file            Current Outpatient Prescriptions               Current Outpatient Medications   Medication Sig Dispense Refill     carvedilol (COREG) 6.25 MG tablet Take 1 tablet (6.25 mg) by mouth 2 times daily 180 tablet 3     clotrimazole (LOTRIMIN) 1 % external cream           ferrous sulfate (FEROSUL) 325 (65 Fe) MG tablet Take 1 tablet (325 mg) by mouth daily (with breakfast) (Patient not taking: Reported on 1/8/2020) 90 tablet 3     FISH OIL 1000 MG PO CAPS 3 CAPSULES DAILY WITH A MEAL         furosemide (LASIX) 20 MG tablet Take 1 tablet (20 mg) by mouth daily for 5 days 5 tablet 0     isosorbide mononitrate (IMDUR) 30 MG 24 hr tablet Take 1 tablet (30 mg) by mouth daily 90 tablet 3     metFORMIN (GLUCOPHAGE) 500 MG tablet Take 1 tablet (500 mg) by mouth 2 times daily (with meals) 180 tablet 1      omeprazole (PRILOSEC) 40 MG DR capsule           order for DME Vinh hose stockings (Patient not taking: Reported on 1/8/2020) 1 Device 0     OTHER MEDICAL SUPPLIES 1 Package by Other route daily. (Patient not taking: Reported on 1/8/2020) 1 Package 0     PARoxetine (PAXIL) 20 MG tablet TAKE 1 TABLET BY MOUTH IN THE MORNING 90 tablet 0     simvastatin (ZOCOR) 10 MG tablet Take 1 tablet (10 mg) by mouth At Bedtime 90 tablet 1     triamcinolone (KENALOG) 0.1 % external cream Apply sparingly to bilateral legs three times daily for 14 days. (Patient not taking: Reported on 1/8/2020) 45 g 1     warfarin ANTICOAGULANT (COUMADIN) 2 MG tablet TAKE 1 & 1/2 (ONE & ONE-HALF) TABLETS ( 3 MG) BY MOUTH ONCE DAILY OR AS DIRECTED BY INR CLINIC 94 tablet 0            Physical exam:  Patient able to get up on table without difficulty.  Head eyes, nose and mouth within normal limits.  No supraclavicular or cervical adenopathy palpated.  Thyroid within normal limits.  No carotid bruits auscultated.  Lungs are clear to auscultation  Heart is regular rate and rhythm with mid systolic murmur or thrills noted.  No costal vertebral angle tenderness noted.  Abdomen is abdomen is soft without significant tenderness, masses, organomegaly or guarding  bowel sounds are positive and no caput medusa noted.  No obvious hernias noted.  Easily palpable posterior tibial pulse or dorsalis pedis pulse bilaterally.  Lower extremity edema is  present.     Using the vein light on the bilateral leg there are numerous varicose veins following the greater saphenous vein distribution started in the mid thigh and going posterior.  Then at the lesser saphenous vein site see  Were the veins going to the lateral malleolus.  Most of the veins are posterior.  Has lower extremity edema edema and venous stasis dermatitis.   She complains of  More pain in the left lateral foot.       Assessment is painful bilateral leg varicose veins.  Plan is ultrasound of  the bilateral leg.  If the ultrasound shows that the bilateral greater saphenous vein is incompetent then will need greater saphenous vein ligation and stripping and can remove secondary veins at time of surgery or with sclerotherapy.  Or perhaps ablation.  as it is just form the knees up.  But do not think it will help with the right leg perforators I see.    Options were discussed including wearing of compression stockings, surgery and sclerotherapy.  Script for compression stockings was given.  Surgery with risks and complications were briefly discussed along with that of sclerotherapy.     Risks of surgery include damage to nerves, bleeding, infection, not getting all the veins and multiple punch biopsies over the veins that will drain over several days after surgery.  These punch hole usually do heal well but may leave some scarring.  Also discussed what to expect after surgery and when to follow up with me in clinic and to bring their compression stockings with them to clinic.     Risks of sclerotherapy include multiple episodes of the procedure, insurance may not cover it, bleeding and bruising.  Less likely risk include infection.     Please wear compression stockings and see if they help your discomfort.  Then when you come back to see me let me know.  You will need to bring these with you for sclerotherapy or for your post op visit after surgery.  I would suggest going to either the [facility] [employer] medical supply store next to [facility] or to [employer] ([phone]) on [address].  They will need to measure your legs to get the right fit.  If you go somewhere else and they do not measure your legs do not get them there.  It is important that you get the right size.  Please allow several days after you are measured to get your stockings.  They may not have your size in stock and will have to order them.     Please wear your compression stocking as some insurance  companies will want to wear them for at least 3 months before they even consider paying for your surgery or sclerotherapy.     CALL [phone]  to get the utrasound set up.          You must follow up with me in the clinic to go over your utrasound results.  I will not be calling you with the results.   It is very difficult to do this over  the phone.  I am not able to show you while looking at your leg what the next step should be.       Time spent with the patient with greater that 50% of the time in discussion was 30 minutes.  In discussing the veins and the plan.        Braulio Reinoso MD

## 2020-08-03 ENCOUNTER — TELEPHONE (OUTPATIENT)
Dept: SURGERY | Facility: CLINIC | Age: 75
End: 2020-08-03

## 2020-08-03 ENCOUNTER — HOSPITAL ENCOUNTER (OUTPATIENT)
Facility: AMBULATORY SURGERY CENTER | Age: 75
End: 2020-08-03
Attending: SURGERY | Admitting: SURGERY
Payer: MEDICARE

## 2020-08-03 DIAGNOSIS — I87.2 VENOUS STASIS DERMATITIS OF RIGHT LOWER EXTREMITY: ICD-10-CM

## 2020-08-03 DIAGNOSIS — I83.819 VARICOSE VEINS WITH PAIN: ICD-10-CM

## 2020-08-03 NOTE — TELEPHONE ENCOUNTER
Type of surgery: radiofrequency ablation,varicose vein,without stab phlebectomy and removal of perforators (right)  CPT 24042, 20945   Varicose veins with pain  I83.819     Venous stasis dermatitis of right lower extremity I87.2     Varicose veins of bilateral lower extremities with other complications I83.893    Location of surgery: [facility]  Date and time of surgery: 20  TBD  Surgeon: Dr Reinoso  Pre-Op Appt Date: 20  Post-Op Appt Date: tbd   Packet sent out: Yes  Pre-cert/Authorization completed: Patient only has Medicare. Per Medicare online:   Coverage Indications, Limitations, and/or Medical Necessity    Indications:  Varicose veins are caused by venous insufficiency as a result of valve reflux (incompetence). The venous insufficiency results in dilated, tortuous, superficial vessels that protrude from the skin of the lower extremities. Spider veins (telangiectases) are dilated capillary veins that are most often treated for cosmetic purposes.   The accepted treatments for eliminating saphenous (great saphenous vein (GSV), anterior accessory GSV (AAGSV), small saphenous vein (SSV)) reflux (saphenofemoral or saphenopopliteal) are radiofrequency ablation (RFA), laser ablation (EVLA), polidocanol microfoam (PEM), cyanoacrylate embolization () ablation, and mechanochemical ablation (MOCA). Coverage is only for devices with FDA approval or clearance consistent with saphenous ablation and used according to its approved instructions for use (IFU).  RFA and EVLA are classified as thermal tumescent (TT) techniques; PEM,  and MOCA are non-thermal non-tumescent (NTNT) techniques. Each endovenous ablation approach has advantages and disadvantages; which one is best depends on the unique clinical/anatomical scenario. While saphenous vein ligation and stripping remains an important option in selected cases, it has been largely supplanted by endovenous ablation therapy as primary treatment of saphenous  (axial/truncal) vein incompetence. The treatments to eliminate the saphenous vein reflux will be considered medically necessary if the patient remains symptomatic after a six-week trial of conservative therapy and has reflux in a saphenous vein.  The treatments for symptomatic varicose tributaries are either compressive sclerotherapy or microphlebectomy. The treatments of the tributary veins will be considered medically necessary if saphenous reflux is not present or already successfully eliminated, the veins are > than 4 mm in diameter and if the patient remains symptomatic after a six-week trial of conservative therapy.      The components of the conservative therapy include, but are not limited to:  weight reduction,  a daily exercise plan,  periodic leg elevation, and  the use of graduated compression stockings.  The conservative therapy must be documented in the medical record. Inability to tolerate compressive bandages or stockings and the reason for such intolerance must be documented in the medical record.     The patient is considered symptomatic if any of the following signs and symptoms of significantly diseased vessels of the lower extremities are documented in the medical record:  stasis ulcer of the lower leg,  significant pain and significant edema that interferes with activities of daily living,  bleeding associated with the diseased vessels of the lower extremities,  recurrent episodes of superficial phlebitis,  stasis dermatitis, or  refractory dependent edema.  Coverage of endovenous ablation therapy is limited to patients with:   a maximum vein diameter of 12 mm for , PEM and MOCA; and  absence of thrombosis or vein tortuosity, which would impair catheter advancement (except for PEM).  Medicare includes payment for the ultrasound guidance in the payment for endovenous ablation procedures.  Limitations:  Coil embolization is non-covered for any varicose vein procedure.  The following  interventional treatments are not considered medically reasonable or necessary and are denied as such:  Surgery, endovenous ablation, or sclerotherapy are typically not performed for varicose veins that develop or worsen during pregnancy because most will spontaneously resolve or improve after delivery.  Reinjection following recanalization or failure of vein closure without recurrent signs or symptoms.  Sclerotherapy of the saphenous vein at its junction with the deep system.  Noncompressive sclerotherapy.  Compressive sclerotherapy for large, extensive or truncal varicosities.  Sclerotherapy, ligation and/or stripping of varicose veins, or endovenous ablation therapy are generally not covered for patients with severe distal arterial occlusive disease; obliteration of deep venous system; an allergy to the sclerosant; or a hypercoaguable state.  Any interventional treatment that uses equipment or sclerosants not approved for such purposes by the FDA.    I will send to Dr. Reinoso to see if this procedure would qualify.       Date: 08/03/2020    Thank you,   Oanh Pearson   [facility] Department  [phone]

## 2020-08-03 NOTE — TELEPHONE ENCOUNTER
Surgery has been rescheduled to 8-13--20 @ [facility] as procedure could not be done at the [facility]. Please call patient's daughter to advise about follow up appt & ultrasound.

## 2020-08-03 NOTE — TELEPHONE ENCOUNTER
Surgery cannot be done at the [facility] per Brii. Rescheduled surgery to 8-13-20 @ [facility].

## 2020-08-03 NOTE — TELEPHONE ENCOUNTER
Reason for Call:  Other appointment    Detailed comments: Patient has been scheduled for 8-19-20 surgery. Patient's daughter stated patient was to have a follow up appointment with ultrasound at the [place] location 3 days after surgery. Need clarification about timing of follow up & will need appointment. Also patient will need to be referred to imaging [phone] to schedule ultrasound.    Phone Number Patient can be reached at: Home number on file [phone] (home)    Best Time: any    Can we leave a detailed message on this number? YES    Call taken on 8/3/2020 at 9:03 AM by Rolanda Dowell

## 2020-08-03 NOTE — TELEPHONE ENCOUNTER
Reason for Call:  Other appointment    Detailed comments: Patient's daughter calling back, states they won't be able to get the ultra sound done until the following week after the surgery. They would like to know if this is going to be okay. Please call back to advise.    Phone Number Patient can be reached at: Cell number on file:    Telephone Information:   Mobile [phone]       Best Time: any    Can we leave a detailed message on this number? YES    Call taken on 8/3/2020 at 11:31 AM by Nikolas Mendoza

## 2020-08-04 NOTE — TELEPHONE ENCOUNTER
Per Dr. Reinoso, procedure will go as planned.   [place] [facility]

## 2020-08-05 ENCOUNTER — TELEPHONE (OUTPATIENT)
Dept: SURGERY | Facility: CLINIC | Age: 75
End: 2020-08-05

## 2020-08-05 DIAGNOSIS — I83.819 VARICOSE VEINS WITH PAIN: Primary | ICD-10-CM

## 2020-08-05 NOTE — TELEPHONE ENCOUNTER
Reason for Call:  Other call back    Detailed comments: The 8-13-20 surgery has been cancelled per [facility] Reinoso is aware. Pre & post op appts have been cancelled. Does patient still need the ultrasound scheduled on 8-18-20? Patient has not been made aware of the cancellation as it is due to Dr Reinoso not having privileges for this procedure at [facility]. This could not be done at the [facility] either.  Please call patient's daughter [phone] to advise of cancellation of surgery & reason.     Phone Number Patient can be reached at: Home number on file [phone] (home)    Best Time: any    Can we leave a detailed message on this number? YES    Call taken on 8/5/2020 at 12:14 PM by Rolanda Dowell

## 2020-08-05 NOTE — TELEPHONE ENCOUNTER
Surgery has been cancelled by [facility] as Dr Reinoso does not have privilege to do this procedure at [facility].

## 2020-08-05 NOTE — TELEPHONE ENCOUNTER
"Per patient in Office Visit from 07/31/20 \"Patient is here to re discuss the leg pain.    She is on a fixed income so wants to have the surgery as that is what will be covered.\"  "

## 2020-08-06 ENCOUNTER — OFFICE VISIT (OUTPATIENT)
Dept: ORTHOPEDICS | Facility: CLINIC | Age: 75
End: 2020-08-06
Payer: MEDICARE

## 2020-08-06 VITALS — HEIGHT: 57 IN | WEIGHT: 195 LBS | BODY MASS INDEX: 42.07 KG/M2

## 2020-08-06 DIAGNOSIS — M19.012 GLENOHUMERAL ARTHRITIS, LEFT: Primary | ICD-10-CM

## 2020-08-06 DIAGNOSIS — M12.812 ROTATOR CUFF TEAR ARTHROPATHY OF LEFT SHOULDER: ICD-10-CM

## 2020-08-06 DIAGNOSIS — M75.102 ROTATOR CUFF TEAR ARTHROPATHY OF LEFT SHOULDER: ICD-10-CM

## 2020-08-06 ASSESSMENT — MIFFLIN-ST. JEOR: SCORE: 1258.39

## 2020-08-06 NOTE — PROGRESS NOTES
SPORTS & ORTHOPEDIC WALK-IN FOLLOW-UP VISIT 8/6/2020    Interval History:     Follow up reason: Left shoulder CT    Date of injury: Chronic    Date last seen: 7/23/2020    Following Therapeutic Plan: Yes     Pain: Unchanged    Function: Unchanged    Interval History: Her pain has good and bad days.      Medical History:    Any recent changes to your medical history? No    Any new medication prescribed since last visit? No    Review of Systems:    Do you have fever, chills, weight loss? No    Do you have any vision problems? No    Do you have any chest pain or edema? No    Do you have any shortness of breath or wheezing?  No    Do you have stomach problems? No    Do you have any numbness or focal weakness? No    Do you have diabetes? Yes    Do you have problems with bleeding or clotting? Yes, warfrin    Do you have an rashes or other skin lesions? No

## 2020-08-06 NOTE — LETTER
Date:August 31, 2020      Provider requested that no letter be sent. Do not send.       [facility] Health Information

## 2020-08-06 NOTE — LETTER
8/6/2020         RE: Selvin Rockwell  [address] [place] [id number]        Dear Colleague,    Thank you for referring your patient, Selvin Rockwell, to the [facility] SPORTS AND ORTHOPAEDIC WALK IN CLINIC. Please see a copy of my visit note below.          SPORTS & ORTHOPEDIC WALK-IN FOLLOW-UP VISIT 8/6/2020    Interval History:     Follow up reason: Left shoulder CT    Date of injury: Chronic    Date last seen: 7/23/2020    Following Therapeutic Plan: Yes     Pain: Unchanged    Function: Unchanged    Interval History: Her pain has good and bad days.      Medical History:    Any recent changes to your medical history? No    Any new medication prescribed since last visit? No    Review of Systems:    Do you have fever, chills, weight loss? No    Do you have any vision problems? No    Do you have any chest pain or edema? No    Do you have any shortness of breath or wheezing?  No    Do you have stomach problems? No    Do you have any numbness or focal weakness? No    Do you have diabetes? Yes    Do you have problems with bleeding or clotting? Yes, warfrin    Do you have an rashes or other skin lesions? No             Again, thank you for allowing me to participate in the care of your patient.        Sincerely,        Gurinder Castellon MD

## 2020-08-08 DIAGNOSIS — Z79.01 LONG TERM (CURRENT) USE OF ANTICOAGULANTS: ICD-10-CM

## 2020-08-08 DIAGNOSIS — I48.0 PAROXYSMAL ATRIAL FIBRILLATION (H): ICD-10-CM

## 2020-08-10 DIAGNOSIS — F41.9 ANXIETY: ICD-10-CM

## 2020-08-10 RX ORDER — WARFARIN SODIUM 2 MG/1
TABLET ORAL
Qty: 94 TABLET | Refills: 1 | Status: SHIPPED | OUTPATIENT
Start: 2020-08-10 | End: 2020-10-12

## 2020-08-10 NOTE — TELEPHONE ENCOUNTER
Anticoagulation Monitoring Return Date Recheck Instructions   Latest Ref Rng & Units      7/9/2020 8/20/2020 6 WEEKS 3 mg every day   Prescription approved per [facility] Refill Protocol.  Tana Parker RN  Anticoagulation Nurse - [facility]

## 2020-08-11 RX ORDER — PAROXETINE 20 MG/1
TABLET, FILM COATED ORAL
Qty: 90 TABLET | Refills: 0 | Status: SHIPPED | OUTPATIENT
Start: 2020-08-11 | End: 2020-11-16

## 2020-08-11 NOTE — TELEPHONE ENCOUNTER
"Requested Prescriptions   Pending Prescriptions Disp Refills     PARoxetine (PAXIL) 20 MG tablet 90 tablet 0       SSRIs Protocol Passed - 8/10/2020  9:21 AM        Passed - Recent (12 mo) or future (30 days) visit within the authorizing provider's specialty     Patient has had an office visit with the authorizing provider or a provider within the authorizing providers department within the previous 12 mos or has a future within next 30 days. See \"Patient Info\" tab in inbasket, or \"Choose Columns\" in Meds & Orders section of the refill encounter.              Passed - Medication is active on med list        Passed - Patient is age 18 or older        Passed - No active pregnancy on record        Passed - No positive pregnancy test in last 12 months             Prescription approved per [facility] Refill Protocol.    Mercy Jansen RN    "

## 2020-08-17 ENCOUNTER — TELEPHONE (OUTPATIENT)
Dept: ORTHOPEDICS | Facility: CLINIC | Age: 75
End: 2020-08-17

## 2020-08-17 NOTE — TELEPHONE ENCOUNTER
Routed to Dr. Castellon for review.   Per Dr. Castellon, patient to get appointment with Dr. Rodriguez or Dr. Moy.

## 2020-08-17 NOTE — TELEPHONE ENCOUNTER
RECORDS RECEIVED FROM: Left shoulder after fall from imaging referral from Dr Castellon   DATE RECEIVED: Aug 26, 2020   NOTES STATUS DETAILS   OFFICE NOTE from referring provider Internal    OFFICE NOTE from other specialist N/A    DISCHARGE SUMMARY from hospital N/A    DISCHARGE REPORT from the ER N/A    OPERATIVE REPORT N/A    MEDICATION LIST Internal    IMPLANT RECORD/STICKER N/A    LABS     CBC/DIFF N/A    CULTURES N/A    INJECTIONS DONE IN RADIOLOGY N/A    MRI N/A    CT SCAN Internal    XRAYS (IMAGES & REPORTS) Internal    TUMOR     PATHOLOGY  Slides & report N/A    08/17/20   1:58 PM   Pre-visit complete  Amber Goodwin, CMA

## 2020-08-17 NOTE — TELEPHONE ENCOUNTER
[facility] Call Center    Phone Message    May a detailed message be left on voicemail: yes     Reason for Call: Other: patient daughter requesting a phone call in reagards to her mother - she said dr Castellon would know what it is about     Action Taken: Message routed to:  Adult Clinics: Orthopedics p [phone]    Travel Screening: Not Applicable

## 2020-08-17 NOTE — TELEPHONE ENCOUNTER
Per Dr. Castellon, clarified with Kat, visit with Dr. Rodriguez 8/26/20 is considered part of the fall incident and charges will be handled under the fall.  Kat was glad to hear this and thanked me for calling her with the update.

## 2020-08-17 NOTE — TELEPHONE ENCOUNTER
Called Kat and schedule appt per Dr Castellon request. Kat asked about billing. Told her I'd reach out to my supervisor Amie about it, who she previously was in contact with about the situation.

## 2020-08-18 ENCOUNTER — OFFICE VISIT (OUTPATIENT)
Dept: VASCULAR SURGERY | Facility: CLINIC | Age: 75
End: 2020-08-18
Attending: SURGERY
Payer: MEDICARE

## 2020-08-18 DIAGNOSIS — I87.2 CHRONIC STASIS DERMATITIS OF RIGHT LOWER EXTREMITY: ICD-10-CM

## 2020-08-18 DIAGNOSIS — I83.891 VARICOSE VEINS OF RIGHT LEG WITH EDEMA: ICD-10-CM

## 2020-08-18 DIAGNOSIS — I83.811 VARICOSE VEINS OF LEG WITH PAIN, RIGHT: Primary | ICD-10-CM

## 2020-08-18 DIAGNOSIS — I83.92 ASYMPTOMATIC VARICOSE VEIN OF LEFT LOWER EXTREMITY WITHOUT COMPLICATION: ICD-10-CM

## 2020-08-18 PROCEDURE — 99204 OFFICE O/P NEW MOD 45 MIN: CPT | Performed by: SPECIALIST

## 2020-08-18 NOTE — LETTER
8/18/2020         RE: Selvin Rockwell  [address] [place] [id number]        Dear Colleague,    [facility] you [facility]vin Rockwell[facility] Please see a copy of my visit note below.    Consult requested by Dr. Reinoso    Reason for consultation: Bilateral varicose veins        HPI:  Patient is a 75-year-old white female with a longstanding history of bilateral lower extremity varicose veins.  They appeared many years ago and then became prominent over the past 1 to 2 years.  She now presents complaining of pain and swelling in her right leg.  She also reports some numbness on the lateral aspect of her left foot and ankle.  She is wondering if veins could be the cause of that.  She is also on Coumadin for A. fib.  Denies any prior DVT, leg trauma, superficial phlebitis.  She feels her right leg is worse than her left.  She has been using compression socks since June.  She now presents for evaluation of bilateral varicose veins.    Past Medical History:   Diagnosis Date     Atrophy of left kidney 3/27/2017     Diabetes mellitus, type 2 (H) 2006     HTN (hypertension) 12/5/2012     Hyperlipidemia LDL goal < 100      OA (OSTEOARTHRITIS) OF KNEE - right 7/13/2010     OA (OSTEOARTHRITIS) OF KNEE - right      TACHO (obstructive sleep apnea) 12/7/2012     Paroxysmal atrial fibrillation (H) 7/11/2018     Past Surgical History:   Procedure Laterality Date     AS ESOPHAGOSCOPY, DIAGNOSTIC  07/2019     C HAND/FINGER SURGERY UNLISTED       C REMOVAL OF KIDNEY STONE       C TOTAL KNEE ARTHROPLASTY  7/21/10    left     C TOTAL KNEE ARTHROPLASTY  12/12/12    Right     CARPAL TUNNEL RELEASE RT/LT       COLONOSCOPY  07/2019     TONSILLECTOMY       Current Outpatient Medications   Medication     carvedilol (COREG) 6.25 MG tablet     diclofenac (VOLTAREN) 1 % topical gel     isosorbide mononitrate (IMDUR) 30 MG 24 hr tablet     metFORMIN  (GLUCOPHAGE) 500 MG tablet     omeprazole (PRILOSEC) 40 MG DR capsule     order for DME     order for DME     order for DME     OTHER MEDICAL SUPPLIES     PARoxetine (PAXIL) 20 MG tablet     simvastatin (ZOCOR) 10 MG tablet     warfarin ANTICOAGULANT (COUMADIN) 2 MG tablet     baclofen (LIORESAL) 10 MG tablet     clotrimazole (LOTRIMIN) 1 % external cream     ferrous sulfate (FEROSUL) 325 (65 Fe) MG tablet     FISH OIL 1000 MG PO CAPS     furosemide (LASIX) 20 MG tablet     methocarbamol (ROBAXIN) 500 MG tablet     methylPREDNISolone (MEDROL) 4 MG tablet therapy pack     tiZANidine (ZANAFLEX) 4 MG tablet     triamcinolone (KENALOG) 0.1 % external cream     Current Facility-Administered Medications   Medication     lidocaine (PF) (XYLOCAINE) 1 % injection 3 mL     methylPREDNISolone (DEPO-MEDROL) injection 80 mg        Allergies   Allergen Reactions     Gabapentin      Rash and itching     Nsaids      Other reaction(s): Gastrointestinal  Former PPI user, EGD 2/2018 showed LA Grade D esophagitis, plus duodenitis and gastritis.  Famotidine on med list but may not have been taking - rx was 3 years old.      Sulfa Drugs Other (See Comments) and Rash     Per 11-4-13 H&P by Dr. Fei Arias.  RASH ALL OVER FOUND IN DR. RAMIREZ DICTATION OF 10/11/11  BACTRIM-  RASH ALL OVER     Social History     Socioeconomic History     Marital status:      Spouse name: Not on file     Number of children: Not on file     Years of education: Not on file     Highest education level: Not on file   Occupational History     Not on file   Social Needs     Financial resource strain: Not on file     Food insecurity     Worry: Not on file     Inability: Not on file     Transportation needs     Medical: Not on file     Non-medical: Not on file   Tobacco Use     Smoking status: Never Smoker     Smokeless tobacco: Never Used   Substance and Sexual Activity     Alcohol use: No     Drug use: No     Sexual activity: Yes     Partners: Male      Birth control/protection: Post-menopausal   Lifestyle     Physical activity     Days per week: Not on file     Minutes per session: Not on file     Stress: Not on file   Relationships     Social connections     Talks on phone: Not on file     Gets together: Not on file     Attends Gnosticism service: Not on file     Active member of club or organization: Not on file     Attends meetings of clubs or organizations: Not on file     Relationship status: Not on file     Intimate partner violence     Fear of current or ex partner: Not on file     Emotionally abused: Not on file     Physically abused: Not on file     Forced sexual activity: Not on file   Other Topics Concern     Parent/sibling w/ CABG, MI or angioplasty before 65F 55M? Not Asked   Social History Narrative     Not on file     History reviewed. No pertinent family history.     ROS: 10 point ROS neg other than the symptoms noted above in the HPI.    PE:  B/P: Data Unavailable, T: Data Unavailable, P: Data Unavailable, R: Data Unavailable  General: well developed, well nourished WF who appears her stated age  HEENT: NC/AT, EOMI, (-)icterus, (-)injection  Neck: Supple, No JVD  Chest: CTA  Heart: S1, S2, (-)m/r/g  Abd: Soft, non tender, non distended, non tender, no masses  Ext; Warm, (+)DP pulses b/l.  RLE - +1 edema, calf/ankle stasis changes.  Calf varicosities.  LLE - No edema, scattered calf varicosities.  Numbness left lat foot  Psych: AAOx3  Neuro: No focal deficits    US -     Exam: US VENOUS COMPENTENCY BILATERAL 6/4/2020 3:56 PM     Comparison study: 9/30/2019     Clinical History: bilateral lower extremity edema and pain; Varicose  veins of bilateral lower extremities with pain; Bilateral lower  extremity edema; Venous stasis dermatitis of both lower extremities     Technique: B-mode (grayscale) and Duplex Doppler ultrasound of the  lower extremity veins (superficial and deep), including incompetency  reflux time and compression for thrombus.  Additional Duplex doppler  assessment of the PTA and EMILIANA at the ankles. Superficial incompetency  exam performed upright, non-weight bearing with distal compression  using rapid inflation/deflation cuffs.     Scan position for evaluation: Reverse Trendelenburg of the deep venous  system and standing for the superficial venous system     Ordering provider: STEFAN HARRIS      Venous Competency Diagnostic Criteria Adopted 11/29/11.     Venous competency criteria defining abnormal reflux duration:  Femoral - popliteal reflux > 1.0 sec.  Deep femoral, deep calf veins, and superficial vein reflux > 0.5 sec.   vein reflux > 0.35 sec.     Supporting document: J Vasc Surg 2003; 38:793-8. Definition of reflux  in lower extremity veins.     Findings:      Right ankle:  Posterior Tibial artery waveform: tri/biphasic  Dorsalis Pedis artery waveform: tri/biphasic     Left ankle:  Posterior Tibial artery waveform: tri/biphasic  Dorsalis Pedis artery waveform: tri/biphasic     Deep Venous Incompetency Study:      Right lower extremity:     Duplex Evaluation for Deep Vein Thrombus (which includes CFV, FV,  popliteal vein, and posterior tibial veins): Negative.     Common femoral vein: Competent     Deep femoral vein: Competent     Femoral vein:      proximal thigh: Competent      mid thigh: Competent      distal thigh: Competent     Popliteal vein: Competent     Posterior tibial veins at the ankle: Competent     Left lower extremity:     Duplex Evaluation for Deep Vein Thrombus (which includes CFV, FV,  popliteal vein, and posterior tibial veins): Negative.     Common femoral vein: Competent     Deep femoral vein: Competent     Femoral vein:      proximal thigh: Competent      mid thigh: Competent      distal thigh: Competent     Popliteal vein: Competent     Posterior tibial veins at the ankle: Competent     Superficial Venous Incompetency Study:        Right lower extremity:     Duplex Evaluation for Superficial  Vein Thrombus (which includes GSV,  SSV, AASV, and PASV): Negative.      Anterior accessory saphenous (AASV): Not visualized     Posterior accessory saphenous (PASV): Competent     Great saphenous vein (GSV)      sapheno-femoral junction: Competent      prox thigh: Competent      mid thigh: Incompetent      dist thigh: Incompetent      knee: Incompetent      prox calf: Competent      mid calf: Competent      ankle: Competent     Vein of Giacomini (V of G):      distal thigh: Competent     Small saphenous vein:      sapheno-popliteal junction: Competent      prox calf: Competent      mid calf: Competent  Diameters of superficial veins:     SFJ: Diameter* 9.8 mm  GSV prox thigh*: Diameter 8.3 mm   GSV mid thigh: Diameter 4.5 mm  GSV dist thigh: Diameter 4.4 mm  GSV knee*: Diameter 4.5 mm     SSV SPJ*: Diameter 3.5 mm     Incompetent 4 mm diameter varicosities in the  anterior calf are  related to the  .     There are incompetent perforators identified.      vein #1: Diameter 10 x 0.2 mm,  Location proximal calf,  22  cm from medial malleolus.     Left lower extremity:     Duplex Evaluation for Superficial Vein Thrombus (which includes GSV,  SSV, AASV, and PASV): Negative.      Anterior accessory saphenous (AASV): Not visualized     Posterior accessory saphenous (PASV): Competent     Great saphenous vein (GSV)      sapheno-femoral junction: Competent      prox thigh: Competent      mid thigh: Competent      dist thigh: Competent      knee: Incompetent      prox calf: Competent      mid calf: Competent      ankle: Competent     Vein of Giacomini (V of G): Not visualized     Small saphenous vein:      sapheno-popliteal junction: Competent      prox calf: Competent      mid calf: Competent  Diameters of superficial veins:     SFJ: Diameter* 7.7 mm  GSV prox thigh*: Diameter 4.7 mm      GSV knee*: Diameter 3.5 mm     SSV SPJ*: Diameter 1.7 mm     Incompetent 2 mm diameter varicosities in the  posterior  "calf are  isolated.      There are no incompetent perforators identified.                                                                         Impression:     1. Right lea. No deep or superficial venous thrombosis or occlusion.  1b. No deep venous incompetence  1c. Superficial venous incompetence of the great saphenous vein from  the mid thigh to the knee.  1d. Incompetent varicosity and perforators as detailed above.     2. Left lea. No deep or superficial venous thrombosis or occlusion.  2b. No deep venous incompetence  2c. Incompetence of the great saphenous vein at the knee.  2d. Incompetent varicosity as detailed above.        Reference: \"Duplex Ultrasound in the Diagnosis of Lower-Extremity Deep  Venous Thrombosis\"- Gabrielle Orozco MD, S; Ronen Walsh MD  (Circulation. 2014;129:917-921. http://circ.ahajournals.org )     I have personally reviewed the examination and initial interpretation  and I agree with the findings.       Impression/plan:  This is a 75-year-old lady with bilateral varicose veins.  The right side is symptomatic and she is a CEAP 4.  She is symptomatic despite compression therapy.  On the left side she is a CEAP 2 and is asymptomatic.  I did explain that her left foot numbness is due to S1 nerve root compression as it matches the dermatome perfectly.  The patient and her daughter expressed understanding.  I do feel her right leg is symptomatic despite compression therapy.  Based on her ultrasound she is a candidate for a right lower extremity GSV radiofrequency ablation.  On ultrasound there is a  but it is too small to treat.   The procedure, risks, benefits, and alternatives were discussed and the patient agrees to proceed.    Alex Renee MD, FACS    Alex Renee MD, FACS      Again, thank you for allowing me to participate in the care of your patient.        Sincerely,        Alex Renee MD    "

## 2020-08-18 NOTE — PROGRESS NOTES
Consult requested by Dr. Reinoso    Reason for consultation: Bilateral varicose veins        HPI:  Patient is a 75-year-old white female with a longstanding history of bilateral lower extremity varicose veins.  They appeared many years ago and then became prominent over the past 1 to 2 years.  She now presents complaining of pain and swelling in her right leg.  She also reports some numbness on the lateral aspect of her left foot and ankle.  She is wondering if veins could be the cause of that.  She is also on Coumadin for A. fib.  Denies any prior DVT, leg trauma, superficial phlebitis.  She feels her right leg is worse than her left.  She has been using compression socks since June.  She now presents for evaluation of bilateral varicose veins.    Past Medical History:   Diagnosis Date     Atrophy of left kidney 3/27/2017     Diabetes mellitus, type 2 (H) 2006     HTN (hypertension) 12/5/2012     Hyperlipidemia LDL goal < 100      OA (OSTEOARTHRITIS) OF KNEE - right 7/13/2010     OA (OSTEOARTHRITIS) OF KNEE - right      TACHO (obstructive sleep apnea) 12/7/2012     Paroxysmal atrial fibrillation (H) 7/11/2018     Past Surgical History:   Procedure Laterality Date     AS ESOPHAGOSCOPY, DIAGNOSTIC  07/2019     C HAND/FINGER SURGERY UNLISTED       C REMOVAL OF KIDNEY STONE       C TOTAL KNEE ARTHROPLASTY  7/21/10    left     C TOTAL KNEE ARTHROPLASTY  12/12/12    Right     CARPAL TUNNEL RELEASE RT/LT       COLONOSCOPY  07/2019     TONSILLECTOMY       Current Outpatient Medications   Medication     carvedilol (COREG) 6.25 MG tablet     diclofenac (VOLTAREN) 1 % topical gel     isosorbide mononitrate (IMDUR) 30 MG 24 hr tablet     metFORMIN (GLUCOPHAGE) 500 MG tablet     omeprazole (PRILOSEC) 40 MG  capsule     order for DME     order for DME     order for DME     OTHER MEDICAL SUPPLIES     PARoxetine (PAXIL) 20 MG tablet     simvastatin (ZOCOR) 10 MG tablet     warfarin ANTICOAGULANT (COUMADIN) 2 MG tablet      baclofen (LIORESAL) 10 MG tablet     clotrimazole (LOTRIMIN) 1 % external cream     ferrous sulfate (FEROSUL) 325 (65 Fe) MG tablet     FISH OIL 1000 MG PO CAPS     furosemide (LASIX) 20 MG tablet     methocarbamol (ROBAXIN) 500 MG tablet     methylPREDNISolone (MEDROL) 4 MG tablet therapy pack     tiZANidine (ZANAFLEX) 4 MG tablet     triamcinolone (KENALOG) 0.1 % external cream     Current Facility-Administered Medications   Medication     lidocaine (PF) (XYLOCAINE) 1 % injection 3 mL     methylPREDNISolone (DEPO-MEDROL) injection 80 mg        Allergies   Allergen Reactions     Gabapentin      Rash and itching     Nsaids      Other reaction(s): Gastrointestinal  Former PPI user, EGD 2/2018 showed LA Grade D esophagitis, plus duodenitis and gastritis.  Famotidine on med list but may not have been taking - rx was 3 years old.      Sulfa Drugs Other (See Comments) and Rash     Per 11-4-13 H&P by Dr. Fei Arias.  RASH ALL OVER FOUND IN DR. RAMIREZ DICTATION OF 10/11/11  BACTRIM-  RASH ALL OVER     Social History     Socioeconomic History     Marital status:      Spouse name: Not on file     Number of children: Not on file     Years of education: Not on file     Highest education level: Not on file   Occupational History     Not on file   Social Needs     Financial resource strain: Not on file     Food insecurity     Worry: Not on file     Inability: Not on file     Transportation needs     Medical: Not on file     Non-medical: Not on file   Tobacco Use     Smoking status: Never Smoker     Smokeless tobacco: Never Used   Substance and Sexual Activity     Alcohol use: No     Drug use: No     Sexual activity: Yes     Partners: Male     Birth control/protection: Post-menopausal   Lifestyle     Physical activity     Days per week: Not on file     Minutes per session: Not on file     Stress: Not on file   Relationships     Social connections     Talks on phone: Not on file     Gets together: Not on file      Attends [employer] service: Not on file     Active member of club or organization: Not on file     Attends meetings of clubs or organizations: Not on file     Relationship status: Not on file     Intimate partner violence     Fear of current or ex partner: Not on file     Emotionally abused: Not on file     Physically abused: Not on file     Forced sexual activity: Not on file   Other Topics Concern     Parent/sibling w/ CABG, MI or angioplasty before 65F 55M? Not Asked   Social History Narrative     Not on file     History reviewed. No pertinent family history.     ROS: 10 point ROS neg other than the symptoms noted above in the HPI.    PE:  B/P: Data Unavailable, T: Data Unavailable, P: Data Unavailable, R: Data Unavailable  General: well developed, well nourished WF who appears her stated age  HEENT: NC/AT, EOMI, (-)icterus, (-)injection  Neck: Supple, No JVD  Chest: CTA  Heart: S1, S2, (-)m/r/g  Abd: Soft, non tender, non distended, non tender, no masses  Ext; Warm, (+)DP pulses b/l.  RLE - +1 edema, calf/ankle stasis changes.  Calf varicosities.  LLE - No edema, scattered calf varicosities.  Numbness left lat foot  Psych: AAOx3  Neuro: No focal deficits    US -     Exam: US VENOUS COMPENTENCY BILATERAL 6/4/2020 3:56 PM     Comparison study: 9/30/2019     Clinical History: bilateral lower extremity edema and pain; Varicose  veins of bilateral lower extremities with pain; Bilateral lower  extremity edema; Venous stasis dermatitis of both lower extremities     Technique: B-mode (grayscale) and Duplex Doppler ultrasound of the  lower extremity veins (superficial and deep), including incompetency  reflux time and compression for thrombus. Additional Duplex doppler  assessment of the PTA and EMILIANA at the ankles. Superficial incompetency  exam performed upright, non-weight bearing with distal compression  using rapid inflation/deflation cuffs.     Scan position for evaluation: Reverse Trendelenburg of the deep  venous  system and standing for the superficial venous system     Ordering provider: STEFAN HARRIS      Venous Competency Diagnostic Criteria Adopted 11/29/11.     Venous competency criteria defining abnormal reflux duration:  Femoral - popliteal reflux > 1.0 sec.  Deep femoral, deep calf veins, and superficial vein reflux > 0.5 sec.   vein reflux > 0.35 sec.     Supporting document: J Vasc Surg 2003; 38:793-8. Definition of reflux  in lower extremity veins.     Findings:      Right ankle:  Posterior Tibial artery waveform: tri/biphasic  Dorsalis Pedis artery waveform: tri/biphasic     Left ankle:  Posterior Tibial artery waveform: tri/biphasic  Dorsalis Pedis artery waveform: tri/biphasic     Deep Venous Incompetency Study:      Right lower extremity:     Duplex Evaluation for Deep Vein Thrombus (which includes CFV, FV,  popliteal vein, and posterior tibial veins): Negative.     Common femoral vein: Competent     Deep femoral vein: Competent     Femoral vein:      proximal thigh: Competent      mid thigh: Competent      distal thigh: Competent     Popliteal vein: Competent     Posterior tibial veins at the ankle: Competent     Left lower extremity:     Duplex Evaluation for Deep Vein Thrombus (which includes CFV, FV,  popliteal vein, and posterior tibial veins): Negative.     Common femoral vein: Competent     Deep femoral vein: Competent     Femoral vein:      proximal thigh: Competent      mid thigh: Competent      distal thigh: Competent     Popliteal vein: Competent     Posterior tibial veins at the ankle: Competent     Superficial Venous Incompetency Study:        Right lower extremity:     Duplex Evaluation for Superficial Vein Thrombus (which includes GSV,  SSV, AASV, and PASV): Negative.      Anterior accessory saphenous (AASV): Not visualized     Posterior accessory saphenous (PASV): Competent     Great saphenous vein (GSV)      sapheno-femoral junction: Competent      prox thigh:  Competent      mid thigh: Incompetent      dist thigh: Incompetent      knee: Incompetent      prox calf: Competent      mid calf: Competent      ankle: Competent     Vein of Giacomini (V of G):      distal thigh: Competent     Small saphenous vein:      sapheno-popliteal junction: Competent      prox calf: Competent      mid calf: Competent  Diameters of superficial veins:     SFJ: Diameter* 9.8 mm  GSV prox thigh*: Diameter 8.3 mm   GSV mid thigh: Diameter 4.5 mm  GSV dist thigh: Diameter 4.4 mm  GSV knee*: Diameter 4.5 mm     SSV SPJ*: Diameter 3.5 mm     Incompetent 4 mm diameter varicosities in the  anterior calf are  related to the  .     There are incompetent perforators identified.      vein #1: Diameter 10 x 0.2 mm,  Location proximal calf,  22  cm from medial malleolus.     Left lower extremity:     Duplex Evaluation for Superficial Vein Thrombus (which includes GSV,  SSV, AASV, and PASV): Negative.      Anterior accessory saphenous (AASV): Not visualized     Posterior accessory saphenous (PASV): Competent     Great saphenous vein (GSV)      sapheno-femoral junction: Competent      prox thigh: Competent      mid thigh: Competent      dist thigh: Competent      knee: Incompetent      prox calf: Competent      mid calf: Competent      ankle: Competent     Vein of Giacomini (V of G): Not visualized     Small saphenous vein:      sapheno-popliteal junction: Competent      prox calf: Competent      mid calf: Competent  Diameters of superficial veins:     SFJ: Diameter* 7.7 mm  GSV prox thigh*: Diameter 4.7 mm      GSV knee*: Diameter 3.5 mm     SSV SPJ*: Diameter 1.7 mm     Incompetent 2 mm diameter varicosities in the  posterior calf are  isolated.      There are no incompetent perforators identified.                                                                         Impression:     1. Right lea. No deep or superficial venous thrombosis or occlusion.  1b. No deep venous  "incompetence  1c. Superficial venous incompetence of the great saphenous vein from  the mid thigh to the knee.  1d. Incompetent varicosity and perforators as detailed above.     2. Left lea. No deep or superficial venous thrombosis or occlusion.  2b. No deep venous incompetence  2c. Incompetence of the great saphenous vein at the knee.  2d. Incompetent varicosity as detailed above.        Reference: \"Duplex Ultrasound in the Diagnosis of Lower-Extremity Deep  Venous Thrombosis\"- Gabrielle Orozco MD, S; Ronen Walsh MD  (Circulation. 2014;129:917-921. http://circ.ahajournals.org )     I have personally reviewed the examination and initial interpretation  and I agree with the findings.       Impression/plan:  This is a 75-year-old lady with bilateral varicose veins.  The right side is symptomatic and she is a CEAP 4.  She is symptomatic despite compression therapy.  On the left side she is a CEAP 2 and is asymptomatic.  I did explain that her left foot numbness is due to S1 nerve root compression as it matches the dermatome perfectly.  The patient and her daughter expressed understanding.  I do feel her right leg is symptomatic despite compression therapy.  Based on her ultrasound she is a candidate for a right lower extremity GSV radiofrequency ablation.  On ultrasound there is a  but it is too small to treat.   The procedure, risks, benefits, and alternatives were discussed and the patient agrees to proceed.    Alex Renee MD, FACS    Alex Renee MD, FACS    "

## 2020-08-19 ENCOUNTER — TELEPHONE (OUTPATIENT)
Dept: VASCULAR SURGERY | Facility: CLINIC | Age: 75
End: 2020-08-19

## 2020-08-19 DIAGNOSIS — I87.2 CHRONIC STASIS DERMATITIS OF RIGHT LOWER EXTREMITY: ICD-10-CM

## 2020-08-19 DIAGNOSIS — I83.891 VARICOSE VEINS OF RIGHT LEG WITH EDEMA: ICD-10-CM

## 2020-08-19 DIAGNOSIS — I83.811 VARICOSE VEINS OF LEG WITH PAIN, RIGHT: Primary | ICD-10-CM

## 2020-08-19 RX ORDER — CLONIDINE HYDROCHLORIDE 0.1 MG/1
TABLET ORAL
Qty: 1 TABLET | Refills: 0 | Status: SHIPPED | OUTPATIENT
Start: 2020-08-19 | End: 2020-10-21

## 2020-08-19 RX ORDER — AMOXICILLIN 500 MG/1
CAPSULE ORAL
Qty: 4 CAPSULE | Refills: 0 | Status: SHIPPED | OUTPATIENT
Start: 2020-08-19 | End: 2020-10-21

## 2020-08-19 RX ORDER — LORAZEPAM 1 MG/1
TABLET ORAL
Qty: 2 TABLET | Refills: 0 | Status: SHIPPED | OUTPATIENT
Start: 2020-08-19 | End: 2020-11-16

## 2020-08-19 NOTE — TELEPHONE ENCOUNTER
[facility]    Preoperative Nurse Call    Procedure: Right leg VNUS closure GSV(med nec)  Date: Tuesday 8/25  Time: 1:30pm  Check in time: 12:30pm    Called and spoke to patient's daughter Alicia. Informed her: when to check in (12:30pm) to sign consent, to bring their preop medications with them (amoxicillin, ativan, clonidine) and take them after signing their consent, to have a /someone that will be responsible for them the rest of the day, to wear loose-fitting comfortable clothing, and to bring their compression hose.    Special instructions: Pt's daughter stated the compression hose cost too much for pt. Informed her that we could use an ACE wrap on her leg, but it is a lot bulkier than wearing hose and instructed her that the pt is supposed to wear the wrap for 7 days. Pt's daughter agreed the ACE wrap would be a better idea than the pt purchasing hose.    Special COVID-19 instructions: Patient aware they need to wear a mask to our clinic and during their procedure. Patient aware that their  cannot come into the clinic and must wait in car. Nurse will call pt's  when procedure is over.    Patient understands that if they have any of the following symptoms (fever, cough, shortness of breath, rash), they need to notify us immediately to cancel their procedure and will have to reschedule for a later date.    Patient's daughter is in agreement with preoperative information and has no further questions.    Katherine Eason RN

## 2020-08-20 NOTE — PROGRESS NOTES
"Selvin Rockwell is a 75 year old female who is being evaluated via a billable telephone visit.      The patient has been notified of following:     \"This telephone visit will be conducted via a call between you and your physician/provider. We have found that certain health care needs can be provided without the need for a physical exam.  This service lets us provide the care you need with a short phone conversation.  If a prescription is necessary we can send it directly to your pharmacy.  If lab work is needed we can place an order for that and you can then stop by our lab to have the test done at a later time.    Telephone visits are billed at different rates depending on your insurance coverage. During this emergency period, for some insurers they may be billed the same as an in-person visit.  Please reach out to your insurance provider with any questions.    If during the course of the call the physician/provider feels a telephone visit is not appropriate, you will not be charged for this service.\"    Patient has given verbal consent for Telephone visit?  Yes    What phone number would you like to be contacted at? Cell with daughter    How would you like to obtain your AVS? MyChart  HISTORY OF PRESENT ILLNESS  Ms. Rockwell is a pleasant 75 year old year old female who presents to followup for left shoulder pain  Selvin explains that she got a little improvement from the injection I performed for her shoulder, but now is bothering her more again  Location: left shoulder, neck  Quality:  achy pain    Severity:7/10 at worst    Duration: since [place] [facility] [place]  Timing: occurs intermittently  Context: occurs while using her shoulder  Modifying factors:  resting and non-use makes it better, movement and use makes it worse  Associated signs & symptoms: some radiation from neck and into arm from shoulder    MEDICAL HISTORY  Patient Active Problem List   Diagnosis     OA " (OSTEOARTHRITIS) OF KNEE - right     Status Post Total Knee Replacement - bilateral     TACHO (obstructive sleep apnea)     Hyperlipidemia with target LDL less than 100     Hypertension goal BP (blood pressure) < 140/90     Type 2 diabetes, HbA1c goal < 7% (H)     Morbid obesity (H)     Achalasia     Adrenal adenoma     Atrophy of left kidney     Calculus of kidney     Chronic low back pain     Gout     Paroxysmal atrial fibrillation (H)     Long term (current) use of anticoagulants     Anxiety     Spinal stenosis of lumbar region with neurogenic claudication     Varicose veins with pain     Venous stasis dermatitis of right lower extremity       Current Outpatient Medications   Medication Sig Dispense Refill     baclofen (LIORESAL) 10 MG tablet Take 1 tablet (10 mg) by mouth At Bedtime (Patient not taking: Reported on 8/18/2020) 30 tablet 0     methylPREDNISolone (MEDROL) 4 MG tablet therapy pack Follow Package Directions (Patient not taking: Reported on 8/18/2020) 21 tablet 0     amoxicillin (AMOXIL) 500 MG capsule Bring to clinic in original bottle where you will be instructed to take medication. 4 capsule 0     carvedilol (COREG) 6.25 MG tablet Take 1 tablet (6.25 mg) by mouth 2 times daily 180 tablet 3     cloNIDine (CATAPRES) 0.1 MG tablet Bring to clinic in original bottle where you will be instructed to take the medication. 1 tablet 0     clotrimazole (LOTRIMIN) 1 % external cream        diclofenac (VOLTAREN) 1 % topical gel Place 4 g onto the skin 4 times daily 1 Tube 3     ferrous sulfate (FEROSUL) 325 (65 Fe) MG tablet Take 1 tablet (325 mg) by mouth daily (with breakfast) (Patient not taking: Reported on 6/29/2020) 90 tablet 3     FISH OIL 1000 MG PO CAPS 3 CAPSULES DAILY WITH A MEAL       furosemide (LASIX) 20 MG tablet Take 1 tablet (20 mg) by mouth daily for 5 days 5 tablet 0     isosorbide mononitrate (IMDUR) 30 MG 24 hr tablet Take 1 tablet (30 mg) by mouth daily 90 tablet 3     LORazepam (ATIVAN) 1  MG tablet Bring to clinic in original bottle where you will be instructed to take the medication. 2 tablet 0     metFORMIN (GLUCOPHAGE) 500 MG tablet Take 1 tablet (500 mg) by mouth 2 times daily (with meals) 180 tablet 1     methocarbamol (ROBAXIN) 500 MG tablet Take 1 tablet (500 mg) by mouth 4 times daily as needed for muscle spasms (Patient not taking: Reported on 8/18/2020) 40 tablet 0     omeprazole (PRILOSEC) 40 MG DR capsule        order for DME 15-20 mm mercury thigh high compression stockings 1-2 pairs 2 Package 5     order for DME 15-20 mm mercury thigh high compression stockings 1-2 pairs  Or waist high if that will work for patient.   Also patient has trouble putting socks on so equipment that would help with that would be great. 2 Package 5     order for DME Vinh hose stockings 1 Device 0     OTHER MEDICAL SUPPLIES 1 Package by Other route daily. 1 Package 0     PARoxetine (PAXIL) 20 MG tablet TAKE 1 TABLET BY MOUTH IN THE MORNING 90 tablet 0     simvastatin (ZOCOR) 10 MG tablet Take 1 tablet (10 mg) by mouth At Bedtime 90 tablet 1     tiZANidine (ZANAFLEX) 4 MG tablet Take 1 tablet (4 mg) by mouth 3 times daily as needed for muscle spasms (Patient not taking: Reported on 6/29/2020) 30 tablet 1     triamcinolone (KENALOG) 0.1 % external cream Apply sparingly to bilateral legs three times daily for 14 days. (Patient not taking: Reported on 6/29/2020) 45 g 1     warfarin ANTICOAGULANT (COUMADIN) 2 MG tablet TAKE 1 & 1/2 (3 mg) TABLETS BY MOUTH ONCE DAILY OR  AS  DIRECTED  BY  INR  CLINIC 94 tablet 1       Allergies   Allergen Reactions     Gabapentin      Rash and itching     Nsaids      Other reaction(s): Gastrointestinal  Former PPI user, EGD 2/2018 showed LA Grade D esophagitis, plus duodenitis and gastritis.  Famotidine on med list but may not have been taking - rx was 3 years old.      Sulfa Drugs Other (See Comments) and Rash     Per 11-4-13 H&P by Dr. Fei Arias.  RASH ALL OVER FOUND IN   JAMES DICTATION OF 10/11/11  BACTRIM-  RASH ALL OVER       No family history on file.  Social History     Socioeconomic History     Marital status:      Spouse name: Not on file     Number of children: Not on file     Years of education: Not on file     Highest education level: Not on file   Occupational History     Not on file   Social Needs     Financial resource strain: Not on file     Food insecurity     Worry: Not on file     Inability: Not on file     Transportation needs     Medical: Not on file     Non-medical: Not on file   Tobacco Use     Smoking status: Never Smoker     Smokeless tobacco: Never Used   Substance and Sexual Activity     Alcohol use: No     Drug use: No     Sexual activity: Yes     Partners: Male     Birth control/protection: Post-menopausal   Lifestyle     Physical activity     Days per week: Not on file     Minutes per session: Not on file     Stress: Not on file   Relationships     Social connections     Talks on phone: Not on file     Gets together: Not on file     Attends [employer] service: Not on file     Active member of club or organization: Not on file     Attends meetings of clubs or organizations: Not on file     Relationship status: Not on file     Intimate partner violence     Fear of current or ex partner: Not on file     Emotionally abused: Not on file     Physically abused: Not on file     Forced sexual activity: Not on file   Other Topics Concern     Parent/sibling w/ CABG, MI or angioplasty before 65F 55M? Not Asked   Social History Narrative     Not on file       Additional medical/Social/Surgical histories reviewed in [facility] and updated as appropriate.     REVIEW OF SYSTEMS (8/20/2020)  10 point ROS of systems including Constitutional, Eyes, Respiratory, Cardiovascular, Gastroenterology, Genitourinary, Integumentary, Musculoskeletal, Psychiatric, Allergic/Immunologic were all negative except for pertinent positives noted in my HPI.       ASSESSMENT & PLAN  74 yo  female with left shoulder Glenohumeral arthritis, RC arthropathy, not improved  Reviewed her left shoulder CT: showing tearing in her RC and GH arthritis  Referred to Dr Rodriguez for further consultation  Start PT, ordered  Start baclofen, medrol pack  F/u after seeing Dr Michael Castellon MD, CAQSM    Phone call duration: 15 minutes  Phone call start: 9:10am  Phone call end: 9:25 am  Gurinder Castellon MD

## 2020-08-20 NOTE — TELEPHONE ENCOUNTER
Called pt's daughter and LDVM informing her that the 3 preop Rx's from Dr. Renee have been sent over to pt's preferred pharmacy and all 3 of them cost less than $9.     Also reminded her that with the ACE wrap, the pt will probably need help wrapping her leg with that everyday for 7 days post op. Or if they're able to find cheaper compression hose online ([employer], etc.) would probably work better.    Katherine Eason, BRIANAN, RN  [facility]  [facility]

## 2020-08-24 NOTE — TELEPHONE ENCOUNTER
[facility]    Preoperative Nurse Call    Procedure: Right leg VNUS Closure GSV  Date: 8/25/2020  Time: 1330  Check in time: 1230    Called and spoke to patient. Informed patient: when to check in (1230) to sign consent, to bring their preop medications with them and take them after signing their consent, to have a /someone that will be responsible for them the rest of the day, to wear loose-fitting comfortable clothing, and to bring their compression hose. Informed patient, that if possible, they should sit in the backseat to elevate their leg on the ride home.    Special instructions: Patient said they prefer to have ACE bandage wrap postop. Writer said clinic will show them how to wrap ACE once they check in.      Special COVID-19 instructions: Patient aware they need to wear a mask to our clinic and during their procedure. Patient aware that their  cannot come into the clinic and must wait in car. Nurse will call pt's  when procedure is over.    Patient understands that if they have any of the following symptoms (fever, cough, shortness of breath, rash), they need to notify us immediately to cancel their procedure and will have to reschedule for a later date.    Patient is in agreement with preoperative information and has no further questions.    Breezy Howard RN

## 2020-08-25 ENCOUNTER — OFFICE VISIT (OUTPATIENT)
Dept: VASCULAR SURGERY | Facility: CLINIC | Age: 75
End: 2020-08-25
Payer: MEDICARE

## 2020-08-25 VITALS — DIASTOLIC BLOOD PRESSURE: 72 MMHG | SYSTOLIC BLOOD PRESSURE: 113 MMHG | HEART RATE: 75 BPM | OXYGEN SATURATION: 98 %

## 2020-08-25 DIAGNOSIS — I83.811 VARICOSE VEINS OF LEG WITH PAIN, RIGHT: Primary | ICD-10-CM

## 2020-08-25 DIAGNOSIS — I83.811 VARICOSE VEINS OF RIGHT LOWER EXTREMITY WITH PAIN: ICD-10-CM

## 2020-08-25 DIAGNOSIS — I83.891 VARICOSE VEINS OF LEG WITH EDEMA, RIGHT: ICD-10-CM

## 2020-08-25 DIAGNOSIS — I83.891 VARICOSE VEINS OF RIGHT LEG WITH EDEMA: ICD-10-CM

## 2020-08-25 PROCEDURE — 36475 ENDOVENOUS RF 1ST VEIN: CPT | Mod: RT | Performed by: SPECIALIST

## 2020-08-25 NOTE — PROGRESS NOTES
[facility] Procedure Note    Selvin Rockwell  August 25, 2020    Selvin Rockwell is a 75 year old year old female. She presents for No chief complaint on file.  .    /72   Pulse 75   SpO2 98%     Flowsheet Data 8/25/2020   Procedure Start Time:  1:35 PM   Prep: Chloraprep   Side: Right   Tx Length (cm): Right GSV: 42.5   Junction (cm): Right GSV:0   RF Cycles: Right GSV:   RF TX Time (Minutes): Right GSV:   Sedation taken: Yes   Pre Pt. Physical / Cognitive Limitations: WNL   TOTAL Local anesthesia Injected (ml): 5 ml   Max Volume Local Anesthesia (ml): 11 ml   TOTAL Tumescent Injected volume (ml): 272ml   Max Volume Tumescent (ml): 572ml   Post Pt. Physical / Cognitive Limitations: WNL   Procedure End Time:  2:23 PM   D/C Instructions given, states readiness to leave and escorted to car: Yes       Venus Closure    Date/Time: 8/25/2020 2:31 PM  Performed by: Alex Renee MD  Authorized by: Alex Renee MD     1st Assist:  Jovanna Teran CST/IZABELA  Procedure:  VNUS  Procedure side:  Right  One Vein    Vein Treated:  GSV  Patient tolerance:  Patient tolerated the procedure well with no immediate complications  Wrap/Hose:  Wraps        Details of Procedure  With the cooperation the patient in the preoperative holding area the right leg was marked.  She is taken the procedure in the right leg was prepped and draped sterile fashion a timeout was performed confirm this and the patient also procedure be performed.  The greater saphenous vein was then mapped over its entire length and an access point was chosen below the knee.  Due to her obesity the junction could be seen but the imaging was a poor quality.  The vein was accessed just below the knee with a micropuncture set and a 7 Syriac needle.  Catheter was then passed up the vein to a point in the proximal thigh.  We attempted advance the catheter and image at the same time to position the catheter at a safe distance from the junction.   Due to the poor quality of the imaging this could not be done.  It was elected to pull the catheter back to a position more superficial in the greater saphenous vein in the proximal thigh well away from the junction.  There was no concern about clot propagation as the patient is on Coumadin.  Tumescent solution was then placed around the vein and radiofrequency ablation was carried out 7 cm segments.  The catheter and sheath were removed and direct pressure was held for 5 minutes by the clock.  Sterile dressing was applied the patient taken from the procedure room back to the holding area in stable condition.    Alex Renee MD,FACS

## 2020-08-25 NOTE — PROGRESS NOTES
Pre-procedure Nursing Note    Selvin Rockwell presents to clinic for No chief complaint on file.  .   /Person Responsible for Patient: Alicia (Daughter)  Phone Number: [phone]    Prophylactic Medication:Antibiotics, 2000 mg,   Time Taken: 1245   Sedation Medication: Ativan, 2mg ,   Time Taken: 1245 and Clonidine, 0.1 mg,   Time Taken: 1245  Compression Stockings: Never got them, Patient is doing ACE bandage for compression. Taught on wrapping ACE snugly.  The procedure is being performed on RLE.  Patient understanding of procedure matches consent? YES    Patient's pre-procedure medications verified by RN, Breezy Howard, SERA.    Breezy Howard RN on 8/25/2020 at 1:22 PM

## 2020-08-25 NOTE — LETTER
8/25/2020         RE: Selvin Rockwell  [address] [place] [id number]        Dear Colleague,    Thank you for referring your patient, Selvin Rockwell, [facility] [place]. Please see a copy of my visit note below.    Pre-procedure Nursing Note    Selvin Rockwell presents to clinic for No chief complaint on file.  .   /Person Responsible for Patient: Alicia (Daughter)  Phone Number: [phone]    Prophylactic Medication:Antibiotics, 2000 mg,   Time Taken: 1245   Sedation Medication: Ativan, 2mg ,   Time Taken: 1245 and Clonidine, 0.1 mg,   Time Taken: 1245  Compression Stockings: Never got them, Patient is doing ACE bandage for compression. Taught on wrapping ACE snugly.  The procedure is being performed on RLE.  Patient understanding of procedure matches consent? YES    Patient's pre-procedure medications verified by RN, Breezy Howard RN.    Breezy Howard RN on 8/25/2020 at 1:22 PM          [facility]    Selvin Rockwell  August 25, 2020    Selvin Rockwell is a 75 year old year old female. She presents for No chief complaint on file.  .    /72   Pulse 75   SpO2 98%     Flowsheet Data 8/25/2020   Procedure Start Time:  1:35 PM   Prep: Chloraprep   Side: Right   Tx Length (cm): Right GSV: 42.5   Junction (cm): Right GSV:0   RF Cycles: Right GSV:   RF TX Time (Minutes): Right GSV:   Sedation taken: Yes   Pre Pt. Physical / Cognitive Limitations: WNL   TOTAL Local anesthesia Injected (ml): 5 ml   Max Volume Local Anesthesia (ml): 11 ml   TOTAL Tumescent Injected volume (ml): 272ml   Max Volume Tumescent (ml): 572ml   Post Pt. Physical / Cognitive Limitations: WNL   Procedure End Time:  2:23 PM   D/C Instructions given, states readiness to leave and escorted to car: Yes       Venus Closure    Date/Time: 8/25/2020 2:31 PM  Performed by: Alex Renee MD  Authorized by: Alex Renee MD     1st Assist:  Jovanna  KAYLIN Teran/IZABELA  Procedure:  VNUS  Procedure side:  Right  One Vein    Vein Treated:  GSV  Patient tolerance:  Patient tolerated the procedure well with no immediate complications  Wrap/Hose:  Wraps        Details of Procedure  With the cooperation the patient in the preoperative holding area the right leg was marked.  She is taken the procedure in the right leg was prepped and draped sterile fashion a timeout was performed confirm this and the patient also procedure be performed.  The greater saphenous vein was then mapped over its entire length and an access point was chosen below the knee.  Due to her obesity the junction could be seen but the imaging was a poor quality.  The vein was accessed just below the knee with a micropuncture set and a 7 Australian needle.  Catheter was then passed up the vein to a point in the proximal thigh.  We attempted advance the catheter and image at the same time to position the catheter at a safe distance from the junction.  Due to the poor quality of the imaging this could not be done.  It was elected to pull the catheter back to a position more superficial in the greater saphenous vein in the proximal thigh well away from the junction.  There was no concern about clot propagation as the patient is on Coumadin.  Tumescent solution was then placed around the vein and radiofrequency ablation was carried out 7 cm segments.  The catheter and sheath were removed and direct pressure was held for 5 minutes by the clock.  Sterile dressing was applied the patient taken from the procedure room back to the holding area in stable condition.    Alex Renee MD,FACS    Again, thank you for allowing me to participate in the care of your patient.        Sincerely,        Alex Renee MD

## 2020-08-26 ENCOUNTER — PRE VISIT (OUTPATIENT)
Dept: ORTHOPEDICS | Facility: CLINIC | Age: 75
End: 2020-08-26

## 2020-08-28 ENCOUNTER — OFFICE VISIT (OUTPATIENT)
Dept: VASCULAR SURGERY | Facility: CLINIC | Age: 75
End: 2020-08-28
Attending: SPECIALIST
Payer: MEDICARE

## 2020-08-28 ENCOUNTER — ANCILLARY PROCEDURE (OUTPATIENT)
Dept: ULTRASOUND IMAGING | Facility: CLINIC | Age: 75
End: 2020-08-28
Attending: SPECIALIST
Payer: MEDICARE

## 2020-08-28 ENCOUNTER — TELEPHONE (OUTPATIENT)
Dept: FAMILY MEDICINE | Facility: CLINIC | Age: 75
End: 2020-08-28

## 2020-08-28 DIAGNOSIS — I48.0 PAROXYSMAL ATRIAL FIBRILLATION (H): ICD-10-CM

## 2020-08-28 DIAGNOSIS — I83.811 VARICOSE VEINS OF LEG WITH PAIN, RIGHT: ICD-10-CM

## 2020-08-28 DIAGNOSIS — I87.2 CHRONIC STASIS DERMATITIS OF RIGHT LOWER EXTREMITY: ICD-10-CM

## 2020-08-28 DIAGNOSIS — I83.811 VARICOSE VEINS OF LEG WITH PAIN, RIGHT: Primary | ICD-10-CM

## 2020-08-28 PROCEDURE — 99207 ZZC NO CHARGE NURSE ONLY: CPT

## 2020-08-28 PROCEDURE — 93971 EXTREMITY STUDY: CPT | Mod: RT | Performed by: SPECIALIST

## 2020-08-28 NOTE — LETTER
2020         RE: Selvin Rockwell  [address] [facility] [address]        Dear Colleague,    Thank you for referring your patient, Selvin Rockwell, to the [facility] [place]. Please see a copy of my visit note below.      [facility]                     Post-Op/Re-Wrap Patient Notes    Date: 2020  Patient: Selvin Rockwell  : 1945  MRN: [id number]    History:    2020   Visit Type: Re-Wrap   Surgeon: Alex Renee   Patient called with Post-Op Problem: No   Surgery Date: 2020       VS POST OP HISTORY 2020   History: Patient is ambulatory;Patient has no complaints and indicates he/she is doing fine;Patient expressed some difficulties with:         On 4-6 week Post-Op Check:   No flowsheet data found.    Physical Examination:  VS POST OP PHYSICAL EXAM 2020   Side Right   Physical Exam: Incisions are approximated without signs of infection;Paresthesia;Swelling   Swelling Minimal       Comments/Notes: Rt GSV closed flush to SFJ. Patient is on coumadin 2 mg, per sydney Kenyon for patient to continue coumadin as prescribed. No follow up needed. Patient states some numbness in toes of right leg. Educated patient numbness will get better with time, but may take 3 months to a year to resolve.     Reinforce education on wrapping ACE bandage to daughter and patient at visit. Post op instructions reviewed with patient and questions answered. Scheduled patient for a 6 weeks follow up appointment with Dr. Renee.       Breezy Howard RN      Again, thank you for allowing me to participate in the care of your patient.        Sincerely,         VEIN NURSE

## 2020-08-28 NOTE — TELEPHONE ENCOUNTER
Selvin Rockwell is overdue for INR check.      Left message for patient to call and schedule INR check as soon as possible. If returning call, please schedule.      Keely Tripathi, Pharm-D AE-C  Anticoagulation Pharmacist

## 2020-09-02 ENCOUNTER — OFFICE VISIT (OUTPATIENT)
Dept: ORTHOPEDICS | Facility: CLINIC | Age: 75
End: 2020-09-02
Payer: MEDICARE

## 2020-09-02 VITALS — WEIGHT: 197.8 LBS | HEIGHT: 57 IN | BODY MASS INDEX: 42.67 KG/M2

## 2020-09-02 DIAGNOSIS — M19.012 GLENOHUMERAL ARTHRITIS, LEFT: Primary | ICD-10-CM

## 2020-09-02 ASSESSMENT — MIFFLIN-ST. JEOR: SCORE: 1271.21

## 2020-09-02 NOTE — NURSING NOTE
"Reason For Visit:   Chief Complaint   Patient presents with     Consult     Left shoulder pain.  Ref. Dr. Castellon       PCP: Gurinder Ho  Ref: Dr. Castellon    ?  No  Occupation Retired - Worked at [employer] [facility] [employer]   Currently working? No.  Work status?  Retired.  Date of injury: 6/4/20  Type of injury: Fall.  Date of surgery: NA  Type of surgery: NA.  Smoker: No  Request smoking cessation information: No    Right hand dominant    SANE score  Affected shoulder: Left - her whole life has been adapted due to other health conditions  Right shoulder SANE: 100  Left shoulder SANE: 100    Ht 1.448 m (4' 9.01\")   Wt 89.7 kg (197 lb 12.8 oz)   BMI 42.79 kg/m        Pain Assessment  Patient Currently in Pain: Denies(Just a little bit of pain.  it varies)    Rita Enrique, ATC        "

## 2020-09-02 NOTE — LETTER
9/2/2020         RE: Selvin Rockwell  [address] [place] [id number]        Dear Colleague,    Thank you for referring your patient, Selvin Rockwell, to the [facility]. Please see a copy of my visit note below.    CHIEF CONCERN:  Left Shoulder Pain    HISTORY OF PRESENT ILLNESS:  Gem Rockwell is a 75 year old female with PMH of HTN, HLD, a fib on warfarin, who presents with left shoulder pain. She is accompanied by her daughter today who also helps provide history and is very involved in Gem's care.     Gem fell on June 4th and since has had pain in her left shoulder. She has been followed by sports medicine primary care, trying multiple modalities to help with the pain, however has felt that her pain has not improved. She did receive an injection near the end of June but does not remember significant pain relief. She does embroidery and work around the house, and has noticed increased pain with these activities. She denies previous pain or injury to her left shoulder.     Past Medical History:   Diagnosis Date     Atrophy of left kidney 3/27/2017     Diabetes mellitus, type 2 (H) 2006     HTN (hypertension) 12/5/2012     Hyperlipidemia LDL goal < 100      OA (OSTEOARTHRITIS) OF KNEE - right 7/13/2010     OA (OSTEOARTHRITIS) OF KNEE - right      TACHO (obstructive sleep apnea) 12/7/2012     Paroxysmal atrial fibrillation (H) 7/11/2018       Past Surgical History:   Procedure Laterality Date     AS ESOPHAGOSCOPY, DIAGNOSTIC  07/2019     C HAND/FINGER SURGERY UNLISTED       C REMOVAL OF KIDNEY STONE       C TOTAL KNEE ARTHROPLASTY  7/21/10    left     C TOTAL KNEE ARTHROPLASTY  12/12/12    Right     CARPAL TUNNEL RELEASE RT/LT       COLONOSCOPY  07/2019     TONSILLECTOMY         Current Outpatient Medications   Medication Sig Dispense Refill     amoxicillin (AMOXIL) 500 MG capsule Bring to clinic in original bottle where you will be instructed to take medication. 4  capsule 0     baclofen (LIORESAL) 10 MG tablet Take 1 tablet (10 mg) by mouth At Bedtime 30 tablet 0     carvedilol (COREG) 6.25 MG tablet Take 1 tablet (6.25 mg) by mouth 2 times daily 180 tablet 3     clotrimazole (LOTRIMIN) 1 % external cream        diclofenac (VOLTAREN) 1 % topical gel Place 4 g onto the skin 4 times daily 1 Tube 3     ferrous sulfate (FEROSUL) 325 (65 Fe) MG tablet Take 1 tablet (325 mg) by mouth daily (with breakfast) 90 tablet 3     FISH OIL 1000 MG PO CAPS 3 CAPSULES DAILY WITH A MEAL       isosorbide mononitrate (IMDUR) 30 MG 24 hr tablet Take 1 tablet (30 mg) by mouth daily 90 tablet 3     metFORMIN (GLUCOPHAGE) 500 MG tablet Take 1 tablet (500 mg) by mouth 2 times daily (with meals) 180 tablet 1     methocarbamol (ROBAXIN) 500 MG tablet Take 1 tablet (500 mg) by mouth 4 times daily as needed for muscle spasms 40 tablet 0     methylPREDNISolone (MEDROL) 4 MG tablet therapy pack Follow Package Directions 21 tablet 0     omeprazole (PRILOSEC) 40 MG DR capsule        order for DME 15-20 mm mercury thigh high compression stockings 1-2 pairs 2 Package 5     order for DME 15-20 mm mercury thigh high compression stockings 1-2 pairs  Or waist high if that will work for patient.   Also patient has trouble putting socks on so equipment that would help with that would be great. 2 Package 5     order for DME Vinh hose stockings 1 Device 0     OTHER MEDICAL SUPPLIES 1 Package by Other route daily. 1 Package 0     PARoxetine (PAXIL) 20 MG tablet TAKE 1 TABLET BY MOUTH IN THE MORNING 90 tablet 0     simvastatin (ZOCOR) 10 MG tablet Take 1 tablet (10 mg) by mouth At Bedtime 90 tablet 1     tiZANidine (ZANAFLEX) 4 MG tablet Take 1 tablet (4 mg) by mouth 3 times daily as needed for muscle spasms 30 tablet 1     triamcinolone (KENALOG) 0.1 % external cream Apply sparingly to bilateral legs three times daily for 14 days. 45 g 1     warfarin ANTICOAGULANT (COUMADIN) 2 MG tablet TAKE 1 & 1/2 (3 mg) TABLETS BY  MOUTH ONCE DAILY OR  AS  DIRECTED  BY  INR  CLINIC 94 tablet 1     cloNIDine (CATAPRES) 0.1 MG tablet Bring to clinic in original bottle where you will be instructed to take the medication. (Patient not taking: Reported on 8/28/2020) 1 tablet 0     furosemide (LASIX) 20 MG tablet Take 1 tablet (20 mg) by mouth daily for 5 days 5 tablet 0     LORazepam (ATIVAN) 1 MG tablet Bring to clinic in original bottle where you will be instructed to take the medication. (Patient not taking: Reported on 8/28/2020) 2 tablet 0          Allergies   Allergen Reactions     Gabapentin      Rash and itching     Nsaids      Other reaction(s): Gastrointestinal  Former PPI user, EGD 2/2018 showed [place] Grade D esophagitis, plus duodenitis and gastritis.  Famotidine on med list but may not have been taking - rx was 3 years old.      Sulfa Drugs Other (See Comments) and Rash     Per 11-4-13 H&P by Dr. Fei Arias.  RASH ALL OVER FOUND IN DR. RAMIREZ DICTATION OF 10/11/11  BACTRIM-  RASH ALL OVER       SOCIAL HISTORY:    Social History     Socioeconomic History     Marital status:      Spouse name: Not on file     Number of children: Not on file     Years of education: Not on file     Highest education level: Not on file   Occupational History     Not on file   Social Needs     Financial resource strain: Not on file     Food insecurity     Worry: Not on file     Inability: Not on file     Transportation needs     Medical: Not on file     Non-medical: Not on file   Tobacco Use     Smoking status: Never Smoker     Smokeless tobacco: Never Used   Substance and Sexual Activity     Alcohol use: No     Drug use: No     Sexual activity: Yes     Partners: Male     Birth control/protection: Post-menopausal   Lifestyle     Physical activity     Days per week: Not on file     Minutes per session: Not on file     Stress: Not on file   Relationships     Social connections     Talks on phone: Not on file     Gets together: Not on file     Attends  "Gnosticism service: Not on file     Active member of club or organization: Not on file     Attends meetings of clubs or organizations: Not on file     Relationship status: Not on file     Intimate partner violence     Fear of current or ex partner: Not on file     Emotionally abused: Not on file     Physically abused: Not on file     Forced sexual activity: Not on file   Other Topics Concern     Parent/sibling w/ CABG, MI or angioplasty before 65F 55M? Not Asked   Social History Narrative     Not on file       FAMILY HISTORY: Reviewed in EMR      REVIEW OF SYSTEMS: Positive for that noted in past medical history and history of present illness and otherwise reviewed in EMR     PHYSICAL EXAM:    Adult female in no acute distress. Articulates and communicates with normal affect.  Respirations even and unlabored  Focused left upper extremity exam: Nontender to palpation, pain with ROM however. AROM FE 90 degrees, abduction 70 degrees, IR to L4, ER to 20. PROM  degrees, abduction 90 degrees, ER to 50 degrees. Positive ER lag.     IMAGING:  Reviewed Left shoulder XR from 6/4/2020 and CT from 7/30/2020 and agree with impressions below. XR demonstrates superior translation of humeral head with significant loss of acromiohumeral joint space with sclerosis. Glenohumeral joint space well preserved without significant degenerative changes.     \"Impression:  1. No acute osseous abnormality.  2. Loss of acromiohumeral interval, compatible with underlying rotator cuff tendon pathology.\"    \"IMPRESSION:  1. Imaging findings most compatible with underlying full-thickness tear of the supraspinatus/infraspinatus indicated by superior migration of the humeral head, subacromial undersurface bony remodeling with at least moderate fatty infiltration of supraspinatus  and infraspinous muscle bellies. MRI is more specific for rotator cuffassessment.  2. Marked bone proliferation at the superior intertubercular groove, may be indicating " "underlying chronic long head of biceps tendon oathology.\"      ASSESSMENT:    1. Left rotator cuff arthropathy    PLAN:  We had a long discussion regarding the natural history of the rotator cuff arthropathy. We went over the anatomy and biomechanics of the rotator cuff and reviewed imaging together. We discussed the possibility of a reverse total shoulder arthroplasty versus suprascapular nerve block. The patients questions were answered and she would like to go home and evaluate her options as well as speak more with her family.   - Call/return to clinic with questions   - Suprascapular nerve injection vs reverse total surgery at future date     Jina Mosqueda MD  Orthopaedic Surgery, PGY-1       I have personally examined this patient and have reviewed the clinical presentation and progress note with the resident.  I agree with the treatment plan as outlined.  The plan was formulated with the resident on the day of the resident's note.         Marianna Rodriguez MD    "

## 2020-09-02 NOTE — PROGRESS NOTES
CHIEF CONCERN:  Left Shoulder Pain    HISTORY OF PRESENT ILLNESS:  Gem Rockwell is a 75 year old female with PMH of HTN, HLD, a fib on warfarin, who presents with left shoulder pain. She is accompanied by her daughter today who also helps provide history and is very involved in Gem's care.     Gem fell on June 4th and since has had pain in her left shoulder. She has been followed by sports medicine primary care, trying multiple modalities to help with the pain, however has felt that her pain has not improved. She did receive an injection near the end of June but does not remember significant pain relief. She does embroidery and work around the house, and has noticed increased pain with these activities. She denies previous pain or injury to her left shoulder.     Past Medical History:   Diagnosis Date     Atrophy of left kidney 3/27/2017     Diabetes mellitus, type 2 (H) 2006     HTN (hypertension) 12/5/2012     Hyperlipidemia LDL goal < 100      OA (OSTEOARTHRITIS) OF KNEE - right 7/13/2010     OA (OSTEOARTHRITIS) OF KNEE - right      TACHO (obstructive sleep apnea) 12/7/2012     Paroxysmal atrial fibrillation (H) 7/11/2018       Past Surgical History:   Procedure Laterality Date     AS ESOPHAGOSCOPY, DIAGNOSTIC  07/2019     C HAND/FINGER SURGERY UNLISTED       C REMOVAL OF KIDNEY STONE       C TOTAL KNEE ARTHROPLASTY  7/21/10    left     C TOTAL KNEE ARTHROPLASTY  12/12/12    Right     CARPAL TUNNEL RELEASE RT/LT       COLONOSCOPY  07/2019     TONSILLECTOMY         Current Outpatient Medications   Medication Sig Dispense Refill     amoxicillin (AMOXIL) 500 MG capsule Bring to clinic in original bottle where you will be instructed to take medication. 4 capsule 0     baclofen (LIORESAL) 10 MG tablet Take 1 tablet (10 mg) by mouth At Bedtime 30 tablet 0     carvedilol (COREG) 6.25 MG tablet Take 1 tablet (6.25 mg) by mouth 2 times daily 180 tablet 3     clotrimazole (LOTRIMIN) 1 % external cream         diclofenac (VOLTAREN) 1 % topical gel Place 4 g onto the skin 4 times daily 1 Tube 3     ferrous sulfate (FEROSUL) 325 (65 Fe) MG tablet Take 1 tablet (325 mg) by mouth daily (with breakfast) 90 tablet 3     FISH OIL 1000 MG PO CAPS 3 CAPSULES DAILY WITH A MEAL       isosorbide mononitrate (IMDUR) 30 MG 24 hr tablet Take 1 tablet (30 mg) by mouth daily 90 tablet 3     metFORMIN (GLUCOPHAGE) 500 MG tablet Take 1 tablet (500 mg) by mouth 2 times daily (with meals) 180 tablet 1     methocarbamol (ROBAXIN) 500 MG tablet Take 1 tablet (500 mg) by mouth 4 times daily as needed for muscle spasms 40 tablet 0     methylPREDNISolone (MEDROL) 4 MG tablet therapy pack Follow Package Directions 21 tablet 0     omeprazole (PRILOSEC) 40 MG DR capsule        order for DME 15-20 mm mercury thigh high compression stockings 1-2 pairs 2 Package 5     order for DME 15-20 mm mercury thigh high compression stockings 1-2 pairs  Or waist high if that will work for patient.   Also patient has trouble putting socks on so equipment that would help with that would be great. 2 Package 5     order for DME Vinh hose stockings 1 Device 0     OTHER MEDICAL SUPPLIES 1 Package by Other route daily. 1 Package 0     PARoxetine (PAXIL) 20 MG tablet TAKE 1 TABLET BY MOUTH IN THE MORNING 90 tablet 0     simvastatin (ZOCOR) 10 MG tablet Take 1 tablet (10 mg) by mouth At Bedtime 90 tablet 1     tiZANidine (ZANAFLEX) 4 MG tablet Take 1 tablet (4 mg) by mouth 3 times daily as needed for muscle spasms 30 tablet 1     triamcinolone (KENALOG) 0.1 % external cream Apply sparingly to bilateral legs three times daily for 14 days. 45 g 1     warfarin ANTICOAGULANT (COUMADIN) 2 MG tablet TAKE 1 & 1/2 (3 mg) TABLETS BY MOUTH ONCE DAILY OR  AS  DIRECTED  BY  INR  CLINIC 94 tablet 1     cloNIDine (CATAPRES) 0.1 MG tablet Bring to clinic in original bottle where you will be instructed to take the medication. (Patient not taking: Reported on 8/28/2020) 1 tablet 0      furosemide (LASIX) 20 MG tablet Take 1 tablet (20 mg) by mouth daily for 5 days 5 tablet 0     LORazepam (ATIVAN) 1 MG tablet Bring to clinic in original bottle where you will be instructed to take the medication. (Patient not taking: Reported on 8/28/2020) 2 tablet 0          Allergies   Allergen Reactions     Gabapentin      Rash and itching     Nsaids      Other reaction(s): Gastrointestinal  Former PPI user, EGD 2/2018 showed LA Grade D esophagitis, plus duodenitis and gastritis.  Famotidine on med list but may not have been taking - rx was 3 years old.      Sulfa Drugs Other (See Comments) and Rash     Per 11-4-13 H&P by Dr. Fei Arias.  RASH ALL OVER FOUND IN DR. RAMIREZ DICTATION OF 10/11/11  BACTRIM-  RASH ALL OVER       SOCIAL HISTORY:    Social History     Socioeconomic History     Marital status:      Spouse name: Not on file     Number of children: Not on file     Years of education: Not on file     Highest education level: Not on file   Occupational History     Not on file   Social Needs     Financial resource strain: Not on file     Food insecurity     Worry: Not on file     Inability: Not on file     Transportation needs     Medical: Not on file     Non-medical: Not on file   Tobacco Use     Smoking status: Never Smoker     Smokeless tobacco: Never Used   Substance and Sexual Activity     Alcohol use: No     Drug use: No     Sexual activity: Yes     Partners: Male     Birth control/protection: Post-menopausal   Lifestyle     Physical activity     Days per week: Not on file     Minutes per session: Not on file     Stress: Not on file   Relationships     Social connections     Talks on phone: Not on file     Gets together: Not on file     Attends Jewish service: Not on file     Active member of club or organization: Not on file     Attends meetings of clubs or organizations: Not on file     Relationship status: Not on file     Intimate partner violence     Fear of current or ex partner: Not  "on file     Emotionally abused: Not on file     Physically abused: Not on file     Forced sexual activity: Not on file   Other Topics Concern     Parent/sibling w/ CABG, MI or angioplasty before 65F 55M? Not Asked   Social History Narrative     Not on file       FAMILY HISTORY: Reviewed in EMR      REVIEW OF SYSTEMS: Positive for that noted in past medical history and history of present illness and otherwise reviewed in EMR     PHYSICAL EXAM:    Adult female in no acute distress. Articulates and communicates with normal affect.  Respirations even and unlabored  Focused left upper extremity exam: Nontender to palpation, pain with ROM however. AROM FE 90 degrees, abduction 70 degrees, IR to L4, ER to 20. PROM  degrees, abduction 90 degrees, ER to 50 degrees. Positive ER lag.     IMAGING:  Reviewed Left shoulder XR from 6/4/2020 and CT from 7/30/2020 and agree with impressions below. XR demonstrates superior translation of humeral head with significant loss of acromiohumeral joint space with sclerosis. Glenohumeral joint space well preserved without significant degenerative changes.     \"Impression:  1. No acute osseous abnormality.  2. Loss of acromiohumeral interval, compatible with underlying rotator cuff tendon pathology.\"    \"IMPRESSION:  1. Imaging findings most compatible with underlying full-thickness tear of the supraspinatus/infraspinatus indicated by superior migration of the humeral head, subacromial undersurface bony remodeling with at least moderate fatty infiltration of supraspinatus  and infraspinous muscle bellies. MRI is more specific for rotator cuffassessment.  2. Marked bone proliferation at the superior intertubercular groove, may be indicating underlying chronic long head of biceps tendon oathology.\"      ASSESSMENT:    1. Left rotator cuff arthropathy    PLAN:  We had a long discussion regarding the natural history of the rotator cuff arthropathy. We went over the anatomy and biomechanics " of the rotator cuff and reviewed imaging together. We discussed the possibility of a reverse total shoulder arthroplasty versus suprascapular nerve block. The patients questions were answered and she would like to go home and evaluate her options as well as speak more with her family.   - Call/return to clinic with questions   - Suprascapular nerve injection vs reverse total surgery at future date     Jina Mosqueda MD  Orthopaedic Surgery, PGY-1       I have personally examined this patient and have reviewed the clinical presentation and progress note with the resident.  I agree with the treatment plan as outlined.  The plan was formulated with the resident on the day of the resident's note.

## 2020-09-03 NOTE — PROGRESS NOTES
HISTORY OF PRESENT ILLNESS  Ms. Rockwell is a pleasant 75 year old year old female who presents to clinic today with ongoing left shoulder pain and limited ROM  Selvin explains that she has not improved much since last visit  Injection I gave her did not help for very long  Location: left shoulder  Quality:  achy pain    Severity: 8/10 at worst    Duration: since injury that occurred here about 2 months prior see prior notes  Timing: occurs intermittently  Context: occurs while using her arm  Modifying factors:  resting and non-use makes it better, movement and use makes it worse  Associated signs & symptoms: no radiation down left arm    MEDICAL HISTORY  Patient Active Problem List   Diagnosis     OA (OSTEOARTHRITIS) OF KNEE - right     Status Post Total Knee Replacement - bilateral     TACHO (obstructive sleep apnea)     Hyperlipidemia with target LDL less than 100     Hypertension goal BP (blood pressure) < 140/90     Type 2 diabetes, HbA1c goal < 7% (H)     Morbid obesity (H)     Achalasia     Adrenal adenoma     Atrophy of left kidney     Calculus of kidney     Chronic low back pain     Gout     Paroxysmal atrial fibrillation (H)     Long term (current) use of anticoagulants     Anxiety     Spinal stenosis of lumbar region with neurogenic claudication     Varicose veins with pain     Venous stasis dermatitis of right lower extremity       Current Outpatient Medications   Medication Sig Dispense Refill     baclofen (LIORESAL) 10 MG tablet Take 1 tablet (10 mg) by mouth At Bedtime 30 tablet 0     carvedilol (COREG) 6.25 MG tablet Take 1 tablet (6.25 mg) by mouth 2 times daily 180 tablet 3     clotrimazole (LOTRIMIN) 1 % external cream        diclofenac (VOLTAREN) 1 % topical gel Place 4 g onto the skin 4 times daily 1 Tube 3     FISH OIL 1000 MG PO CAPS 3 CAPSULES DAILY WITH A MEAL       isosorbide mononitrate (IMDUR) 30 MG 24 hr tablet Take 1 tablet (30 mg) by mouth daily 90 tablet 3     metFORMIN (GLUCOPHAGE)  500 MG tablet Take 1 tablet (500 mg) by mouth 2 times daily (with meals) 180 tablet 1     methocarbamol (ROBAXIN) 500 MG tablet Take 1 tablet (500 mg) by mouth 4 times daily as needed for muscle spasms 40 tablet 0     methylPREDNISolone (MEDROL) 4 MG tablet therapy pack Follow Package Directions 21 tablet 0     omeprazole (PRILOSEC) 40 MG DR capsule        order for DME 15-20 mm mercury thigh high compression stockings 1-2 pairs 2 Package 5     order for DME 15-20 mm mercury thigh high compression stockings 1-2 pairs  Or waist high if that will work for patient.   Also patient has trouble putting socks on so equipment that would help with that would be great. 2 Package 5     order for DME Vinh hose stockings 1 Device 0     OTHER MEDICAL SUPPLIES 1 Package by Other route daily. 1 Package 0     simvastatin (ZOCOR) 10 MG tablet Take 1 tablet (10 mg) by mouth At Bedtime 90 tablet 1     amoxicillin (AMOXIL) 500 MG capsule Bring to clinic in original bottle where you will be instructed to take medication. 4 capsule 0     cloNIDine (CATAPRES) 0.1 MG tablet Bring to clinic in original bottle where you will be instructed to take the medication. (Patient not taking: Reported on 8/28/2020) 1 tablet 0     ferrous sulfate (FEROSUL) 325 (65 Fe) MG tablet Take 1 tablet (325 mg) by mouth daily (with breakfast) 90 tablet 3     furosemide (LASIX) 20 MG tablet Take 1 tablet (20 mg) by mouth daily for 5 days 5 tablet 0     LORazepam (ATIVAN) 1 MG tablet Bring to clinic in original bottle where you will be instructed to take the medication. (Patient not taking: Reported on 8/28/2020) 2 tablet 0     PARoxetine (PAXIL) 20 MG tablet TAKE 1 TABLET BY MOUTH IN THE MORNING 90 tablet 0     tiZANidine (ZANAFLEX) 4 MG tablet Take 1 tablet (4 mg) by mouth 3 times daily as needed for muscle spasms 30 tablet 1     triamcinolone (KENALOG) 0.1 % external cream Apply sparingly to bilateral legs three times daily for 14 days. 45 g 1     warfarin  ANTICOAGULANT (COUMADIN) 2 MG tablet TAKE 1 & 1/2 (3 mg) TABLETS BY MOUTH ONCE DAILY OR  AS  DIRECTED  BY  INR  CLINIC 94 tablet 1       Allergies   Allergen Reactions     Gabapentin      Rash and itching     Nsaids      Other reaction(s): Gastrointestinal  Former PPI user, EGD 2/2018 showed LA Grade D esophagitis, plus duodenitis and gastritis.  Famotidine on med list but may not have been taking - rx was 3 years old.      Sulfa Drugs Other (See Comments) and Rash     Per 11-4-13 H&P by Dr. Fei Arias.  RASH ALL OVER FOUND IN DR. RAMIREZ DICTATION OF 10/11/11  BACTRIM-  RASH ALL OVER       No family history on file.  Social History     Socioeconomic History     Marital status:      Spouse name: Not on file     Number of children: Not on file     Years of education: Not on file     Highest education level: Not on file   Occupational History     Not on file   Social Needs     Financial resource strain: Not on file     Food insecurity     Worry: Not on file     Inability: Not on file     Transportation needs     Medical: Not on file     Non-medical: Not on file   Tobacco Use     Smoking status: Never Smoker     Smokeless tobacco: Never Used   Substance and Sexual Activity     Alcohol use: No     Drug use: No     Sexual activity: Yes     Partners: Male     Birth control/protection: Post-menopausal   Lifestyle     Physical activity     Days per week: Not on file     Minutes per session: Not on file     Stress: Not on file   Relationships     Social connections     Talks on phone: Not on file     Gets together: Not on file     Attends Jewish service: Not on file     Active member of club or organization: Not on file     Attends meetings of clubs or organizations: Not on file     Relationship status: Not on file     Intimate partner violence     Fear of current or ex partner: Not on file     Emotionally abused: Not on file     Physically abused: Not on file     Forced sexual activity: Not on file   Other  "Topics Concern     Parent/sibling w/ CABG, MI or angioplasty before 65F 55M? Not Asked   Social History Narrative     Not on file       Additional medical/Social/Surgical histories reviewed in [facility] and updated as appropriate.     REVIEW OF SYSTEMS (9/3/2020)  10 point ROS of systems including Constitutional, Eyes, Respiratory, Cardiovascular, Gastroenterology, Genitourinary, Integumentary, Musculoskeletal, Psychiatric, Allergic/Immunologic were all negative except for pertinent positives noted in my HPI.     PHYSICAL EXAM  Vitals:    08/06/20 1406   Weight: 88.5 kg (195 lb)   Height: 1.448 m (4' 9\")     Vital Signs: Ht 1.448 m (4' 9\")   Wt 88.5 kg (195 lb)   BMI 42.20 kg/m   Patient declined being weighed. Body mass index is 42.2 kg/m .    General  - normal appearance, in no obvious distress  HEENT  - conjunctivae not injected, moist mucous membranes, normocephalic/atraumatic head, ears normal appearance, no lesions, mouth normal appearance, no scars, normal dentition and teeth present  CV  - normal radial pulse  Pulm  - normal respiratory pattern, non-labored  Musculoskeletal - left shoulder  - inspection: normal bone and joint alignment, no obvious deformity, no scapular winging, no AC step-off  - palpation: no bony or soft tissue tenderness, normal clavicle, non-tender AC  - ROM:  limited flexion, IR, ER, abduction, painful at end range  - strength: 5/5  strength, 5/5 in all shoulder planes  - special tests:  (-) Speed's  (-) Neer  (-) Hawkin's  (-) Anne's  (+) [place]'s  (+) load & shift, supine  (-) apprehension  (-) subscap lift-off  Neuro  - no sensory or motor deficit, grossly normal coordination, normal muscle tone  Skin  - no ecchymosis, erythema, warmth, or induration, no obvious rash  Psych  - interactive, appropriate, normal mood and affect          ASSESSMENT & PLAN  76 yo female with left shoulder pain due to GH arthritis, RC tear  Reviewed shoulder CT: shows GH arthritis, severe, and RC " tear  Injection did not help much  Referred to shoulder surgeon  voltaren gel  PT ordered  F/u after seeing shoulder surgery  Gurinder Castellon MD, CAQSM

## 2020-09-04 ENCOUNTER — TELEPHONE (OUTPATIENT)
Dept: ORTHOPEDICS | Facility: CLINIC | Age: 75
End: 2020-09-04

## 2020-09-04 NOTE — TELEPHONE ENCOUNTER
Patient and her daughter were going to think about non-operative (injection) versus surgical (reverse total shoulder) treatment.  Daughter requests Dr Castellon's input in helping them make the decision.  Message will be discussed with Dr Castellon next week.  Daughter is ok with this plan.

## 2020-09-04 NOTE — TELEPHONE ENCOUNTER
[facility] Call Center    Phone Message    May a detailed message be left on voicemail: yes     Reason for Call: Other: Patient's daughter wants a call back for medical advice on what patient can do for her shoulder.     Action Taken: Message routed to:  Other: Ortho    Travel Screening: Not Applicable

## 2020-09-08 ENCOUNTER — TELEPHONE (OUTPATIENT)
Dept: ORTHOPEDICS | Facility: CLINIC | Age: 75
End: 2020-09-08

## 2020-09-08 NOTE — TELEPHONE ENCOUNTER
[facility] Call Center    Phone Message    May a detailed message be left on voicemail: yes     Reason for Call: Daughter would like to know what the next steps are regarding mothers shoulder. If surgery is needed or not. Please call daughter at the number listed. Ok to leave msg if there is no answer.     Action Taken: Message routed to:  [facility] ortho    Travel Screening: Not Applicable

## 2020-09-09 NOTE — TELEPHONE ENCOUNTER
LVM for daughter to schedule follow up appointment to go over questions. Virtual appointment can be scheduled on Ortho, [facility] or walk in schedule.

## 2020-09-09 NOTE — TELEPHONE ENCOUNTER
[facility] Call Center    Phone Message    May a detailed message be left on voicemail: yes     Reason for Call: Other: Please call patient today      Action Taken: Message routed to:  [facility] ortho    Travel Screening: Not Applicable

## 2020-09-16 ENCOUNTER — TELEPHONE (OUTPATIENT)
Dept: FAMILY MEDICINE | Facility: CLINIC | Age: 75
End: 2020-09-16

## 2020-09-16 NOTE — TELEPHONE ENCOUNTER
Selvin Rockwell is overdue for INR check.      Spoke with Kat and scheduled INR appointment on 9/18     Chitra Morrison RN - [facility]

## 2020-09-17 ENCOUNTER — VIRTUAL VISIT (OUTPATIENT)
Dept: ORTHOPEDICS | Facility: CLINIC | Age: 75
End: 2020-09-17
Payer: MEDICARE

## 2020-09-17 DIAGNOSIS — M19.012 GLENOHUMERAL ARTHRITIS, LEFT: Primary | ICD-10-CM

## 2020-09-17 NOTE — PROGRESS NOTES
"Selvin Rockwell is a 75 year old female who is being evaluated via a billable telephone visit.      The patient has been notified of following:     \"This telephone visit will be conducted via a call between you and your physician/provider. We have found that certain health care needs can be provided without the need for a physical exam.  This service lets us provide the care you need with a short phone conversation.  If a prescription is necessary we can send it directly to your pharmacy.  If lab work is needed we can place an order for that and you can then stop by our lab to have the test done at a later time.    Telephone visits are billed at different rates depending on your insurance coverage. During this emergency period, for some insurers they may be billed the same as an in-person visit.  Please reach out to your insurance provider with any questions.    If during the course of the call the physician/provider feels a telephone visit is not appropriate, you will not be charged for this service.\"    Patient has given verbal consent for Telephone visit?  Yes    What phone number would you like to be contacted at? [phone]    How would you like to obtain your AVS? Mail a copy  HISTORY OF PRESENT ILLNESS  Ms. Rockwell is a pleasant 75 year old year old female who presents to followup for left shoulder pain due to contusion from fall and glenohumeral arthritis  Selvin explains that she still has pain in shoulder and would like to discuss what she talked with Dr Rodriguez about her shoulder  Location: left shoulder  Quality:  achy pain    Severity: 8/10 at worst    Duration: since fall 2 months prior see previous notes  Timing: occurs intermittently  Context: occurs while lifting arm  Modifying factors:  resting and non-use makes it better, movement and use makes it worse  Associated signs & symptoms: limted ROM due to pain and stiffness  MEDICAL HISTORY  Patient Active Problem List   Diagnosis     OA " (OSTEOARTHRITIS) OF KNEE - right     Status Post Total Knee Replacement - bilateral     TACHO (obstructive sleep apnea)     Hyperlipidemia with target LDL less than 100     Hypertension goal BP (blood pressure) < 140/90     Type 2 diabetes, HbA1c goal < 7% (H)     Morbid obesity (H)     Achalasia     Adrenal adenoma     Atrophy of left kidney     Calculus of kidney     Chronic low back pain     Gout     Paroxysmal atrial fibrillation (H)     Long term (current) use of anticoagulants     Anxiety     Spinal stenosis of lumbar region with neurogenic claudication     Varicose veins with pain     Venous stasis dermatitis of right lower extremity       Current Outpatient Medications   Medication Sig Dispense Refill     baclofen (LIORESAL) 10 MG tablet Take 1 tablet (10 mg) by mouth At Bedtime 30 tablet 0     carvedilol (COREG) 6.25 MG tablet Take 1 tablet (6.25 mg) by mouth 2 times daily 180 tablet 3     cloNIDine (CATAPRES) 0.1 MG tablet Bring to clinic in original bottle where you will be instructed to take the medication. 1 tablet 0     clotrimazole (LOTRIMIN) 1 % external cream        diclofenac (VOLTAREN) 1 % topical gel Place 4 g onto the skin 4 times daily 1 Tube 3     ferrous sulfate (FEROSUL) 325 (65 Fe) MG tablet Take 1 tablet (325 mg) by mouth daily (with breakfast) 90 tablet 3     FISH OIL 1000 MG PO CAPS 3 CAPSULES DAILY WITH A MEAL       isosorbide mononitrate (IMDUR) 30 MG 24 hr tablet Take 1 tablet (30 mg) by mouth daily 90 tablet 3     LORazepam (ATIVAN) 1 MG tablet Bring to clinic in original bottle where you will be instructed to take the medication. 2 tablet 0     metFORMIN (GLUCOPHAGE) 500 MG tablet Take 1 tablet (500 mg) by mouth 2 times daily (with meals) 180 tablet 1     methocarbamol (ROBAXIN) 500 MG tablet Take 1 tablet (500 mg) by mouth 4 times daily as needed for muscle spasms 40 tablet 0     omeprazole (PRILOSEC) 40 MG DR capsule        order for DME 15-20 mm mercury thigh high compression  stockings 1-2 pairs 2 Package 5     order for DME 15-20 mm mercury thigh high compression stockings 1-2 pairs  Or waist high if that will work for patient.   Also patient has trouble putting socks on so equipment that would help with that would be great. 2 Package 5     order for DME Vinh hose stockings 1 Device 0     OTHER MEDICAL SUPPLIES 1 Package by Other route daily. 1 Package 0     PARoxetine (PAXIL) 20 MG tablet TAKE 1 TABLET BY MOUTH IN THE MORNING 90 tablet 0     simvastatin (ZOCOR) 10 MG tablet Take 1 tablet (10 mg) by mouth At Bedtime 90 tablet 1     tiZANidine (ZANAFLEX) 4 MG tablet Take 1 tablet (4 mg) by mouth 3 times daily as needed for muscle spasms 30 tablet 1     triamcinolone (KENALOG) 0.1 % external cream Apply sparingly to bilateral legs three times daily for 14 days. 45 g 1     warfarin ANTICOAGULANT (COUMADIN) 2 MG tablet TAKE 1 & 1/2 (3 mg) TABLETS BY MOUTH ONCE DAILY OR  AS  DIRECTED  BY  INR  CLINIC 94 tablet 1     amoxicillin (AMOXIL) 500 MG capsule Bring to clinic in original bottle where you will be instructed to take medication. (Patient not taking: Reported on 9/22/2020) 4 capsule 0     furosemide (LASIX) 20 MG tablet Take 1 tablet (20 mg) by mouth daily for 5 days 5 tablet 0     methylPREDNISolone (MEDROL) 4 MG tablet therapy pack Follow Package Directions 21 tablet 0       Allergies   Allergen Reactions     Gabapentin      Rash and itching     Nsaids      Other reaction(s): Gastrointestinal  Former PPI user, EGD 2/2018 showed LA Grade D esophagitis, plus duodenitis and gastritis.  Famotidine on med list but may not have been taking - rx was 3 years old.      Sulfa Drugs Other (See Comments) and Rash     Per 11-4-13 H&P by Dr. Fei Arias.  RASH ALL OVER FOUND IN DR. RAMIREZ DICTATION OF 10/11/11  BACTRIM-  RASH ALL OVER       No family history on file.  Social History     Socioeconomic History     Marital status:      Spouse name: Not on file     Number of children: Not on  file     Years of education: Not on file     Highest education level: Not on file   Occupational History     Not on file   Social Needs     Financial resource strain: Not on file     Food insecurity     Worry: Not on file     Inability: Not on file     Transportation needs     Medical: Not on file     Non-medical: Not on file   Tobacco Use     Smoking status: Never Smoker     Smokeless tobacco: Never Used   Substance and Sexual Activity     Alcohol use: No     Drug use: No     Sexual activity: Yes     Partners: Male     Birth control/protection: Post-menopausal   Lifestyle     Physical activity     Days per week: Not on file     Minutes per session: Not on file     Stress: Not on file   Relationships     Social connections     Talks on phone: Not on file     Gets together: Not on file     Attends Evangelical service: Not on file     Active member of club or organization: Not on file     Attends meetings of clubs or organizations: Not on file     Relationship status: Not on file     Intimate partner violence     Fear of current or ex partner: Not on file     Emotionally abused: Not on file     Physically abused: Not on file     Forced sexual activity: Not on file   Other Topics Concern     Parent/sibling w/ CABG, MI or angioplasty before 65F 55M? Not Asked   Social History Narrative     Not on file       Additional medical/Social/Surgical histories reviewed in [facility] and updated as appropriate.     REVIEW OF SYSTEMS (9/17/2020)  10 point ROS of systems including Constitutional, Eyes, Respiratory, Cardiovascular, Gastroenterology, Genitourinary, Integumentary, Musculoskeletal, Psychiatric, Allergic/Immunologic were all negative except for pertinent positives noted in my HPI.       ASSESSMENT & PLAN  74 yo female with left shoulder contusion/glenohumeral arthritis, not resolved  Reviewed her discussion with Dr Rodriguez, who offered considering surgery for shoulder replacement  Her shoulder continues to hurt   She has not  yet gone to PT, has done chiro on her own  Only using tylenol at this time  Discussed having her come in for suprascapular nerve block which we have not yet tried  Gurinder Castellon MD, CAQSM    Phone call duration: 16  minutes  Phone call start: 2:10pm  Phone call end: 2:26pm  Gurinder Castellon MD

## 2020-09-17 NOTE — LETTER
"    9/17/2020         RE: Selvin Rockwell  [address] [place] [id number]        Dear Colleague,    Thank you for referring your patient, Selvin Rockwell, to the [facility]. Please see a copy of my visit note below.    Selvin Rockwell is a 75 year old female who is being evaluated via a billable telephone visit.      The patient has been notified of following:     \"This telephone visit will be conducted via a call between you and your physician/provider. We have found that certain health care needs can be provided without the need for a physical exam.  This service lets us provide the care you need with a short phone conversation.  If a prescription is necessary we can send it directly to your pharmacy.  If lab work is needed we can place an order for that and you can then stop by our lab to have the test done at a later time.    Telephone visits are billed at different rates depending on your insurance coverage. During this emergency period, for some insurers they may be billed the same as an in-person visit.  Please reach out to your insurance provider with any questions.    If during the course of the call the physician/provider feels a telephone visit is not appropriate, you will not be charged for this service.\"    Patient has given verbal consent for Telephone visit?  Yes    What phone number would you like to be contacted at? [phone]    How would you like to obtain your AVS? Mail a copy  HISTORY OF PRESENT ILLNESS  Ms. Rockwell is a pleasant 75 year old year old female who presents to followup for left shoulder pain due to contusion from fall and glenohumeral arthritis  Selvin explains that she still has pain in shoulder and would like to discuss what she talked with Dr Rodriguez about her shoulder  Location: left shoulder  Quality:  achy pain    Severity: 8/10 at worst    Duration: since fall 2 months prior see previous notes  Timing: occurs " intermittently  Context: occurs while lifting arm  Modifying factors:  resting and non-use makes it better, movement and use makes it worse  Associated signs & symptoms: limted ROM due to pain and stiffness  MEDICAL HISTORY  Patient Active Problem List   Diagnosis     OA (OSTEOARTHRITIS) OF KNEE - right     Status Post Total Knee Replacement - bilateral     TACHO (obstructive sleep apnea)     Hyperlipidemia with target LDL less than 100     Hypertension goal BP (blood pressure) < 140/90     Type 2 diabetes, HbA1c goal < 7% (H)     Morbid obesity (H)     Achalasia     Adrenal adenoma     Atrophy of left kidney     Calculus of kidney     Chronic low back pain     Gout     Paroxysmal atrial fibrillation (H)     Long term (current) use of anticoagulants     Anxiety     Spinal stenosis of lumbar region with neurogenic claudication     Varicose veins with pain     Venous stasis dermatitis of right lower extremity       Current Outpatient Medications   Medication Sig Dispense Refill     baclofen (LIORESAL) 10 MG tablet Take 1 tablet (10 mg) by mouth At Bedtime 30 tablet 0     carvedilol (COREG) 6.25 MG tablet Take 1 tablet (6.25 mg) by mouth 2 times daily 180 tablet 3     cloNIDine (CATAPRES) 0.1 MG tablet Bring to clinic in original bottle where you will be instructed to take the medication. 1 tablet 0     clotrimazole (LOTRIMIN) 1 % external cream        diclofenac (VOLTAREN) 1 % topical gel Place 4 g onto the skin 4 times daily 1 Tube 3     ferrous sulfate (FEROSUL) 325 (65 Fe) MG tablet Take 1 tablet (325 mg) by mouth daily (with breakfast) 90 tablet 3     FISH OIL 1000 MG PO CAPS 3 CAPSULES DAILY WITH A MEAL       isosorbide mononitrate (IMDUR) 30 MG 24 hr tablet Take 1 tablet (30 mg) by mouth daily 90 tablet 3     LORazepam (ATIVAN) 1 MG tablet Bring to clinic in original bottle where you will be instructed to take the medication. 2 tablet 0     metFORMIN (GLUCOPHAGE) 500 MG tablet Take 1 tablet (500 mg) by mouth 2  times daily (with meals) 180 tablet 1     methocarbamol (ROBAXIN) 500 MG tablet Take 1 tablet (500 mg) by mouth 4 times daily as needed for muscle spasms 40 tablet 0     omeprazole (PRILOSEC) 40 MG DR capsule        order for DME 15-20 mm mercury thigh high compression stockings 1-2 pairs 2 Package 5     order for DME 15-20 mm mercury thigh high compression stockings 1-2 pairs  Or waist high if that will work for patient.   Also patient has trouble putting socks on so equipment that would help with that would be great. 2 Package 5     order for DME Vinh hose stockings 1 Device 0     OTHER MEDICAL SUPPLIES 1 Package by Other route daily. 1 Package 0     PARoxetine (PAXIL) 20 MG tablet TAKE 1 TABLET BY MOUTH IN THE MORNING 90 tablet 0     simvastatin (ZOCOR) 10 MG tablet Take 1 tablet (10 mg) by mouth At Bedtime 90 tablet 1     tiZANidine (ZANAFLEX) 4 MG tablet Take 1 tablet (4 mg) by mouth 3 times daily as needed for muscle spasms 30 tablet 1     triamcinolone (KENALOG) 0.1 % external cream Apply sparingly to bilateral legs three times daily for 14 days. 45 g 1     warfarin ANTICOAGULANT (COUMADIN) 2 MG tablet TAKE 1 & 1/2 (3 mg) TABLETS BY MOUTH ONCE DAILY OR  AS  DIRECTED  BY  INR  CLINIC 94 tablet 1     amoxicillin (AMOXIL) 500 MG capsule Bring to clinic in original bottle where you will be instructed to take medication. (Patient not taking: Reported on 9/22/2020) 4 capsule 0     furosemide (LASIX) 20 MG tablet Take 1 tablet (20 mg) by mouth daily for 5 days 5 tablet 0     methylPREDNISolone (MEDROL) 4 MG tablet therapy pack Follow Package Directions 21 tablet 0       Allergies   Allergen Reactions     Gabapentin      Rash and itching     Nsaids      Other reaction(s): Gastrointestinal  Former PPI user, EGD 2/2018 showed LA Grade D esophagitis, plus duodenitis and gastritis.  Famotidine on med list but may not have been taking - rx was 3 years old.      Sulfa Drugs Other (See Comments) and Rash     Per 11-4-13 H&P  by Dr. Fei Arias.  RASH ALL OVER FOUND IN DR. RAMIREZ DICTATION OF 10/11/11  BACTRIM-  RASH ALL OVER       No family history on file.  Social History     Socioeconomic History     Marital status:      Spouse name: Not on file     Number of children: Not on file     Years of education: Not on file     Highest education level: Not on file   Occupational History     Not on file   Social Needs     Financial resource strain: Not on file     Food insecurity     Worry: Not on file     Inability: Not on file     Transportation needs     Medical: Not on file     Non-medical: Not on file   Tobacco Use     Smoking status: Never Smoker     Smokeless tobacco: Never Used   Substance and Sexual Activity     Alcohol use: No     Drug use: No     Sexual activity: Yes     Partners: Male     Birth control/protection: Post-menopausal   Lifestyle     Physical activity     Days per week: Not on file     Minutes per session: Not on file     Stress: Not on file   Relationships     Social connections     Talks on phone: Not on file     Gets together: Not on file     Attends Tenriism service: Not on file     Active member of club or organization: Not on file     Attends meetings of clubs or organizations: Not on file     Relationship status: Not on file     Intimate partner violence     Fear of current or ex partner: Not on file     Emotionally abused: Not on file     Physically abused: Not on file     Forced sexual activity: Not on file   Other Topics Concern     Parent/sibling w/ CABG, MI or angioplasty before 65F 55M? Not Asked   Social History Narrative     Not on file       Additional medical/Social/Surgical histories reviewed in [facility] and updated as appropriate.     REVIEW OF SYSTEMS (9/17/2020)  10 point ROS of systems including Constitutional, Eyes, Respiratory, Cardiovascular, Gastroenterology, Genitourinary, Integumentary, Musculoskeletal, Psychiatric, Allergic/Immunologic were all negative except for pertinent positives  noted in my HPI.       ASSESSMENT & PLAN  74 yo female with left shoulder contusion/glenohumeral arthritis, not resolved  Reviewed her discussion with Dr Rodriguez, who offered considering surgery for shoulder replacement  Her shoulder continues to hurt   She has not yet gone to PT, has done chiro on her own  Only using tylenol at this time  Discussed having her come in for suprascapular nerve block which we have not yet tried  Gurinder Castellon MD, CAQSM    Phone call duration: 16  minutes  Phone call start: 2:10pm  Phone call end: 2:26pm  Gurinder Castellon MD

## 2020-09-18 ENCOUNTER — TELEPHONE (OUTPATIENT)
Dept: FAMILY MEDICINE | Facility: CLINIC | Age: 75
End: 2020-09-18

## 2020-09-18 ENCOUNTER — ANTICOAGULATION THERAPY VISIT (OUTPATIENT)
Dept: NURSING | Facility: CLINIC | Age: 75
End: 2020-09-18

## 2020-09-18 DIAGNOSIS — I48.0 PAROXYSMAL ATRIAL FIBRILLATION (H): Primary | ICD-10-CM

## 2020-09-18 DIAGNOSIS — Z79.01 LONG TERM CURRENT USE OF ANTICOAGULANT THERAPY: ICD-10-CM

## 2020-09-18 DIAGNOSIS — I48.0 PAROXYSMAL ATRIAL FIBRILLATION (H): ICD-10-CM

## 2020-09-18 LAB
CAPILLARY BLOOD COLLECTION: NORMAL
INR PPP: 1.9 (ref 0.86–1.14)

## 2020-09-18 PROCEDURE — 36416 COLLJ CAPILLARY BLOOD SPEC: CPT | Performed by: FAMILY MEDICINE

## 2020-09-18 PROCEDURE — 85610 PROTHROMBIN TIME: CPT | Performed by: FAMILY MEDICINE

## 2020-09-18 NOTE — PROGRESS NOTES
ANTICOAGULATION MANAGEMENT     Patient Name:  Selvin Rockwell  Date:  2020    ASSESSMENT /SUBJECTIVE:    Today's INR result of 1.9 is subtherapeutic. Goal INR of 2.0-3.0      Warfarin dose taken: Warfarin taken as previously instructed    Diet: No new diet changes affecting INR    Medication changes/ interactions: No new medications/supplements affecting INR    Previous INR: Therapeutic     S/S of bleeding or thromboembolism: No    New injury or illness: No    Upcoming surgery, procedure or cardioversion: No    Additional findings: None      PLAN:    Spoke with Kat regarding INR result and instructed:     Warfarin Dosing Instructions: Continue your current warfarin dose 3 mg daily    Instructed patient to follow up no later than: 2 weeks  Lab visit scheduled    Education provided: Target INR goal and significance of current INR result and Importance of notifying clinic of upcoming surgeries and procedures 2 weeks in advance      Kat verbalizes understanding and agrees to warfarin dosing plan.    Instructed to call the Anticoagulation Clinic for any changes, questions or concerns. (#[phone])        Chitra Morrison RN      OBJECTIVE:  Recent labs: (last 7 days)     20  1031   INR 1.90*         No question data found.  Anticoagulation Summary  As of 2020    INR goal:   2.0-3.0   TTR:   90.2 % (1 y)   INR used for dosin.90! (2020)   Warfarin maintenance plan:   3 mg (2 mg x 1.5) every day   Full warfarin instructions:   3 mg every day   Weekly warfarin total:   21 mg   Plan last modified:   Tana Parker RN (2019)   Next INR check:   10/2/2020   Priority:   Maintenance   Target end date:   Indefinite    Indications    Paroxysmal atrial fibrillation (H) [I48.0]             Anticoagulation Episode Summary     INR check location:   Anticoagulation Clinic    Preferred lab:   [facility]    [facility]    Comments:    Transfer from [facility] - Patient states has been on coumadin for 2-3 years      Anticoagulation Care Providers     Provider Role Specialty Phone number    Gurinder Ho MD  Family Practice [phone]

## 2020-09-22 ENCOUNTER — OFFICE VISIT (OUTPATIENT)
Dept: ORTHOPEDICS | Facility: CLINIC | Age: 75
End: 2020-09-22
Payer: MEDICARE

## 2020-09-22 VITALS — WEIGHT: 198 LBS | RESPIRATION RATE: 17 BRPM | HEIGHT: 57 IN | BODY MASS INDEX: 42.72 KG/M2

## 2020-09-22 DIAGNOSIS — G89.29 CHRONIC LEFT SHOULDER PAIN: ICD-10-CM

## 2020-09-22 DIAGNOSIS — M19.012 GLENOHUMERAL ARTHRITIS, LEFT: Primary | ICD-10-CM

## 2020-09-22 DIAGNOSIS — M25.512 CHRONIC LEFT SHOULDER PAIN: ICD-10-CM

## 2020-09-22 DIAGNOSIS — M19.012 PRIMARY OSTEOARTHRITIS OF LEFT SHOULDER: ICD-10-CM

## 2020-09-22 RX ADMIN — LIDOCAINE HYDROCHLORIDE 4 ML: 10 INJECTION, SOLUTION EPIDURAL; INFILTRATION; INTRACAUDAL; PERINEURAL at 15:38

## 2020-09-22 RX ADMIN — BUPIVACAINE HYDROCHLORIDE 4 ML: 5 INJECTION, SOLUTION EPIDURAL; INTRACAUDAL at 15:38

## 2020-09-22 ASSESSMENT — MIFFLIN-ST. JEOR: SCORE: 1272

## 2020-09-22 NOTE — PROGRESS NOTES
Gem returns for left shoulder suprascapular nerve block. She continues to have pain and stiffness related to her glenohumeral arthritis and shoulder arthritis. It is clear that she aggravated this during her fall/accident that occurred in imaging here about 2 months ago.  Her shoulder arthritis has been present for a much longer period of time. She is considering a shoulder replacement, which she is consulting with Dr sidhu about  Diagnosis: GH arthritis and shoulder arthritis    Left suprascapular nerve block - Ultrasound Guided  The patient was informed of the risks and the benefits of the procedure and a written consent was signed.  The patient s left shoulder was prepped with chlorhexidine in sterile fashion.   4mL of marcaine and 4mL of lidocaine was drawn up into a 10 mL syringe   Injection was performed using sterile technique.  Under ultrasound guidance a 3.5-inch 22-gauge needle was used to inject at the suprascapular notch of her posterior left shoulder. Posterior approach was used with the patient in a seated position, arm in neutral position at the side.  Needle placement was visualized and documented with ultrasound.  Ultrasound visualization was necessary due to the small joint space entered and to avoid injection of nerve or vasculature  There were no complications. The patient tolerated the procedure well. There was negligible bleeding.   Therapy scheduled to follow for mobilization.  The patient was instructed to call or go to the emergency room with any unusual pain, swelling, redness, or if otherwise concerned.    We also ordered physical therapy which I have suggested in the past that they have not followed through on.  She can continue to use tylenol and voltaren gel  F/u 2 weeks to discuss further options  Dr Castellon

## 2020-09-22 NOTE — LETTER
9/22/2020      RE: Corazonysabel Bernardadeel  [address] [place] [address]       Gem returns for left shoulder suprascapular nerve block. She continues to have pain and stiffness related to her glenohumeral arthritis and shoulder arthritis. It is clear that she aggravated this during her fall/accident that occurred in imaging here about 2 months ago.  Her shoulder arthritis has been present for a much longer period of time. She is considering a shoulder replacement, which she is consulting with Dr sidhu about  Diagnosis: GH arthritis and shoulder arthritis    Left suprascapular nerve block - Ultrasound Guided  The patient was informed of the risks and the benefits of the procedure and a written consent was signed.  The patient s left shoulder was prepped with chlorhexidine in sterile fashion.   4mL of marcaine and 4mL of lidocaine was drawn up into a 10 mL syringe   Injection was performed using sterile technique.  Under ultrasound guidance a 3.5-inch 22-gauge needle was used to inject at the suprascapular notch of her posterior left shoulder. Posterior approach was used with the patient in a seated position, arm in neutral position at the side.  Needle placement was visualized and documented with ultrasound.  Ultrasound visualization was necessary due to the small joint space entered and to avoid injection of nerve or vasculature  There were no complications. The patient tolerated the procedure well. There was negligible bleeding.   Therapy scheduled to follow for mobilization.  The patient was instructed to call or go to the emergency room with any unusual pain, swelling, redness, or if otherwise concerned.    We also ordered physical therapy which I have suggested in the past that they have not followed through on.  She can continue to use tylenol and voltaren gel  F/u 2 weeks to discuss further options  Dr Castellon    Large Joint Injection/Arthocentesis    Date/Time: 9/22/2020 3:38  PM  Performed by: Gurinder Castellon MD  Authorized by: Gurinder Castellon MD     Indications:  Pain  Needle Size:  22 G  Guidance: ultrasound    Location:  Shoulder   Location comment:  L Suprascapular       Medications:  4 mL bupivacaine (PF) 0.5 %; 4 mL lidocaine (PF) 1 %  Outcome:  Tolerated well, no immediate complications  Procedure discussed: discussed risks, benefits, and alternatives    Consent Given by:  Patient  Timeout: timeout called immediately prior to procedure    Prep: patient was prepped and draped in usual sterile fashion          [address] [address]  [phone]  Dept: [phone]  ______________________________________________________________________________    Patient: Selvin Rockwell   : 1945   MRN: [id number]   2020    INVASIVE PROCEDURE SAFETY CHECKLIST    Date: 2020   Procedure: Left shoulder Nerve Block left suprascapular  Patient Name: Selvin Rockwell  MRN: [id number]  YOB: 1945    Action: Complete sections as appropriate. Any discrepancy results in a HARD COPY until resolved.     PRE PROCEDURE:  Patient ID verified with 2 identifiers (name and  or MRN): Yes  Procedure and site verified with patient/designee (when able): Yes  Accurate consent documentation in medical record: Yes  H&P (or appropriate assessment) documented in medical record: Yes  H&P must be up to 20 days prior to procedure and updates within 24 hours of procedure as applicable: Yes  Relevant diagnostic and radiology test results appropriately labeled and displayed as applicable: Yes  Procedure site(s) marked with provider initials: Yes    TIMEOUT:  Time-Out performed immediately prior to starting procedure, including verbal and active participation of all team members addressing the following:Yes  * Correct patient identify  * Confirmed that the correct side and site are marked  * An accurate  procedure consent form  * Agreement on the procedure to be done  * Correct patient position  * Relevant images and results are properly labeled and appropriately displayed  * The need to administer antibiotics or fluids for irrigation purposes during the procedure as applicable   * Safety precautions based on patient history or medication use    DURING PROCEDURE: Verification of correct person, site, and procedures any time the responsibility for care of the patient is transferred to another member of the care team.       The following medications were given:         Prior to injection, verified patient identity using patient's name and date of birth.  Due to injection administration, patient instructed to remain in clinic for 15 minutes  afterwards, and to report any adverse reaction to me immediately.    Nerve Block Injection was performed.   Medication Name: Marcaine   NDC 35046-390-55  Drug Amount Wasted:  Yes: 6 mg/ml   Vial/Syringe: Single dose vial  Expiration Date:  10/2023    Medication Name: Lidocaine 1%  NDC 52961-253-40  Drug Amount Wasted:  Yes: 1 mg/ml   Vial/Syringe: Single dose vial  Expiration Date:  2/2024    Scribed by Gena Larry ATC for Dr. Castellon on September 22, 2020 at 3:15pm  based on the provider's statements to me.     Gena Castellon MD

## 2020-09-22 NOTE — PROGRESS NOTES
Large Joint Injection/Arthocentesis    Date/Time: 2020 3:38 PM  Performed by: Gurinder Castellon MD  Authorized by: Gurinder Castellon MD     Indications:  Pain  Needle Size:  22 G  Guidance: ultrasound    Location:  Shoulder   Location comment:  L Suprascapular       Medications:  4 mL bupivacaine (PF) 0.5 %; 4 mL lidocaine (PF) 1 %  Outcome:  Tolerated well, no immediate complications  Procedure discussed: discussed risks, benefits, and alternatives    Consent Given by:  Patient  Timeout: timeout called immediately prior to procedure    Prep: patient was prepped and draped in usual sterile fashion          [facility]  [address] [address]  [phone]  Dept: [phone]  ______________________________________________________________________________    Patient: Selvin Rockwell   : 1945   MRN: [id number]   2020    INVASIVE PROCEDURE SAFETY CHECKLIST    Date: 2020   Procedure: Left shoulder Nerve Block left suprascapular  Patient Name: Selvin Rockwell  MRN: [id number]  YOB: 1945    Action: Complete sections as appropriate. Any discrepancy results in a HARD COPY until resolved.     PRE PROCEDURE:  Patient ID verified with 2 identifiers (name and  or MRN): Yes  Procedure and site verified with patient/designee (when able): Yes  Accurate consent documentation in medical record: Yes  H&P (or appropriate assessment) documented in medical record: Yes  H&P must be up to 20 days prior to procedure and updates within 24 hours of procedure as applicable: Yes  Relevant diagnostic and radiology test results appropriately labeled and displayed as applicable: Yes  Procedure site(s) marked with provider initials: Yes    TIMEOUT:  Time-Out performed immediately prior to starting procedure, including verbal and active participation of all team members addressing the following:Yes  * Correct patient identify  *  Confirmed that the correct side and site are marked  * An accurate procedure consent form  * Agreement on the procedure to be done  * Correct patient position  * Relevant images and results are properly labeled and appropriately displayed  * The need to administer antibiotics or fluids for irrigation purposes during the procedure as applicable   * Safety precautions based on patient history or medication use    DURING PROCEDURE: Verification of correct person, site, and procedures any time the responsibility for care of the patient is transferred to another member of the care team.       The following medications were given:         Prior to injection, verified patient identity using patient's name and date of birth.  Due to injection administration, patient instructed to remain in clinic for 15 minutes  afterwards, and to report any adverse reaction to me immediately.    Nerve Block Injection was performed.   Medication Name: Marcaine   NDC 46519-769-59  Drug Amount Wasted:  Yes: 6 mg/ml   Vial/Syringe: Single dose vial  Expiration Date:  10/2023    Medication Name: Lidocaine 1%  NDC 59478-594-52  Drug Amount Wasted:  Yes: 1 mg/ml   Vial/Syringe: Single dose vial  Expiration Date:  2/2024    Scribed by Gena Larry ATC for Dr. Castellon on September 22, 2020 at 3:15pm  based on the provider's statements to me.     Gena Larry ATC

## 2020-09-22 NOTE — NURSING NOTE
"Reason For Visit:   Chief Complaint   Patient presents with     RECHECK     L shoulder pain      Resp 17   Ht 1.448 m (4' 9\")   Wt 89.8 kg (198 lb)   BMI 42.85 kg/m      Pain Assessment  Patient Currently in Pain: Yes  0-10 Pain Scale: 6  Primary Pain Location: Shoulder  Pain Descriptors: Steven Larry ATC     "

## 2020-09-28 RX ORDER — LIDOCAINE HYDROCHLORIDE 10 MG/ML
4 INJECTION, SOLUTION EPIDURAL; INFILTRATION; INTRACAUDAL; PERINEURAL
Status: DISCONTINUED | OUTPATIENT
Start: 2020-09-22 | End: 2024-07-03

## 2020-09-28 RX ORDER — BUPIVACAINE HYDROCHLORIDE 5 MG/ML
4 INJECTION, SOLUTION EPIDURAL; INTRACAUDAL
Status: DISCONTINUED | OUTPATIENT
Start: 2020-09-22 | End: 2020-10-21

## 2020-09-30 ENCOUNTER — THERAPY VISIT (OUTPATIENT)
Dept: PHYSICAL THERAPY | Facility: CLINIC | Age: 75
End: 2020-09-30
Attending: PREVENTIVE MEDICINE
Payer: MEDICARE

## 2020-09-30 DIAGNOSIS — M25.512 SHOULDER PAIN, LEFT: Primary | ICD-10-CM

## 2020-09-30 DIAGNOSIS — M19.012 GLENOHUMERAL ARTHRITIS, LEFT: ICD-10-CM

## 2020-09-30 PROCEDURE — 97161 PT EVAL LOW COMPLEX 20 MIN: CPT | Mod: GP | Performed by: PHYSICAL THERAPIST

## 2020-09-30 PROCEDURE — 97110 THERAPEUTIC EXERCISES: CPT | Mod: GP | Performed by: PHYSICAL THERAPIST

## 2020-09-30 NOTE — LETTER
DEPARTMENT OF HEALTH AND HUMAN SERVICES  [facility] FOR MED[facility]ARE & MEDICAID SERVICES    PLAN/UPDATED PLAN OF PROGRESS FOR OUTPATIENT REHABILITATION@       PATIENTS NAME:  Selvin Rockwell   : 1945  PROVIDER NUMBER:    [id number]  HICN:  [id number]   PROVIDER NAME: [facility] PT  MEDICAL RECORD NUMBER: [id number]   START OF CARE DATE:  SOC Date: 20   TYPE:  PT    PRIMARY/TREATMENT DIAGNOSIS: (Pertinent Medical Diagnosis)     Glenohumeral arthritis, left  Shoulder pain, left    VISITS FROM START OF CARE:  Rxs Used: 1     [facility] Initial Evaluation  Subjective:  The history is provided by the patient. No  was used.   Patient Health History  Selvin Rockwell being seen for Left Shoulder Pain .     Problem began: 2020.   Problem occurred: Fell when attempting to sit on a bed for a medical procedure   Pain is reported as 6/10 on pain scale.  General health as reported by patient is fair.  Health conditions: high BP, diabetes, heart problems, headaches, abdominal pain, calf pain, RA, high BP.   Red flags:  Pain at rest/night.  Medical allergies: sulfa, gabapentin, NSAIDS.   Surgeries include:  Orthopedic surgery and other.    Current medications: BP, metformin.       Therapist Generated HPI Evaluation     Type of problem:  Left shoulder.  This is a new condition.  Condition occurred with:  A fall.  Where condition occurred: in the community.  Patient reports pain:  Lateral and anterior.  Pain is described as aching and shooting and is constant.  Pain radiates to:  Upper arm.   Since onset symptoms are unchanged.  Associated symptoms:  Loss of motion/stiffness. Symptoms are exacerbated by using arm overhead and lifting  and relieved by rest.  Imaging testing: X-ray- no fractures, possible RC involvement.    Barriers include:  None as reported by patient.      PATIENTS NAME:  Selvin Rockwell   : 1945                  Objective:  Shoulder  Evaluation:  ROM:  AROM:    Flexion:  Left:  90    Right:  100  Abduction:  Left: 90   Right:  100  Internal Rotation:  Left:  L SI    Right:  R SI  External Rotation:  Left:  0*    Right:  30      PROM:    Flexion:  Left:  100        Abduction:  Left:  110      External Rotation:  Left:  20        Pain: pain with L shoulder flexion, abduction and ER    Assessment/Plan:    Patient is a 75 year old female with left side shoulder complaints.    Patient has the following significant findings with corresponding treatment plan.                Diagnosis 1:  Left Shoulder Pain  Pain -  manual therapy, self management, education and home program  Decreased ROM/flexibility - manual therapy and therapeutic exercise  Decreased strength - therapeutic exercise and therapeutic activities  Impaired posture - neuro re-education    Therapy Evaluation Codes:   1) History comprised of:   Personal factors that impact the plan of care:      Coping style and Time since onset of symptoms.    Comorbidity factors that impact the plan of care are:      high BP, diabetes, headaches, abdominal pulsatin,calf pain.     Medications impacting care: BP, Metformin  2) Examination of Body Systems comprised of:   Body structures and functions that impact the plan of care:      Shoulder.   Activity limitations that impact the plan of care are:      reaching, lifting, self care/ cooking.  3) Clinical presentation characteristics are:   Stable/Uncomplicated.  4) Decision-Making    Low complexity using standardized patient assessment instrument and/or measureable assessment of functional outcome.  Cumulative Therapy Evaluation is: Low complexity.    Previous and current functional limitations:  (See Goal Flow Sheet for this information)    Short term and Long term goals: (See Goal Flow Sheet for this information)     Communication ability:  Patient appears to be able to clearly communicate and understand verbal and written communication and follow  "directions correctly.  Treatment Explanation - The following has been discussed with the patient:   RX ordered/plan of care  Anticipated outcomes  PATIENTS NAME:  Selvin Rockwell   : 1945    Possible risks and side effects  This patient would benefit from PT intervention to resume normal activities.   Rehab potential is fair.    Frequency:  1 X week, once daily  Duration:  for 8 weeks  Discharge Plan:  Achieve all LTG.  Independent in home treatment program.  Reach maximal therapeutic benefit.    Please refer to the daily flowsheet for treatment today, total treatment time and time spent performing 1:1 timed codes.       Caregiver Signature/Credentials _____________________________ Date ________       Treating Provider: Desiree Kearns, PT, DPT   I have reviewed and certified the need for these services and plan of treatment while under my care.        PHYSICIAN'S SIGNATURE:   _________________________________________  Date___________   Gurinder Castellon MD    Certification period:  Beginning of Cert date period: 20 to  End of Cert period date: 20     Functional Level Progress Report: Please see attached \"Goal Flow sheet for Functional level.\"    ____X____ Continue Services or       ________ DC Services                Service dates: From  SOC Date: 20 date to present                         "

## 2020-09-30 NOTE — PROGRESS NOTES
[facility] Initial Evaluation  Subjective:  The history is provided by the patient. No  was used.   Patient Health History  Selvin Rockwell being seen for Left Shoulder Pain .     Problem began: 6/4/2020.   Problem occurred: Fell when attempting to sit on a bed for a medical procedure   Pain is reported as 6/10 on pain scale.  General health as reported by patient is fair.  Health conditions: high BP, diabetes, heart problems, headaches, abdominal pain, calf pain, RA, high BP.   Red flags:  Pain at rest/night.  Medical allergies: sulfa, gabapentin, NSAIDS.   Surgeries include:  Orthopedic surgery and other.    Current medications: BP, metformin.                         Therapist Generated HPI Evaluation         Type of problem:  Left shoulder.    This is a new condition.  Condition occurred with:  A fall.  Where condition occurred: in the community.  Patient reports pain:  Lateral and anterior.  Pain is described as aching and shooting and is constant.  Pain radiates to:  Upper arm.   Since onset symptoms are unchanged.  Associated symptoms:  Loss of motion/stiffness. Symptoms are exacerbated by using arm overhead and lifting  and relieved by rest.  Imaging testing: X-ray- no fractures, possible RC involvement.    Barriers include:  None as reported by patient.                        Objective:  System                   Shoulder Evaluation:  ROM:  AROM:    Flexion:  Left:  90    Right:  100    Abduction:  Left: 90   Right:  100    Internal Rotation:  Left:  L SI    Right:  R SI  External Rotation:  Left:  0*    Right:  30                PROM:    Flexion:  Left:  100          Abduction:  Left:  110          External Rotation:  Left:  20                    Pain: pain with L shoulder flexion, abduction and ER                                               General     ROS    Assessment/Plan:    Patient is a 75 year old female with left side shoulder complaints.    Patient has  the following significant findings with corresponding treatment plan.                Diagnosis 1:  Left Shoulder Pain  Pain -  manual therapy, self management, education and home program  Decreased ROM/flexibility - manual therapy and therapeutic exercise  Decreased strength - therapeutic exercise and therapeutic activities  Impaired posture - neuro re-education    Therapy Evaluation Codes:   1) History comprised of:   Personal factors that impact the plan of care:      Coping style and Time since onset of symptoms.    Comorbidity factors that impact the plan of care are:      high BP, diabetes, headaches, abdominal pulsatin,calf pain.     Medications impacting care: BP, Metformin  2) Examination of Body Systems comprised of:   Body structures and functions that impact the plan of care:      Shoulder.   Activity limitations that impact the plan of care are:      reaching, lifting, self care/ cooking.  3) Clinical presentation characteristics are:   Stable/Uncomplicated.  4) Decision-Making    Low complexity using standardized patient assessment instrument and/or measureable assessment of functional outcome.  Cumulative Therapy Evaluation is: Low complexity.    Previous and current functional limitations:  (See Goal Flow Sheet for this information)    Short term and Long term goals: (See Goal Flow Sheet for this information)     Communication ability:  Patient appears to be able to clearly communicate and understand verbal and written communication and follow directions correctly.  Treatment Explanation - The following has been discussed with the patient:   RX ordered/plan of care  Anticipated outcomes  Possible risks and side effects  This patient would benefit from PT intervention to resume normal activities.   Rehab potential is fair.    Frequency:  1 X week, once daily  Duration:  for 8 weeks  Discharge Plan:  Achieve all LTG.  Independent in home treatment program.  Reach maximal therapeutic benefit.    Please  refer to the daily flowsheet for treatment today, total treatment time and time spent performing 1:1 timed codes.

## 2020-10-01 DIAGNOSIS — I10 HYPERTENSION GOAL BP (BLOOD PRESSURE) < 140/90: ICD-10-CM

## 2020-10-02 ENCOUNTER — ANTICOAGULATION THERAPY VISIT (OUTPATIENT)
Dept: FAMILY MEDICINE | Facility: CLINIC | Age: 75
End: 2020-10-02

## 2020-10-02 ENCOUNTER — OFFICE VISIT (OUTPATIENT)
Dept: ORTHOPEDICS | Facility: CLINIC | Age: 75
End: 2020-10-02
Payer: MEDICARE

## 2020-10-02 VITALS — OXYGEN SATURATION: 98 % | HEART RATE: 75 BPM | RESPIRATION RATE: 17 BRPM

## 2020-10-02 DIAGNOSIS — M19.012 GLENOHUMERAL ARTHRITIS, LEFT: Primary | ICD-10-CM

## 2020-10-02 DIAGNOSIS — I48.0 PAROXYSMAL ATRIAL FIBRILLATION (H): ICD-10-CM

## 2020-10-02 DIAGNOSIS — Z79.01 LONG TERM (CURRENT) USE OF ANTICOAGULANTS: ICD-10-CM

## 2020-10-02 LAB
CAPILLARY BLOOD COLLECTION: NORMAL
INR PPP: 2.2 (ref 0.86–1.14)

## 2020-10-02 PROCEDURE — 85610 PROTHROMBIN TIME: CPT | Performed by: INTERNAL MEDICINE

## 2020-10-02 PROCEDURE — 36416 COLLJ CAPILLARY BLOOD SPEC: CPT | Performed by: INTERNAL MEDICINE

## 2020-10-02 PROCEDURE — 99213 OFFICE O/P EST LOW 20 MIN: CPT | Performed by: PREVENTIVE MEDICINE

## 2020-10-02 RX ORDER — PREGABALIN 25 MG/1
25 CAPSULE ORAL 2 TIMES DAILY
Qty: 30 CAPSULE | Refills: 0 | Status: SHIPPED | OUTPATIENT
Start: 2020-10-02 | End: 2020-10-21

## 2020-10-02 RX ORDER — ISOSORBIDE MONONITRATE 30 MG/1
30 TABLET, EXTENDED RELEASE ORAL DAILY
Qty: 90 TABLET | Refills: 0 | Status: SHIPPED | OUTPATIENT
Start: 2020-10-02 | End: 2020-11-16

## 2020-10-02 NOTE — LETTER
10/2/2020      RE: Selvin Rockwell  [address] [place] [address]       HISTORY OF PRESENT ILLNESS  Ms. Rockwell is a pleasant 75 year old year old female who presents to clinic today with f/u for left shoulder pain due to arthritis  She reports that she did not get much improvement from suprascapular nerve block we performed last week  The discomfort in her shoulder began after a fall that occurred here as previously described. The arthritis had been present for years.  Selvin explains that she is still in pain  Location: left shoulder  Quality:  achy pain    Severity: 6/10 at worst    Duration: 2 months  Timing: occurs intermittently  Context: occurs while moving her arm  Modifying factors:  resting and non-use makes it better, movement and use makes it worse  Associated signs & symptoms: left arm pain  MEDICAL HISTORY  Patient Active Problem List   Diagnosis     OA (OSTEOARTHRITIS) OF KNEE - right     Status Post Total Knee Replacement - bilateral     TACHO (obstructive sleep apnea)     Hyperlipidemia with target LDL less than 100     Hypertension goal BP (blood pressure) < 140/90     Type 2 diabetes, HbA1c goal < 7% (H)     Morbid obesity (H)     Achalasia     Adrenal adenoma     Atrophy of left kidney     Calculus of kidney     Chronic low back pain     Gout     Paroxysmal atrial fibrillation (H)     Long term (current) use of anticoagulants     Anxiety     Spinal stenosis of lumbar region with neurogenic claudication     Varicose veins with pain     Venous stasis dermatitis of right lower extremity     Shoulder pain, left       Current Outpatient Medications   Medication Sig Dispense Refill     amoxicillin (AMOXIL) 500 MG capsule Bring to clinic in original bottle where you will be instructed to take medication. 4 capsule 0     baclofen (LIORESAL) 10 MG tablet Take 1 tablet (10 mg) by mouth At Bedtime 30 tablet 0     carvedilol (COREG) 6.25 MG tablet Take 1 tablet (6.25 mg) by mouth 2  times daily 180 tablet 3     cloNIDine (CATAPRES) 0.1 MG tablet Bring to clinic in original bottle where you will be instructed to take the medication. 1 tablet 0     clotrimazole (LOTRIMIN) 1 % external cream        diclofenac (VOLTAREN) 1 % topical gel Place 4 g onto the skin 4 times daily 1 Tube 3     ferrous sulfate (FEROSUL) 325 (65 Fe) MG tablet Take 1 tablet (325 mg) by mouth daily (with breakfast) 90 tablet 3     FISH OIL 1000 MG PO CAPS 3 CAPSULES DAILY WITH A MEAL       isosorbide mononitrate (IMDUR) 30 MG 24 hr tablet Take 1 tablet (30 mg) by mouth daily 90 tablet 0     LORazepam (ATIVAN) 1 MG tablet Bring to clinic in original bottle where you will be instructed to take the medication. 2 tablet 0     metFORMIN (GLUCOPHAGE) 500 MG tablet Take 1 tablet (500 mg) by mouth 2 times daily (with meals) 180 tablet 1     methocarbamol (ROBAXIN) 500 MG tablet Take 1 tablet (500 mg) by mouth 4 times daily as needed for muscle spasms 40 tablet 0     methylPREDNISolone (MEDROL) 4 MG tablet therapy pack Follow Package Directions 21 tablet 0     omeprazole (PRILOSEC) 40 MG DR capsule        order for DME 15-20 mm mercury thigh high compression stockings 1-2 pairs 2 Package 5     order for DME 15-20 mm mercury thigh high compression stockings 1-2 pairs  Or waist high if that will work for patient.   Also patient has trouble putting socks on so equipment that would help with that would be great. 2 Package 5     order for DME Vinh hose stockings 1 Device 0     OTHER MEDICAL SUPPLIES 1 Package by Other route daily. 1 Package 0     PARoxetine (PAXIL) 20 MG tablet TAKE 1 TABLET BY MOUTH IN THE MORNING 90 tablet 0     simvastatin (ZOCOR) 10 MG tablet Take 1 tablet (10 mg) by mouth At Bedtime 90 tablet 1     tiZANidine (ZANAFLEX) 4 MG tablet Take 1 tablet (4 mg) by mouth 3 times daily as needed for muscle spasms 30 tablet 1     triamcinolone (KENALOG) 0.1 % external cream Apply sparingly to bilateral legs three times daily for  14 days. 45 g 1     warfarin ANTICOAGULANT (COUMADIN) 2 MG tablet TAKE 1 & 1/2 (3 mg) TABLETS BY MOUTH ONCE DAILY OR  AS  DIRECTED  BY  INR  CLINIC 94 tablet 1     furosemide (LASIX) 20 MG tablet Take 1 tablet (20 mg) by mouth daily for 5 days 5 tablet 0       Allergies   Allergen Reactions     Gabapentin      Rash and itching     Nsaids      Other reaction(s): Gastrointestinal  Former PPI user, EGD 2/2018 showed LA Grade D esophagitis, plus duodenitis and gastritis.  Famotidine on med list but may not have been taking - rx was 3 years old.      Sulfa Drugs Other (See Comments) and Rash     Per 11-4-13 H&P by Dr. Fei Arias.  RASH ALL OVER FOUND IN DR. RAMIREZ DICTATION OF 10/11/11  BACTRIM-  RASH ALL OVER       No family history on file.  Social History     Socioeconomic History     Marital status:      Spouse name: None     Number of children: None     Years of education: None     Highest education level: None   Occupational History     None   Social Needs     Financial resource strain: None     Food insecurity     Worry: None     Inability: None     Transportation needs     Medical: None     Non-medical: None   Tobacco Use     Smoking status: Never Smoker     Smokeless tobacco: Never Used   Substance and Sexual Activity     Alcohol use: No     Drug use: No     Sexual activity: Yes     Partners: Male     Birth control/protection: Post-menopausal   Lifestyle     Physical activity     Days per week: None     Minutes per session: None     Stress: None   Relationships     Social connections     Talks on phone: None     Gets together: None     Attends Rastafarian service: None     Active member of club or organization: None     Attends meetings of clubs or organizations: None     Relationship status: None     Intimate partner violence     Fear of current or ex partner: None     Emotionally abused: None     Physically abused: None     Forced sexual activity: None   Other Topics Concern     Parent/sibling w/  CABG, MI or angioplasty before 65F 55M? Not Asked   Social History Narrative     None       Additional medical/Social/Surgical histories reviewed in [facility] and updated as appropriate.     REVIEW OF SYSTEMS (10/2/2020)  10 point ROS of systems including Constitutional, Eyes, Respiratory, Cardiovascular, Gastroenterology, Genitourinary, Integumentary, Musculoskeletal, Psychiatric, Allergic/Immunologic were all negative except for pertinent positives noted in my HPI.     PHYSICAL EXAM  Vitals:    10/02/20 1346   Pulse: 75   Resp: 17   SpO2: 98%     Vital Signs: Pulse 75   Resp 17   SpO2 98%  Patient declined being weighed. There is no height or weight on file to calculate BMI.    General  - normal appearance, in no obvious distress  HEENT  - conjunctivae not injected, moist mucous membranes, normocephalic/atraumatic head, ears normal appearance, no lesions, mouth normal appearance, no scars, normal dentition and teeth present  CV  - normal radial pulse  Pulm  - normal respiratory pattern, non-labored  Musculoskeletal - left shoulder  - inspection: normal bone and joint alignment, no obvious deformity, no scapular winging, no AC step-off  - palpation: no bony or soft tissue tenderness, normal clavicle, non-tender AC  - ROM:  limited flexion, IR, ER, abduction, painful at end range  - strength: 5/5  strength, 5/5 in all shoulder planes  - special tests:  (-) Speed's  (-) Neer  (-) Hawkin's  (-) Anne's  (+) [place]'s  (+) load & shift, supine  (-) apprehension  (-) subscap lift-off  Neuro  - no sensory or motor deficit, grossly normal coordination, normal muscle tone  Skin  - no ecchymosis, erythema, warmth, or induration, no obvious rash  Psych  - interactive, appropriate, normal mood and affect        ASSESSMENT & PLAN  74 yo female with left shoulder GH arthritis, djd   Reviewed shoulder xray: shows arthritis, severe superiorly  Will refer back to Dr Rodriguez to discuss options for surgery  Start lyrica  I  explained to them that the shoulder arthritis was not Caused by the fall, it was Aggravated by the fall. Her arthritis is degenerative and chronic and has been occurring for years  Cont. PT    Appropriate PPE was utilized for prevention of spread of Covid-19.  Gurinder Castellon MD, CAM        Gurinder Castellon MD

## 2020-10-02 NOTE — PROGRESS NOTES
ANTICOAGULATION MANAGEMENT     Patient Name:  Selvin Rockwell  Date:  10/2/2020    ASSESSMENT /SUBJECTIVE:    Today's INR result of 2.2 is therapeutic. Goal INR of 2.0-3.0      Warfarin dose taken: Warfarin taken as instructed    Diet: No new diet changes affecting INR    Medication changes/ interactions: No new medications/supplements affecting INR    Previous INR: Subtherapeutic     S/S of bleeding or thromboembolism: No    New injury or illness: No    Upcoming surgery, procedure or cardioversion: No    Additional findings: None      PLAN:    Telephone call with  Kat regarding INR result and instructed:     Warfarin Dosing Instructions: Continue your current warfarin dose 3mg daily    Instructed patient to follow up no later than: 3 weeks  Lab visit scheduled    Education provided: Target INR goal and significance of current INR result and Contact the anticoagulation clinic with any changes, questions or concerns at #[phone]       Kat verbalizes understanding and agrees to warfarin dosing plan.    Instructed to call the Anticoagulation Clinic for any changes, questions or concerns. (#[phone])        Sergio Henning RN      OBJECTIVE:  Recent labs: (last 7 days)     10/02/20  0954   INR 2.20*         No question data found.  Anticoagulation Summary  As of 10/2/2020    INR goal:  2.0-3.0   TTR:  89.0 % (1 y)   INR used for dosin.20 (10/2/2020)   Warfarin maintenance plan:  3 mg (2 mg x 1.5) every day   Full warfarin instructions:  3 mg every day   Weekly warfarin total:  21 mg   No change documented:  Milady, Sergio, RN   Plan last modified:  Tana Parker RN (2019)   Next INR check:     Priority:  Maintenance   Target end date:  Indefinite    Indications    Paroxysmal atrial fibrillation (H) [I48.0]             Anticoagulation Episode Summary     INR check location:  Anticoagulation Clinic    Preferred lab:  [facility]    Send INR reminders to:  [facility]  Medical Record Number: [id number]  Ishmael Whitman  YOB: 1955   Phone: [phone] (home) [phone] (work)  Primary Provider: Giovanni Calixto    Diagnosis:  LEFT hip Osteoarthritis    Surgeon: SHARATH Bledsoe MD  Surgical Procedure: LEFT total hip arthroplasty  ICD-10 Coded:   Hospital:  [facility]    In-Patient/ Out-Patient status: Inpatient- Length of stay:  2 days.  Length of surgery: 130 mn  Anesthesia: choice with block  Implanted Cardiac Device:     Date of Surgery:  Call patient to schedule (March 2018) per pt request  When post-op appointment needed:  2 weeks    Special Requests/ Equipment:: Carisa implants and instrumentation    CPM Needed: na   HEIGHTS    Comments:  Transfer from [facility] - Patient states has been on coumadin for 2-3 years      Anticoagulation Care Providers     Provider Role Specialty Phone number    Gurinder Ho MD Referring Family Practice [phone]

## 2020-10-02 NOTE — NURSING NOTE
Reason For Visit:   Chief Complaint   Patient presents with     RECHECK     L shoulder pain      Pulse 75   Resp 17   SpO2 98%     Pain Assessment  Patient Currently in Pain: Yes  0-10 Pain Scale: 6  Primary Pain Location: Shoulder    Gena Larry ATC

## 2020-10-02 NOTE — TELEPHONE ENCOUNTER
Last seen by Dr. Ho 1/8/20.    Prescription approved per [facility] [facility].    Georgina Mcdermott RN  [facility]

## 2020-10-02 NOTE — PROGRESS NOTES
HISTORY OF PRESENT ILLNESS  Ms. Rockwell is a pleasant 75 year old year old female who presents to clinic today with f/u for left shoulder pain due to arthritis  She reports that she did not get much improvement from suprascapular nerve block we performed last week  The discomfort in her shoulder began after a fall that occurred here as previously described. The arthritis had been present for years.  Selvin explains that she is still in pain  Location: left shoulder  Quality:  achy pain    Severity: 6/10 at worst    Duration: 2 months  Timing: occurs intermittently  Context: occurs while moving her arm  Modifying factors:  resting and non-use makes it better, movement and use makes it worse  Associated signs & symptoms: left arm pain  MEDICAL HISTORY  Patient Active Problem List   Diagnosis     OA (OSTEOARTHRITIS) OF KNEE - right     Status Post Total Knee Replacement - bilateral     TACHO (obstructive sleep apnea)     Hyperlipidemia with target LDL less than 100     Hypertension goal BP (blood pressure) < 140/90     Type 2 diabetes, HbA1c goal < 7% (H)     Morbid obesity (H)     Achalasia     Adrenal adenoma     Atrophy of left kidney     Calculus of kidney     Chronic low back pain     Gout     Paroxysmal atrial fibrillation (H)     Long term (current) use of anticoagulants     Anxiety     Spinal stenosis of lumbar region with neurogenic claudication     Varicose veins with pain     Venous stasis dermatitis of right lower extremity     Shoulder pain, left       Current Outpatient Medications   Medication Sig Dispense Refill     amoxicillin (AMOXIL) 500 MG capsule Bring to clinic in original bottle where you will be instructed to take medication. 4 capsule 0     baclofen (LIORESAL) 10 MG tablet Take 1 tablet (10 mg) by mouth At Bedtime 30 tablet 0     carvedilol (COREG) 6.25 MG tablet Take 1 tablet (6.25 mg) by mouth 2 times daily 180 tablet 3     cloNIDine (CATAPRES) 0.1 MG tablet Bring to clinic in original  bottle where you will be instructed to take the medication. 1 tablet 0     clotrimazole (LOTRIMIN) 1 % external cream        diclofenac (VOLTAREN) 1 % topical gel Place 4 g onto the skin 4 times daily 1 Tube 3     ferrous sulfate (FEROSUL) 325 (65 Fe) MG tablet Take 1 tablet (325 mg) by mouth daily (with breakfast) 90 tablet 3     FISH OIL 1000 MG PO CAPS 3 CAPSULES DAILY WITH A MEAL       isosorbide mononitrate (IMDUR) 30 MG 24 hr tablet Take 1 tablet (30 mg) by mouth daily 90 tablet 0     LORazepam (ATIVAN) 1 MG tablet Bring to clinic in original bottle where you will be instructed to take the medication. 2 tablet 0     metFORMIN (GLUCOPHAGE) 500 MG tablet Take 1 tablet (500 mg) by mouth 2 times daily (with meals) 180 tablet 1     methocarbamol (ROBAXIN) 500 MG tablet Take 1 tablet (500 mg) by mouth 4 times daily as needed for muscle spasms 40 tablet 0     methylPREDNISolone (MEDROL) 4 MG tablet therapy pack Follow Package Directions 21 tablet 0     omeprazole (PRILOSEC) 40 MG DR capsule        order for DME 15-20 mm mercury thigh high compression stockings 1-2 pairs 2 Package 5     order for DME 15-20 mm mercury thigh high compression stockings 1-2 pairs  Or waist high if that will work for patient.   Also patient has trouble putting socks on so equipment that would help with that would be great. 2 Package 5     order for DME Ivnh hose stockings 1 Device 0     OTHER MEDICAL SUPPLIES 1 Package by Other route daily. 1 Package 0     PARoxetine (PAXIL) 20 MG tablet TAKE 1 TABLET BY MOUTH IN THE MORNING 90 tablet 0     simvastatin (ZOCOR) 10 MG tablet Take 1 tablet (10 mg) by mouth At Bedtime 90 tablet 1     tiZANidine (ZANAFLEX) 4 MG tablet Take 1 tablet (4 mg) by mouth 3 times daily as needed for muscle spasms 30 tablet 1     triamcinolone (KENALOG) 0.1 % external cream Apply sparingly to bilateral legs three times daily for 14 days. 45 g 1     warfarin ANTICOAGULANT (COUMADIN) 2 MG tablet TAKE 1 & 1/2 (3 mg) TABLETS  BY MOUTH ONCE DAILY OR  AS  DIRECTED  BY  INR  CLINIC 94 tablet 1     furosemide (LASIX) 20 MG tablet Take 1 tablet (20 mg) by mouth daily for 5 days 5 tablet 0       Allergies   Allergen Reactions     Gabapentin      Rash and itching     Nsaids      Other reaction(s): Gastrointestinal  Former PPI user, EGD 2/2018 showed LA Grade D esophagitis, plus duodenitis and gastritis.  Famotidine on med list but may not have been taking - rx was 3 years old.      Sulfa Drugs Other (See Comments) and Rash     Per 11-4-13 H&P by Dr. Fei Arias.  RASH ALL OVER FOUND IN DR. RAMIREZ DICTATION OF 10/11/11  BACTRIM-  RASH ALL OVER       No family history on file.  Social History     Socioeconomic History     Marital status:      Spouse name: None     Number of children: None     Years of education: None     Highest education level: None   Occupational History     None   Social Needs     Financial resource strain: None     Food insecurity     Worry: None     Inability: None     Transportation needs     Medical: None     Non-medical: None   Tobacco Use     Smoking status: Never Smoker     Smokeless tobacco: Never Used   Substance and Sexual Activity     Alcohol use: No     Drug use: No     Sexual activity: Yes     Partners: Male     Birth control/protection: Post-menopausal   Lifestyle     Physical activity     Days per week: None     Minutes per session: None     Stress: None   Relationships     Social connections     Talks on phone: None     Gets together: None     Attends Mandaeism service: None     Active member of club or organization: None     Attends meetings of clubs or organizations: None     Relationship status: None     Intimate partner violence     Fear of current or ex partner: None     Emotionally abused: None     Physically abused: None     Forced sexual activity: None   Other Topics Concern     Parent/sibling w/ CABG, MI or angioplasty before 65F 55M? Not Asked   Social History Narrative     None        Additional medical/Social/Surgical histories reviewed in [facility] and updated as appropriate.     REVIEW OF SYSTEMS (10/2/2020)  10 point ROS of systems including Constitutional, Eyes, Respiratory, Cardiovascular, Gastroenterology, Genitourinary, Integumentary, Musculoskeletal, Psychiatric, Allergic/Immunologic were all negative except for pertinent positives noted in my HPI.     PHYSICAL EXAM  Vitals:    10/02/20 1346   Pulse: 75   Resp: 17   SpO2: 98%     Vital Signs: Pulse 75   Resp 17   SpO2 98%  Patient declined being weighed. There is no height or weight on file to calculate BMI.    General  - normal appearance, in no obvious distress  HEENT  - conjunctivae not injected, moist mucous membranes, normocephalic/atraumatic head, ears normal appearance, no lesions, mouth normal appearance, no scars, normal dentition and teeth present  CV  - normal radial pulse  Pulm  - normal respiratory pattern, non-labored  Musculoskeletal - left shoulder  - inspection: normal bone and joint alignment, no obvious deformity, no scapular winging, no AC step-off  - palpation: no bony or soft tissue tenderness, normal clavicle, non-tender AC  - ROM:  limited flexion, IR, ER, abduction, painful at end range  - strength: 5/5  strength, 5/5 in all shoulder planes  - special tests:  (-) Speed's  (-) Neer  (-) Hawkin's  (-) Anne's  (+) [place]'s  (+) load & shift, supine  (-) apprehension  (-) subscap lift-off  Neuro  - no sensory or motor deficit, grossly normal coordination, normal muscle tone  Skin  - no ecchymosis, erythema, warmth, or induration, no obvious rash  Psych  - interactive, appropriate, normal mood and affect        ASSESSMENT & PLAN  74 yo female with left shoulder GH arthritis, djd   Reviewed shoulder xray: shows arthritis, severe superiorly  Will refer back to Dr Rodriguez to discuss options for surgery  Start lyrica  I explained to them that the shoulder arthritis was not Caused by the fall, it was Aggravated by  the fall. Her arthritis is degenerative and chronic and has been occurring for years  Cont. PT    Appropriate PPE was utilized for prevention of spread of Covid-19.  Gurinder Castellon MD, CAM

## 2020-10-05 ENCOUNTER — TELEPHONE (OUTPATIENT)
Dept: ORTHOPEDICS | Facility: CLINIC | Age: 75
End: 2020-10-05

## 2020-10-05 NOTE — TELEPHONE ENCOUNTER
" [facility] Call Center    Phone Message    May a detailed message be left on voicemail: yes     Reason for Call: Symptoms or Concerns     If patient has red-flag symptoms, warm transfer to triage line    Current symptom or concern: Side effects from the medication Dr. Castellon gave pt for her shoulder.  Dtr Alicia couldn't remember the name of it.  It is giving pt headaches, dizziness and just \"not feeling right\"    Symptoms have been present for:  3 day(s)    Has patient previously been seen for this?     By Dr. Castellon    Date: 10/02/2020    Are there any new or worsening symptoms? Yes:       Action Taken: Message routed to:  [facility] Ortho    Travel Screening: Not Applicable                                                                      "

## 2020-10-05 NOTE — TELEPHONE ENCOUNTER
Daughter states her mother took one Lyrica tablet yesterday and symptoms (headache, dizziness) started soon after.  They have not increased in severity.   She reports her mother has had similar symptoms in the past,  She has been encouraging her to drink fluids and eat foods on a regular basis.  Daughter was told she should continue to monitor symptoms.  If symptoms do not start to decrease or any new symptoms are present patient should call back and possibly be seen in clinic.  Daughter is agreeable with this plan.

## 2020-10-07 ENCOUNTER — OFFICE VISIT (OUTPATIENT)
Dept: ORTHOPEDICS | Facility: CLINIC | Age: 75
End: 2020-10-07
Payer: MEDICARE

## 2020-10-07 DIAGNOSIS — M19.012 GLENOHUMERAL ARTHRITIS, LEFT: Primary | ICD-10-CM

## 2020-10-07 PROCEDURE — 99213 OFFICE O/P EST LOW 20 MIN: CPT | Performed by: ORTHOPAEDIC SURGERY

## 2020-10-07 NOTE — LETTER
10/7/2020         RE: Selvin Rockwell  [address] [place] [id number]        Dear Colleague,    Thank you for referring your patient, Selvin Rockwell, to the [facility] [place]. Please see a copy of my visit note below.    CHIEF CONCERN:  Left shoulder cuff tear arthropathy    HISTORY OF PRESENT ILLNESS:  Mrs. Rockwell is a 75 year old woman who I am seeing again today for her left shoulder pain. Her suprascapular nerve block did not relieve a measurable amount of her pain. She would like to pursue surgery.   Of note, recall she has had lifestyle limiting pain since her fall 6/4 when getting her ultrasound. She reports no pain in her shoulder prior to that fall. She is here today with her .    PHYSICAL EXAM:    Adult woman in no acute distress. Articulates and communicates with normal affect. Accompanied by her father.  Respirations even and unlabored  Focused upper extremity exam: Skin intact about the left shoulder. Active motion of the left shoulder is FE to 90, ER to 5, and IR to greater troch only - limited by pain.     IMAGING:  None new.  CT left shoulder 7/20/20 demonstrates marked narrowing of the acromiohumeral distance. There is atrophy of the supra and infra (grade 3-4). Arthrosis of the glenohumeral joint. Findings consistent with cuff tear arthropathy    ASSESSMENT:  Left cuff tear arthropathy    PLAN:  I reviewed the physical exam and imaging findings with the patient, her  (and their daughter via phone during the visit). We again discussed the nature of large rotator cuff tears in the setting of arthrosis. We discussed reasonable expectations of reverse TSA function and that this is the best surgical option in the setting of glenohumeral arthrosis with the type of changes seen on her imaging (acromiohumeral interval very narrow with grade 3-4 cuff atrophy on CT). I advised the patient of the lifetime 10# restriction and expected  range of motion patterns after reverse TSA. She expressed understanding of this discussion. I have also given her the name of my colleague Dr. Moy at [facility] if she would like to pursue that. She will be in touch with our office regarding scheduling if she so desires.      Marianna Rodriguez MD

## 2020-10-07 NOTE — NURSING NOTE
Reason For Visit:   Chief Complaint   Patient presents with     RECHECK     Follow up left shoulder pain.        PCP: Gurinder Ho  Ref: Dr. Castellon     ?  No  Occupation Retired - Worked at [employer]   Currently working? No.  Work status?  Retired.  Date of injury: 6/4/20  Type of injury: Fall.  Date of surgery: NA  Type of surgery: NA.  Smoker: No  Request smoking cessation information: No     Right hand dominant    SANE score  Affected shoulder: Left  Right shoulder SANE: 100  Left shoulder SANE: 15-20    There were no vitals taken for this visit.      Pain Assessment  Patient Currently in Pain: Yes  0-10 Pain Scale: 7  Primary Pain Location: Shoulder(Left)  Pain Descriptors: Radiating  Alleviating Factors: Rest  Aggravating Factors: Movement    Rita Enrique ATC

## 2020-10-07 NOTE — NURSING NOTE
Teaching Flowsheet   Relevant Diagnosis: Left shoulder arthritis  Teaching Topic: Pre-op for left reverse total shoulder arthroplasty - surgery coordinator will call to schedule     Person(s) involved in teaching:   Patient  Spouse     Motivation Level:  Asks Questions: Yes  Cooperative: Yes  Receptive (willing/able to accept information): Yes  Any cultural factors/Gnosticism beliefs that may influence understanding or compliance? No     Family demonstrates understanding of the following:  Reason for the appointment, diagnosis and treatment plan: Yes     Teaching Concerns Addressed:   Pre-op H&P to be completed by PMD  Informed of pre and post-op expectations - surgery information will be reviewed again before surgery.  Family was focused on financial concerns     Proper use and care of sling x 6 weeks (medical equip, care aids, etc.): Yes  Nutritional needs and diet plan: Yes  How and/when to access community resources: Yes     Instructional Materials Used/Given: Pre-op packet, surgical soap, written guidelines for care after shoulder replacement

## 2020-10-07 NOTE — PROGRESS NOTES
CHIEF CONCERN:  Left shoulder cuff tear arthropathy    HISTORY OF PRESENT ILLNESS:  Mrs. Rockwell is a 75 year old woman who I am seeing again today for her left shoulder pain. Her suprascapular nerve block did not relieve a measurable amount of her pain. She would like to pursue surgery.   Of note, recall she has had lifestyle limiting pain since her fall 6/4 when getting her ultrasound. She reports no pain in her shoulder prior to that fall. She is here today with her .    PHYSICAL EXAM:    Adult woman in no acute distress. Articulates and communicates with normal affect. Accompanied by her father.  Respirations even and unlabored  Focused upper extremity exam: Skin intact about the left shoulder. Active motion of the left shoulder is FE to 90, ER to 5, and IR to greater troch only - limited by pain.     IMAGING:  None new.  CT left shoulder 7/20/20 demonstrates marked narrowing of the acromiohumeral distance. There is atrophy of the supra and infra (grade 3-4). Arthrosis of the glenohumeral joint. Findings consistent with cuff tear arthropathy    ASSESSMENT:  Left cuff tear arthropathy    PLAN:  I reviewed the physical exam and imaging findings with the patient, her  (and their daughter via phone during the visit). We again discussed the nature of large rotator cuff tears in the setting of arthrosis. We discussed reasonable expectations of reverse TSA function and that this is the best surgical option in the setting of glenohumeral arthrosis with the type of changes seen on her imaging (acromiohumeral interval very narrow with grade 3-4 cuff atrophy on CT). I advised the patient of the lifetime 10# restriction and expected range of motion patterns after reverse TSA. She expressed understanding of this discussion. I have also given her the name of my colleague Dr. Moy at [facility] if she would like to pursue that. She will be in touch with our office regarding scheduling if she so desires.      Marianna  NANCY Rodriguez MD

## 2020-10-08 ENCOUNTER — TELEPHONE (OUTPATIENT)
Dept: ORTHOPEDICS | Facility: CLINIC | Age: 75
End: 2020-10-08

## 2020-10-08 NOTE — TELEPHONE ENCOUNTER
[facility] Call Center    Phone Message    May a detailed message be left on voicemail: yes     Reason for Call: Other: Patients daughter would like a call back to clarify the patients options. Ok to leave a msg if no answer      Action Taken: Message routed to:  [facility]    Travel Screening: Not Applicable

## 2020-10-09 NOTE — TELEPHONE ENCOUNTER
Patient's daughter, Alicia, requested more information concerning the shoulder replacement surgery.  She was given information on the procedure and reason for the surgery.  She states the family is still deciding on whether to proceed with the plan.  She will contact the clinic with the decision.

## 2020-10-10 DIAGNOSIS — Z79.01 LONG TERM (CURRENT) USE OF ANTICOAGULANTS: ICD-10-CM

## 2020-10-10 DIAGNOSIS — I48.0 PAROXYSMAL ATRIAL FIBRILLATION (H): ICD-10-CM

## 2020-10-12 ENCOUNTER — THERAPY VISIT (OUTPATIENT)
Dept: PHYSICAL THERAPY | Facility: CLINIC | Age: 75
End: 2020-10-12
Payer: MEDICARE

## 2020-10-12 DIAGNOSIS — M25.512 SHOULDER PAIN, LEFT: ICD-10-CM

## 2020-10-12 PROCEDURE — 97530 THERAPEUTIC ACTIVITIES: CPT | Mod: GP | Performed by: PHYSICAL THERAPIST

## 2020-10-12 PROCEDURE — 97110 THERAPEUTIC EXERCISES: CPT | Mod: GP | Performed by: PHYSICAL THERAPIST

## 2020-10-12 RX ORDER — WARFARIN SODIUM 2 MG/1
TABLET ORAL
Qty: 94 TABLET | Refills: 0 | Status: SHIPPED | OUTPATIENT
Start: 2020-10-12 | End: 2020-12-28

## 2020-10-12 NOTE — TELEPHONE ENCOUNTER
Anticoagulation Monitoring Return Date Recheck Instructions   Latest Ref Rng & Units      10/2/2020 10/23/2020  3 mg every day   Prescription approved per [facility] Refill Protocol.  Tana Parker RN  Anticoagulation Nurse - [facility]

## 2020-10-19 ENCOUNTER — TRANSFERRED RECORDS (OUTPATIENT)
Dept: HEALTH INFORMATION MANAGEMENT | Facility: CLINIC | Age: 75
End: 2020-10-19

## 2020-10-19 LAB — RETINOPATHY: NEGATIVE

## 2020-10-20 ENCOUNTER — OFFICE VISIT (OUTPATIENT)
Dept: VASCULAR SURGERY | Facility: CLINIC | Age: 75
End: 2020-10-20
Payer: MEDICARE

## 2020-10-20 DIAGNOSIS — Z98.890 POSTOPERATIVE STATE: Primary | ICD-10-CM

## 2020-10-20 PROCEDURE — 99024 POSTOP FOLLOW-UP VISIT: CPT | Performed by: SPECIALIST

## 2020-10-20 NOTE — LETTER
10/20/2020         RE: Selvin Rockwell  [address] [place] [id number]        Dear Colleague,    Thank you for referring your patient, Selvin Rockwell, to the [facility]. Please see a copy of my visit note below.    Follow-up for right GSV RF ablation    Subjective:  Patient reports some improvement but not 100%.  Still reports some tightness in the right ankle.  She has not been wearing compression socks.    Objective:  B/P: Data Unavailable, T: Data Unavailable, P: Data Unavailable, R: Data Unavailable  RLE - Trace ankle edema.  Some residual lateral varicosities    Assessment/plan:  This is a 75-year-old lady status post a right GSV ablation on Coumadin.  The ablation was successful with some improvement but she continues to have some distal symptoms.  She has not been wearing compression socks.  Plan at this time is to have her restart her compression socks and she see how she does.  Due to her poor medical condition I would like to try to manage her nonoperatively if possible.  Should she should the symptoms still persist despite compression then she will be reimaged and proceed based on those findings.  She will follow-up with me in 1 to 2 months.    Alex Renee MD, FACS      Again, thank you for allowing me to participate in the care of your patient.        Sincerely,        Alex Renee MD

## 2020-10-20 NOTE — PROGRESS NOTES
Follow-up for right GSV RF ablation    Subjective:  Patient reports some improvement but not 100%.  Still reports some tightness in the right ankle.  She has not been wearing compression socks.    Objective:  B/P: Data Unavailable, T: Data Unavailable, P: Data Unavailable, R: Data Unavailable  RLE - Trace ankle edema.  Some residual lateral varicosities    Assessment/plan:  This is a 75-year-old lady status post a right GSV ablation on Coumadin.  The ablation was successful with some improvement but she continues to have some distal symptoms.  She has not been wearing compression socks.  Plan at this time is to have her restart her compression socks and she see how she does.  Due to her poor medical condition I would like to try to manage her nonoperatively if possible.  Should she should the symptoms still persist despite compression then she will be reimaged and proceed based on those findings.  She will follow-up with me in 1 to 2 months.    Alex Renee MD, FACS

## 2020-10-21 ENCOUNTER — OFFICE VISIT (OUTPATIENT)
Dept: FAMILY MEDICINE | Facility: CLINIC | Age: 75
End: 2020-10-21
Payer: MEDICARE

## 2020-10-21 VITALS
TEMPERATURE: 97.8 F | WEIGHT: 203 LBS | SYSTOLIC BLOOD PRESSURE: 109 MMHG | HEART RATE: 80 BPM | DIASTOLIC BLOOD PRESSURE: 68 MMHG | BODY MASS INDEX: 43.93 KG/M2

## 2020-10-21 DIAGNOSIS — M54.50 CHRONIC BILATERAL LOW BACK PAIN WITHOUT SCIATICA: Primary | ICD-10-CM

## 2020-10-21 DIAGNOSIS — G89.29 CHRONIC BILATERAL LOW BACK PAIN WITHOUT SCIATICA: Primary | ICD-10-CM

## 2020-10-21 DIAGNOSIS — M25.531 PAIN IN JOINT INVOLVING FOREARM, RIGHT: ICD-10-CM

## 2020-10-21 PROCEDURE — 99214 OFFICE O/P EST MOD 30 MIN: CPT | Performed by: PHYSICIAN ASSISTANT

## 2020-10-21 NOTE — PROGRESS NOTES
Subjective     Selvin Rockwell is a 75 year old female who presents to clinic today for the following health issues:  She is here with her daughter who is helping with the history.      HPI         Back Pain off and on   Onset/Duration: 1 month   Description:   Location of pain: low back left  Character of pain: dull ache and tight   Pain radiation: none, pain all across back.    New numbness or weakness in legs, not attributed to pain: no   Intensity: Currently 4-5 /10, At its worst 8-9 /10  Progression of Symptoms: worsening  History:   Specific cause: was hit by a shopping cart at [facility] -did not fall.    Has hx of back pain but this made it worse.  Has vitor seeing chiropractor for this for about a year.     Pain interferes with job: not working -house work aggravates pain but has in the past too   History of back problems: yes  Any previous MRI or X-rays: None  Sees a specialist for back pain: No  Alleviating factors:   Improved by: chiropractor -little  Help   Precipitating factors:  Worsened by: Bending, Sitting and Walking  Therapies tried and outcome: chiropractor, cold and heat,tylenol -not much help   Chiropractor helps    Accompanying Signs & Symptoms:  Risk of Fracture: None  Risk of Cauda Equina: None  Risk of Infection: None  Risk of Cancer: None  Risk of Ankylosing Spondylitis: Onset at age <35, male, AND morning back stiffness  no   Daughter thinks the pain is worse-she complains more about the pain and with different activities   No imaging has done        R arm pain off and on   Onset/Duration: since January   Description  Location: R arm forearm   Joint Swelling: YES  Redness: no  Pain: YES  Warmth: no  Intensity: moderate  Progression of Symptoms:  same  Accompanying signs and symptoms:   Fevers: no  Numbness/tingling/weakness: no  History  Trauma to the area: YES her arm was hit by refrigerator  door at [facility]   Recent illness:  no  Previous similar problem: no  Previous evaluation:   "no  Precipitating or alleviating factors:  Aggravating factors include: overuse  Therapies tried and outcome: ice and tylenol   No hx of problems of arm  Has not been seen for this other than chiropractor    They have claims with [employer] for both injuries.    She has seen ortho multiple times for a shoulder pain that daughter states is from a fall at [facility].  She hasn't tolerated gabapentin, lyrica or steroids.            Review of Systems   As above      Objective    /68   Pulse 80   Temp 97.8  F (36.6  C) (Tympanic)   Wt 92.1 kg (203 lb)   BMI 43.93 kg/m    Body mass index is 43.93 kg/m .  Physical Exam  Constitutional:       General: She is not in acute distress.     Appearance: She is obese.   Cardiovascular:      Rate and Rhythm: Normal rate and regular rhythm.   Pulmonary:      Effort: Pulmonary effort is normal.      Breath sounds: Normal breath sounds.   Musculoskeletal:      Right elbow: Normal.     Left elbow: Normal.      Lumbar back: She exhibits decreased range of motion and tenderness.      Right forearm: She exhibits tenderness.      Left forearm: She exhibits tenderness.   Neurological:      Mental Status: She is alert.      Motor: No weakness.      Deep Tendon Reflexes:      Reflex Scores:       Patellar reflexes are 1+ on the right side and 1+ on the left side.     Comments: Positive straight leg raise with pain on left low back when both legs are raised.                      Assessment & Plan     Chronic bilateral low back pain without sciatica  Acute on chronic.  Overall exam is unremarkable.  She declines a steroid pack.  Will continue to go to chiropractor for treatment.      Pain in joint involving forearm, right  Exam normal.  Monitor.  The injury doesn't match the pain.        BMI:   Estimated body mass index is 43.93 kg/m  as calculated from the following:    Height as of 9/22/20: 1.448 m (4' 9\").    Weight as of this encounter: 92.1 kg (203 lb).                Return in about 2 " weeks (around 11/4/2020) for diabetes.    Kassidy Sanders PA-C  [facility]

## 2020-10-23 ENCOUNTER — TELEPHONE (OUTPATIENT)
Dept: FAMILY MEDICINE | Facility: CLINIC | Age: 75
End: 2020-10-23

## 2020-10-23 ENCOUNTER — TELEPHONE (OUTPATIENT)
Dept: ORTHOPEDICS | Facility: CLINIC | Age: 75
End: 2020-10-23

## 2020-10-23 NOTE — TELEPHONE ENCOUNTER
Has lab appt for INR 10/27.    Lab Results   Component Value Date    A1C 6.5 09/20/2019    A1C 6.7 04/24/2019    A1C 6.7 06/08/2018    A1C 6.9 02/16/2018     Was seen by Kassidy Sanders PA-C 10/21/20 for pain, see plan:    Return in about 2 weeks (around 11/4/2020) for diabetes.     Kassidy Sanders PA-C  [facility]      Routed to Dr. Ho to address future labs, do you want in office or virtual visit for diabetes follow up?    Georgina Mcdermott RN  [facility]

## 2020-10-23 NOTE — TELEPHONE ENCOUNTER
Noted, Dr. Ho listed as PCP, has not seen this patient since 1/8/20 routine visit.    Routed request for A1C future order to Kassidy Sanders PA-C who will return to clinic the morning of 10/27; patient having lab drawn for INR at 11:15.      Georgina Mcdermott RN  [facility]

## 2020-10-23 NOTE — TELEPHONE ENCOUNTER
[facility] Call Center    Phone Message    May a detailed message be left on voicemail: yes     Reason for Call: Other: Pt's  Colin is calling to see what the plan of care is for Pt.  Colin is calling to speak with Padmini , he is saying medication is not working and the cortisone injection is not helping.  He wants to talk about surgery, please call him back today     Action Taken: Message routed to:  [facility] Ortho    Travel Screening: Not Applicable

## 2020-10-23 NOTE — TELEPHONE ENCOUNTER
I am not sure what Nissa had in mind, but I have only seen this patient one time earlier this year and she was supposed to follow-up a couple of months later (but never did, at least partly because of Covid I'm sure).  If Nissa wants to order labs for her, that is fine, but if the patient is wanting me to be involved in her care, then I would want her to see me for a fasting office visit at some point because she needs more comprehensive lab work and other ongoing care.

## 2020-10-23 NOTE — TELEPHONE ENCOUNTER
Shoulder surgery is scheduled 12/15/20.  Spouse is agreeable with this date.  He states the family is considering a second opinion.   He will call back with the decision after he finds out if it is covered by [facility]..

## 2020-10-23 NOTE — TELEPHONE ENCOUNTER
Reason for Call: Request for an order or referral:    Order or referral being requested:  Order     Date needed: as soon as possible    Has the patient been seen by the PCP for this problem? YES    Additional comments:  Patient wanting to do A1c has appointment on 10-27     Phone number Patient can be reached at:  [phone]    Best Time:   Anytime     Can we leave a detailed message on this number?  YES    Call taken on 10/23/2020 at 8:42 AM by Bonnie Martinez

## 2020-10-26 ENCOUNTER — HOSPITAL ENCOUNTER (INPATIENT)
Facility: CLINIC | Age: 75
Setting detail: SURGERY ADMIT
End: 2020-10-26
Attending: ORTHOPAEDIC SURGERY | Admitting: ORTHOPAEDIC SURGERY
Payer: MEDICARE

## 2020-10-26 DIAGNOSIS — Z11.59 ENCOUNTER FOR SCREENING FOR OTHER VIRAL DISEASES: Primary | ICD-10-CM

## 2020-10-26 DIAGNOSIS — M19.012 GLENOHUMERAL ARTHRITIS, LEFT: ICD-10-CM

## 2020-10-27 ENCOUNTER — ANTICOAGULATION THERAPY VISIT (OUTPATIENT)
Dept: FAMILY MEDICINE | Facility: CLINIC | Age: 75
End: 2020-10-27

## 2020-10-27 DIAGNOSIS — I48.0 PAROXYSMAL ATRIAL FIBRILLATION (H): ICD-10-CM

## 2020-10-27 DIAGNOSIS — Z79.01 LONG TERM (CURRENT) USE OF ANTICOAGULANTS: ICD-10-CM

## 2020-10-27 LAB
CAPILLARY BLOOD COLLECTION: NORMAL
INR PPP: 2.3 (ref 0.86–1.14)

## 2020-10-27 PROCEDURE — 36416 COLLJ CAPILLARY BLOOD SPEC: CPT | Performed by: NURSE PRACTITIONER

## 2020-10-27 PROCEDURE — 85610 PROTHROMBIN TIME: CPT | Performed by: NURSE PRACTITIONER

## 2020-10-27 NOTE — TELEPHONE ENCOUNTER
FUTURE VISIT INFORMATION      SURGERY INFORMATION:    Date: 12/15/20    Location: ur or    Surgeon:  Marianna Rodriguez MD    Anesthesia Type:  choice    Procedure: ARTHROPLASTY, SHOULDER, TOTAL, REVERSE LEFT    Consult: ov 10/7    RECORDS REQUESTED FROM:       [facility]    Pertinent Medical History: TACHO, hypertension, Paroxysmal atrial fibrillation    [facility]    [facility]    Most recent Cardiac Stress Test: 17- [facility]    Most recent Coronary Angiogram: 11- [facility]    Most recent PFT's: 3/5/16- [facility]

## 2020-10-27 NOTE — TELEPHONE ENCOUNTER
I do see consent to communicate with Alicia on file.    Patient does have appointment with Dr. oH scheduled for 11/16/20.    Appears was scheduled yesterday.    Will she be fasting for this 10:40 visit with Dr. Ho?   That is his advice.  I called and spoke to Alicia, advised only INR will be done today.    I removed the A1C note from lab visit.   Alicia says Gem will come in fasting for 11/16 visit.    Georgina Mcdermott RN  [facility]

## 2020-10-27 NOTE — TELEPHONE ENCOUNTER
Pt said she considered Georgia her primary.  I told her and her daughter to schedule follow up in 2 weeks-they didn't want to schedule in office.  I only saw her for this injury.  I agree with Georgia needs follow up office visit first.      Kassidy Sanders PA-C

## 2020-10-27 NOTE — PROGRESS NOTES
ANTICOAGULATION MANAGEMENT     Patient Name:  Selvin Rockwell  Date:  10/27/2020    ASSESSMENT /SUBJECTIVE:    Today's INR result of 2.30 is therapeutic. Goal INR of 2.0-3.0      Warfarin dose taken: Warfarin taken as instructed    Diet: No new diet changes affecting INR    Medication changes/ interactions: No new medications/supplements affecting INR    Previous INR: Therapeutic     S/S of bleeding or thromboembolism: No    New injury or illness: No    Upcoming surgery, procedure or cardioversion: No    Additional findings: None      PLAN:    Telephone call with  Kat regarding INR result and instructed:     Warfarin Dosing Instructions: Continue your current warfarin dose 3mg daily    Instructed patient to follow up no later than: 5 weeks  Lab visit scheduled    Education provided: Target INR goal and significance of current INR result and Contact the anticoagulation clinic with any changes, questions or concerns at #[phone]       Kat verbalizes understanding and agrees to warfarin dosing plan.    Instructed to call the Anticoagulation Clinic for any changes, questions or concerns. (#[phone])        Sergio Henning RN      OBJECTIVE:  Recent labs: (last 7 days)     10/27/20  1125   INR 2.30*         No question data found.  Anticoagulation Summary  As of 10/27/2020    INR goal:  2.0-3.0   TTR:  89.0 % (1 y)   INR used for dosin.30 (10/27/2020)   Warfarin maintenance plan:  3 mg (2 mg x 1.5) every day   Full warfarin instructions:  3 mg every day   Weekly warfarin total:  21 mg   No change documented:  Milady, Sergio, RN   Plan last modified:  Tana Parker RN (2019)   Next INR check:  2020   Priority:  Maintenance   Target end date:  Indefinite    Indications    Paroxysmal atrial fibrillation (H) [I48.0]             Anticoagulation Episode Summary     INR check location:  Anticoagulation Clinic    Preferred lab:  [facility]    Send INR reminders to:  JERRY  [facility]    Comments:  Transfer from [facility] has been on coumadin for 2-3 years      Anticoagulation Care Providers     Provider Role Specialty Phone number    Gurinder Ho MD Referring Family Practice [phone]

## 2020-11-14 DIAGNOSIS — E78.5 HYPERLIPIDEMIA WITH TARGET LDL LESS THAN 100: ICD-10-CM

## 2020-11-14 DIAGNOSIS — F41.9 ANXIETY: ICD-10-CM

## 2020-11-16 ENCOUNTER — OFFICE VISIT (OUTPATIENT)
Dept: FAMILY MEDICINE | Facility: CLINIC | Age: 75
End: 2020-11-16
Payer: MEDICARE

## 2020-11-16 VITALS
HEART RATE: 82 BPM | WEIGHT: 198 LBS | SYSTOLIC BLOOD PRESSURE: 111 MMHG | OXYGEN SATURATION: 99 % | DIASTOLIC BLOOD PRESSURE: 70 MMHG | BODY MASS INDEX: 42.85 KG/M2 | TEMPERATURE: 98.8 F

## 2020-11-16 DIAGNOSIS — Z78.0 ASYMPTOMATIC POSTMENOPAUSAL STATE: ICD-10-CM

## 2020-11-16 DIAGNOSIS — Z11.59 NEED FOR HEPATITIS C SCREENING TEST: ICD-10-CM

## 2020-11-16 DIAGNOSIS — E78.5 HYPERLIPIDEMIA WITH TARGET LDL LESS THAN 100: ICD-10-CM

## 2020-11-16 DIAGNOSIS — F41.9 ANXIETY: ICD-10-CM

## 2020-11-16 DIAGNOSIS — N18.31 STAGE 3A CHRONIC KIDNEY DISEASE (H): ICD-10-CM

## 2020-11-16 DIAGNOSIS — I10 HYPERTENSION GOAL BP (BLOOD PRESSURE) < 140/90: ICD-10-CM

## 2020-11-16 DIAGNOSIS — I48.0 PAROXYSMAL ATRIAL FIBRILLATION (H): ICD-10-CM

## 2020-11-16 DIAGNOSIS — E66.01 MORBID OBESITY (H): ICD-10-CM

## 2020-11-16 DIAGNOSIS — Z23 NEED FOR PROPHYLACTIC VACCINATION WITH TETANUS-DIPHTHERIA (TD): ICD-10-CM

## 2020-11-16 DIAGNOSIS — E11.21 TYPE 2 DIABETES MELLITUS WITH DIABETIC NEPHROPATHY, WITHOUT LONG-TERM CURRENT USE OF INSULIN (H): Primary | ICD-10-CM

## 2020-11-16 DIAGNOSIS — Z12.31 ENCOUNTER FOR SCREENING MAMMOGRAM FOR BREAST CANCER: ICD-10-CM

## 2020-11-16 DIAGNOSIS — M19.012 GLENOHUMERAL ARTHRITIS, LEFT: ICD-10-CM

## 2020-11-16 PROBLEM — E11.9 DIABETES MELLITUS, TYPE 2 (H): Status: ACTIVE | Noted: 2020-11-16

## 2020-11-16 PROCEDURE — 90471 IMMUNIZATION ADMIN: CPT | Performed by: FAMILY MEDICINE

## 2020-11-16 PROCEDURE — 99215 OFFICE O/P EST HI 40 MIN: CPT | Mod: 25 | Performed by: FAMILY MEDICINE

## 2020-11-16 PROCEDURE — 90714 TD VACC NO PRESV 7 YRS+ IM: CPT | Performed by: FAMILY MEDICINE

## 2020-11-16 RX ORDER — CARVEDILOL 6.25 MG/1
6.25 TABLET ORAL 2 TIMES DAILY
Qty: 180 TABLET | Refills: 3 | Status: SHIPPED | OUTPATIENT
Start: 2020-11-16 | End: 2022-01-25

## 2020-11-16 RX ORDER — ISOSORBIDE MONONITRATE 30 MG/1
30 TABLET, EXTENDED RELEASE ORAL DAILY
Qty: 90 TABLET | Refills: 3 | Status: SHIPPED | OUTPATIENT
Start: 2020-11-16 | End: 2021-12-14

## 2020-11-16 RX ORDER — SIMVASTATIN 10 MG
10 TABLET ORAL AT BEDTIME
Qty: 90 TABLET | Refills: 3 | Status: SHIPPED | OUTPATIENT
Start: 2020-11-16 | End: 2021-10-25

## 2020-11-16 RX ORDER — PAROXETINE 20 MG/1
TABLET, FILM COATED ORAL
Qty: 90 TABLET | Refills: 3 | Status: SHIPPED | OUTPATIENT
Start: 2020-11-16 | End: 2021-12-14

## 2020-11-16 NOTE — NURSING NOTE
Prior to immunization administration, verified patients identity using patient s name and date of birth. Please see Immunization Activity for additional information.     Screening Questionnaire for Adult Immunization    Are you sick today?   No   Do you have allergies to medications, food, a vaccine component or latex?   No   Have you ever had a serious reaction after receiving a vaccination?   No   Do you have a long-term health problem with heart, lung, kidney, or metabolic disease (e.g., diabetes), asthma, a blood disorder, no spleen, complement component deficiency, a cochlear implant, or a spinal fluid leak?  Are you on long-term aspirin therapy?   Yes   Do you have cancer, leukemia, HIV/AIDS, or any other immune system problem?   No   Do you have a parent, brother, or sister with an immune system problem?   No   In the past 3 months, have you taken medications that affect  your immune system, such as prednisone, other steroids, or anticancer drugs; drugs for the treatment of rheumatoid arthritis, Crohn s disease, or psoriasis; or have you had radiation treatments?   No   Have you had a seizure, or a brain or other nervous system problem?   No   During the past year, have you received a transfusion of blood or blood    products, or been given immune (gamma) globulin or antiviral drug?   No   For women: Are you pregnant or is there a chance you could become       pregnant during the next month?   No   Have you received any vaccinations in the past 4 weeks?   No     Immunization questionnaire was positive for at least one answer.  Notified Dr Ho.        Per orders of Dr. Ho, injection of Td given by Jaida Chandra CMA. Patient instructed to remain in clinic for 15 minutes afterwards, and to report any adverse reaction to me immediately.  Vaccine information supplied.       Screening performed by Jaida Chandra CMA on 11/16/2020 at 11:31 AM.

## 2020-11-16 NOTE — PROGRESS NOTES
Subjective     Selvin Rockwell is a 75 year old female who presents to clinic today for the following health issues:    HPI         Diabetes Follow-up    How often are you checking your blood sugar? One time daily  What time of day are you checking your blood sugars (select all that apply)?  Before meals  Have you had any blood sugars above 200?  No  Have you had any blood sugars below 70?  No    What symptoms do you notice when your blood sugar is low?  None    What concerns do you have today about your diabetes? Other: Apatite     Do you have any of these symptoms? (Select all that apply)  No numbness or tingling in feet.  No redness, sores or blisters on feet.  No complaints of excessive thirst.  No reports of blurry vision.  No significant changes to weight.    Have you had a diabetic eye exam in the last 12 months? Yes- Date of last eye exam: unknown,  Location: unknown        Hyperlipidemia Follow-Up      Are you regularly taking any medication or supplement to lower your cholesterol?   Yes- Simvastatin    Are you having muscle aches or other side effects that you think could be caused by your cholesterol lowering medication?  No    Hypertension Follow-up      Do you check your blood pressure regularly outside of the clinic? Yes     Are you following a low salt diet? Yes    Are your blood pressures ever more than 140 on the top number (systolic) OR more   than 90 on the bottom number (diastolic), for example 140/90? No    BP Readings from Last 2 Encounters:   10/21/20 109/68   08/25/20 113/72     Hemoglobin A1C (%)   Date Value   09/20/2019 6.5 (H)   04/24/2019 6.7 (A)     LDL Cholesterol Calculated (mg/dL)   Date Value   09/20/2019 49   01/04/2019 49         How many servings of fruits and vegetables do you eat daily?  2-3    On average, how many sweetened beverages do you drink each day (Examples: soda, juice, sweet tea, etc.  Do NOT count diet or artificially sweetened beverages)?   0    How many days  per week do you exercise enough to make your heart beat faster? 0    How many minutes a day do you exercise enough to make your heart beat faster? 0    How many days per week do you miss taking your medication? 0    This is a lady with multiple active problems who has bounced around from the [facility] system to the [facility] system to [facility] system and has been seen by numerous primary care providers in recent years.  I have seen her 1 time previously and do not know her health history well.  It is complicated by her limited ability to speak English.  Her daughter does most of the talking for her.  She is currently being seen for various orthopedic elements including significant left shoulder arthritis.  She is considering surgery for that, but apparently just had an injection by an outside [facility] provider who is suggesting that surgery may not be the answer for her.  She does have type 2 diabetes.  She is on Metformin for that.  She is overdue for lab work.  She is not fasting.  She is overdue for a mammogram.  I do not believe she is ever had a DEXA bone density test.  She has had those ordered for her in recent years, but she has not followed through with them.    She is overdue for a tetanus booster.  It sounds like she is willing to do that.  She also should have a flu shot, but she is not willing to do that.    She has a history of PAF and it sounds like she had a catheter ablation.  She has no known history of ischemic heart disease.  She is on Imdur for some reason, however.  I did review her chart and saw the catheterization from 2017 and the fact that she had a nuclear stress test which was negative for ischemia at that time.    Patient Active Problem List   Diagnosis     OA (OSTEOARTHRITIS) OF KNEE - right     Status Post Total Knee Replacement - bilateral     TACHO (obstructive sleep apnea)     Hyperlipidemia with target LDL less than 100     Hypertension goal BP (blood pressure) < 140/90      Morbid obesity (H)     Achalasia     Adrenal adenoma     Atrophy of left kidney     Calculus of kidney     Chronic low back pain     Gout     Paroxysmal atrial fibrillation (H)     Long term (current) use of anticoagulants     Anxiety     Spinal stenosis of lumbar region with neurogenic claudication     Varicose veins with pain     Venous stasis dermatitis of right lower extremity     Shoulder pain, left     Glenohumeral arthritis, left     Type 2 diabetes mellitus with diabetic nephropathy, without long-term current use of insulin (H)     Stage 3a chronic kidney disease     Current Outpatient Medications   Medication     carvedilol (COREG) 6.25 MG tablet     isosorbide mononitrate (IMDUR) 30 MG 24 hr tablet     metFORMIN (GLUCOPHAGE) 500 MG tablet     omeprazole (PRILOSEC) 40 MG DR capsule     order for DME     order for DME     PARoxetine (PAXIL) 20 MG tablet     simvastatin (ZOCOR) 10 MG tablet     warfarin ANTICOAGULANT (COUMADIN) 2 MG tablet     clotrimazole (LOTRIMIN) 1 % external cream     FISH OIL 1000 MG PO CAPS     Current Facility-Administered Medications   Medication     lidocaine (PF) (XYLOCAINE) 1 % injection 3 mL     lidocaine (PF) (XYLOCAINE) 1 % injection 4 mL     lidocaine 1 % 10 mL, sodium bicarbonate 1 mEq 11 mL injection     lidocaine 1% with EPINEPHrine 1:100,000 60 mL, sodium bicarbonate 12 mEq, sodium chloride 0.9% 500 mL 572 mL     methylPREDNISolone (DEPO-MEDROL) injection 80 mg         Review of Systems  She does get short of breath with activity, which is not very active.  Constitutional, HEENT, cardiovascular, pulmonary, gi and gu systems are negative, except as otherwise noted.      Objective    /70 (BP Location: Right arm, Patient Position: Sitting, Cuff Size: Adult Large)   Pulse 82   Temp 98.8  F (37.1  C) (Oral)   Wt 89.8 kg (198 lb)   SpO2 99%   BMI 42.85 kg/m    Body mass index is 42.85 kg/m .  Physical Exam   GENERAL: alert, no distress and morbidly obese  RESP: lungs  clear to auscultation - no rales, rhonchi or wheezes  CV: regular rate and rhythm, normal S1 S2, no S3 or S4, no murmur, click or rub    I spent a good deal of time reviewing her chart both before and after her visit trying to get familiar with her extensive past medical history.        Assessment & Plan       ICD-10-CM    1. Type 2 diabetes mellitus with diabetic nephropathy, without long-term current use of insulin (H)  E11.21 HEMOGLOBIN A1C     BASIC METABOLIC PANEL     Albumin Random Urine Quantitative with Creat Ratio     metFORMIN (GLUCOPHAGE) 500 MG tablet   2. Hyperlipidemia with target LDL less than 100  E78.5 Lipid panel reflex to direct LDL Fasting     simvastatin (ZOCOR) 10 MG tablet     ALT   3. Morbid obesity (H)  E66.01    4. Paroxysmal atrial fibrillation (H)  I48.0 carvedilol (COREG) 6.25 MG tablet   5. Hypertension goal BP (blood pressure) < 140/90  I10 carvedilol (COREG) 6.25 MG tablet     isosorbide mononitrate (IMDUR) 30 MG 24 hr tablet   6. Stage 3a chronic kidney disease  N18.31 CBC with platelets   7. Anxiety  F41.9 PARoxetine (PAXIL) 20 MG tablet   8. Glenohumeral arthritis, left  M19.012    9. Need for hepatitis C screening test  Z11.59 Hepatitis C Screen Reflex to HCV RNA Quant and Genotype   10. Asymptomatic postmenopausal state  Z78.0 DEXA HIP/PELVIS/SPINE - Future   11. Encounter for screening mammogram for breast cancer  Z12.31 MA SCREENING DIGITAL BILAT - Future  (s+30)   12. Need for prophylactic vaccination with tetanus-diphtheria (Td)  Z23 TD (ADULT, 7+) PRESERVE FREE     Her blood pressure and other vital signs look good today  We discussed the various conditions above  We will have her return soon for fasting labs as ordered above  We will give her a tetanus booster today  She declines a flu shot  Continue ongoing orthopedic care with current providers  She was advised to schedule a screening mammogram and DEXA scan, but they do not want to do that today  Plan a tentative recheck  "in 6 to 12 months     BMI:   Estimated body mass index is 42.85 kg/m  as calculated from the following:    Height as of 9/22/20: 1.448 m (4' 9\").    Weight as of this encounter: 89.8 kg (198 lb).   Weight management plan: Discussed healthy diet and exercise guidelines         Return in about 6 months (around 5/16/2021) for diabetes recheck.     40+ minute visit with over half the time spent counseling and/or coordinating care and reviewing old records.      Gurinder Ho MD  [facility]    "

## 2020-11-17 RX ORDER — PAROXETINE 20 MG/1
TABLET, FILM COATED ORAL
Qty: 90 TABLET | Refills: 0 | OUTPATIENT
Start: 2020-11-17

## 2020-11-17 RX ORDER — SIMVASTATIN 10 MG
TABLET ORAL
Qty: 90 TABLET | Refills: 0 | OUTPATIENT
Start: 2020-11-17

## 2020-11-18 ENCOUNTER — TELEPHONE (OUTPATIENT)
Dept: FAMILY MEDICINE | Facility: CLINIC | Age: 75
End: 2020-11-18

## 2020-11-18 DIAGNOSIS — E78.5 HYPERLIPIDEMIA WITH TARGET LDL LESS THAN 100: ICD-10-CM

## 2020-11-18 DIAGNOSIS — D50.9 IRON DEFICIENCY ANEMIA, UNSPECIFIED IRON DEFICIENCY ANEMIA TYPE: Primary | ICD-10-CM

## 2020-11-18 DIAGNOSIS — N18.31 STAGE 3A CHRONIC KIDNEY DISEASE (H): ICD-10-CM

## 2020-11-18 DIAGNOSIS — E11.21 TYPE 2 DIABETES MELLITUS WITH DIABETIC NEPHROPATHY, WITHOUT LONG-TERM CURRENT USE OF INSULIN (H): ICD-10-CM

## 2020-11-18 LAB
ALT SERPL W P-5'-P-CCNC: 26 U/L (ref 0–50)
ANION GAP SERPL CALCULATED.3IONS-SCNC: 6 MMOL/L (ref 3–14)
BUN SERPL-MCNC: 30 MG/DL (ref 7–30)
CALCIUM SERPL-MCNC: 8.7 MG/DL (ref 8.5–10.1)
CHLORIDE SERPL-SCNC: 111 MMOL/L (ref 94–109)
CHOLEST SERPL-MCNC: 109 MG/DL
CO2 SERPL-SCNC: 25 MMOL/L (ref 20–32)
CREAT SERPL-MCNC: 1.04 MG/DL (ref 0.52–1.04)
CREAT UR-MCNC: 124 MG/DL
ERYTHROCYTE [DISTWIDTH] IN BLOOD BY AUTOMATED COUNT: 21.7 % (ref 10–15)
GFR SERPL CREATININE-BSD FRML MDRD: 53 ML/MIN/{1.73_M2}
GLUCOSE SERPL-MCNC: 141 MG/DL (ref 70–99)
HBA1C MFR BLD: 7 % (ref 0–5.6)
HCT VFR BLD AUTO: 27.2 % (ref 35–47)
HDLC SERPL-MCNC: 37 MG/DL
HGB BLD-MCNC: 7.1 G/DL (ref 11.7–15.7)
LDLC SERPL CALC-MCNC: 23 MG/DL
MCH RBC QN AUTO: 16.7 PG (ref 26.5–33)
MCHC RBC AUTO-ENTMCNC: 26.1 G/DL (ref 31.5–36.5)
MCV RBC AUTO: 64 FL (ref 78–100)
MICROALBUMIN UR-MCNC: 80 MG/L
MICROALBUMIN/CREAT UR: 64.92 MG/G CR (ref 0–25)
NONHDLC SERPL-MCNC: 72 MG/DL
PLATELET # BLD AUTO: 312 10E9/L (ref 150–450)
POTASSIUM SERPL-SCNC: 4.5 MMOL/L (ref 3.4–5.3)
RBC # BLD AUTO: 4.24 10E12/L (ref 3.8–5.2)
SODIUM SERPL-SCNC: 142 MMOL/L (ref 133–144)
TRIGL SERPL-MCNC: 243 MG/DL
WBC # BLD AUTO: 9.6 10E9/L (ref 4–11)

## 2020-11-18 PROCEDURE — 84460 ALANINE AMINO (ALT) (SGPT): CPT | Performed by: FAMILY MEDICINE

## 2020-11-18 PROCEDURE — 82043 UR ALBUMIN QUANTITATIVE: CPT | Performed by: FAMILY MEDICINE

## 2020-11-18 PROCEDURE — 36415 COLL VENOUS BLD VENIPUNCTURE: CPT | Performed by: FAMILY MEDICINE

## 2020-11-18 PROCEDURE — 80048 BASIC METABOLIC PNL TOTAL CA: CPT | Performed by: FAMILY MEDICINE

## 2020-11-18 PROCEDURE — 85027 COMPLETE CBC AUTOMATED: CPT | Performed by: FAMILY MEDICINE

## 2020-11-18 PROCEDURE — 80061 LIPID PANEL: CPT | Performed by: FAMILY MEDICINE

## 2020-11-18 PROCEDURE — 83036 HEMOGLOBIN GLYCOSYLATED A1C: CPT | Performed by: FAMILY MEDICINE

## 2020-11-18 NOTE — LETTER
[facility]   [address] [place]  [address]  [phone]                                   November 19, 2020    Selvin Rockwell  [address] [facility] [address]        Gem (and Kat),     Here is a copy of the lab work that I called you about.  Your diabetes is still adequately controlled.  Liver and kidney tests look fine.  Lipid values are acceptable.   The most significant finding is the low hemoglobin, as we discussed.  Please take an iron supplement twice a day, such as ferrous sulfate 325 mg tablets with 65 mg of elemental iron.  You can get these over-the-counter at any drugstore.   Please return to see me in a month from now so that we can recheck your hemoglobin and make sure it is coming up.     Results for orders placed or performed in visit on 11/18/20   ALT     Status: None   Result Value Ref Range    ALT 26 0 - 50 U/L   CBC with platelets     Status: Abnormal   Result Value Ref Range    WBC 9.6 4.0 - 11.0 10e9/L    RBC Count 4.24 3.8 - 5.2 10e12/L    Hemoglobin 7.1 (LL) 11.7 - 15.7 g/dL    Hematocrit 27.2 (L) 35.0 - 47.0 %    MCV 64 (L) 78 - 100 fl    MCH 16.7 (L) 26.5 - 33.0 pg    MCHC 26.1 (L) 31.5 - 36.5 g/dL    RDW 21.7 (H) 10.0 - 15.0 %    Platelet Count 312 150 - 450 10e9/L   Albumin Random Urine Quantitative with Creat Ratio     Status: Abnormal   Result Value Ref Range    Creatinine Urine 124 mg/dL    Albumin Urine mg/L 80 mg/L    Albumin Urine mg/g Cr 64.92 (H) 0 - 25 mg/g Cr   Lipid panel reflex to direct LDL Fasting     Status: Abnormal   Result Value Ref Range    Cholesterol 109 <200 mg/dL    Triglycerides 243 (H) <150 mg/dL    HDL Cholesterol 37 (L) >49 mg/dL    LDL Cholesterol Calculated 23 <100 mg/dL    Non HDL Cholesterol 72 <130 mg/dL   BASIC METABOLIC PANEL     Status: Abnormal   Result Value Ref Range    Sodium 142 133 - 144 mmol/L    Potassium 4.5 3.4 - 5.3 mmol/L    Chloride 111 (H) 94 - 109 mmol/L    Carbon Dioxide  25 20 - 32 mmol/L    Anion Gap 6 3 - 14 mmol/L    Glucose 141 (H) 70 - 99 mg/dL    Urea Nitrogen 30 7 - 30 mg/dL    Creatinine 1.04 0.52 - 1.04 mg/dL    GFR Estimate 53 (L) >60 mL/min/[1.73_m2]    GFR Estimate If Black 61 >60 mL/min/[1.73_m2]    Calcium 8.7 8.5 - 10.1 mg/dL   HEMOGLOBIN A1C     Status: Abnormal   Result Value Ref Range    Hemoglobin A1C 7.0 (H) 0 - 5.6 %       If you have any questions please call the clinic at [phone]    Sincerely,    Gurinder Ho MD   bmd

## 2020-11-19 NOTE — RESULT ENCOUNTER NOTE
Gem (and Kat),  Here is a copy of the lab work that I called you about.  Your diabetes is still adequately controlled.  Liver and kidney tests look fine.  Lipid values are acceptable.  The most significant finding is the low hemoglobin, as we discussed.  Please take an iron supplement twice a day, such as ferrous sulfate 325 mg tablets with 65 mg of elemental iron.  You can get these over-the-counter at any drugstore.  Please return to see me in a month from now so that we can recheck your hemoglobin and make sure it is coming up.    Gurinder Ho MD

## 2020-11-19 NOTE — TELEPHONE ENCOUNTER
Patient's hemoglobin came back unexpectedly low at 7.1.  RBC indices are consistent with iron deficiency.  Review of her chart shows that she has had this in the past.  She had upper and lower endoscopy in the summer 2018 for this.  Those studies were unremarkable.  She was on iron supplementation for a while, but apparently was stopped sometime ago.  Her last hemoglobin on record was 8.5 in October 2019.  She does not appear to have any acute blood loss, so we will recommend that she take an iron supplement twice a day going forward and then return in a month from now for recheck of her CBC along with iron studies.    I discussed this in some detail with the patient's daughter, Kat, by phone today, and also sent them information by letter today reviewing the above.

## 2020-11-30 ENCOUNTER — TELEPHONE (OUTPATIENT)
Dept: ORTHOPEDICS | Facility: CLINIC | Age: 75
End: 2020-11-30

## 2020-11-30 NOTE — TELEPHONE ENCOUNTER
[facility] Call Center    Phone Message    May a detailed message be left on voicemail: yes     Reason for Call: Other: Patients daughter stated we called but we didn't leave a msg. Please call back      Action Taken: Message routed to:  [facility] ortho    Travel Screening: Not Applicable

## 2020-11-30 NOTE — TELEPHONE ENCOUNTER
Patient's daughter reports patient was given a shoulder steroid injection in November at another clinic.  She has an appointment with the surgeon for a second opinion concerning surgery 12/04/20.  She states she will call back next week with the family's decision concerning treatment.

## 2020-12-01 ENCOUNTER — ANTICOAGULATION THERAPY VISIT (OUTPATIENT)
Dept: FAMILY MEDICINE | Facility: CLINIC | Age: 75
End: 2020-12-01

## 2020-12-01 DIAGNOSIS — Z79.01 LONG TERM CURRENT USE OF ANTICOAGULANT THERAPY: ICD-10-CM

## 2020-12-01 DIAGNOSIS — I48.0 PAROXYSMAL ATRIAL FIBRILLATION (H): ICD-10-CM

## 2020-12-01 LAB
CAPILLARY BLOOD COLLECTION: NORMAL
INR PPP: 3.7 (ref 0.86–1.14)

## 2020-12-01 PROCEDURE — 85610 PROTHROMBIN TIME: CPT | Performed by: FAMILY MEDICINE

## 2020-12-01 PROCEDURE — 36416 COLLJ CAPILLARY BLOOD SPEC: CPT | Performed by: FAMILY MEDICINE

## 2020-12-01 NOTE — PROGRESS NOTES
ANTICOAGULATION MANAGEMENT     Patient Name:  Selvin Rockwell  Date:  12/1/2020    ASSESSMENT /SUBJECTIVE:    Today's INR result of 3.70 is supratherapeutic. Goal INR of 2.0-3.0      Warfarin dose taken: Warfarin taken as instructed    Diet: No new diet changes affecting INR    Medication changes/ interactions: No new medications/supplements affecting INR    Previous INR: Therapeutic     S/S of bleeding or thromboembolism: No    New injury or illness: No    Upcoming surgery, procedure or cardioversion: Yes: The patient is potentially having an upcoming surgery on 12/15, they are planning to have the pre-procedure visit tomorrow and additionally have another consult on Friday for another opinion.     Additional findings: Reports increased stress, anxiousness, and ongoing pain r/t shoulder.    PLAN:    Telephone call with  Alicia regarding INR result and instructed:     Warfarin Dosing Instructions: Hold today then continue your current warfarin dose of 3mg every day    Instructed patient to follow up no later than: 1 week  Lab visit scheduled    Education provided: Impact of vitamin K foods on INR, Vitamin K content of foods, Target INR goal and significance of current INR result, Importance of following up for INR monitoring at instructed interval and Importance of taking warfarin as instructed      Alicia verbalizes understanding and agrees to warfarin dosing plan.    Instructed to call the Anticoagulation Clinic for any changes, questions or concerns. (#[phone])        Sergio Key RN      OBJECTIVE:  Recent labs: (last 7 days)     12/01/20  1012   INR 3.70*         No question data found.  Anticoagulation Summary  As of 12/1/2020    INR goal:  2.0-3.0   TTR:  84.2 % (1 y)   INR used for dosing:  3.70 (12/1/2020)   Warfarin maintenance plan:  3 mg (2 mg x 1.5) every day   Full warfarin instructions:  12/1: Hold; Otherwise 3 mg every day   Weekly warfarin total:  21 mg   Plan last modified:  Tana Parker,  RN (9/20/2019)   Next INR check:  12/8/2020   Priority:  Maintenance   Target end date:  Indefinite    Indications    Paroxysmal atrial fibrillation (H) [I48.0]             Anticoagulation Episode Summary     INR check location:  Anticoagulation Clinic    Preferred lab:  [facility]    Send INR reminders to:  [facility]    Comments:  Transfer from [facility] - Patient states has been on coumadin for 2-3 years      Anticoagulation Care Providers     Provider Role Specialty Phone number    Gurinder Ho MD Referring Family Medicine [phone]

## 2020-12-02 ENCOUNTER — PRE VISIT (OUTPATIENT)
Dept: SURGERY | Facility: CLINIC | Age: 75
End: 2020-12-02

## 2020-12-07 ENCOUNTER — ANTICOAGULATION THERAPY VISIT (OUTPATIENT)
Dept: FAMILY MEDICINE | Facility: CLINIC | Age: 75
End: 2020-12-07

## 2020-12-07 ENCOUNTER — TELEPHONE (OUTPATIENT)
Dept: ORTHOPEDICS | Facility: CLINIC | Age: 75
End: 2020-12-07

## 2020-12-07 DIAGNOSIS — Z79.01 LONG TERM CURRENT USE OF ANTICOAGULANT THERAPY: ICD-10-CM

## 2020-12-07 DIAGNOSIS — I48.0 PAROXYSMAL ATRIAL FIBRILLATION (H): ICD-10-CM

## 2020-12-07 LAB
CAPILLARY BLOOD COLLECTION: NORMAL
INR PPP: 3.2 (ref 0.86–1.14)

## 2020-12-07 PROCEDURE — 36416 COLLJ CAPILLARY BLOOD SPEC: CPT | Performed by: FAMILY MEDICINE

## 2020-12-07 PROCEDURE — 85610 PROTHROMBIN TIME: CPT | Performed by: FAMILY MEDICINE

## 2020-12-07 PROCEDURE — 99207 PR NO CHARGE NURSE ONLY: CPT | Performed by: FAMILY MEDICINE

## 2020-12-07 NOTE — PROGRESS NOTES
ANTICOAGULATION FOLLOW-UP CLINIC VISIT    Patient Name:  Selvin Rockwell  Date:  12/7/2020  Contact Type:  Telephone  Small adjustment down since elevated last 2 times. Will recheck in 2 weeks.   SUBJECTIVE:  Patient Findings     Comments:    Assessed for S/S bleeding, clotting, medication, diet, health, activity and alcohol changes          Clinical Outcomes     Negatives:  Major bleeding event, Thromboembolic event, Anticoagulation-related hospital admission, Anticoagulation-related ED visit, Anticoagulation-related fatality    Comments:    Assessed for S/S bleeding, clotting, medication, diet, health, activity and alcohol changes             OBJECTIVE    Recent labs: (last 7 days)     12/07/20  1017   INR 3.20*       ASSESSMENT / PLAN  INR assessment SUPRA    Recheck INR In: 2 WEEKS    INR Location Clinic      Anticoagulation Summary  As of 12/7/2020    INR goal:  2.0-3.0   TTR:  82.6 % (1 y)   INR used for dosing:  3.20 (12/7/2020)   Warfarin maintenance plan:  2 mg (2 mg x 1) every Mon, Fri; 3 mg (2 mg x 1.5) all other days   Full warfarin instructions:  12/11: 2 mg; Otherwise 2 mg every Mon, Fri; 3 mg all other days   Weekly warfarin total:  19 mg   Plan last modified:  Danielle Vera RN (12/7/2020)   Next INR check:  12/7/2020   Priority:  Maintenance   Target end date:  Indefinite    Indications    Paroxysmal atrial fibrillation (H) [I48.0]             Anticoagulation Episode Summary     INR check location:  Anticoagulation Clinic    Preferred lab:  [facility]    Send INR reminders to:  [facility]    Comments:  Transfer from [facility] states has been on coumadin for 2-3 years      Anticoagulation Care Providers     Provider Role Specialty Phone number    Gurinder Ho MD Referring Family Medicine [phone]            See the Encounter Report to view Anticoagulation Flowsheet and Dosing Calendar (Go to Encounters tab in chart review, and  find the Anticoagulation Therapy Visit)  Some signs and symptoms of bleeding include: Nose bleed or cut that does not stop bleeding in 10 minutes, bleeding of the gums, vomiting (will look like coffee grounds) or coughing up blood, unusual, easy or large areas of bruising, increased or unexpected  Vaginal bleeding or increased menstrual flow, red or black stools, red or orange urine, prolonged or severe headache, pale skin, unusual or constant tiredness.  If you have these please call 911 or seek medical care immediately.       Danielle Vera RN

## 2020-12-07 NOTE — TELEPHONE ENCOUNTER
Patient's daughter was contacted concerning surgery.  She reports the second opinion surgeon stated her mother is not a candidate for the procedure.  The daughter is aware the surgery with Dr Rodriguez cannot be done for 3 months after the steroid injection given in November.  She states the family is going to discuss whether to proceed with rescheduling the surgery.  She will contact the clinic with their decision.

## 2020-12-21 ENCOUNTER — ANTICOAGULATION THERAPY VISIT (OUTPATIENT)
Dept: FAMILY MEDICINE | Facility: CLINIC | Age: 75
End: 2020-12-21

## 2020-12-21 DIAGNOSIS — I48.0 PAROXYSMAL ATRIAL FIBRILLATION (H): ICD-10-CM

## 2020-12-21 DIAGNOSIS — Z79.01 LONG TERM CURRENT USE OF ANTICOAGULANT THERAPY: ICD-10-CM

## 2020-12-21 LAB
CAPILLARY BLOOD COLLECTION: NORMAL
INR PPP: 4.8 (ref 0.86–1.14)

## 2020-12-21 PROCEDURE — 85610 PROTHROMBIN TIME: CPT | Performed by: FAMILY MEDICINE

## 2020-12-21 PROCEDURE — 36416 COLLJ CAPILLARY BLOOD SPEC: CPT | Performed by: FAMILY MEDICINE

## 2020-12-21 NOTE — PROGRESS NOTES
ANTICOAGULATION MANAGEMENT     Patient Name:  Selvin Rockwell  Date:  2020    ASSESSMENT /SUBJECTIVE:    Today's INR result of 4.80 is supratherapeutic. Goal INR of 2.0-3.0      Warfarin dose taken: More warfarin taken than planned which may be affecting INR    Diet: No new diet changes affecting INR    Medication changes/ interactions: No new medications/supplements affecting INR    Previous INR: Supratherapeutic     S/S of bleeding or thromboembolism: No    New injury or illness: No    Upcoming surgery, procedure or cardioversion: No    Additional findings: None      PLAN:    Telephone call with  Kat regarding INR result and instructed:     Warfarin Dosing Instructions: Hold today and tomorrow then continue on with 2mg every Mon & Fri; 3mg all other days of the week.  The patient took 3mg every day since the last INR, which could explain supratherapeutic level.     Instructed patient to follow up no later than: 7-10 days,  Lab visit scheduled    Education provided: Potential interaction between warfarin and alcohol, Target INR goal and significance of current INR result, Importance of therapeutic range and Monitoring for bleeding signs and symptoms      Kat verbalizes understanding and agrees to warfarin dosing plan.    Instructed to call the Anticoagulation Clinic for any changes, questions or concerns. (#[phone])        Sergio Key RN      OBJECTIVE:  Recent labs: (last 7 days)     20  1116   INR 4.80*         No question data found.  Anticoagulation Summary  As of 2020    INR goal:  2.0-3.0   TTR:  78.7 % (1 y)   INR used for dosin.80 (2020)   Warfarin maintenance plan:  2 mg (2 mg x 1) every Mon, Fri; 3 mg (2 mg x 1.5) all other days   Full warfarin instructions:  2 mg every Mon, Fri; 3 mg all other days   Weekly warfarin total:  19 mg   Plan last modified:  Danielle Vera RN (2020)   Next INR check:     Priority:  Maintenance   Target end date:   Indefinite    Indications    Paroxysmal atrial fibrillation (H) [I48.0]             Anticoagulation Episode Summary     INR check location:  Anticoagulation Clinic    Preferred lab:  [facility]    Send INR reminders to:  [facility]    Comments:  Transfer from [facility] - Patient states has been on coumadin for 2-3 years      Anticoagulation Care Providers     Provider Role Specialty Phone number    Gurinder Ho MD Referring Family Medicine [phone]

## 2020-12-26 DIAGNOSIS — Z79.01 LONG TERM (CURRENT) USE OF ANTICOAGULANTS: ICD-10-CM

## 2020-12-26 DIAGNOSIS — I48.0 PAROXYSMAL ATRIAL FIBRILLATION (H): ICD-10-CM

## 2020-12-28 RX ORDER — WARFARIN SODIUM 2 MG/1
TABLET ORAL
Qty: 120 TABLET | Refills: 0 | Status: SHIPPED | OUTPATIENT
Start: 2020-12-28 | End: 2021-03-29

## 2020-12-31 ENCOUNTER — LAB (OUTPATIENT)
Dept: LAB | Facility: CLINIC | Age: 75
End: 2020-12-31
Payer: MEDICARE

## 2020-12-31 ENCOUNTER — ANTICOAGULATION THERAPY VISIT (OUTPATIENT)
Dept: FAMILY MEDICINE | Facility: CLINIC | Age: 75
End: 2020-12-31

## 2020-12-31 DIAGNOSIS — I48.0 PAROXYSMAL ATRIAL FIBRILLATION (H): ICD-10-CM

## 2020-12-31 DIAGNOSIS — Z79.01 LONG TERM CURRENT USE OF ANTICOAGULANT THERAPY: ICD-10-CM

## 2020-12-31 LAB
CAPILLARY BLOOD COLLECTION: NORMAL
INR PPP: 2.4 (ref 0.86–1.14)

## 2020-12-31 PROCEDURE — 36416 COLLJ CAPILLARY BLOOD SPEC: CPT | Performed by: FAMILY MEDICINE

## 2020-12-31 PROCEDURE — 85610 PROTHROMBIN TIME: CPT | Performed by: FAMILY MEDICINE

## 2020-12-31 NOTE — PROGRESS NOTES
ANTICOAGULATION MANAGEMENT     Patient Name:  Selvin Rockwell  Date:  2020    ASSESSMENT /SUBJECTIVE:    Today's INR result of 2.40 is therapeutic. Goal INR of 2.0-3.0      Warfarin dose taken: Warfarin taken as instructed    Diet: No new diet changes affecting INR    Medication changes/ interactions: No new medications/supplements affecting INR    Previous INR: Supratherapeutic     S/S of bleeding or thromboembolism: No    New injury or illness: No    Upcoming surgery, procedure or cardioversion: No    Additional findings: None      PLAN:    Telephone call with Selvin regarding INR result and instructed:     Warfarin Dosing Instructions: Continue your current warfarin dose 2mg every Mon & Fri; 3mg all other days of the week.     Instructed patient to follow up no later than: 2 weeks  Lab visit scheduled    Education provided: None required      Kat verbalizes understanding and agrees to warfarin dosing plan.    Instructed to call the Anticoagulation Clinic for any changes, questions or concerns. (#[phone])        Sergio Key RN      OBJECTIVE:  Recent labs: (last 7 days)     20  1139   INR 2.40*         No question data found.  Anticoagulation Summary  As of 2020    INR goal:  2.0-3.0   TTR:  76.7 % (1 y)   INR used for dosin.40 (2020)   Warfarin maintenance plan:  2 mg (2 mg x 1) every Mon, Fri; 3 mg (2 mg x 1.5) all other days   Full warfarin instructions:  2 mg every Mon, Fri; 3 mg all other days   Weekly warfarin total:  19 mg   No change documented:  Green, Sergio, RN   Plan last modified:  Danielle Vera RN (2020)   Next INR check:  2021   Priority:  High   Target end date:  Indefinite    Indications    Paroxysmal atrial fibrillation (H) [I48.0]             Anticoagulation Episode Summary     INR check location:  Anticoagulation Clinic    Preferred lab:  [facility]    Send INR reminders to:  [facility]    Comments:   Transfer from [facility] - Patient states has been on coumadin for 2-3 years      Anticoagulation Care Providers     Provider Role Specialty Phone number    Gurinder Ho MD Referring Family Medicine [phone]

## 2021-01-05 ENCOUNTER — OFFICE VISIT (OUTPATIENT)
Dept: VASCULAR SURGERY | Facility: CLINIC | Age: 76
End: 2021-01-05
Payer: MEDICARE

## 2021-01-05 DIAGNOSIS — I83.811 VARICOSE VEINS OF RIGHT LOWER EXTREMITY WITH PAIN: ICD-10-CM

## 2021-01-05 DIAGNOSIS — I83.891 VARICOSE VEINS OF RIGHT LEG WITH EDEMA: Primary | ICD-10-CM

## 2021-01-05 PROCEDURE — 99213 OFFICE O/P EST LOW 20 MIN: CPT | Performed by: SPECIALIST

## 2021-01-05 NOTE — PROGRESS NOTES
Follow-up for right GSV RF ablation    Subjective:  Patient is a 75-year-old lady status post a right GSV ablation last August.  She had some improvement up until about a month ago when she noticed some new veins on the lateral and medial aspect of her calf.  She reports increasing pain and edema over that time.  She has been wearing her compression socks intermittently without relief.      Objective:  B/P: Data Unavailable, T: Data Unavailable, P: Data Unavailable, R: Data Unavailable  RLE -+1 edema.  Some increased residual lateral varicosities and new medial varicosities    Assessment/plan:  This is a 75-year-old lady status post a right GSV ablation on Coumadin.  The ablation was successful with some initial improvement but she continues to have some distal symptoms.  The symptoms have worsened recently despite compression therapy.  She is a CEAP 3 in the right leg.  The plan at this time is to repeat her ultrasound and proceed based on those findings.  I suspect she may require stab phlebectomy and would have to be off Coumadin.  I discussed this with the patient daughter and they expressed understanding.  They tell me she has been off Coumadin for about a week for other procedures.  They will get it cleared up with their PCP as well should that be necessary.    Alex Renee MD, FACS

## 2021-01-05 NOTE — LETTER
1/5/2021         RE: Selvin Rockwell  [address] [place] [id number]        Dear Colleague,    Thank you for referring your patient, Selvin Rockwell, to the [facility] [place]. Please see a copy of my visit note below.    Follow-up for right GSV RF ablation    Subjective:  Patient is a 75-year-old lady status post a right GSV ablation last August.  She had some improvement up until about a month ago when she noticed some new veins on the lateral and medial aspect of her calf.  She reports increasing pain and edema over that time.  She has been wearing her compression socks intermittently without relief.      Objective:  B/P: Data Unavailable, T: Data Unavailable, P: Data Unavailable, R: Data Unavailable  RLE -+1 edema.  Some increased residual lateral varicosities and new medial varicosities    Assessment/plan:  This is a 75-year-old lady status post a right GSV ablation on Coumadin.  The ablation was successful with some initial improvement but she continues to have some distal symptoms.  The symptoms have worsened recently despite compression therapy.  She is a CEAP 3 in the right leg.  The plan at this time is to repeat her ultrasound and proceed based on those findings.  I suspect she may require stab phlebectomy and would have to be off Coumadin.  I discussed this with the patient daughter and they expressed understanding.  They tell me she has been off Coumadin for about a week for other procedures.  They will get it cleared up with their PCP as well should that be necessary.    Alex Renee MD, FACS      Again, thank you for allowing me to participate in the care of your patient.        Sincerely,        Alex Renee MD

## 2021-01-19 ENCOUNTER — OFFICE VISIT (OUTPATIENT)
Dept: VASCULAR SURGERY | Facility: CLINIC | Age: 76
End: 2021-01-19
Attending: SPECIALIST
Payer: MEDICARE

## 2021-01-19 ENCOUNTER — ANCILLARY PROCEDURE (OUTPATIENT)
Dept: ULTRASOUND IMAGING | Facility: CLINIC | Age: 76
End: 2021-01-19
Attending: SPECIALIST
Payer: MEDICARE

## 2021-01-19 ENCOUNTER — TELEPHONE (OUTPATIENT)
Dept: FAMILY MEDICINE | Facility: CLINIC | Age: 76
End: 2021-01-19

## 2021-01-19 DIAGNOSIS — I83.891 VARICOSE VEINS OF RIGHT LEG WITH EDEMA: Primary | ICD-10-CM

## 2021-01-19 DIAGNOSIS — I83.891 VARICOSE VEINS OF RIGHT LEG WITH EDEMA: ICD-10-CM

## 2021-01-19 DIAGNOSIS — I83.811 VARICOSE VEINS OF RIGHT LOWER EXTREMITY WITH PAIN: ICD-10-CM

## 2021-01-19 PROCEDURE — 93971 EXTREMITY STUDY: CPT | Mod: RT | Performed by: SPECIALIST

## 2021-01-19 PROCEDURE — 99213 OFFICE O/P EST LOW 20 MIN: CPT | Performed by: SPECIALIST

## 2021-01-19 NOTE — TELEPHONE ENCOUNTER
Reason for Call:  Other     Detailed comments: Daughter said that [facility] Dr Lucia needs a letter stating patient can stop warfin for five days to complete a procedure. Fax  and let daughter know when this has been faxed.     Phone Number   KATERIN NGO (DAUGHTER) [phone]         Best Time:     Can we leave a detailed message on this number? YES    Call taken on 1/19/2021 at 1:58 PM by Kiley Bower

## 2021-01-19 NOTE — LETTER
[facility]  [address] [place] [id number]  Phone: [phone]    January 20, 2021        Selvin Rockwell  [address] [place] [id number]          To whom it may concern:    RE: Selvin Rockwell    It is okay for the above individual to hold her warfarin for 5 days to complete a procedure.    Please contact me for questions or concerns.      Sincerely,        Gurinder Ho MD

## 2021-01-19 NOTE — LETTER
2021         RE: Selvin Rockwell  [address] [place] [address]        Dear Colleague,    Thank you for referring your patient, Selvin Rockwell, to the [facility] [place]. Please see a copy of my visit note below.    Follow-up for right GSV RF ablation    Subjective:  Patient is a 75-year-old lady status post a right GSV ablation last August.  She had some improvement up until about a month ago when she noticed some new veins on the lateral and medial aspect of her calf.  She reports increasing pain and edema over that time.  She has been wearing her compression socks intermittently without relief.    She had her US today for which she now follows up.    Objective:  B/P: Data Unavailable, T: Data Unavailable, P: Data Unavailable, R: Data Unavailable  RLE -+1 edema.  Some increased residual lateral varicosities and new medial varicosities    US -   Name:  Selvin Rockwell                                                     Patient ID: [id number]  Date: 2021                                                          : 1945  Sex: female                                                                 Examined by: PEYTON York RVT  Age:  75 year old                                                         Reading MD: Alex Renee MD     INDICATION:  Recurrent right leg varicose vein with pain s/p right GSV ablation.     EXAM TYPE  RIGHT LOWER EXTREMITY VENOUS DUPLEX FOR VENOUS INSUFFICIENCY  TECHNICAL SUMMARY     A duplex ultrasound study using color flow was performed, to evaluate the right lower extremity veins for valvular incompetence with the patient in a steep reversed trendelenberg.      RIGHT:     The deep veins demonstrate phasic flow, compress and respond to augmentations.  There is no reflux or DVT.       The GSV is closed 8 mm below the SFJ to the knee with thrombus seen throughout. The patent segments of the  GSV demonstrates  phasic flow, compresses and responds to augmentations with no evidence of reflux or thrombus. The greater saphenous vein measures 3.0 mm at the saphenofemoral junction and 2.0 mm distally. The GSV gives rise to a varicose branch measuring 2.4 mm off the Proximal Calf  that courses Anteromedial with a reflux time of 5609 milliseconds.       The AASV is incompetent ( 4.5 mm) draining into the saphenofemoral junction. The AASV is incompetent from the saphenofemoal junction to the proximal thigh with a reflux time of 2787 milliseconds. The AASV takes a straight course for 14 cm. The AASV gives rise to a varicose branch measuring 3.2 mm off the Proximal Thigh  that courses Anteromedial and anterolateral with a reflux time of 726 milliseconds.     The Giacomini vein is mildly incompetent ( 4.3 mm) communicating with the small saphenous vein at the knee level. The Giacomini vein is mildly incompetent at the distal thigh with a reflux time of 775 milliseconds.      The SSV demonstrates phasic flow, compresses and responds to augmentations from the popliteal space to the ankle.  No reflux or thrombus is seen. The saphenopopliteal junction is competent ( 5.1 mm).      Perforators: There is an incompetent  vein ( 3.1  mm) at lateral mid calf 21 cm from lateral mallelous that communicates with an incompetent varicose vein.     LEFT:     The CFV demonstrate phasic flow, compress and respond to augmentations.  There is no DVT.       FINAL SUMMARY:  1.         No evidence of right lower extremity deep vein thrombus.  2.         Segments of right greater saphenous vein are closed.  3.         Right accessory saphenous vein incompetence.  4.         Right giacomini vein mild incompetence.  5.         Right incompetent  vein.  6.         Right incompetent varicose veins.  7.         The time of incompetence is greater than 500 milliseconds in length            Assessment/plan:  This is a 75-year-old lady status  post a right GSV ablation on Coumadin.  The ablation was successful with some initial improvement but she continues to have some distal symptoms.  The symptoms have worsened recently despite compression therapy.  She is a CEAP 3 in the right leg.   Her repeat ultrasound revealed her GSV to be thrombosed however her AA SV is refluxing.  On her original ultrasound the ASV cannot be visualized but that was done at an outside facility.  She also has a  but that is too small to treat.  After discussion with the patient the plan at this time is doing RF ablation of her ASV and possible stab phlebectomies.  She will contact her PCP to see if she can be safely taken off the Coumadin to perform the stab phlebectomies.  The procedure, risks, benefits, and alternatives were discussed and the patient agrees to proceed.  She will be scheduled once insurance approval is obtained and there is clearance from her primary care provider.    Alex Renee MD, FACS      Again, thank you for allowing me to participate in the care of your patient.        Sincerely,        Alex Renee MD

## 2021-01-19 NOTE — PROGRESS NOTES
Follow-up for right GSV RF ablation    Subjective:  Patient is a 75-year-old lady status post a right GSV ablation last August.  She had some improvement up until about a month ago when she noticed some new veins on the lateral and medial aspect of her calf.  She reports increasing pain and edema over that time.  She has been wearing her compression socks intermittently without relief.    She had her US today for which she now follows up.    Objective:  B/P: Data Unavailable, T: Data Unavailable, P: Data Unavailable, R: Data Unavailable  RLE -+1 edema.  Some increased residual lateral varicosities and new medial varicosities    US -   Name:  Selvin Rockwell                                                     Patient ID: [id number]  Date: 2021                                                          : 1945  Sex: female                                                                 Examined by: PEYTON York RVT  Age:  75 year old                                                         Reading MD: Alex Renee MD     INDICATION:  Recurrent right leg varicose vein with pain s/p right GSV ablation.     EXAM TYPE  RIGHT LOWER EXTREMITY VENOUS DUPLEX FOR VENOUS INSUFFICIENCY  TECHNICAL SUMMARY     A duplex ultrasound study using color flow was performed, to evaluate the right lower extremity veins for valvular incompetence with the patient in a steep reversed trendelenberg.      RIGHT:     The deep veins demonstrate phasic flow, compress and respond to augmentations.  There is no reflux or DVT.       The GSV is closed 8 mm below the SFJ to the knee with thrombus seen throughout. The patent segments of the  GSV demonstrates phasic flow, compresses and responds to augmentations with no evidence of reflux or thrombus. The greater saphenous vein measures 3.0 mm at the saphenofemoral junction and 2.0 mm distally. The GSV gives rise to a varicose branch measuring 2.4 mm off the Proximal Calf  that  courses Anteromedial with a reflux time of 5609 milliseconds.       The AASV is incompetent ( 4.5 mm) draining into the saphenofemoral junction. The AASV is incompetent from the saphenofemoal junction to the proximal thigh with a reflux time of 2787 milliseconds. The AASV takes a straight course for 14 cm. The AASV gives rise to a varicose branch measuring 3.2 mm off the Proximal Thigh  that courses Anteromedial and anterolateral with a reflux time of 726 milliseconds.     The Giacomini vein is mildly incompetent ( 4.3 mm) communicating with the small saphenous vein at the knee level. The Giacomini vein is mildly incompetent at the distal thigh with a reflux time of 775 milliseconds.      The SSV demonstrates phasic flow, compresses and responds to augmentations from the popliteal space to the ankle.  No reflux or thrombus is seen. The saphenopopliteal junction is competent ( 5.1 mm).      Perforators: There is an incompetent  vein ( 3.1  mm) at lateral mid calf 21 cm from lateral mallelous that communicates with an incompetent varicose vein.     LEFT:     The CFV demonstrate phasic flow, compress and respond to augmentations.  There is no DVT.       FINAL SUMMARY:  1.         No evidence of right lower extremity deep vein thrombus.  2.         Segments of right greater saphenous vein are closed.  3.         Right accessory saphenous vein incompetence.  4.         Right giacomini vein mild incompetence.  5.         Right incompetent  vein.  6.         Right incompetent varicose veins.  7.         The time of incompetence is greater than 500 milliseconds in length            Assessment/plan:  This is a 75-year-old lady status post a right GSV ablation on Coumadin.  The ablation was successful with some initial improvement but she continues to have some distal symptoms.  The symptoms have worsened recently despite compression therapy.  She is a CEAP 3 in the right leg.   Her repeat ultrasound  revealed her GSV to be thrombosed however her AA SV is refluxing.  On her original ultrasound the ASV cannot be visualized but that was done at an outside facility.  She also has a  but that is too small to treat.  After discussion with the patient the plan at this time is doing RF ablation of her ASV and possible stab phlebectomies.  She will contact her PCP to see if she can be safely taken off the Coumadin to perform the stab phlebectomies.  The procedure, risks, benefits, and alternatives were discussed and the patient agrees to proceed.  She will be scheduled once insurance approval is obtained and there is clearance from her primary care provider.    Alex Renee MD, FACS

## 2021-01-20 ENCOUNTER — TELEPHONE (OUTPATIENT)
Dept: FAMILY MEDICINE | Facility: CLINIC | Age: 76
End: 2021-01-20

## 2021-01-20 NOTE — TELEPHONE ENCOUNTER
ANTICOAGULATION     Selvin Rockwell is overdue for INR check.      spoke with Kat who declined to schedule a lab appointment at this time. If calling back please schedule as soon as possible.    Chitra Morrison RN

## 2021-01-21 NOTE — TELEPHONE ENCOUNTER
Returned call to [facility]. Scheduled INR appt for pt on 1/28 @ 10:15 am.    Sergio Key RN

## 2021-01-27 DIAGNOSIS — I83.891 VARICOSE VEINS OF RIGHT LEG WITH EDEMA: Primary | ICD-10-CM

## 2021-01-28 ENCOUNTER — ANTICOAGULATION THERAPY VISIT (OUTPATIENT)
Dept: FAMILY MEDICINE | Facility: CLINIC | Age: 76
End: 2021-01-28

## 2021-01-28 DIAGNOSIS — I48.0 PAROXYSMAL ATRIAL FIBRILLATION (H): ICD-10-CM

## 2021-01-28 DIAGNOSIS — Z79.01 LONG TERM (CURRENT) USE OF ANTICOAGULANTS: ICD-10-CM

## 2021-01-28 LAB
CAPILLARY BLOOD COLLECTION: NORMAL
INR PPP: 2.4 (ref 0.86–1.14)

## 2021-01-28 PROCEDURE — 36416 COLLJ CAPILLARY BLOOD SPEC: CPT | Performed by: NURSE PRACTITIONER

## 2021-01-28 PROCEDURE — 85610 PROTHROMBIN TIME: CPT | Performed by: NURSE PRACTITIONER

## 2021-01-28 NOTE — PROGRESS NOTES
ANTICOAGULATION MANAGEMENT     Patient Name:  Selvin Rockwell  Date:  2021    ASSESSMENT /SUBJECTIVE:    Today's INR result of 2.40 is therapeutic. Goal INR of 2.0-3.0      Warfarin dose taken: Warfarin taken as instructed    Diet: No new diet changes affecting INR    Medication changes/ interactions: No new medications/supplements affecting INR    Previous INR: Therapeutic     S/S of bleeding or thromboembolism: No    New injury or illness: No    Upcoming surgery, procedure or cardioversion: No    Additional findings: None      PLAN:    Telephone call with  Kat regarding INR result and instructed:     Warfarin Dosing Instructions: Continue your current warfarin dose 2mg every Mon & Fri; 3mg all other days of the week.     Instructed patient to follow up no later than: 5 weeks  Lab visit scheduled    Education provided: None required      Kat verbalizes understanding and agrees to warfarin dosing plan.    Instructed to call the Anticoagulation Clinic for any changes, questions or concerns. (#[phone])        Sergio Key RN      OBJECTIVE:  Recent labs: (last 7 days)     21  1014   INR 2.40*         No question data found.  Anticoagulation Summary  As of 2021    INR goal:  2.0-3.0   TTR:  76.7 % (1 y)   INR used for dosin.40 (2021)   Warfarin maintenance plan:  2 mg (2 mg x 1) every Mon, Fri; 3 mg (2 mg x 1.5) all other days   Full warfarin instructions:  2 mg every Mon, Fri; 3 mg all other days   Weekly warfarin total:  19 mg   No change documented:  Green, Sergio, RN   Plan last modified:  Danielle Vera RN (2020)   Next INR check:  3/4/2021   Priority:  High   Target end date:  Indefinite    Indications    Paroxysmal atrial fibrillation (H) [I48.0]             Anticoagulation Episode Summary     INR check location:  Anticoagulation Clinic    Preferred lab:  [facility]    Send INR reminders to:  [facility]    Comments:  Transfer  from [facility] - Patient states has been on coumadin for 2-3 years      Anticoagulation Care Providers     Provider Role Specialty Phone number    Gurinder Ho MD Referring Family Medicine [phone]

## 2021-02-22 ENCOUNTER — TELEPHONE (OUTPATIENT)
Dept: VASCULAR SURGERY | Facility: CLINIC | Age: 76
End: 2021-02-22

## 2021-02-22 ENCOUNTER — TELEPHONE (OUTPATIENT)
Dept: FAMILY MEDICINE | Facility: CLINIC | Age: 76
End: 2021-02-22

## 2021-02-22 NOTE — TELEPHONE ENCOUNTER
Patient has a procedure scheduled at the [facility]in Clinic on 3/3/21.  They have orders already to hold warfarin 5 days before.  When should patient start taking it again.    Routed to please advise.    Stefani WANG-RN  Triage Nurse  [facility]: [facility]

## 2021-02-22 NOTE — CONFIDENTIAL NOTE
Checking with PCP clinic as to when patient can go back on her warfarin. Nursing staff will verify with MD and call back.

## 2021-02-22 NOTE — TELEPHONE ENCOUNTER
Leola would recommend starting warfarin with a boost in dose that same day of surgery if ok with vascular surgeon.  Leola Velasco Rn  [facility]

## 2021-02-24 ENCOUNTER — TELEPHONE (OUTPATIENT)
Dept: VASCULAR SURGERY | Facility: CLINIC | Age: 76
End: 2021-02-24

## 2021-02-24 DIAGNOSIS — I83.891 VARICOSE VEINS OF RIGHT LEG WITH EDEMA: Primary | ICD-10-CM

## 2021-02-24 RX ORDER — LORAZEPAM 1 MG/1
TABLET ORAL
Qty: 2 TABLET | Refills: 0 | Status: SHIPPED | OUTPATIENT
Start: 2021-02-24 | End: 2021-04-06

## 2021-02-24 RX ORDER — CLONIDINE HYDROCHLORIDE 0.1 MG/1
TABLET ORAL
Qty: 1 TABLET | Refills: 0 | Status: SHIPPED | OUTPATIENT
Start: 2021-02-24 | End: 2021-04-06

## 2021-02-24 RX ORDER — AMOXICILLIN 500 MG/1
CAPSULE ORAL
Qty: 4 CAPSULE | Refills: 0 | Status: SHIPPED | OUTPATIENT
Start: 2021-02-24 | End: 2022-01-13

## 2021-02-24 NOTE — TELEPHONE ENCOUNTER
Vein Clinic Preoperative Nurse Call    Procedure: Right leg VNUS closure of ASV (med nec) and 10-20 phlebectomies (med nec).   Date: 3/3/2021  Surgeon: Dr Renee  Time: 1330  Check in time: 1230    Called and left detailed message. Informed patient: when to check in (1230) to sign consent, to bring their preop medications in their original bottle with them (2mg ativan, 0.1mg clonidine, amoxicillin 500mg (4 tabs)). Patient will take the medications after signing the consent to the procedure. Instructed patient to wear a mask, wear loose-fitting comfortable clothing, and bring their compression hose. Ensured patient has a /someone that will be responsible for them the rest of the day. No visitors are allowed in the clinic, so  can either stay in the parking lot or leave. If  does leave, IF POSSIBLE, we ask that they do not go more than 15-20 mins from our clinic. Once procedure is completed, we will keep patient in recovery for 30-45 mins, and call  with aftercare instructions. Informed patient, that if possible, they should sit in the backseat to elevate their leg on the ride home.    Pt needs thigh-high compression hose for procedure. Status of the hose: patient If pt has knee high compression hose we can wrap with ace wrap..    Special instructions: STOP Warfarin for 5 days prior to procedure (last dose 2/25/21). Call Anticoagulation clinic on when to resume Warfarin after procedure.     Special COVID-19 instructions: Patient aware they need to wear a mask to our clinic and during their procedure. Patient aware that their  cannot come into the clinic and must wait in car. Nurse will call pt's  when procedure is over.    Patient understands that if they have any of the following symptoms (fever, cough, shortness of breath, rash), they need to notify us immediately to cancel their procedure and will have to reschedule for a later date.    Patient to call with any further  questions.    Kenneth Jiang, SERA  2/24/2021

## 2021-02-25 ENCOUNTER — TELEPHONE (OUTPATIENT)
Dept: FAMILY MEDICINE | Facility: CLINIC | Age: 76
End: 2021-02-25

## 2021-02-25 DIAGNOSIS — I48.0 PAROXYSMAL ATRIAL FIBRILLATION (H): ICD-10-CM

## 2021-02-25 NOTE — TELEPHONE ENCOUNTER
Called pt daughter, Kat, updated her on warfarin hold plan as outlined by PharmD.   Kat verbalized understanding and reback information to RN.     INR appt on 3/4 was rescheduled to 3/10/21 to follow up within 5-7 following restart of warfarin.     ACC number given to daughter, informed that she can contact us should as questions arise.     Sergio Key RN

## 2021-02-25 NOTE — TELEPHONE ENCOUNTER
Daughter Alicia calling to inquire when Gem should re-start warfarin following R leg vein procedure on 03/03 with Dr. Renee. Per chart review, TE encounter from 02/22, ACC RN suggested restarting warfarin with a boost dose the night of the procedure if ok with vascular surgeon.    Routing to [facility] for formal bridge assesment to ensure patient does not need to bridge.

## 2021-02-25 NOTE — TELEPHONE ENCOUNTER
Daughter Kat called, reviewed pre procedure information left on voicemail. Pt will take last dose of Warfarin tonight and stop for 5 days (until procedure). She verbalized understanding. Discussed with daughter calling anticoagulation clinic regarding when to resume Warfarin following procedure. She states she is having a difficult time reaching anyone at the clinic. Gave aKt   (#[phone]) for a direct line to a anticoagulation nurse. Kat was told to call back if she was unable to reach anyone. She verbalized understanding and had no further questions.      Kenneth Jiang RN

## 2021-02-26 NOTE — PROGRESS NOTES
[facility]                     Post-Op/Re-Wrap Patient Notes    Date: 2020  Patient: Selvin Rockwell  : 1945  MRN: [id number]    History:    2020   Visit Type: Re-Wrap   Surgeon: Alex Renee   Patient called with Post-Op Problem: No   Surgery Date: 2020       VS POST OP HISTORY 2020   History: Patient is ambulatory;Patient has no complaints and indicates he/she is doing fine;Patient expressed some difficulties with:         On 4-6 week Post-Op Check:   No flowsheet data found.    Physical Examination:  VS POST OP PHYSICAL EXAM 2020   Side Right   Physical Exam: Incisions are approximated without signs of infection;Paresthesia;Swelling   Swelling Minimal       Comments/Notes: Rt GSV closed flush to SFJ. Patient is on coumadin 2 mg, per Dr. Miller, [place] for patient to continue coumadin as prescribed. No follow up needed. Patient states some numbness in toes of right leg. Educated patient numbness will get better with time, but may take 3 months to a year to resolve.     Reinforce education on wrapping ACE bandage to daughter and patient at visit. Post op instructions reviewed with patient and questions answered. Scheduled patient for a 6 weeks follow up appointment with Dr. Renee.       Breezy Howard RN    
normal balance

## 2021-03-03 ENCOUNTER — OFFICE VISIT (OUTPATIENT)
Dept: VASCULAR SURGERY | Facility: CLINIC | Age: 76
End: 2021-03-03
Payer: MEDICARE

## 2021-03-03 VITALS — SYSTOLIC BLOOD PRESSURE: 111 MMHG | OXYGEN SATURATION: 97 % | HEART RATE: 79 BPM | DIASTOLIC BLOOD PRESSURE: 83 MMHG

## 2021-03-03 DIAGNOSIS — G89.18 ACUTE POST-OPERATIVE PAIN: Primary | ICD-10-CM

## 2021-03-03 DIAGNOSIS — I83.811 VARICOSE VEINS OF RIGHT LOWER EXTREMITY WITH PAIN: ICD-10-CM

## 2021-03-03 PROCEDURE — 36475 ENDOVENOUS RF 1ST VEIN: CPT | Mod: RT | Performed by: SPECIALIST

## 2021-03-03 PROCEDURE — 37765 STAB PHLEB VEINS XTR 10-20: CPT | Mod: RT | Performed by: SPECIALIST

## 2021-03-03 RX ORDER — HYDROCODONE BITARTRATE AND ACETAMINOPHEN 5; 325 MG/1; MG/1
1-2 TABLET ORAL EVERY 6 HOURS PRN
Qty: 10 TABLET | Refills: 0 | Status: SHIPPED | OUTPATIENT
Start: 2021-03-03 | End: 2022-01-13

## 2021-03-03 NOTE — PROGRESS NOTES
[facility] Vein Clinic Procedure Note    Selvin Rockwell  March 3, 2021    Selvin Rockwell is a 75 year old year old female. She presents for Vein Procedure  .    /83   Pulse 79   SpO2 97%     Flowsheet Data 3/3/2021   Procedure Start Time:  1:36 PM   Prep: Chloraprep   Side: Right   Tx Length (cm): RIGHT ASV:12   Junction (cm): RIGHT ASV: no junction   RF Cycles: RIGHT ASV: 4   RF TX Time (Minutes): RIGHT ASV: 1:20   # PHLEB Sites: 16   Sedation taken: Yes   Pre Pt. Physical / Cognitive Limitations: WNL   TOTAL Local anesthesia Injected (ml): 7   Max Volume Local Anesthesia (ml): 11   TOTAL Tumescent Injected volume (ml): 175   Max Volume Tumescent (ml): 572   Post Pt. Physical / Cognitive Limitations: WNL   Procedure End Time:  2:22 PM   D/C Instructions given, states readiness to leave and escorted to car: Yes       Venus Closure    Date/Time: 3/3/2021 2:39 PM  Performed by: Alex Renee MD  Authorized by: Alex Renee MD     1st Assist:  Bonnie Baldwin CST/IZABELA  Circulator:  Kenneth Jiang RN  Procedure:  VNUS  Procedure side:  Right  One Vein    Vein Treated:  ASV  Wrap/Hose:  Wraps  Phlebectomy    Date/Time: 3/3/2021 2:40 PM  Performed by: Alex Renee MD  Authorized by: Alex Renee MD     1st Assist:  Bonnie Baldwin CST/IZABELA  Circulator:  Kenneth Jiang RN  Procedure:  Phlebectomies  Type:  Medically Necessary  Procedure side:  Right  Stabs:  10-20  Patient tolerance:  Patient tolerated the procedure well with no immediate complications  Wrap/Hose:  Wraps        Details of procedure:  With the cooperation of the patient in the preop holding of the right lower extremities marked as well as the areas for stab phlebectomy.  She was taken the procedure room and the right lower extremities prepped and draped in sterile fashion.  A timeout is performed confirm the day the patient also procedure be performed.  We mapped the excess accessory saphenous vein over the  most of the thigh but the junction with the greater saphenous vein could not be identified due to the patient's obesity and pannus.  We did see a large feeding varicosity come off and was elected to treat at from that point proximally also ensuring we were well away from the deep system.  The anterior sensory saphenous vein was accessed with a micropuncture needle in the mid thigh and a 7 Japanese sheath was inserted.  The catheter was then passed up the vein to an area we felt was safe but above a large varicosity coming off the vein.  Tumescent solution was placed around the vein.  Radiofrequency ablation was then carried out in 7 cm segments.  This was done for treatment length of 12 cm.  Next the areas marked for staff back to me on the lateral capsule infiltrated local anesthetic.  After adequate analgesia was achieved multiple stabs were made using ophthalmic blade.  The pieces of vein were removed using a kait hook and mosquito clamps.  This was done for a total of 16 stabs.  Sterile dressings and wraps were then applied and the patient taken from the procedure back to the holding her in stable condition to be sent home.    Alex Renee MD, FACS

## 2021-03-03 NOTE — LETTER
3/3/2021         RE: Selvin Rockwell  [address] [place] [address]        Dear Colleague,    Thank you for referring your patient, Selvin Rockwell, to the [facility] VEIN CLINIC [place]. Please see a copy of my visit note below.    Pre-procedure Nursing Note    Selvin Rockwell presents to clinic for Vein Procedure  .   /Person Responsible for Patient: Alicia ([facility])  Phone Number: [phone]    Prophylactic Medication:Antibiotics, Amoxicillin 2 gm,   Time Taken: 1246   Sedation Medication: Clonidine, 0.1mg,   Time Taken: 1246  Compression Stockings: Hose at home  The procedure is being performed on RLE.  Patient understanding of procedure matches consent? YES    Patient's pre-procedure medications verified by Kenneth Jiang RN  .    Kenneth Jiang RN on 3/3/2021 at 12:53 PM      [facility] Vein Clinic Procedure Note    Selvin Rockwell  March 3, 2021    Selvin Rockwell is a 75 year old year old female. She presents for Vein Procedure  .    /83   Pulse 79   SpO2 97%     Flowsheet Data 3/3/2021   Procedure Start Time:  1:36 PM   Prep: Chloraprep   Side: Right   Tx Length (cm): RIGHT ASV:12   Junction (cm): RIGHT ASV: no junction   RF Cycles: RIGHT ASV: 4   RF TX Time (Minutes): RIGHT ASV: 1:20   # PHLEB Sites: 16   Sedation taken: Yes   Pre Pt. Physical / Cognitive Limitations: WNL   TOTAL Local anesthesia Injected (ml): 7   Max Volume Local Anesthesia (ml): 11   TOTAL Tumescent Injected volume (ml): 175   Max Volume Tumescent (ml): 572   Post Pt. Physical / Cognitive Limitations: WNL   Procedure End Time:  2:22 PM   D/C Instructions given, states readiness to leave and escorted to car: Yes       Venus Closure    Date/Time: 3/3/2021 2:39 PM  Performed by: Alex Renee MD  Authorized by: Alxe Renee MD     1st Assist:  Bonnie Baldwin, CST/CSFA  Circulator:  Kenneth Jiang RN  Procedure:  VNUS  Procedure side:  Right  One Vein     Vein Treated:  ASV  Wrap/Hose:  Wraps  Phlebectomy    Date/Time: 3/3/2021 2:40 PM  Performed by: Alex Renee MD  Authorized by: Alex Renee MD     1st Assist:  Bonnie Baldwin, CST/CSFA  Circulator:  Kenneth Jiang RN  Procedure:  Phlebectomies  Type:  Medically Necessary  Procedure side:  Right  Stabs:  10-20  Patient tolerance:  Patient tolerated the procedure well with no immediate complications  Wrap/Hose:  Wraps        Details of procedure:  With the cooperation of the patient in the preop holding of the right lower extremities marked as well as the areas for stab phlebectomy.  She was taken the procedure room and the right lower extremities prepped and draped in sterile fashion.  A timeout is performed confirm the day the patient also procedure be performed.  We mapped the excess accessory saphenous vein over the most of the thigh but the junction with the greater saphenous vein could not be identified due to the patient's obesity and pannus.  We did see a large feeding varicosity come off and was elected to treat at from that point proximally also ensuring we were well away from the deep system.  The anterior sensory saphenous vein was accessed with a micropuncture needle in the mid thigh and a 7 Bruneian sheath was inserted.  The catheter was then passed up the vein to an area we felt was safe but above a large varicosity coming off the vein.  Tumescent solution was placed around the vein.  Radiofrequency ablation was then carried out in 7 cm segments.  This was done for treatment length of 12 cm.  Next the areas marked for staff back to me on the lateral capsule infiltrated local anesthetic.  After adequate analgesia was achieved multiple stabs were made using ophthalmic blade.  The pieces of vein were removed using a kait hook and mosquito clamps.  This was done for a total of 16 stabs.  Sterile dressings and wraps were then applied and the patient taken from the procedure back to the  holding her in stable condition to be sent home.    Alex Renee MD, FACS      Again, thank you for allowing me to participate in the care of your patient.        Sincerely,        Alex Renee MD

## 2021-03-03 NOTE — PROGRESS NOTES
Pre-procedure Nursing Note    Selvin Rockwell presents to clinic for Vein Procedure  .   /Person Responsible for Patient: Alicia ([facility])  Phone Number: [phone]    Prophylactic Medication:Antibiotics, Amoxicillin 2 gm,   Time Taken: 1246   Sedation Medication: Clonidine, 0.1mg,   Time Taken: 1246  Compression Stockings: Hose at home  The procedure is being performed on RLE.  Patient understanding of procedure matches consent? YES    Patient's pre-procedure medications verified by Kenneth Jiang RN  .    Kenneth Jiang RN on 3/3/2021 at 12:53 PM

## 2021-03-05 ENCOUNTER — ANCILLARY PROCEDURE (OUTPATIENT)
Dept: ULTRASOUND IMAGING | Facility: CLINIC | Age: 76
End: 2021-03-05
Attending: SPECIALIST
Payer: MEDICARE

## 2021-03-05 ENCOUNTER — OFFICE VISIT (OUTPATIENT)
Dept: VASCULAR SURGERY | Facility: CLINIC | Age: 76
End: 2021-03-05
Attending: SPECIALIST
Payer: MEDICARE

## 2021-03-05 DIAGNOSIS — I83.891 VARICOSE VEINS OF RIGHT LEG WITH EDEMA: ICD-10-CM

## 2021-03-05 DIAGNOSIS — I83.811 VARICOSE VEINS OF RIGHT LOWER EXTREMITY WITH PAIN: Primary | ICD-10-CM

## 2021-03-05 PROCEDURE — 93971 EXTREMITY STUDY: CPT | Mod: RT | Performed by: SPECIALIST

## 2021-03-05 PROCEDURE — 99207 PR NO CHARGE NURSE ONLY: CPT

## 2021-03-05 NOTE — LETTER
3/5/2021         RE: Selvin Rockwell  [address] [place] [address]        Dear Colleague,    Thank you for referring your patient, Selvin Rockwell, to the [facility] VEIN CLINIC [place]. Please see a copy of my visit note below.        Vein Clinic Postoperative Nurse Note    Patient is here for their 72 hour post op, Right leg VNUS closure of ASV (med nec) and 10-20 phlebectomies (med nec) on 3/3/2021 with RJK. postoperative visit.    Procedure: 72 hour post op, Right leg VNUS closure of ASV (med nec) and 10-20 phlebectomies (med nec) on 3/3/2021 with RJK.    Surgeon: Dr. Renee    Ultrasound Result: The AASV is closed 13 mm from the SFJ.    Physical Exam: Incisions are approximated without signs of infection.  Ecchymosis: Minimal  Swelling: Minimal  Paresthesia: Denies  Current pain: 0/10    Patient Questions or Concerns: Daughter states patient has noticed significant relief in lower abdominal discomfort since the procedure. Instruct patient to follow up with the anticoagulant clinic on direction to resume warfarin. Acknowledges understanding.     Reviewed postoperative instructions with patient and provided them with written material of common things to expect from their procedure.     Patient's Next Vein Clinic Appointment: 4/20/21, 11am with Dr. Renee.     Breezy Howard RN      Again, thank you for allowing me to participate in the care of your patient.        Sincerely,        VIDEO,

## 2021-03-08 NOTE — PROGRESS NOTES
Vein Clinic Postoperative Nurse Note    Patient is here for their 72 hour post op, Right leg VNUS closure of ASV (med nec) and 10-20 phlebectomies (med nec) on 3/3/2021 with BOUCHRA. postoperative visit.    Procedure: 72 hour post op, Right leg VNUS closure of ASV (med nec) and 10-20 phlebectomies (med nec) on 3/3/2021 with BOUCHRA.    Surgeon: Dr. Renee    Ultrasound Result: The AASV is closed 13 mm from the SFJ.    Physical Exam: Incisions are approximated without signs of infection.  Ecchymosis: Minimal  Swelling: Minimal  Paresthesia: Denies  Current pain: 0/10    Patient Questions or Concerns: Daughter states patient has noticed significant relief in lower abdominal discomfort since the procedure. Instruct patient to follow up with the anticoagulant clinic on direction to resume warfarin. Acknowledges understanding.     Reviewed postoperative instructions with patient and provided them with written material of common things to expect from their procedure.     Patient's Next Vein Clinic Appointment: 4/20/21, 11am with Dr. Renee.     Breezy Howard RN

## 2021-03-10 ENCOUNTER — ANTICOAGULATION THERAPY VISIT (OUTPATIENT)
Dept: FAMILY MEDICINE | Facility: CLINIC | Age: 76
End: 2021-03-10

## 2021-03-10 DIAGNOSIS — I48.0 PAROXYSMAL ATRIAL FIBRILLATION (H): ICD-10-CM

## 2021-03-10 DIAGNOSIS — Z79.01 LONG TERM (CURRENT) USE OF ANTICOAGULANTS: ICD-10-CM

## 2021-03-10 LAB
CAPILLARY BLOOD COLLECTION: NORMAL
INR PPP: 1.9 (ref 0.86–1.14)

## 2021-03-10 PROCEDURE — 36416 COLLJ CAPILLARY BLOOD SPEC: CPT | Performed by: NURSE PRACTITIONER

## 2021-03-10 PROCEDURE — 85610 PROTHROMBIN TIME: CPT | Performed by: NURSE PRACTITIONER

## 2021-03-10 NOTE — PROGRESS NOTES
ANTICOAGULATION MANAGEMENT     Patient Name:  Selvin Rockwell  Date:  3/10/2021    ASSESSMENT /SUBJECTIVE:    Today's INR result of 1.90 is subtherapeutic. Goal INR of 2.0-3.0      Warfarin dose taken: Warfarin taken as instructed    Diet: No new diet changes affecting INR    Medication changes/ interactions: No new medications/supplements affecting INR    Previous INR: Therapeutic     S/S of bleeding or thromboembolism: No    New injury or illness: No    Upcoming surgery, procedure or cardioversion: No    Additional findings: Pt had recent hold due to procedure.      PLAN:    Telephone call with  Kat regarding INR result and instructed:     Warfarin Dosing Instructions: Continue your current warfarin dose 2mg every Mon & Fri; 3mg all other days of the week.     Instructed patient to follow up no later than: 2 weeks  Lab visit scheduled    Education provided: Target INR goal and significance of current INR result and Importance of therapeutic range      Kat verbalizes understanding and agrees to warfarin dosing plan.    Instructed to call the Anticoagulation Clinic for any changes, questions or concerns. (#[phone])        Sergio Key RN      OBJECTIVE:  Recent labs: (last 7 days)     03/10/21  1021   INR 1.90*         No question data found.  Anticoagulation Summary  As of 3/10/2021    INR goal:  2.0-3.0   TTR:  74.4 % (1 y)   INR used for dosing:     Plan last modified:  Danielle Vera RN (12/7/2020)   Next INR check:     Target end date:  Indefinite    Indications    Paroxysmal atrial fibrillation (H) [I48.0]             Anticoagulation Episode Summary     INR check location:  Anticoagulation Clinic    Preferred lab:      Send INR reminders to:  JERRY PEÑA    Comments:  [facility] Patient states has been on coumadin for 2-3 years      Anticoagulation Care Providers     Provider Role Specialty Phone number    Gurinder Ho MD Referring Family Medicine [phone]

## 2021-03-11 ENCOUNTER — TELEPHONE (OUTPATIENT)
Dept: VASCULAR SURGERY | Facility: CLINIC | Age: 76
End: 2021-03-11
Payer: MEDICARE

## 2021-03-11 NOTE — TELEPHONE ENCOUNTER
Call daughter Dana at [phone]. Mothers bubble on rt. Leg has gotten bigger swollen and red.VNUS closure and phlebs done 3/3/21 by Dr. Renee . thanks

## 2021-03-11 NOTE — TELEPHONE ENCOUNTER
Alicia (daughter) called back. She states her mom (Gem) right anterior calf has bruised area that is a raised lump area the size of a half dollar coin. She states she has some phlebectomy sites near this area also. The calf area is not painful, but  bruised and a  little red. Told patient this could be a process of the healing. Asked if they can send a picture of the area.She continues to wear her knee high compression hose, she is elevating it when she can, she will try ice or heat if needed for comfort. She is instructed to call if area looks infected, red streaks, increased pain or concerns.

## 2021-03-16 NOTE — TELEPHONE ENCOUNTER
Pt sent photos- called an left message for daughter (Alicia) that site does not look infected and to continue to observe, wear compression  Hose, elevate when possible and heat or ice for comfort.. Pt or daughter to call with any further questions or concerns.

## 2021-03-24 ENCOUNTER — ANTICOAGULATION THERAPY VISIT (OUTPATIENT)
Dept: FAMILY MEDICINE | Facility: CLINIC | Age: 76
End: 2021-03-24

## 2021-03-24 DIAGNOSIS — Z79.01 LONG TERM (CURRENT) USE OF ANTICOAGULANTS: ICD-10-CM

## 2021-03-24 DIAGNOSIS — I48.0 PAROXYSMAL ATRIAL FIBRILLATION (H): ICD-10-CM

## 2021-03-24 LAB
CAPILLARY BLOOD COLLECTION: NORMAL
INR PPP: 2 (ref 0.86–1.14)

## 2021-03-24 PROCEDURE — 36416 COLLJ CAPILLARY BLOOD SPEC: CPT | Performed by: NURSE PRACTITIONER

## 2021-03-24 PROCEDURE — 85610 PROTHROMBIN TIME: CPT | Performed by: NURSE PRACTITIONER

## 2021-03-24 NOTE — PROGRESS NOTES
ANTICOAGULATION MANAGEMENT     Patient Name:  Selvin Rockwell  Date:  3/24/2021    ASSESSMENT /SUBJECTIVE:    Today's INR result of 2.00 is therapeutic. Goal INR of 2.0-3.0      Warfarin dose taken: Warfarin taken as instructed    Diet: No new diet changes affecting INR    Medication changes/ interactions: No new medications/supplements affecting INR    Previous INR: Subtherapeutic     S/S of bleeding or thromboembolism: No    New injury or illness: No    Upcoming surgery, procedure or cardioversion: No    Additional findings: None      PLAN:    Telephone call with  Kat regarding INR result and instructed:     Warfarin Dosing Instructions: Continue your current warfarin dose 2mg every Mon & Fri; 3mg all other days of the week.     Instructed patient to follow up no later than: 3 weeks  Lab visit scheduled    Education provided: Target INR goal and significance of current INR result and Importance of therapeutic range      Kat verbalizes understanding and agrees to warfarin dosing plan.    Instructed to call the Anticoagulation Clinic for any changes, questions or concerns. (#[phone])        Sergio Key RN      OBJECTIVE:  Recent labs: (last 7 days)     03/24/21  1129   INR 2.00*         No question data found.  Anticoagulation Summary  As of 3/24/2021    INR goal:  2.0-3.0   TTR:  70.6 % (1 y)   INR used for dosing:     Warfarin maintenance plan:  2 mg (2 mg x 1) every Mon, Fri; 3 mg (2 mg x 1.5) all other days   Full warfarin instructions:  2 mg every Mon, Fri; 3 mg all other days   Weekly warfarin total:  19 mg   Plan last modified:  Danielle Vera RN (12/7/2020)   Next INR check:     Target end date:  Indefinite    Indications    Paroxysmal atrial fibrillation (H) [I48.0]             Anticoagulation Episode Summary     INR check location:  Anticoagulation Clinic    Preferred lab:      Send INR reminders to:  JERRY PEÑA    Comments:  Transfer from [facility] has  been on coumadin for 2-3 years      Anticoagulation Care Providers     Provider Role Specialty Phone number    Gurinder Ho MD Referring Family Medicine [phone]

## 2021-03-27 DIAGNOSIS — Z79.01 LONG TERM (CURRENT) USE OF ANTICOAGULANTS: ICD-10-CM

## 2021-03-27 DIAGNOSIS — I48.0 PAROXYSMAL ATRIAL FIBRILLATION (H): ICD-10-CM

## 2021-03-29 RX ORDER — WARFARIN SODIUM 2 MG/1
TABLET ORAL
Qty: 120 TABLET | Refills: 0 | Status: SHIPPED | OUTPATIENT
Start: 2021-03-29 | End: 2021-06-28

## 2021-04-05 ENCOUNTER — TELEPHONE (OUTPATIENT)
Dept: SURGERY | Facility: OTHER | Age: 76
End: 2021-04-05

## 2021-04-05 NOTE — CONFIDENTIAL NOTE
Writer called back, unable to locate photos sent. Writer looked at prior message, inquire on area of discomfort.

## 2021-04-05 NOTE — TELEPHONE ENCOUNTER
Spoke with Dr Renee he recommends she be seen for this. He states she can call [facility] for an appt today. [phone]. Daughter Alicia will call and make an appt. If pt is unable to make appt today in [place] to call writer back. Dr Renee stressed he wants her seen. Alicia (daughter) verbalized understanding.

## 2021-04-06 ENCOUNTER — OFFICE VISIT (OUTPATIENT)
Dept: VASCULAR SURGERY | Facility: CLINIC | Age: 76
End: 2021-04-06
Payer: MEDICARE

## 2021-04-06 DIAGNOSIS — M96.843 POSTOPERATIVE SEROMA OF MUSCULOSKELETAL STRUCTURE AFTER NON-MUSCULOSKELETAL PROCEDURE: Primary | ICD-10-CM

## 2021-04-06 PROCEDURE — 99024 POSTOP FOLLOW-UP VISIT: CPT | Performed by: SPECIALIST

## 2021-04-06 NOTE — LETTER
4/6/2021         RE: Selvin Rockwell  [address] [place] [address]        Dear Colleague,    Thank you for referring your patient, Selvin Rockwell, to the [facility] VEIN CLINIC [place]. Please see a copy of my visit note below.    F/U for Rt AASV and phlebs    Subjective:  Patient feels good and her preop symptoms have resolved.  However, she developed a lump on the lateral aspect of his leg that started about 2 weeks ago.  Its been slowly enlarging.  It does cause her some pain now she presents to have it evaluated.          Objective:  B/P: Data Unavailable, T: Data Unavailable, P: Data Unavailable, R: Data Unavailable  Right lower extremity: 3 x 3 x 3 cm fluctuant area over old stab phlebectomy site.  15 cc of serous fluid were sterilely aspirated.  Pressure dressing applied.        Assessment/plan:  This is a 75 lady status post a right AAS V RF ablation with medically necessary stab phlebectomy.  She developed a postoperative seroma in the lateral calf.  This was aspirated today in clinic with resolution of her symptoms.  She will continue to wash the area daily soap and water and keep a pressure dressing over it as well as continue her compression socks.  She will follow-up with me on the 20th as scheduled.  She knows to be seen sooner should problems develop.    Alex Renee MD, FACS      Again, thank you for allowing me to participate in the care of your patient.        Sincerely,        Alex Renee MD

## 2021-04-06 NOTE — PROGRESS NOTES
F/U for Rt AASV and phlebs    Subjective:  Patient feels good and her preop symptoms have resolved.  However, she developed a lump on the lateral aspect of his leg that started about 2 weeks ago.  Its been slowly enlarging.  It does cause her some pain now she presents to have it evaluated.          Objective:  B/P: Data Unavailable, T: Data Unavailable, P: Data Unavailable, R: Data Unavailable  Right lower extremity: 3 x 3 x 3 cm fluctuant area over old stab phlebectomy site.  15 cc of serous fluid were sterilely aspirated.  Pressure dressing applied.        Assessment/plan:  This is a 75 lady status post a right AAS V RF ablation with medically necessary stab phlebectomy.  She developed a postoperative seroma in the lateral calf.  This was aspirated today in clinic with resolution of her symptoms.  She will continue to wash the area daily soap and water and keep a pressure dressing over it as well as continue her compression socks.  She will follow-up with me on the 20th as scheduled.  She knows to be seen sooner should problems develop.    Alex Renee MD, FACS

## 2021-04-13 DIAGNOSIS — K21.9 GASTROESOPHAGEAL REFLUX DISEASE, UNSPECIFIED WHETHER ESOPHAGITIS PRESENT: Primary | ICD-10-CM

## 2021-04-13 RX ORDER — OMEPRAZOLE 40 MG/1
40 CAPSULE, DELAYED RELEASE ORAL DAILY
Qty: 90 CAPSULE | Refills: 1 | Status: SHIPPED | OUTPATIENT
Start: 2021-04-13 | End: 2021-10-07

## 2021-04-13 NOTE — TELEPHONE ENCOUNTER
Daughter Alicia (has consent) states pt is out of Rx and pharmacy has tried requesting Rx 3x. Please contact if there are further questions/concerns.    Shruthi Whitley on 4/13/2021 at 10:30 AM

## 2021-04-15 ENCOUNTER — ANTICOAGULATION THERAPY VISIT (OUTPATIENT)
Dept: FAMILY MEDICINE | Facility: CLINIC | Age: 76
End: 2021-04-15

## 2021-04-15 DIAGNOSIS — Z79.01 LONG TERM (CURRENT) USE OF ANTICOAGULANTS: ICD-10-CM

## 2021-04-15 DIAGNOSIS — I48.0 PAROXYSMAL ATRIAL FIBRILLATION (H): ICD-10-CM

## 2021-04-15 LAB
CAPILLARY BLOOD COLLECTION: NORMAL
INR PPP: 1.9 (ref 0.86–1.14)

## 2021-04-15 PROCEDURE — 36416 COLLJ CAPILLARY BLOOD SPEC: CPT | Performed by: NURSE PRACTITIONER

## 2021-04-15 PROCEDURE — 85610 PROTHROMBIN TIME: CPT | Performed by: NURSE PRACTITIONER

## 2021-04-15 PROCEDURE — 99207 PR NO CHARGE NURSE ONLY: CPT | Performed by: FAMILY MEDICINE

## 2021-04-15 NOTE — PROGRESS NOTES
ANTICOAGULATION FOLLOW-UP CLINIC VISIT    Patient Name:  Selvin Rockwell  Date:  4/15/2021  Contact Type:  Telephone    SUBJECTIVE:  Patient Findings     Positives:  Missed doses (missed a 3 mg dose)        Clinical Outcomes     Negatives:  Major bleeding event, Thromboembolic event, Anticoagulation-related hospital admission, Anticoagulation-related ED visit, Anticoagulation-related fatality           OBJECTIVE    Recent labs: (last 7 days)     04/15/21  1011   INR 1.90*       ASSESSMENT / PLAN  INR assessment SUB    Recheck INR In: 2 WEEKS    INR Location Clinic      Anticoagulation Summary  As of 4/15/2021    INR goal:  2.0-3.0   TTR:  64.6 % (1 y)   INR used for dosin.90 (4/15/2021)   Warfarin maintenance plan:  2 mg (2 mg x 1) every Mon, Fri; 3 mg (2 mg x 1.5) all other days   Full warfarin instructions:  4/15: 6 mg; Otherwise 2 mg every Mon, Fri; 3 mg all other days   Weekly warfarin total:  19 mg   Plan last modified:  Danielle Vera RN (4/15/2021)   Next INR check:  2021   Priority:  High   Target end date:  Indefinite    Indications    Paroxysmal atrial fibrillation (H) [I48.0]             Anticoagulation Episode Summary     INR check location:  Anticoagulation Clinic    Preferred lab:      Send INR reminders to:  JERRY PEÑA    Comments:  Transfer from [facility] - Patient states has been on coumadin for 2-3 years      Anticoagulation Care Providers     Provider Role Specialty Phone number    Gurinder Ho MD Referring Family Medicine [phone]            See the Encounter Report to view Anticoagulation Flowsheet and Dosing Calendar (Go to Encounters tab in chart review, and find the Anticoagulation Therapy Visit)    Some signs and symptoms of clots include: pain or tenderness in arm or leg, swelling in arm or leg, changes in skin color, or area is warm to touch, shortness or breath, trouble breathing.  Numbness or weakness especially on 1 side of the body, sudden trouble  speaking or swallowing, sudden trouble seeing, sudden confusion, dizzy spells or headache.  If you have these please call 911 or seek medical care immediately.      Danielle Vera RN

## 2021-04-20 ENCOUNTER — OFFICE VISIT (OUTPATIENT)
Dept: VASCULAR SURGERY | Facility: CLINIC | Age: 76
End: 2021-04-20
Payer: MEDICARE

## 2021-04-20 DIAGNOSIS — M96.843 POSTOPERATIVE SEROMA OF MUSCULOSKELETAL STRUCTURE AFTER NON-MUSCULOSKELETAL PROCEDURE: Primary | ICD-10-CM

## 2021-04-20 PROCEDURE — 99024 POSTOP FOLLOW-UP VISIT: CPT | Performed by: SPECIALIST

## 2021-04-20 NOTE — PROGRESS NOTES
F/U for Rt AASV and phlebs    Subjective:  Patient feels good and her preop symptoms have resolved.  She developed a right leg lateral seroma when she was seen several weeks ago.  She had it aspirated at that time was told to apply compression.  The daughter states about a week and a half later it did refill.  Is unclear how compliant the patient was with compression.      Objective:  B/P: Data Unavailable, T: Data Unavailable, P: Data Unavailable, R: Data Unavailable  Right lower extremity: 3 x 3 x 3 cm fluctuant area over old stab phlebectomy site.  After infiltrated the local anesthetic a cruciate incision was made draining approximately 15 cc of serous fluid.  The wound was packed with gauze.      Assessment/plan:  This is a 75 lady status post a right AAS V RF ablation with medically necessary stab phlebectomy.  She developed a postoperative seroma in the lateral calf.  An incision and drainage was done today in clinic.  She will wash the area daily with soap and water and lightly packed with gauze followed by an Ace wrap.  She will follow-up with me in 1 week for wound check.      Alex Renee MD, FACS

## 2021-04-20 NOTE — LETTER
4/20/2021         RE: Selvin Rockwell  [address] [place] [id number]        Dear Colleague,    Thank you for referring your patient, Selvin Rockwell, to the [facility] VEIN CLINIC [place]. Please see a copy of my visit note below.    F/U for Rt AASV and phlebs    Subjective:  Patient feels good and her preop symptoms have resolved.  She developed a right leg lateral seroma when she was seen several weeks ago.  She had it aspirated at that time was told to apply compression.  The daughter states about a week and a half later it did refill.  Is unclear how compliant the patient was with compression.      Objective:  B/P: Data Unavailable, T: Data Unavailable, P: Data Unavailable, R: Data Unavailable  Right lower extremity: 3 x 3 x 3 cm fluctuant area over old stab phlebectomy site.  After infiltrated the local anesthetic a cruciate incision was made draining approximately 15 cc of serous fluid.  The wound was packed with gauze.      Assessment/plan:  This is a 75 lady status post a right AAS V RF ablation with medically necessary stab phlebectomy.  She developed a postoperative seroma in the lateral calf.  An incision and drainage was done today in clinic.  She will wash the area daily with soap and water and lightly packed with gauze followed by an Ace wrap.  She will follow-up with me in 1 week for wound check.      Alex Renee MD, FACS      Again, thank you for allowing me to participate in the care of your patient.        Sincerely,        Alex Renee MD

## 2021-04-28 ENCOUNTER — OFFICE VISIT (OUTPATIENT)
Dept: VASCULAR SURGERY | Facility: CLINIC | Age: 76
End: 2021-04-28
Payer: MEDICARE

## 2021-04-28 DIAGNOSIS — L03.115 CELLULITIS OF RIGHT LOWER EXTREMITY: ICD-10-CM

## 2021-04-28 DIAGNOSIS — M96.843 POSTOPERATIVE SEROMA OF MUSCULOSKELETAL STRUCTURE AFTER NON-MUSCULOSKELETAL PROCEDURE: Primary | ICD-10-CM

## 2021-04-28 PROCEDURE — 99024 POSTOP FOLLOW-UP VISIT: CPT | Performed by: SPECIALIST

## 2021-04-28 NOTE — LETTER
4/28/2021         RE: Selvin Rockwell  [address] [place] [id number]        Dear Colleague,    Thank you for referring your patient, Selvin Rockwell, to the [facility] [place]. Please see a copy of my visit note below.    F/U for seroma      Subjective:  Patient is a 75-year-old lady who had an incision and drainage of a right leg seroma done a week ago.  The dressing was removed on the first day but then the packing was never changed nor was the wound repacked.  The daughter did not feel comfortable doing it.  On Saturday she developed redness around the wound.  She called on Monday and was started on oral antibiotics (Keflex).  The wound had closed over and she had increasing pain at the site.  She denies any fevers chills or elevated blood sugars.  She now presents today for reevaluation of her I&D site.    Objective:  B/P: Data Unavailable, T: Data Unavailable, P: Data Unavailable, R: Data Unavailable  Right lower extremity: I&D site is healed over with fluctuance underneath.  There is also some cellulitis extending on the lateral aspect of the leg.  Under local anesthetic the I&D site was reopened with drainage of a small amount of purulent material.  It was then repacked with gauze.  The incisions were extended to keep it from closing over.      Assessment/plan:  There is a 75 lady status post an I&D of a seroma on her right leg.  She did develop a cellulitis on Saturday due to compliance issues with wound care.  On her visit today the wound was closed over.  It was reopened under local anesthetic extending the incisions further and the wound was repacked.  A small amount of pus was drained.  The daughter was instructed on how to pack the wound.  The plan at this time is to continue her on the oral antibiotics and do twice daily wound washings.  With packing.  They know should the cellulitis not improve over the next several days to go to the ER for admission  for IV antibiotics.  They will go to an ER closer to their house.  She will follow-up with me in 1 week for wound check.    Alex Renee MD, FACS      Again, thank you for allowing me to participate in the care of your patient.        Sincerely,        Alex Renee MD

## 2021-04-28 NOTE — PROGRESS NOTES
F/U for seroma      Subjective:  Patient is a 75-year-old lady who had an incision and drainage of a right leg seroma done a week ago.  The dressing was removed on the first day but then the packing was never changed nor was the wound repacked.  The daughter did not feel comfortable doing it.  On Saturday she developed redness around the wound.  She called on Monday and was started on oral antibiotics (Keflex).  The wound had closed over and she had increasing pain at the site.  She denies any fevers chills or elevated blood sugars.  She now presents today for reevaluation of her I&D site.    Objective:  B/P: Data Unavailable, T: Data Unavailable, P: Data Unavailable, R: Data Unavailable  Right lower extremity: I&D site is healed over with fluctuance underneath.  There is also some cellulitis extending on the lateral aspect of the leg.  Under local anesthetic the I&D site was reopened with drainage of a small amount of purulent material.  It was then repacked with gauze.  The incisions were extended to keep it from closing over.      Assessment/plan:  There is a 75 lady status post an I&D of a seroma on her right leg.  She did develop a cellulitis on Saturday due to compliance issues with wound care.  On her visit today the wound was closed over.  It was reopened under local anesthetic extending the incisions further and the wound was repacked.  A small amount of pus was drained.  The daughter was instructed on how to pack the wound.  The plan at this time is to continue her on the oral antibiotics and do twice daily wound washings.  With packing.  They know should the cellulitis not improve over the next several days to go to the ER for admission for IV antibiotics.  They will go to an ER closer to their house.  She will follow-up with me in 1 week for wound check.    Alex Renee MD, FACS

## 2021-05-03 ENCOUNTER — ANTICOAGULATION THERAPY VISIT (OUTPATIENT)
Dept: ANTICOAGULATION | Facility: CLINIC | Age: 76
End: 2021-05-03

## 2021-05-03 DIAGNOSIS — I48.0 PAROXYSMAL ATRIAL FIBRILLATION (H): ICD-10-CM

## 2021-05-03 DIAGNOSIS — Z79.01 LONG TERM CURRENT USE OF ANTICOAGULANT THERAPY: ICD-10-CM

## 2021-05-03 LAB — INR PPP: 2.2 (ref 0.86–1.14)

## 2021-05-03 PROCEDURE — 85610 PROTHROMBIN TIME: CPT | Performed by: FAMILY MEDICINE

## 2021-05-03 PROCEDURE — 36416 COLLJ CAPILLARY BLOOD SPEC: CPT | Performed by: FAMILY MEDICINE

## 2021-05-03 NOTE — PROGRESS NOTES
ANTICOAGULATION MANAGEMENT     Patient Name:  Selvin Rockwell  Date:  5/3/2021    ASSESSMENT /SUBJECTIVE:    Today's INR result of 2.20 is therapeutic. Goal INR of 2.0-3.0      Warfarin dose taken: Warfarin taken as instructed    Diet: No new diet changes affecting INR    Medication changes/ interactions: No new medications/supplements affecting INR    Previous INR: Subtherapeutic     S/S of bleeding or thromboembolism: No    New injury or illness: No    Upcoming surgery, procedure or cardioversion: No    Additional findings: None      PLAN:    Telephone call with  Kat regarding INR result and instructed:     Warfarin Dosing Instructions: Continue your current warfarin dose 2 mg Mon,Fri; 3 mg ROW.    Instructed patient to follow up no later than: 4 weeks  Lab visit scheduled    Education provided: Please call back if any changes to your diet, medications or how you've been taking warfarin      Kat verbalizes understanding and agrees to warfarin dosing plan.    Instructed to call the Anticoagulation Clinic for any changes, questions or concerns. (#[phone])        Aryan Urban RN      OBJECTIVE:  Recent labs: (last 7 days)     21  1402   INR 2.20*         No question data found.  Anticoagulation Summary  As of 5/3/2021    INR goal:  2.0-3.0   TTR:  63.0 % (1 y)   INR used for dosin.20 (5/3/2021)   Warfarin maintenance plan:  2 mg (2 mg x 1) every Mon, Fri; 3 mg (2 mg x 1.5) all other days   Full warfarin instructions:  2 mg every Mon, Fri; 3 mg all other days   Weekly warfarin total:  19 mg   No change documented:  Aryan Urban RN   Plan last modified:  Danielle Vera RN (4/15/2021)   Next INR check:  2021   Priority:  Maintenance   Target end date:  Indefinite    Indications    Paroxysmal atrial fibrillation (H) [I48.0]             Anticoagulation Episode Summary     INR check location:  Anticoagulation Clinic    Preferred lab:      Send INR reminders to:   JERRY PEÑA    Comments:  Transfer from [facility] - Patient states has been on coumadin for 2-3 years      Anticoagulation Care Providers     Provider Role Specialty Phone number    Gurinder Ho MD Referring Family Medicine [phone]

## 2021-05-04 ENCOUNTER — OFFICE VISIT (OUTPATIENT)
Dept: VASCULAR SURGERY | Facility: CLINIC | Age: 76
End: 2021-05-04
Payer: MEDICARE

## 2021-05-04 DIAGNOSIS — M96.843 POSTOPERATIVE SEROMA OF MUSCULOSKELETAL STRUCTURE AFTER NON-MUSCULOSKELETAL PROCEDURE: Primary | ICD-10-CM

## 2021-05-04 PROCEDURE — 99024 POSTOP FOLLOW-UP VISIT: CPT | Performed by: SPECIALIST

## 2021-05-04 NOTE — PROGRESS NOTES
F/U for seroma      Subjective:  Patient is a 75-year-old lady who had an incision and drainage of a right leg seroma done a week ago.  The dressing was removed on the first day but then the packing was never changed nor was the wound repacked.  The daughter did not feel comfortable doing it.  On Saturday she developed redness around the wound.  She called on Monday and was started on oral antibiotics (Keflex).  The wound had closed over and she had increasing pain at the site.  She denies any fevers chills or elevated blood sugars.       She had a Ezequiel&D done last week and started on Keflex for cellulitis.  The daughter reports this is much improved.  She now presents today for reevaluation of her I&D site.        Objective:  B/P: Data Unavailable, T: Data Unavailable, P: Data Unavailable, R: Data Unavailable  Right lower extremity: I I&D site clean healing well.  Cellulitis is resolved.    Assessment/plan:  There is a 75 lady status post an I&D of a seroma on her right leg.  She did develop a cellulitis on Saturday due to compliance issues with wound care.  Her cellulitis is resolved and the wound looks very good at this time.  The plan at this time is just to have her continue to wash it daily with soap and water and cover with gauze.  She no longer needs to pack it.  She will follow-up in 2 weeks with a wound check or sooner if there are issues.      Alex Renee MD, FACS

## 2021-05-04 NOTE — LETTER
5/4/2021         RE: Selvin Rockwell  [address] [place] [id number]        Dear Colleague,    Thank you for referring your patient, Selvin Rockwell, to the [facility] [place]. Please see a copy of my visit note below.    F/U for seroma      Subjective:  Patient is a 75-year-old lady who had an incision and drainage of a right leg seroma done a week ago.  The dressing was removed on the first day but then the packing was never changed nor was the wound repacked.  The daughter did not feel comfortable doing it.  On Saturday she developed redness around the wound.  She called on Monday and was started on oral antibiotics (Keflex).  The wound had closed over and she had increasing pain at the site.  She denies any fevers chills or elevated blood sugars.       She had a Ezequiel&D done last week and started on Keflex for cellulitis.  The daughter reports this is much improved.  She now presents today for reevaluation of her I&D site.        Objective:  B/P: Data Unavailable, T: Data Unavailable, P: Data Unavailable, R: Data Unavailable  Right lower extremity: I I&D site clean healing well.  Cellulitis is resolved.    Assessment/plan:  There is a 75 lady status post an I&D of a seroma on her right leg.  She did develop a cellulitis on Saturday due to compliance issues with wound care.  Her cellulitis is resolved and the wound looks very good at this time.  The plan at this time is just to have her continue to wash it daily with soap and water and cover with gauze.  She no longer needs to pack it.  She will follow-up in 2 weeks with a wound check or sooner if there are issues.      Alex Renee MD, FACS      Again, thank you for allowing me to participate in the care of your patient.        Sincerely,        Alex Renee MD

## 2021-05-18 ENCOUNTER — OFFICE VISIT (OUTPATIENT)
Dept: VASCULAR SURGERY | Facility: CLINIC | Age: 76
End: 2021-05-18
Payer: MEDICARE

## 2021-05-18 DIAGNOSIS — M96.843 POSTOPERATIVE SEROMA OF MUSCULOSKELETAL STRUCTURE AFTER NON-MUSCULOSKELETAL PROCEDURE: Primary | ICD-10-CM

## 2021-05-18 PROCEDURE — 99024 POSTOP FOLLOW-UP VISIT: CPT | Performed by: SPECIALIST

## 2021-05-18 NOTE — PROGRESS NOTES
F/U for seroma      Subjective:  Patient is a 75-year-old lady who had an incision and drainage of a right leg seroma done a week ago.  The dressing was removed on the first day but then the packing was never changed nor was the wound repacked.  The daughter did not feel comfortable doing it.  On Saturday she developed redness around the wound.  She called on Monday and was started on oral antibiotics (Keflex).  The wound had closed over and she had increasing pain at the site.  She denies any fevers chills or elevated blood sugars.       She had a Ezequiel&D done at the end of April and started on Keflex for cellulitis.  The daughter reports this is much improved.  She now presents today for reevaluation of her I&D site.      Objective:  B/P: Data Unavailable, T: Data Unavailable, P: Data Unavailable, R: Data Unavailable  Right lower extremity: I I&D site clean healing well.  Cellulitis is resolved.  Wound much and filled in.  Small eschar removed.    Assessment/plan:  There is a 75 lady status post an I&D of a seroma on her right leg.  She is much improved on today's visit.  The wound is almost closed.  The plan at this time is to have her continue to cover the wound with gauze until it closes.  She will follow-up in 2 weeks should the wound remain open otherwise as necessary.      Alex Renee MD, FACS

## 2021-05-18 NOTE — LETTER
5/18/2021         RE: Selvin Rockwell  [address] [place] [id number]        Dear Colleague,    Thank you for referring your patient, Selvin Rockwell, to the [facility] [place]. Please see a copy of my visit note below.    F/U for seroma      Subjective:  Patient is a 75-year-old lady who had an incision and drainage of a right leg seroma done a week ago.  The dressing was removed on the first day but then the packing was never changed nor was the wound repacked.  The daughter did not feel comfortable doing it.  On Saturday she developed redness around the wound.  She called on Monday and was started on oral antibiotics (Keflex).  The wound had closed over and she had increasing pain at the site.  She denies any fevers chills or elevated blood sugars.       She had a Ezequiel&D done at the end of April and started on Keflex for cellulitis.  The daughter reports this is much improved.  She now presents today for reevaluation of her I&D site.      Objective:  B/P: Data Unavailable, T: Data Unavailable, P: Data Unavailable, R: Data Unavailable  Right lower extremity: I I&D site clean healing well.  Cellulitis is resolved.  Wound much and filled in.  Small eschar removed.    Assessment/plan:  There is a 75 lady status post an I&D of a seroma on her right leg.  She is much improved on today's visit.  The wound is almost closed.  The plan at this time is to have her continue to cover the wound with gauze until it closes.  She will follow-up in 2 weeks should the wound remain open otherwise as necessary.      Alex Renee MD, FACS      Again, thank you for allowing me to participate in the care of your patient.        Sincerely,        Alex Renee MD

## 2021-06-01 ENCOUNTER — OFFICE VISIT (OUTPATIENT)
Dept: VASCULAR SURGERY | Facility: CLINIC | Age: 76
End: 2021-06-01
Payer: MEDICARE

## 2021-06-01 DIAGNOSIS — Z98.890 POSTOPERATIVE STATE: Primary | ICD-10-CM

## 2021-06-01 PROCEDURE — 99024 POSTOP FOLLOW-UP VISIT: CPT | Performed by: SPECIALIST

## 2021-06-01 NOTE — LETTER
6/1/2021         RE: Selvin Rockwell  [address] [place] [id number]        Dear Colleague,    Thank you for referring your patient, Selvin Rockwell, to the [facility] [place]. Please see a copy of my visit note below.    F/U for seroma      Subjective:  Patient is a 75-year-old lady who had an incision and drainage of a right leg seroma done a week ago.  The dressing was removed on the first day but then the packing was never changed nor was the wound repacked.  The daughter did not feel comfortable doing it.  On Saturday she developed redness around the wound.  She called on Monday and was started on oral antibiotics (Keflex).  The wound had closed over and she had increasing pain at the site.  She denies any fevers chills or elevated blood sugars.       She had a Ezequiel&D done at the end of April and started on Keflex for cellulitis.  The daughter reports this is much improved.  She now presents today for reevaluation of her I&D site.      Objective:  B/P: Data Unavailable, T: Data Unavailable, P: Data Unavailable, R: Data Unavailable  Right lower extremity: I I&D site clean healing well.  Cellulitis is resolved.  Wound healed      Assessment/plan:  There is a 75 lady status post an I&D of a seroma on her right leg.  She is much improved on today's visit.  The wound is closed.  She will continue her activity as tolerated and follow-up with me as necessary.      Alex Renee MD, FACS      Again, thank you for allowing me to participate in the care of your patient.        Sincerely,        Alex Renee MD

## 2021-06-01 NOTE — PROGRESS NOTES
F/U for seroma      Subjective:  Patient is a 75-year-old lady who had an incision and drainage of a right leg seroma done a week ago.  The dressing was removed on the first day but then the packing was never changed nor was the wound repacked.  The daughter did not feel comfortable doing it.  On Saturday she developed redness around the wound.  She called on Monday and was started on oral antibiotics (Keflex).  The wound had closed over and she had increasing pain at the site.  She denies any fevers chills or elevated blood sugars.       She had a Ezequiel&D done at the end of April and started on Keflex for cellulitis.  The daughter reports this is much improved.  She now presents today for reevaluation of her I&D site.      Objective:  B/P: Data Unavailable, T: Data Unavailable, P: Data Unavailable, R: Data Unavailable  Right lower extremity: I I&D site clean healing well.  Cellulitis is resolved.  Wound healed      Assessment/plan:  There is a 75 lady status post an I&D of a seroma on her right leg.  She is much improved on today's visit.  The wound is closed.  She will continue her activity as tolerated and follow-up with me as necessary.      Alex Renee MD, FACS

## 2021-06-03 ENCOUNTER — NURSE TRIAGE (OUTPATIENT)
Dept: FAMILY MEDICINE | Facility: CLINIC | Age: 76
End: 2021-06-03

## 2021-06-03 ENCOUNTER — ANTICOAGULATION THERAPY VISIT (OUTPATIENT)
Dept: FAMILY MEDICINE | Facility: CLINIC | Age: 76
End: 2021-06-03

## 2021-06-03 DIAGNOSIS — Z79.01 LONG TERM CURRENT USE OF ANTICOAGULANT THERAPY: ICD-10-CM

## 2021-06-03 DIAGNOSIS — I48.0 PAROXYSMAL ATRIAL FIBRILLATION (H): ICD-10-CM

## 2021-06-03 LAB
CAPILLARY BLOOD COLLECTION: NORMAL
INR PPP: 2.4 (ref 0.86–1.14)

## 2021-06-03 PROCEDURE — 85610 PROTHROMBIN TIME: CPT | Performed by: FAMILY MEDICINE

## 2021-06-03 PROCEDURE — 36416 COLLJ CAPILLARY BLOOD SPEC: CPT | Performed by: FAMILY MEDICINE

## 2021-06-03 NOTE — TELEPHONE ENCOUNTER
Called and left detailed message with provider's message as written. Also left UC locations and hours in the message. Call back to [phone] and ask to speak with the nurse if any further questions or concerns.    Desiree Sen RN  [facility]

## 2021-06-03 NOTE — TELEPHONE ENCOUNTER
Spoke with daughter. No consent to communicate is on file. She was with pt and she does not speak English. Pt has an INR appt at 10:30 this am. She wanted to see if a provider or a nurse could see her this am while she is here for INR. Pt is having left sided facial swelling. She does not know when it started. Pt put ice on it last night and this helped, then swelling returned this am. Swelling comes and goes. Swelling is below her eye in her cheek on left side. No toothache. Did not mention leg swelling. Is having pain- feels like something is pulling at her face. Advised that nurse would not be able to diagnose, so should see provider today. No openings around appt time this am. Can pt be worked in with PCP while here for INR? Did recommend [facility] and daughter states she wouldn't even know how to get there and prefers to have her seen in clinic while here for INR appt.  Please advise.     Reason for Disposition    Swelling is painful to touch    Additional Information    Negative: SEVERE swelling of entire face and < 2 hours since exposure to high-risk allergen (e.g., peanuts, tree nuts, fish, shellfish or 1st dose of drug) and no serious symptoms AND [4] no serious allergic reaction in the past    Negative: Pregnant > 20 weeks    Negative: Postpartum (from 0 to 6 weeks after delivery)    Negative: Fever    Negative: Taking an ACE Inhibitor medication  (e.g., benazepril/LOTENSIN, captopril/CAPOTEN, enalapril/VASOTEC, lisinopril/ZESTRIL)    Negative: Patient sounds very sick or weak to the triager    Negative: Life-threatening reaction (anaphylaxis) in the past to similar substance (e.g., food, insect bite/sting, chemical, etc.) and < 2 hours since exposure    Negative: Unresponsive, passed out or very weak    Negative: Swollen tongue    Negative: Difficulty breathing or wheezing    Negative: Sounds like a life-threatening emergency to the triager    Negative: Followed an injury to face    Negative: Bee sting and  within last 24 hours    Negative: Mosquito bite suspected    Negative: Insect bite suspected    Negative: SEVERE swelling of the entire face    Negative: Swelling began after taking a drug    Negative: Looks infected (e.g., spreading redness, pus)    Negative: Looks like a boil or infected sore    Negative: Painful rash with multiple small blisters grouped together (i.e., dermatomal distribution or 'band' or 'stripe')    Negative: Swelling of both lower legs (i.e., bilateral pedal edema)    Negative: Widespread rash on body    Protocols used: FACE SWELLING-A-OH    Desiree Sen RN  [facility]

## 2021-06-03 NOTE — TELEPHONE ENCOUNTER
Reason for call:  Patient reporting a symptom    Symptom or request: Patient's daughter is calling in for patient. She is reporting that the patient is having a puffy face. Daughter is requesting for the patient to be seen. She is coming in for an INR draw and would like something around that time today.     There are no openings currently at [facility]    Daughter had further medical questions. She will discuss this with an RN to advise.   Duration (how long have symptoms been present): it comes and goes few days    Have you been treated for this before? No    Additional comments: Patient's daughter was transferred to the green line    Phone Number patient can be reached at: [phone]    Best Time:    Can we leave a detailed message on this number:  YES    Call taken on 6/3/2021 at 8:42 AM by Ganesh Bowen

## 2021-06-03 NOTE — PROGRESS NOTES
ANTICOAGULATION MANAGEMENT     Patient Name:  Selvin Rockwell  Date:  6/3/2021    ASSESSMENT /SUBJECTIVE:    Today's INR result of 2.4 is therapeutic. Goal INR of 2.0-3.0      Warfarin dose taken: Warfarin taken as instructed    Diet: No new diet changes affecting INR    Medication changes/ interactions: No new medications/supplements affecting INR    Previous INR: Therapeutic     S/S of bleeding or thromboembolism: No    New injury or illness: No    Upcoming surgery, procedure or cardioversion: No    Additional findings: None      PLAN:    Warfarin Dosing Instructions: Continue your current warfarin dose 2 mg Mon/Fri and 3 mg ROW    Instructed patient to follow up no later than: 4 weeks  Lab visit scheduled    Education provided: Target INR goal and significance of current INR result    Telephone call with  Ruba whom verbalizes understanding and agrees to plan    Instructed to call the Anticoagulation Clinic for any changes, questions or concerns. (#[phone])        Chitra Morrison RN      OBJECTIVE:  Recent labs: (last 7 days)     21  1041   INR 2.40*         No question data found.  Anticoagulation Summary  As of 6/3/2021    INR goal:  2.0-3.0   TTR:  62.9 % (1 y)   INR used for dosin.40 (6/3/2021)   Warfarin maintenance plan:  2 mg (2 mg x 1) every Mon, Fri; 3 mg (2 mg x 1.5) all other days   Full warfarin instructions:  2 mg every Mon, Fri; 3 mg all other days   Weekly warfarin total:  19 mg   No change documented:  Chitra Morrison RN   Plan last modified:  Danielle Vera RN (4/15/2021)   Next INR check:  2021   Priority:  Maintenance   Target end date:  Indefinite    Indications    Paroxysmal atrial fibrillation (H) [I48.0]             Anticoagulation Episode Summary     INR check location:  Anticoagulation Clinic    Preferred lab:      Send INR reminders to:  JERRY PEÑA    Comments:  Transfer from [facility] - Patient states has been on coumadin for 2-3 years       Anticoagulation Care Providers     Provider Role Specialty Phone number    Guridner Ho MD Referring Family Medicine [phone]

## 2021-06-03 NOTE — TELEPHONE ENCOUNTER
I do not have any openings for this.  She could be seen by someone else in our clinic if there are openings, but otherwise the urgent care option may be the best one for her.

## 2021-06-08 ENCOUNTER — OFFICE VISIT (OUTPATIENT)
Dept: FAMILY MEDICINE | Facility: CLINIC | Age: 76
End: 2021-06-08
Payer: MEDICARE

## 2021-06-08 VITALS
OXYGEN SATURATION: 98 % | BODY MASS INDEX: 43.18 KG/M2 | HEART RATE: 76 BPM | SYSTOLIC BLOOD PRESSURE: 150 MMHG | DIASTOLIC BLOOD PRESSURE: 80 MMHG | TEMPERATURE: 98.8 F | WEIGHT: 199.52 LBS

## 2021-06-08 DIAGNOSIS — R22.0 FACIAL SWELLING: Primary | ICD-10-CM

## 2021-06-08 PROCEDURE — 99214 OFFICE O/P EST MOD 30 MIN: CPT | Performed by: FAMILY MEDICINE

## 2021-06-08 RX ORDER — MONTELUKAST SODIUM 10 MG/1
10 TABLET ORAL AT BEDTIME
Qty: 30 TABLET | Refills: 1 | Status: SHIPPED | OUTPATIENT
Start: 2021-06-08 | End: 2022-10-12

## 2021-06-08 NOTE — PROGRESS NOTES
Assessment & Plan       ICD-10-CM    1. Facial swelling  R22.0 montelukast (SINGULAIR) 10 MG tablet     Follow-up with primary care provider in 2 weeks for further assessment    Review of external notes as documented elsewhere in note      Patient Instructions     Patient Education     Angioedema  Angioedema (GW-pyg-zp-eh-LATHA-muh) is a sudden appearance of swollen patches (edema) on the skin or mucous membranes. It most often involves the face, lips, mouth, tongue, back of throat, or vocal cords. It may also occur in other places, such as the arms, legs, or genitals. A rash may also appear during the first 4 days of this illness.  There are different types of angioedema. Sometimes angioedema is part of an allergic reaction (allergic angioedema). Other times angioedema is present without any other signs of allergic reaction (isolated angioedema). Your symptoms will depend on what type of angioedema you have. Like allergic reactions, angioedema may include:    Rash, hives, redness, welts, blisters    Itching, burning, stinging, pain    Dry, flaky, cracking, or scaly skin    Swelling of the face, lips, tongue, or other parts of the body  More severe symptoms may include:    Trouble swallowing, or feeling like your throat is closing    Trouble breathing or wheezing    Hoarse voice or trouble speaking    Nausea, vomiting, diarrhea, or stomach cramps    Feeling faint or lightheaded, rapid heart rate, or low blood pressure  Angioedema can be triggered by exposure to certain substances. Medical conditions involving the immune systems and certain infections may cause it. In rare cases, angioedema can be hereditary. Sometimes the cause may be very clear. However, it's often hard to find a cause. The most common causes of allergic angioedema include:    Foods, such as shrimp, shellfish, peanuts, milk products, gluten, and eggs; also colorings, flavorings, and additives    Insect bites or stings, from bees, mosquitoes,  fleas, or ticks    Medicines, such as ACE inhibitors, penicillin medicines, sulfa medicines, , aspirin, and ibuprofen    Latex, which may be in gloves, clothes, toys, balloons, and some kinds of tape. People who are allergic to latex may have problems with foods such as bananas, avocados, kiwi, papaya, or chestnuts.    Stress    Heat, cold, or sunlight  The most common cause of angioedema is a reaction to a class of medicines called ACE inhibitors. These are used to treat high blood pressure. ACE inhibitors include lotensin, captopril, enalapril, and lisinopril. Angiodema can happen even after you have been taking the medicine for some time. Tell your healthcare provider if you have angioedema symptoms and are taking any of these medicines. Angioedema may recur. It's important to watch for the earliest signs of this condition (see the list below). Contact your healthcare provider right away if swelling involves the face, mouth, or throat.  Home care  Rest quietly today. Don't do vigorous physical activity.  Medicines. The healthcare provider may prescribe medicines for itching, swelling, or pain. Follow the healthcare provider s instructions when taking these medicines.    Oral diphenhydramine is an antihistamine available without a prescription. Unless a prescription antihistamine was given, diphenhydramine may be used to reduce widespread itching. It may make you sleepy, so be careful using it when going to school, working, or driving. (Note: Don't use diphenhydramine if you have glaucoma or if you are a man who has trouble urinating due to an enlarged prostate.) Loratadine is an antihistamine that may cause less drowsiness.    Don't use diphenhydramine cream on your skin. Some people can have an allergic reaction to this.    Calamine lotion or oatmeal baths sometimes help with itching.    You may use acetaminophen or ibuprofen for pain, unless another pain medicine was prescribed.    If you were told that your  angioedema was caused by a medicine you are taking, you must stop taking it. Ask your healthcare provider for a different one. In the future, advise medical staff that you are allergic to this medicine.    If medicine was prescribed, such as steroids or antihistamines, be sure you understand what the medicine is and how to take it.   General care    Make sure you don't scratch areas of the body that had a reaction. This will help prevent infection.     Stay away from air pollution, tobacco, and wood smoke. Also stay away from cold temperatures. These things can make allergy symptoms worse.    Try to find out what caused your reaction. Make sure to remove the allergen. Future reactions may be worse.     If you have a serious allergy, wear a medical alert bracelet that notes this allergy.    If the healthcare provider prescribed an epinephrine auto injector kit, keep it with you at all times.     Tell all care providers about your allergy. Ask them how to use any prescribed medicines.    Keep a record of allergies and symptoms, and when they occurred. This will help your provider treat you over time.     Follow-up care  Follow up with your healthcare provider, or as advised. You may need to see an allergist. An allergist can help find the cause of an allergic reaction and give recommendations on how to prevent future reactions.  Call 911  Call 911 right away if any of these occur:    Trouble breathing or swallowing, or wheezing    Hoarse voice or trouble speaking, or drooling    Chest pain or tightness    Confusion, lightheadedness, or dizziness    Extreme drowsiness or trouble awakening    Fainting or loss of consciousness    Rapid heart rate    Vomiting blood, or large amounts of blood in stool    Seizure    Nausea, vomiting, diarrhea, abdominal pain, or stomach cramps  When to seek medical attention  Call your healthcare provider right away if any of the following occur:    Symptoms don't go away    Symptoms come  back    Symptoms get worse or new symptoms develop    Hives feel uncomfortable    Fever of 100.4 F (38 C), or as directed by your healthcare provider  [employer] last reviewed this educational content on 8/1/2019 2000-2021 [employer]. All rights reserved. This information is not intended as a substitute for professional medical care. Always follow your healthcare professional's instructions.               Return in about 2 weeks (around 6/22/2021) for With PCP.    Rehan Randolph MD  [facility] MARIANA Rabago is a 76 year old who presents for the following health issues  accompanied by her daughter who is interpreting:    HPI     Concern - Facial swelling  Onset: 1 month  Description: Swelling left side of face under eye and on cheek  Intensity: moderate  Progression of Symptoms:  same  Accompanying Signs & Symptoms: intermittent  Previous history of similar problem:   Precipitating factors:        Worsened by:   Alleviating factors:        Improved by:   Therapies tried and outcome: ice pack    Unknown triggers  Level of swelling fluctuates  Itchiness associated with swelling      Review of Systems   Constitutional, HEENT, cardiovascular, pulmonary, gi and gu systems are negative, except as otherwise noted.      Objective    BP (!) 150/80   Pulse 76   Temp 98.8  F (37.1  C) (Oral)   Wt 90.5 kg (199 lb 8.3 oz)   SpO2 98%   BMI 43.18 kg/m    Body mass index is 43.18 kg/m .  Physical Exam   GENERAL: healthy, alert and no distress  EYES: Eyes grossly normal to inspection, PERRL and conjunctivae and sclerae normal  HENT: ear canals and TM's normal, nose and mouth without ulcers or lesions  NECK: no adenopathy, no asymmetry, masses, or scars and thyroid normal to palpation  RESP: lungs clear to auscultation - no rales, rhonchi or wheezes  CV: regular rate and rhythm, normal S1 S2, no S3 or S4, no murmur, click or rub, no peripheral edema and peripheral pulses  strong  SKIN: not able to appreciate facial swelling  PSYCH: mentation appears normal, affect normal/bright

## 2021-06-08 NOTE — PATIENT INSTRUCTIONS
Patient Education     Angioedema  Angioedema (YH-wro-di-eh-LATHA-muh) is a sudden appearance of swollen patches (edema) on the skin or mucous membranes. It most often involves the face, lips, mouth, tongue, back of throat, or vocal cords. It may also occur in other places, such as the arms, legs, or genitals. A rash may also appear during the first 4 days of this illness.  There are different types of angioedema. Sometimes angioedema is part of an allergic reaction (allergic angioedema). Other times angioedema is present without any other signs of allergic reaction (isolated angioedema). Your symptoms will depend on what type of angioedema you have. Like allergic reactions, angioedema may include:    Rash, hives, redness, welts, blisters    Itching, burning, stinging, pain    Dry, flaky, cracking, or scaly skin    Swelling of the face, lips, tongue, or other parts of the body  More severe symptoms may include:    Trouble swallowing, or feeling like your throat is closing    Trouble breathing or wheezing    Hoarse voice or trouble speaking    Nausea, vomiting, diarrhea, or stomach cramps    Feeling faint or lightheaded, rapid heart rate, or low blood pressure  Angioedema can be triggered by exposure to certain substances. Medical conditions involving the immune systems and certain infections may cause it. In rare cases, angioedema can be hereditary. Sometimes the cause may be very clear. However, it's often hard to find a cause. The most common causes of allergic angioedema include:    Foods, such as shrimp, shellfish, peanuts, milk products, gluten, and eggs; also colorings, flavorings, and additives    Insect bites or stings, from bees, mosquitoes, fleas, or ticks    Medicines, such as ACE inhibitors, penicillin medicines, sulfa medicines, , aspirin, and ibuprofen    Latex, which may be in gloves, clothes, toys, balloons, and some kinds of tape. People who are allergic to latex may have problems with foods such as  bananas, avocados, kiwi, papaya, or chestnuts.    Stress    Heat, cold, or sunlight  The most common cause of angioedema is a reaction to a class of medicines called ACE inhibitors. These are used to treat high blood pressure. ACE inhibitors include lotensin, captopril, enalapril, and lisinopril. Angiodema can happen even after you have been taking the medicine for some time. Tell your healthcare provider if you have angioedema symptoms and are taking any of these medicines. Angioedema may recur. It's important to watch for the earliest signs of this condition (see the list below). Contact your healthcare provider right away if swelling involves the face, mouth, or throat.  Home care  Rest quietly today. Don't do vigorous physical activity.  Medicines. The healthcare provider may prescribe medicines for itching, swelling, or pain. Follow the healthcare provider s instructions when taking these medicines.    Oral diphenhydramine is an antihistamine available without a prescription. Unless a prescription antihistamine was given, diphenhydramine may be used to reduce widespread itching. It may make you sleepy, so be careful using it when going to school, working, or driving. (Note: Don't use diphenhydramine if you have glaucoma or if you are a man who has trouble urinating due to an enlarged prostate.) Loratadine is an antihistamine that may cause less drowsiness.    Don't use diphenhydramine cream on your skin. Some people can have an allergic reaction to this.    Calamine lotion or oatmeal baths sometimes help with itching.    You may use acetaminophen or ibuprofen for pain, unless another pain medicine was prescribed.    If you were told that your angioedema was caused by a medicine you are taking, you must stop taking it. Ask your healthcare provider for a different one. In the future, advise medical staff that you are allergic to this medicine.    If medicine was prescribed, such as steroids or antihistamines, be  sure you understand what the medicine is and how to take it.   General care    Make sure you don't scratch areas of the body that had a reaction. This will help prevent infection.     Stay away from air pollution, tobacco, and wood smoke. Also stay away from cold temperatures. These things can make allergy symptoms worse.    Try to find out what caused your reaction. Make sure to remove the allergen. Future reactions may be worse.     If you have a serious allergy, wear a medical alert bracelet that notes this allergy.    If the healthcare provider prescribed an epinephrine auto injector kit, keep it with you at all times.     Tell all care providers about your allergy. Ask them how to use any prescribed medicines.    Keep a record of allergies and symptoms, and when they occurred. This will help your provider treat you over time.     Follow-up care  Follow up with your healthcare provider, or as advised. You may need to see an allergist. An allergist can help find the cause of an allergic reaction and give recommendations on how to prevent future reactions.  Call 911  Call 911 right away if any of these occur:    Trouble breathing or swallowing, or wheezing    Hoarse voice or trouble speaking, or drooling    Chest pain or tightness    Confusion, lightheadedness, or dizziness    Extreme drowsiness or trouble awakening    Fainting or loss of consciousness    Rapid heart rate    Vomiting blood, or large amounts of blood in stool    Seizure    Nausea, vomiting, diarrhea, abdominal pain, or stomach cramps  When to seek medical attention  Call your healthcare provider right away if any of the following occur:    Symptoms don't go away    Symptoms come back    Symptoms get worse or new symptoms develop    Hives feel uncomfortable    Fever of 100.4 F (38 C), or as directed by your healthcare provider  [employer] last reviewed this educational content on 8/1/2019 2000-2021 [employer]. All rights reserved.  This information is not intended as a substitute for professional medical care. Always follow your healthcare professional's instructions.

## 2021-06-14 ENCOUNTER — TELEPHONE (OUTPATIENT)
Dept: FAMILY MEDICINE | Facility: CLINIC | Age: 76
End: 2021-06-14

## 2021-06-14 NOTE — TELEPHONE ENCOUNTER
Patient's daughter Kat called back to find out what Dr. Brown recommended.  Patient given message from Dr. Brown as written. Patient's daughter agreed to try the OTC medications and will call back if patient has any further issues or another allergic reaction.  Katherine Bonds RN on 6/14/2021 at 1:32 PM

## 2021-06-14 NOTE — TELEPHONE ENCOUNTER
Received call from patient's daughter, Alicia. Consent to communicate on file.    She stated that patient was seen in office 06/08/2021 for swelling under eye/cheek. She was prescribed montelukast (SINGULAIR) 10 MG tablet. She took medication one time and reports side effects - sleepiness, dizziness, nausea, no red flag symptoms. She stopped taking medication. Swelling in eye/cheek has stayed the same. Patient is wondering if alternative medication could be prescribed or if another OV required?    Daughter, Alicia would like to receive call back when message reviewed - okay to leave detailed VM on phone # listed    FELIPE Mercado RN  [facility]

## 2021-06-14 NOTE — TELEPHONE ENCOUNTER
I recommend she try an antihistamine such as zyrtec instead along with cortisone cream.    Rehan Randolph MD

## 2021-06-14 NOTE — TELEPHONE ENCOUNTER
Left message on answering machine for patient to call back to the nurse at [phone].    Gabrielle Velasquez RN  [facility]

## 2021-07-01 ENCOUNTER — ANTICOAGULATION THERAPY VISIT (OUTPATIENT)
Dept: NURSING | Facility: CLINIC | Age: 76
End: 2021-07-01

## 2021-07-01 ENCOUNTER — OFFICE VISIT (OUTPATIENT)
Dept: OPTOMETRY | Facility: CLINIC | Age: 76
End: 2021-07-01
Payer: MEDICARE

## 2021-07-01 DIAGNOSIS — H02.834 DERMATOCHALASIS OF BOTH UPPER EYELIDS: ICD-10-CM

## 2021-07-01 DIAGNOSIS — H57.89 IRRITATION OF LEFT EYE: ICD-10-CM

## 2021-07-01 DIAGNOSIS — Z79.01 LONG TERM CURRENT USE OF ANTICOAGULANT THERAPY: ICD-10-CM

## 2021-07-01 DIAGNOSIS — H02.831 DERMATOCHALASIS OF BOTH UPPER EYELIDS: ICD-10-CM

## 2021-07-01 DIAGNOSIS — H02.402 PTOSIS OF LEFT EYELID: Primary | ICD-10-CM

## 2021-07-01 DIAGNOSIS — I48.0 PAROXYSMAL ATRIAL FIBRILLATION (H): ICD-10-CM

## 2021-07-01 LAB — INR PPP: 3.7 (ref 0.86–1.14)

## 2021-07-01 PROCEDURE — 36416 COLLJ CAPILLARY BLOOD SPEC: CPT | Performed by: FAMILY MEDICINE

## 2021-07-01 PROCEDURE — 99203 OFFICE O/P NEW LOW 30 MIN: CPT | Performed by: OPTOMETRIST

## 2021-07-01 PROCEDURE — 85610 PROTHROMBIN TIME: CPT | Performed by: FAMILY MEDICINE

## 2021-07-01 ASSESSMENT — VISUAL ACUITY
OS_CC: 20/40
CORRECTION_TYPE: GLASSES
OD_CC+: -2
OD_CC: 20/30
OS_CC: 20/60
OS_CC+: -1
METHOD: SNELLEN - LINEAR
OD_CC: 20/60

## 2021-07-01 ASSESSMENT — SLIT LAMP EXAM - LIDS
COMMENTS: 2+ DERMATOCHALASIS
COMMENTS: 2+ DERMATOCHALASIS, 1+ PTOSIS

## 2021-07-01 ASSESSMENT — CONF VISUAL FIELD
OD_NORMAL: 1
OS_NORMAL: 1

## 2021-07-01 ASSESSMENT — EXTERNAL EXAM - RIGHT EYE: OD_EXAM: NORMAL

## 2021-07-01 ASSESSMENT — EXTERNAL EXAM - LEFT EYE: OS_EXAM: NORMAL

## 2021-07-01 NOTE — LETTER
"    7/1/2021         RE: Selvin Rockwell  [address] [facility] [address]        Dear Colleague,    Thank you for referring your patient, Selvin Rockwell, to the [facility]. Please see a copy of my visit note below.    Chief Complaint   Patient presents with     Droopy Eye Lid Evaluation   Left Lid is swollen and starting drooping about 5 months ago not experiencing pain but lid feels heavy. No double vision. Does not feel it is limiting her visual field, just concerned about the droop. Does feel occasional irritation/itching in her left eye.     Do you wear contact lenses? No      Adrianne Benites     See Review Of Systems   Eyes: left upper lid droop, left eye irritation  Constitutional: No fevers, chills, or weight changes.      Medical, surgical and family histories reviewed and updated 7/1/2021.         OBJECTIVE: See Ophthalmology exam    ASSESSMENT:    ICD-10-CM    1. Ptosis of left eyelid  H02.402    2. Dermatochalasis of both upper eyelids  H02.831     H02.834    3. Irritation of left eye  H57.89       PLAN:    Patient Instructions   Mild ptosis of left upper eyelid. Minimal impact on vision at this time. No double vision. Pupils normal. Full eye movements.   Discussed option of surgery with oculoplastics. Patient elects to monitor at this time.    Occasional eye irritation of the left eye may be due to dryness or allergies.     Artificial tears can help with dry eye symptoms. There are over the counter drops that work well and may be used up to 4x daily (Systane , Refresh, Thera Tears)- avoid \"get the red out\" drops.     I recommend trying ketotifen drops (Alaway or Zaditor) to be used 2x daily in each eye to help with itching. These drops are available over the counter. Cold compresses can also make things more comfortable.      Return to clinic as needed.       Connor Gonzalez, OD  [facility]  [address] [place] [address]. [place]  " [id number]    [phone]                Again, thank you for allowing me to participate in the care of your patient.        Sincerely,        Connor Gonzalez OD

## 2021-07-01 NOTE — PATIENT INSTRUCTIONS
"Mild ptosis of left upper eyelid. Minimal impact on vision at this time. No double vision. Pupils normal. Full eye movements.   Discussed option of surgery with oculoplastics. Patient elects to monitor at this time.    Occasional eye irritation of the left eye may be due to dryness or allergies.     Artificial tears can help with dry eye symptoms. There are over the counter drops that work well and may be used up to 4x daily (Systane , Refresh, Thera Tears)- avoid \"get the red out\" drops.     I recommend trying ketotifen drops (Alaway or Zaditor) to be used 2x daily in each eye to help with itching. These drops are available over the counter. Cold compresses can also make things more comfortable.      Return to clinic as needed.       Connor Gonzalez, KELY  [address]. [place]  [address]    [phone]         "

## 2021-07-01 NOTE — PROGRESS NOTES
ANTICOAGULATION MANAGEMENT     Selvin Rockwell 76 year old female is on warfarin with supratherapeutic INR result. (Goal INR 2.0-3.0)    Recent labs: (last 7 days)     07/01/21  1052   INR 3.70*       ASSESSMENT     Source(s): Patient/Caregiver Call       Warfarin doses taken: Warfarin taken as instructed    Diet: No new diet changes identified    New illness, injury, or hospitalization: No    Medication/supplement changes: None noted    Signs or symptoms of bleeding or clotting: Yes: patient will have an occasional bloody nose    Previous INR: Therapeutic last 2(+) visits    Additional findings: None     PLAN     Recommended plan for no diet, medication or health factor changes affecting INR     Dosing Instructions:  Decrease your warfarin dose (5.3% change) with next INR in 2 weeks .  Hold today    Summary  As of 7/1/2021    Full warfarin instructions:  2 mg every Mon, Fri; 3 mg all other days   Next INR check:  7/15/2021             Telephone call with  Kat whom verbalizes understanding and agrees to plan    Lab visit scheduled    Education provided: Target INR goal and significance of current INR result, Importance of therapeutic range, Importance of following up for INR monitoring at instructed interval, Importance of taking warfarin as instructed, Monitoring for bleeding signs and symptoms and When to seek medical attention/emergency care    Plan made per [facility] anticoagulation protocol    Desiree Granda RN  Anticoagulation Clinic  7/1/2021    _______________________________________________________________________     Anticoagulation Episode Summary     Current INR goal:  2.0-3.0   TTR:  58.8 % (1 y)   Target end date:  Indefinite   Send INR reminders to:  JERRY PEÑA    Indications    Paroxysmal atrial fibrillation (H) [I48.0]           Comments:  Transfer from [facility] - Patient states has been on coumadin for 2-3 years         Anticoagulation Care Providers     Provider Role Specialty  Phone number    Gurinder Ho MD [facility] Medicine [phone]

## 2021-07-01 NOTE — PROGRESS NOTES
"Chief Complaint   Patient presents with     Droopy Eye Lid Evaluation   Left Lid is swollen and starting drooping about 5 months ago not experiencing pain but lid feels heavy. No double vision. Does not feel it is limiting her visual field, just concerned about the droop. Does feel occasional irritation/itching in her left eye.     Do you wear contact lenses? No      Adrianne Benites     See Review Of Systems   Eyes: left upper lid droop, left eye irritation  Constitutional: No fevers, chills, or weight changes.      Medical, surgical and family histories reviewed and updated 7/1/2021.         OBJECTIVE: See Ophthalmology exam    ASSESSMENT:    ICD-10-CM    1. Ptosis of left eyelid  H02.402    2. Dermatochalasis of both upper eyelids  H02.831     H02.834    3. Irritation of left eye  H57.89       PLAN:    Patient Instructions   Mild ptosis of left upper eyelid. Minimal impact on vision at this time. No double vision. Pupils normal. Full eye movements.   Discussed option of surgery with oculoplastics. Patient elects to monitor at this time.    Occasional eye irritation of the left eye may be due to dryness or allergies.     Artificial tears can help with dry eye symptoms. There are over the counter drops that work well and may be used up to 4x daily (Systane , Refresh, Thera Tears)- avoid \"get the red out\" drops.     I recommend trying ketotifen drops (Alaway or Zaditor) to be used 2x daily in each eye to help with itching. These drops are available over the counter. Cold compresses can also make things more comfortable.      Return to clinic as needed.       Connor Gonzalez, KELY  [facility]  [address] [place] [address]. [place]  [address]    [phone]            "

## 2021-07-14 DIAGNOSIS — Z79.01 LONG TERM (CURRENT) USE OF ANTICOAGULANTS: ICD-10-CM

## 2021-07-14 DIAGNOSIS — I48.0 PAROXYSMAL ATRIAL FIBRILLATION (H): Primary | ICD-10-CM

## 2021-07-16 ENCOUNTER — ANTICOAGULATION THERAPY VISIT (OUTPATIENT)
Dept: FAMILY MEDICINE | Facility: CLINIC | Age: 76
End: 2021-07-16

## 2021-07-16 ENCOUNTER — LAB (OUTPATIENT)
Dept: LAB | Facility: CLINIC | Age: 76
End: 2021-07-16
Payer: MEDICARE

## 2021-07-16 DIAGNOSIS — I48.0 PAROXYSMAL ATRIAL FIBRILLATION (H): Primary | ICD-10-CM

## 2021-07-16 DIAGNOSIS — Z79.01 LONG TERM (CURRENT) USE OF ANTICOAGULANTS: ICD-10-CM

## 2021-07-16 DIAGNOSIS — I48.0 PAROXYSMAL ATRIAL FIBRILLATION (H): ICD-10-CM

## 2021-07-16 LAB — INR BLD: 2.1 (ref 0.9–1.1)

## 2021-07-16 PROCEDURE — 85610 PROTHROMBIN TIME: CPT

## 2021-07-16 NOTE — PROGRESS NOTES
ANTICOAGULATION MANAGEMENT     Selvin Rockwell 76 year old female is on warfarin with therapeutic INR result. (Goal INR 2.0-3.0)    Recent labs: (last 7 days)     07/16/21  1137   INR 2.1*       ASSESSMENT     Source(s): Chart Review and Patient/Caregiver Call       Warfarin doses taken: Warfarin taken as instructed    Diet: No new diet changes identified    New illness, injury, or hospitalization: No    Medication/supplement changes: None noted    Signs or symptoms of bleeding or clotting: No    Previous INR: Supratherapeutic    Additional findings: None     PLAN     Recommended plan for no diet, medication or health factor changes affecting INR     Dosing Instructions: Continue your current warfarin dose with next INR in 3 weeks       Summary  As of 7/16/2021    Full warfarin instructions:  2 mg every Mon, Wed, Fri; 3 mg all other days   Next INR check:  8/6/2021             Telephone call with  Kta who verbalizes understanding and agrees to plan    Lab visit scheduled    Education provided: Target INR goal and significance of current INR result    Plan made per [facility] anticoagulation protocol    Chitra Morrison RN  Anticoagulation Clinic  7/16/2021    _______________________________________________________________________     Anticoagulation Episode Summary     Current INR goal:  2.0-3.0   TTR:  57.0 % (1 y)   Target end date:  Indefinite   Send INR reminders to:  JERRY PEÑA    Indications    Paroxysmal atrial fibrillation (H) [I48.0]           Comments:  Transfer from [facility] has been on coumadin for 2-3 years         Anticoagulation Care Providers     Provider Role Specialty Phone number    Gurinder Ho MD Referring Family Medicine [phone]

## 2021-07-16 NOTE — PROGRESS NOTES
Anticoagulation Management    Unable to reach Kat today.    Today's INR result of 2.1 is therapeutic (goal INR of 2.0-3.0).  Result received from: Clinic Lab    Follow up required to confirm warfarin dose taken and assess for changes    Left message to continue maintenance dose this weekend.     Plan: continue maintenance dose and recheck INR in 3 weeks      Anticoagulation clinic to follow up    Chitra Morrison RN

## 2021-08-02 ENCOUNTER — OFFICE VISIT (OUTPATIENT)
Dept: ORTHOPEDICS | Facility: CLINIC | Age: 76
End: 2021-08-02
Payer: MEDICARE

## 2021-08-02 ENCOUNTER — ANCILLARY PROCEDURE (OUTPATIENT)
Dept: GENERAL RADIOLOGY | Facility: CLINIC | Age: 76
End: 2021-08-02
Attending: ORTHOPAEDIC SURGERY
Payer: MEDICARE

## 2021-08-02 VITALS
DIASTOLIC BLOOD PRESSURE: 91 MMHG | WEIGHT: 199 LBS | HEART RATE: 73 BPM | SYSTOLIC BLOOD PRESSURE: 159 MMHG | BODY MASS INDEX: 43.06 KG/M2 | RESPIRATION RATE: 18 BRPM

## 2021-08-02 DIAGNOSIS — M48.062 SPINAL STENOSIS OF LUMBAR REGION WITH NEUROGENIC CLAUDICATION: ICD-10-CM

## 2021-08-02 DIAGNOSIS — Z96.653 STATUS POST TOTAL BILATERAL KNEE REPLACEMENT: Primary | ICD-10-CM

## 2021-08-02 DIAGNOSIS — Z96.653 STATUS POST TOTAL BILATERAL KNEE REPLACEMENT: ICD-10-CM

## 2021-08-02 PROCEDURE — 73562 X-RAY EXAM OF KNEE 3: CPT | Mod: LT | Performed by: RADIOLOGY

## 2021-08-02 PROCEDURE — 99213 OFFICE O/P EST LOW 20 MIN: CPT | Performed by: ORTHOPAEDIC SURGERY

## 2021-08-02 NOTE — LETTER
8/2/2021         RE: Selvin Rockwell  [address] [facility] [address]        Dear Colleague,    Thank you for referring your patient, Selvin Rockwell, to the [facility]. Please see a copy of my visit note below.    Selvin Rockwell is seen for follow up left total knee arthroplasty from 7/21/2010 and right total knee arthroplasty from 12/12/2012.  She complains of left posterior knee pain and shin pain, bilateral leg weakness, bilateral  calf swelling. She reports feeling like the legs are weak and want to give way with walking.  She does not report numbness.    In evaluation in June 2019 and 2/17/2020 I felt her primary problem was from the spine.  We ordered MRI scan of the lumbar spine.  She says she obtained this, but not through the [place] system.  She has been subsequently treated with physical therapy but not consistently, so we have no access to these studies or treatments.  Exercise:  limited walking.    Past Medical History:   Diagnosis Date     Atrophy of left kidney 03/27/2017     Diabetes mellitus, type 2 (H) 2006     HTN (hypertension) 12/05/2012     Hyperlipidemia LDL goal < 100      OA (OSTEOARTHRITIS) OF KNEE - right 07/13/2010     OA (OSTEOARTHRITIS) OF KNEE - right      TACHO (obstructive sleep apnea) 12/07/2012     Paroxysmal atrial fibrillation (H) 07/11/2018    had ablation procedure 2017; nuclear stress test neg for ischemia in 2017       Past Surgical History:   Procedure Laterality Date     AS ESOPHAGOSCOPY, DIAGNOSTIC  07/2019     C HAND/FINGER SURGERY UNLISTED       C REMOVAL OF KIDNEY STONE       C TOTAL KNEE ARTHROPLASTY  07/21/2010    left     C TOTAL KNEE ARTHROPLASTY  12/12/2012    Right     CARPAL TUNNEL RELEASE RT/LT       CATHETER, ABLATION  2017    for PAF     COLONOSCOPY  07/2019     TONSILLECTOMY         History reviewed. No pertinent family history.    Social History     Socioeconomic History     Marital status:       Spouse name: Not on file     Number of children: Not on file     Years of education: Not on file     Highest education level: Not on file   Occupational History     Not on file   Tobacco Use     Smoking status: Never Smoker     Smokeless tobacco: Never Used   Substance and Sexual Activity     Alcohol use: No     Drug use: No     Sexual activity: Yes     Partners: Male     Birth control/protection: Post-menopausal   Other Topics Concern     Parent/sibling w/ CABG, MI or angioplasty before 65F 55M? Not Asked   Social History Narrative     Not on file     Social Determinants of Health     Financial Resource Strain:      Difficulty of Paying Living Expenses:    Food Insecurity:      Worried About Running Out of Food in the Last Year:      Ran Out of Food in the Last Year:    Transportation Needs:      Lack of Transportation (Medical):      Lack of Transportation (Non-Medical):    Physical Activity:      Days of Exercise per Week:      Minutes of Exercise per Session:    Stress:      Feeling of Stress :    Social Connections:      Frequency of Communication with Friends and Family:      Frequency of Social Gatherings with Friends and Family:      Attends Mandaeism Services:      Active Member of Clubs or Organizations:      Attends Club or Organization Meetings:      Marital Status:    Intimate Partner Violence:      Fear of Current or Ex-Partner:      Emotionally Abused:      Physically Abused:      Sexually Abused:        Current Outpatient Medications   Medication Sig Dispense Refill     amoxicillin (AMOXIL) 500 MG capsule Bring to clinic in original bottle where you will be instructed to take medication. 4 capsule 0     carvedilol (COREG) 6.25 MG tablet Take 1 tablet (6.25 mg) by mouth 2 times daily 180 tablet 3     cephALEXin (KEFLEX) 500 MG capsule Take 1 capsule (500 mg) by mouth 4 times daily 28 capsule 0     clotrimazole (LOTRIMIN) 1 % external cream        FISH OIL 1000 MG PO CAPS 3 CAPSULES DAILY WITH A MEAL        HYDROcodone-acetaminophen (NORCO) 5-325 MG tablet Take 1-2 tablets by mouth every 6 hours as needed for moderate to severe pain 10 tablet 0     isosorbide mononitrate (IMDUR) 30 MG 24 hr tablet Take 1 tablet (30 mg) by mouth daily 90 tablet 3     metFORMIN (GLUCOPHAGE) 500 MG tablet Take 1 tablet (500 mg) by mouth 2 times daily (with meals) 180 tablet 3     montelukast (SINGULAIR) 10 MG tablet Take 1 tablet (10 mg) by mouth At Bedtime 30 tablet 1     omeprazole (PRILOSEC) 40 MG DR capsule Take 1 capsule (40 mg) by mouth daily 90 capsule 1     order for DME 15-20 mm mercury thigh high compression stockings 1-2 pairs 2 Package 5     order for DME 15-20 mm mercury thigh high compression stockings 1-2 pairs  Or waist high if that will work for patient.   Also patient has trouble putting socks on so equipment that would help with that would be great. 2 Package 5     PARoxetine (PAXIL) 20 MG tablet TAKE 1 TABLET BY MOUTH IN THE MORNING 90 tablet 3     simvastatin (ZOCOR) 10 MG tablet Take 1 tablet (10 mg) by mouth At Bedtime 90 tablet 3     warfarin ANTICOAGULANT (COUMADIN) 2 MG tablet TAKE 2 MG (1 TABLET) BY MOUTH MONDAY/FRIDAY, AND 3 MG (1 & 1/2 TABLETS) ALL OTHER DAYS OF THE WEEK. 120 tablet 0       Allergies   Allergen Reactions     Gabapentin      Rash and itching     Nsaids      Other reaction(s): Gastrointestinal  Former PPI user, EGD 2/2018 showed LA Grade D esophagitis, plus duodenitis and gastritis.  Famotidine on med list but may not have been taking - rx was 3 years old.      Sulfa Drugs Other (See Comments) and Rash     Per 11-4-13 H&P by Dr. Fei Arias.  RASH ALL OVER FOUND IN DR. RAMIREZ DICTATION OF 10/11/11  BACTRIM-  RASH ALL OVER       REVIEW OF SYSTEMS:  CONSTITUTIONAL:  NEGATIVE for fever, chills, change in weight, not feeling tired  SKIN:  NEGATIVE for worrisome rashes, no skin lumps, no skin ulcers and no non-healing wounds  EYES:  NEGATIVE for vision changes or irritation.  ENT/MOUTH:   NEGATIVE.  No hearing loss, no hoarseness, no difficulty swallowing.  RESP:  NEGATIVE. No cough or shortness of breath.  BREAST:  NEGATIVE for masses, tenderness or discharge  CV:  NEGATIVE for chest pain, palpitations or peripheral edema  GI:  NEGATIVE for nausea, abdominal pain, heartburn, or change in bowel habits  :  Negative. No dysuria, no hematuria  MUSCULOSKELETAL:  See HPI above  NEURO:  NEGATIVE . No headaches, no dizziness,  no numbness  ENDOCRINE:  NEGATIVE for temperature intolerance, skin/hair changes  HEME/ALLERGY/IMMUNE:  NEGATIVE for bleeding problems  PSYCHIATRIC:  NEGATIVE. no anxiety, no depression.      Xray:  Xray images were independently visualized with the patient.  This shows good position of components of both total knee arthroplasty .  No sign of loosening or wear.     Exam:  Vitals: BP (!) 159/91   Pulse 73   Resp 18   Wt 90.3 kg (199 lb)   BMI 43.06 kg/m    BMI= Body mass index is 43.06 kg/m .  Range of motion right 0-113 degrees, left 0-110 degrees.  Alignment  Normal.  Stability:   Right       Lateral collateral ligament no laxity       Medial collateral ligament mild laxity.    Left:       Lateral collateral ligament no laxity       Medial collateral ligament mild laxity.    Wound:  Well healed.  Edema: mild in right lower leg, moderate in left lower leg.  She is using compression hose.  Effusion: None  Tenderness: Right positive medial joint line        Left:  Minor - lateral hamstrings.  Sensation, motor, and circulation intact.  Spine has tenderness across the low back.  She has tight hamstrings on straight leg raise at 70 degrees.  No asymmetry.      Assessment:  bilateral  total knee arthroplasty with mild medial collateral ligament laxity.    No xray abnormalities found in either knee.    Most likely has lumbar spinal stenosis as cause of weakness in legs.    Plan:  She can work on strengthening.  She should again see her spine doctors to see if anything can or should be  done for the leg weakness and pains..  She has been told to use compression socks for the swelling in the lower legs.  Return to clinic 2-4 years with x-ray bilateral knees.          Again, thank you for allowing me to participate in the care of your patient.        Sincerely,        Gurinder Martinez MD

## 2021-08-03 ENCOUNTER — LAB (OUTPATIENT)
Dept: LAB | Facility: CLINIC | Age: 76
End: 2021-08-03
Payer: MEDICARE

## 2021-08-03 ENCOUNTER — ANTICOAGULATION THERAPY VISIT (OUTPATIENT)
Dept: ANTICOAGULATION | Facility: CLINIC | Age: 76
End: 2021-08-03

## 2021-08-03 DIAGNOSIS — Z79.01 LONG TERM (CURRENT) USE OF ANTICOAGULANTS: ICD-10-CM

## 2021-08-03 DIAGNOSIS — I48.0 PAROXYSMAL ATRIAL FIBRILLATION (H): Primary | ICD-10-CM

## 2021-08-03 DIAGNOSIS — I48.0 PAROXYSMAL ATRIAL FIBRILLATION (H): ICD-10-CM

## 2021-08-03 LAB — INR BLD: 2 (ref 0.9–1.1)

## 2021-08-03 PROCEDURE — 85610 PROTHROMBIN TIME: CPT

## 2021-08-03 NOTE — PROGRESS NOTES
Selvin Rockwell is seen for follow up left total knee arthroplasty from 7/21/2010 and right total knee arthroplasty from 12/12/2012.  She complains of left posterior knee pain and shin pain, bilateral leg weakness, bilateral  calf swelling. She reports feeling like the legs are weak and want to give way with walking.  She does not report numbness.    In evaluation in June 2019 and 2/17/2020 I felt her primary problem was from the spine.  We ordered MRI scan of the lumbar spine.  She says she obtained this, but not through the [place] system.  She has been subsequently treated with physical therapy but not consistently, so we have no access to these studies or treatments.  Exercise:  limited walking.    Past Medical History:   Diagnosis Date     Atrophy of left kidney 03/27/2017     Diabetes mellitus, type 2 (H) 2006     HTN (hypertension) 12/05/2012     Hyperlipidemia LDL goal < 100      OA (OSTEOARTHRITIS) OF KNEE - right 07/13/2010     OA (OSTEOARTHRITIS) OF KNEE - right      TACHO (obstructive sleep apnea) 12/07/2012     Paroxysmal atrial fibrillation (H) 07/11/2018    had ablation procedure 2017; nuclear stress test neg for ischemia in 2017       Past Surgical History:   Procedure Laterality Date     AS ESOPHAGOSCOPY, DIAGNOSTIC  07/2019     C HAND/FINGER SURGERY UNLISTED       C REMOVAL OF KIDNEY STONE       C TOTAL KNEE ARTHROPLASTY  07/21/2010    left     C TOTAL KNEE ARTHROPLASTY  12/12/2012    Right     CARPAL TUNNEL RELEASE RT/LT       CATHETER, ABLATION  2017    for PAF     COLONOSCOPY  07/2019     TONSILLECTOMY         History reviewed. No pertinent family history.    Social History     Socioeconomic History     Marital status:      Spouse name: Not on file     Number of children: Not on file     Years of education: Not on file     Highest education level: Not on file   Occupational History     Not on file   Tobacco Use     Smoking status: Never Smoker     Smokeless tobacco: Never Used    Substance and Sexual Activity     Alcohol use: No     Drug use: No     Sexual activity: Yes     Partners: Male     Birth control/protection: Post-menopausal   Other Topics Concern     Parent/sibling w/ CABG, MI or angioplasty before 65F 55M? Not Asked   Social History Narrative     Not on file     Social Determinants of Health     Financial Resource Strain:      Difficulty of Paying Living Expenses:    Food Insecurity:      Worried About Running Out of Food in the Last Year:      Ran Out of Food in the Last Year:    Transportation Needs:      Lack of Transportation (Medical):      Lack of Transportation (Non-Medical):    Physical Activity:      Days of Exercise per Week:      Minutes of Exercise per Session:    Stress:      Feeling of Stress :    Social Connections:      Frequency of Communication with Friends and Family:      Frequency of Social Gatherings with Friends and Family:      Attends Taoist Services:      Active Member of Clubs or Organizations:      Attends Club or Organization Meetings:      Marital Status:    Intimate Partner Violence:      Fear of Current or Ex-Partner:      Emotionally Abused:      Physically Abused:      Sexually Abused:        Current Outpatient Medications   Medication Sig Dispense Refill     amoxicillin (AMOXIL) 500 MG capsule Bring to clinic in original bottle where you will be instructed to take medication. 4 capsule 0     carvedilol (COREG) 6.25 MG tablet Take 1 tablet (6.25 mg) by mouth 2 times daily 180 tablet 3     cephALEXin (KEFLEX) 500 MG capsule Take 1 capsule (500 mg) by mouth 4 times daily 28 capsule 0     clotrimazole (LOTRIMIN) 1 % external cream        FISH OIL 1000 MG PO CAPS 3 CAPSULES DAILY WITH A MEAL       HYDROcodone-acetaminophen (NORCO) 5-325 MG tablet Take 1-2 tablets by mouth every 6 hours as needed for moderate to severe pain 10 tablet 0     isosorbide mononitrate (IMDUR) 30 MG 24 hr tablet Take 1 tablet (30 mg) by mouth daily 90 tablet 3      metFORMIN (GLUCOPHAGE) 500 MG tablet Take 1 tablet (500 mg) by mouth 2 times daily (with meals) 180 tablet 3     montelukast (SINGULAIR) 10 MG tablet Take 1 tablet (10 mg) by mouth At Bedtime 30 tablet 1     omeprazole (PRILOSEC) 40 MG DR capsule Take 1 capsule (40 mg) by mouth daily 90 capsule 1     order for DME 15-20 mm mercury thigh high compression stockings 1-2 pairs 2 Package 5     order for DME 15-20 mm mercury thigh high compression stockings 1-2 pairs  Or waist high if that will work for patient.   Also patient has trouble putting socks on so equipment that would help with that would be great. 2 Package 5     PARoxetine (PAXIL) 20 MG tablet TAKE 1 TABLET BY MOUTH IN THE MORNING 90 tablet 3     simvastatin (ZOCOR) 10 MG tablet Take 1 tablet (10 mg) by mouth At Bedtime 90 tablet 3     warfarin ANTICOAGULANT (COUMADIN) 2 MG tablet TAKE 2 MG (1 TABLET) BY MOUTH MONDAY/FRIDAY, AND 3 MG (1 & 1/2 TABLETS) ALL OTHER DAYS OF THE WEEK. 120 tablet 0       Allergies   Allergen Reactions     Gabapentin      Rash and itching     Nsaids      Other reaction(s): Gastrointestinal  Former PPI user, EGD 2/2018 showed LA Grade D esophagitis, plus duodenitis and gastritis.  Famotidine on med list but may not have been taking - rx was 3 years old.      Sulfa Drugs Other (See Comments) and Rash     Per 11-4-13 H&P by Dr. Fei Arias.  RASH ALL OVER FOUND IN DR. RAMIREZ DICTATION OF 10/11/11  BACTRIM-  RASH ALL OVER       REVIEW OF SYSTEMS:  CONSTITUTIONAL:  NEGATIVE for fever, chills, change in weight, not feeling tired  SKIN:  NEGATIVE for worrisome rashes, no skin lumps, no skin ulcers and no non-healing wounds  EYES:  NEGATIVE for vision changes or irritation.  ENT/MOUTH:  NEGATIVE.  No hearing loss, no hoarseness, no difficulty swallowing.  RESP:  NEGATIVE. No cough or shortness of breath.  BREAST:  NEGATIVE for masses, tenderness or discharge  CV:  NEGATIVE for chest pain, palpitations or peripheral edema  GI:  NEGATIVE  for nausea, abdominal pain, heartburn, or change in bowel habits  :  Negative. No dysuria, no hematuria  MUSCULOSKELETAL:  See HPI above  NEURO:  NEGATIVE . No headaches, no dizziness,  no numbness  ENDOCRINE:  NEGATIVE for temperature intolerance, skin/hair changes  HEME/ALLERGY/IMMUNE:  NEGATIVE for bleeding problems  PSYCHIATRIC:  NEGATIVE. no anxiety, no depression.      Xray:  Xray images were independently visualized with the patient.  This shows good position of components of both total knee arthroplasty .  No sign of loosening or wear.     Exam:  Vitals: BP (!) 159/91   Pulse 73   Resp 18   Wt 90.3 kg (199 lb)   BMI 43.06 kg/m    BMI= Body mass index is 43.06 kg/m .  Range of motion right 0-113 degrees, left 0-110 degrees.  Alignment  Normal.  Stability:   Right       Lateral collateral ligament no laxity       Medial collateral ligament mild laxity.    Left:       Lateral collateral ligament no laxity       Medial collateral ligament mild laxity.    Wound:  Well healed.  Edema: mild in right lower leg, moderate in left lower leg.  She is using compression hose.  Effusion: None  Tenderness: Right positive medial joint line        Left:  Minor - lateral hamstrings.  Sensation, motor, and circulation intact.  Spine has tenderness across the low back.  She has tight hamstrings on straight leg raise at 70 degrees.  No asymmetry.      Assessment:  bilateral  total knee arthroplasty with mild medial collateral ligament laxity.    No xray abnormalities found in either knee.    Most likely has lumbar spinal stenosis as cause of weakness in legs.    Plan:  She can work on strengthening.  She should again see her spine doctors to see if anything can or should be done for the leg weakness and pains..  She has been told to use compression socks for the swelling in the lower legs.  Return to clinic 2-4 years with x-ray bilateral knees.

## 2021-08-31 ENCOUNTER — ANTICOAGULATION THERAPY VISIT (OUTPATIENT)
Dept: FAMILY MEDICINE | Facility: CLINIC | Age: 76
End: 2021-08-31

## 2021-08-31 ENCOUNTER — LAB (OUTPATIENT)
Dept: LAB | Facility: CLINIC | Age: 76
End: 2021-08-31
Payer: MEDICARE

## 2021-08-31 ENCOUNTER — TELEPHONE (OUTPATIENT)
Dept: ANTICOAGULATION | Facility: CLINIC | Age: 76
End: 2021-08-31

## 2021-08-31 DIAGNOSIS — I48.0 PAROXYSMAL ATRIAL FIBRILLATION (H): ICD-10-CM

## 2021-08-31 DIAGNOSIS — Z79.01 LONG TERM (CURRENT) USE OF ANTICOAGULANTS: Primary | ICD-10-CM

## 2021-08-31 DIAGNOSIS — Z79.01 LONG TERM (CURRENT) USE OF ANTICOAGULANTS: ICD-10-CM

## 2021-08-31 DIAGNOSIS — I48.0 PAROXYSMAL ATRIAL FIBRILLATION (H): Primary | ICD-10-CM

## 2021-08-31 LAB — INR BLD: 2.3 (ref 0.9–1.1)

## 2021-08-31 PROCEDURE — 85610 PROTHROMBIN TIME: CPT

## 2021-08-31 NOTE — TELEPHONE ENCOUNTER
Anticoagulation Management    Discussed INR home monitoring program with Selvin Rockwell reviewing:      [employer] requirements: >= 3 months of anticoagulation therapy, indication for chronic anticoagulation and order from provider    Required testing frequency (q1-2 weeks)    Home meters, testing supplies, meter training, and reporting of INR results done through an outside company. Patient would be contacted by home monitoring company to review insurance coverage with home monitoring company prior to enrolling.    [facility] would continue to receive and manage INR results.    Home monitoring application may take several weeks and must continue to follow up with recommended INR monitoring in clinic until receives monitor and training completed.     Home monitoring terms reviewed with patient      Patient agrees to frequency of testing as directed by referring provider ( weekly or biweekly) Yes    Testing to be performed during business hours of [facility] Yes    Patient agrees they have the skill (or a designated caregiver) necessary to perform the self test Yes    Patient agrees to report all INR results to INR home monitoring company Yes    Patient agrees to have additional INR test in clinic if a home result is critical Yes    Patient agrees to schedule an INR test at a [facility] clinic yearly for technique observation and quality check of INR results with their home meter Yes    Patient agrees to use a [facility] approved service provider and device for home monitoring Yes    [facility]    Referring provider: Gurinder Ho MD    Referring providers Clinic Fax number [phone]    Selvin Rockwell is interested home INR monitoring and requests order be submitted.

## 2021-08-31 NOTE — TELEPHONE ENCOUNTER
ANTICOAGULATION MANAGEMENT      Selvin Rockwell due for annual renewal of referral to anticoagulation monitoring. Order pended for your review and signature.      ANTICOAGULATION SUMMARY      Warfarin indication(s)     Atrial fibrillation    Heart valve present?  NO       Current goal range   INR: 2.0-3.0     Goal appropriate for indication? Yes, INR 2-3 appropriate for hx of DVT, PE, hypercoagulable state, Afib, LVAD, or bileaflet AVR without risk factors     Current duration of therapy Indefinite/long term therapy   Time in Therapeutic Range (TTR)  (Goal > 60%) 61.7%       Office visit with referring provider's group within last year yes on 11/16/20       Jessi Lin RN

## 2021-08-31 NOTE — PROGRESS NOTES
ANTICOAGULATION MANAGEMENT     Erummorgan Rockwell 76 year old female is on warfarin with therapeutic INR result. (Goal INR 2.0-3.0)    Recent labs: (last 7 days)     08/31/21  1042   INR 2.3*       ASSESSMENT     Source(s): Chart Review and Patient/Caregiver Call       Warfarin doses taken: Warfarin taken as instructed    Diet: No new diet changes identified    New illness, injury, or hospitalization: No    Medication/supplement changes: None noted    Signs or symptoms of bleeding or clotting: No    Previous INR: Therapeutic last 2(+) visits    Additional findings: Patient has an appointment soon to get her passport. Once she has her passport she plans to travel to [place] for a few months in the fall. Discussed a home monitor with her daughter, Kat, today. We will start the process and they will determine once insurance processed if they would like to proceed.      PLAN     Recommended plan for no diet, medication or health factor changes affecting INR     Dosing Instructions: Continue your current warfarin dose with next INR in 4 weeks       Summary  As of 8/31/2021    Full warfarin instructions:  2 mg every Mon, Wed, Fri; 3 mg all other days   Next INR check:  9/28/2021             Telephone call with Selvin who verbalizes understanding and agrees to plan    Lab visit scheduled    Education provided: None required    Plan made per [facility] anticoagulation protocol    Jessi Lin RN  Anticoagulation Clinic  8/31/2021    _______________________________________________________________________     Anticoagulation Episode Summary     Current INR goal:  2.0-3.0   TTR:  61.7 % (1 y)   Target end date:  Indefinite   Send INR reminders to:  JERRY PEÑA    Indications    Paroxysmal atrial fibrillation (H) [I48.0]           Comments:  Transfer from [facility] the [facility] - Patient states has been on coumadin for 2-3 years         Anticoagulation Care Providers     Provider Role Specialty Phone number    Gurinder Ho  MD [facility], [phone]

## 2021-09-08 ENCOUNTER — MEDICAL CORRESPONDENCE (OUTPATIENT)
Dept: HEALTH INFORMATION MANAGEMENT | Facility: CLINIC | Age: 76
End: 2021-09-08

## 2021-09-28 ENCOUNTER — TELEPHONE (OUTPATIENT)
Dept: FAMILY MEDICINE | Facility: CLINIC | Age: 76
End: 2021-09-28

## 2021-09-28 ENCOUNTER — LAB (OUTPATIENT)
Dept: LAB | Facility: CLINIC | Age: 76
End: 2021-09-28
Payer: MEDICARE

## 2021-09-28 ENCOUNTER — ANTICOAGULATION THERAPY VISIT (OUTPATIENT)
Dept: ANTICOAGULATION | Facility: CLINIC | Age: 76
End: 2021-09-28

## 2021-09-28 DIAGNOSIS — Z79.01 LONG TERM (CURRENT) USE OF ANTICOAGULANTS: ICD-10-CM

## 2021-09-28 DIAGNOSIS — I48.0 PAROXYSMAL ATRIAL FIBRILLATION (H): Primary | ICD-10-CM

## 2021-09-28 DIAGNOSIS — I48.0 PAROXYSMAL ATRIAL FIBRILLATION (H): ICD-10-CM

## 2021-09-28 LAB — INR BLD: 1.1 (ref 0.9–1.1)

## 2021-09-28 PROCEDURE — 85610 PROTHROMBIN TIME: CPT

## 2021-09-28 PROCEDURE — 36416 COLLJ CAPILLARY BLOOD SPEC: CPT

## 2021-09-28 NOTE — PROGRESS NOTES
ANTICOAGULATION MANAGEMENT     Selvin Rockwell 76 year old female is on warfarin with therapeutic INR result. (Goal INR 2.0-3.0)    Recent labs: (last 7 days)     09/28/21  1040   INR 1.1       ASSESSMENT     Source(s): Chart Review and Patient/Caregiver Call       Warfarin doses taken: Missed dose(s) may be affecting INR - Daughter reports that she doesn't remember/know the specific days that the patient missed.     Diet: No new diet changes identified    New illness, injury, or hospitalization: No    Medication/supplement changes: None noted    Signs or symptoms of bleeding or clotting: No    Previous INR: Therapeutic last 2(+) visits    Additional findings: Daughter inquiring about the status of home monitor application, will notify [facility] coordinator of Home monitor to follow up.      PLAN     Recommended plan for temporary change(s) affecting INR     Dosing Instructions: Booster dose then continue your current warfarin dose with next INR in 5-7 days.        Summary  As of 9/28/2021    Full warfarin instructions:  9/28: 6 mg; Otherwise 2 mg every Mon, Wed, Fri; 3 mg all other days   Next INR check:  10/7/2021             Telephone call with  Alicia who verbalizes understanding and agrees to plan    Lab visit scheduled    Education provided: Goal range and significance of current result, Importance of therapeutic range, Importance of following up at instructed interval and Importance of taking warfarin as instructed    Plan made per [facility] anticoagulation protocol    Sergio Key RN  Anticoagulation Clinic  9/28/2021    _______________________________________________________________________     Anticoagulation Episode Summary     Current INR goal:  2.0-3.0   TTR:  62.3 % (1 y)   Target end date:  Indefinite   Send INR reminders to:  JERRY PEÑA    Indications    Paroxysmal atrial fibrillation (H) [I48.0]  Long term (current) use of anticoagulants [Z79.01]           Comments:  Transfer from [place] -  Patient states has been on coumadin for 2-3 years         Anticoagulation Care Providers     Provider Role Specialty Phone number    Gurinder Ho MD Referring Family Medicine [phone]

## 2021-09-28 NOTE — TELEPHONE ENCOUNTER
Reason for Call:  INR    Who is calling?  Alicia    Phone number:  [phone]    Fax number:  [phone]    Name of caller: Alicia daughter    INR Value:  NA    Are there any other concerns:  Yes: Would like to know the status of the Home Monitor for when she leaves for [place]    Can we leave a detailed message on this number? YES    Phone number patient can be reached at: Other phone number:  [phone] Alicia-Daughter      Call taken on 9/28/2021 at 10:48 AM by Jacqui Casanova

## 2021-10-06 DIAGNOSIS — I48.0 PAROXYSMAL ATRIAL FIBRILLATION (H): ICD-10-CM

## 2021-10-06 DIAGNOSIS — K21.9 GASTROESOPHAGEAL REFLUX DISEASE, UNSPECIFIED WHETHER ESOPHAGITIS PRESENT: ICD-10-CM

## 2021-10-06 DIAGNOSIS — Z79.01 LONG TERM (CURRENT) USE OF ANTICOAGULANTS: ICD-10-CM

## 2021-10-07 ENCOUNTER — ANTICOAGULATION THERAPY VISIT (OUTPATIENT)
Dept: ANTICOAGULATION | Facility: CLINIC | Age: 76
End: 2021-10-07

## 2021-10-07 ENCOUNTER — LAB (OUTPATIENT)
Dept: LAB | Facility: CLINIC | Age: 76
End: 2021-10-07
Payer: MEDICARE

## 2021-10-07 DIAGNOSIS — I48.0 PAROXYSMAL ATRIAL FIBRILLATION (H): ICD-10-CM

## 2021-10-07 DIAGNOSIS — Z79.01 LONG TERM (CURRENT) USE OF ANTICOAGULANTS: ICD-10-CM

## 2021-10-07 DIAGNOSIS — I48.0 PAROXYSMAL ATRIAL FIBRILLATION (H): Primary | ICD-10-CM

## 2021-10-07 LAB — INR BLD: 1.8 (ref 0.9–1.1)

## 2021-10-07 PROCEDURE — 85610 PROTHROMBIN TIME: CPT

## 2021-10-07 PROCEDURE — 36415 COLL VENOUS BLD VENIPUNCTURE: CPT

## 2021-10-07 RX ORDER — WARFARIN SODIUM 2 MG/1
TABLET ORAL
Qty: 120 TABLET | Refills: 0 | Status: SHIPPED | OUTPATIENT
Start: 2021-10-07 | End: 2021-11-17

## 2021-10-07 RX ORDER — OMEPRAZOLE 40 MG/1
CAPSULE, DELAYED RELEASE ORAL
Qty: 90 CAPSULE | Refills: 2 | Status: SHIPPED | OUTPATIENT
Start: 2021-10-07 | End: 2022-05-23 | Stop reason: ALTCHOICE

## 2021-10-07 NOTE — PROGRESS NOTES
ANTICOAGULATION MANAGEMENT     Selvin Rockwell 76 year old female is on warfarin with subtherapeutic INR result. (Goal INR 2.0-3.0)    Recent labs: (last 7 days)     10/07/21  1227   INR 1.8*       ASSESSMENT     Source(s): Chart Review and Patient/Caregiver Call       Warfarin doses taken: Warfarin taken as instructed    Diet: No new diet changes identified    New illness, injury, or hospitalization: No    Medication/supplement changes: None noted    Signs or symptoms of bleeding or clotting: No    Previous INR: Subtherapeutic    Additional findings: None     PLAN     Recommended plan for no diet, medication or health factor changes affecting INR     Dosing Instructions: Continue your current warfarin dose with next INR in 1 week       Summary  As of 10/7/2021    Full warfarin instructions:  2 mg every Mon, Wed, Fri; 3 mg all other days   Next INR check:  10/14/2021             Telephone call with  Alicia who verbalizes understanding and agrees to plan    Lab visit scheduled    Education provided: Goal range and significance of current result and Importance of therapeutic range    Plan made per [facility] anticoagulation protocol    Sergio Key RN  Anticoagulation Clinic  10/7/2021    _______________________________________________________________________     Anticoagulation Episode Summary     Current INR goal:  2.0-3.0   TTR:  59.8 % (1 y)   Target end date:  Indefinite   Send INR reminders to:  JERRY PEÑA    Indications    Paroxysmal atrial fibrillation (H) [I48.0]  Long term (current) use of anticoagulants [Z79.01]           Comments:  Transfer from [facility] - Patient states has been on coumadin for 2-3 years         Anticoagulation Care Providers     Provider Role Specialty Phone number    Gurinder Ho MD Referring Family Medicine [phone]

## 2021-10-07 NOTE — TELEPHONE ENCOUNTER
Prescription approved per [facility] Refill Protocol.   Lori Cox RN;

## 2021-10-07 NOTE — PROGRESS NOTES
Anticoagulation Management    Unable to reach Alicia/Gem  today.    Today's INR result of 1.8 is subtherapeutic (goal INR of 2.0-3.0).  Result received from: Clinic Lab    Follow up required to confirm warfarin dose taken and assess for changes    Left message to continue current dose of warfarin 3 mg tonight.      Anticoagulation clinic to follow up    Sergio Key RN

## 2021-10-14 ENCOUNTER — LAB (OUTPATIENT)
Dept: LAB | Facility: CLINIC | Age: 76
End: 2021-10-14
Payer: MEDICARE

## 2021-10-14 ENCOUNTER — ANTICOAGULATION THERAPY VISIT (OUTPATIENT)
Dept: FAMILY MEDICINE | Facility: CLINIC | Age: 76
End: 2021-10-14

## 2021-10-14 DIAGNOSIS — Z79.01 LONG TERM (CURRENT) USE OF ANTICOAGULANTS: ICD-10-CM

## 2021-10-14 DIAGNOSIS — I48.0 PAROXYSMAL ATRIAL FIBRILLATION (H): Primary | ICD-10-CM

## 2021-10-14 DIAGNOSIS — I48.0 PAROXYSMAL ATRIAL FIBRILLATION (H): ICD-10-CM

## 2021-10-14 LAB — INR BLD: 2.9 (ref 0.9–1.1)

## 2021-10-14 PROCEDURE — 85610 PROTHROMBIN TIME: CPT

## 2021-10-14 PROCEDURE — 36415 COLL VENOUS BLD VENIPUNCTURE: CPT

## 2021-10-14 NOTE — PROGRESS NOTES
ANTICOAGULATION MANAGEMENT     Selvin Rockwell 76 year old female is on warfarin with therapeutic INR result. (Goal INR 2.0-3.0)    Recent labs: (last 7 days)     10/14/21  1718   INR 2.9*       ASSESSMENT     Source(s): Chart Review and Patient/Caregiver Call       Warfarin doses taken: Warfarin taken as instructed    Diet: No new diet changes identified    New illness, injury, or hospitalization: No    Medication/supplement changes: None noted    Signs or symptoms of bleeding or clotting: No    Previous INR: Subtherapeutic    Additional findings: None     PLAN     Recommended plan for no diet, medication or health factor changes affecting INR     Dosing Instructions: Continue your current warfarin dose with next INR in 2 weeks       Summary  As of 10/14/2021    Full warfarin instructions:  2 mg every Mon, Wed, Fri; 3 mg all other days   Next INR check:  11/11/2021             Telephone call with  Daughter who verbalizes understanding and agrees to plan and who agrees to plan and repeated back plan correctly    Lab visit scheduled for 4 weeks, will huddle with Kiera Casanova about extending recheck.    Education provided: None required    Plan made with [facility] Pharmacist Kiera Velasquez, RN  Anticoagulation Clinic  10/14/2021    _______________________________________________________________________     Anticoagulation Episode Summary     Current INR goal:  2.0-3.0   TTR:  59.4 % (1 y)   Target end date:  Indefinite   Send INR reminders to:  JERRY PEÑA    Indications    Paroxysmal atrial fibrillation (H) [I48.0]  Long term (current) use of anticoagulants [Z79.01]           Comments:           Anticoagulation Care Providers     Provider Role Specialty Phone number    Gurinder Ho MD Referring Family Medicine [phone]

## 2021-10-21 ENCOUNTER — TELEPHONE (OUTPATIENT)
Dept: FAMILY MEDICINE | Facility: CLINIC | Age: 76
End: 2021-10-21

## 2021-10-21 NOTE — TELEPHONE ENCOUNTER
Patient's daughter Alicia is requesting to speak with a nurse or Dr. Ho regarding the machine they thought patient should check her INR. The machine cost to much and they will not be able to afford and now they are wondering what they should do. Alicia works during the day and is wondering if someone can call her around 3:30 to discuss.  Felicia Cisse   [facility]  Central Scheduler

## 2021-10-22 NOTE — TELEPHONE ENCOUNTER
Spoke to Kat she said for now she will keep bringing her mom at scheduled time to lab and she will let us know travel plans to leave the [place] when she has that info.  Leola Velasco Rn  [facility]

## 2021-10-23 DIAGNOSIS — E78.5 HYPERLIPIDEMIA WITH TARGET LDL LESS THAN 100: ICD-10-CM

## 2021-10-25 RX ORDER — SIMVASTATIN 10 MG
TABLET ORAL
Qty: 90 TABLET | Refills: 0 | Status: SHIPPED | OUTPATIENT
Start: 2021-10-25 | End: 2022-02-01

## 2021-10-25 NOTE — TELEPHONE ENCOUNTER
Prescription approved per [facility] Refill Protocol.    Desiree Sen RN  [facility]

## 2021-11-01 ENCOUNTER — TRANSFERRED RECORDS (OUTPATIENT)
Dept: HEALTH INFORMATION MANAGEMENT | Facility: CLINIC | Age: 76
End: 2021-11-01
Payer: MEDICARE

## 2021-11-01 LAB — RETINOPATHY: NORMAL

## 2021-11-11 ENCOUNTER — LAB (OUTPATIENT)
Dept: LAB | Facility: CLINIC | Age: 76
End: 2021-11-11
Payer: MEDICARE

## 2021-11-11 ENCOUNTER — ANTICOAGULATION THERAPY VISIT (OUTPATIENT)
Dept: ANTICOAGULATION | Facility: CLINIC | Age: 76
End: 2021-11-11

## 2021-11-11 DIAGNOSIS — I48.0 PAROXYSMAL ATRIAL FIBRILLATION (H): ICD-10-CM

## 2021-11-11 DIAGNOSIS — Z79.01 LONG TERM (CURRENT) USE OF ANTICOAGULANTS: ICD-10-CM

## 2021-11-11 DIAGNOSIS — I48.0 PAROXYSMAL ATRIAL FIBRILLATION (H): Primary | ICD-10-CM

## 2021-11-11 LAB — INR BLD: 2.8 (ref 0.9–1.1)

## 2021-11-11 PROCEDURE — 36416 COLLJ CAPILLARY BLOOD SPEC: CPT

## 2021-11-11 PROCEDURE — 85610 PROTHROMBIN TIME: CPT

## 2021-11-11 NOTE — PROGRESS NOTES
ANTICOAGULATION MANAGEMENT     Selvin Rockwell 76 year old female is on warfarin with therapeutic INR result. (Goal INR 2.0-3.0)    Recent labs: (last 7 days)     11/11/21  1658   INR 2.8*       ASSESSMENT     Source(s): Chart Review and Patient/Caregiver Call       Warfarin doses taken: Warfarin taken as instructed    Diet: No new diet changes identified    New illness, injury, or hospitalization: No    Medication/supplement changes: None noted    Signs or symptoms of bleeding or clotting: No    Previous INR: Subtherapeutic    Additional findings: None     PLAN     Recommended plan for no diet, medication or health factor changes affecting INR     Dosing Instructions: Continue your current warfarin dose with next INR in 4 weeks       Summary  As of 11/11/2021    Full warfarin instructions:  2 mg every Mon, Wed, Fri; 3 mg all other days   Next INR check:  12/9/2021             Telephone call with Kat who verbalizes understanding and agrees to plan    Lab visit scheduled    Education provided: Goal range and significance of current result    Plan made per [facility] anticoagulation protocol    Chitra Morrison RN  Anticoagulation Clinic  11/11/2021    _______________________________________________________________________     Anticoagulation Episode Summary     Current INR goal:  2.0-3.0   TTR:  59.4 % (1 y)   Target end date:  Indefinite   Send INR reminders to:  JERRY PEÑA    Indications    Paroxysmal atrial fibrillation (H) [I48.0]  Long term (current) use of anticoagulants [Z79.01]           Comments:  Daughter Kat with dosing         Anticoagulation Care Providers     Provider Role Specialty Phone number    Gurinder Ho MD Referring Family Medicine [phone]

## 2021-11-15 DIAGNOSIS — I48.0 PAROXYSMAL ATRIAL FIBRILLATION (H): ICD-10-CM

## 2021-11-15 DIAGNOSIS — E11.21 TYPE 2 DIABETES MELLITUS WITH DIABETIC NEPHROPATHY, WITHOUT LONG-TERM CURRENT USE OF INSULIN (H): ICD-10-CM

## 2021-11-15 DIAGNOSIS — Z79.01 LONG TERM (CURRENT) USE OF ANTICOAGULANTS: ICD-10-CM

## 2021-11-17 RX ORDER — WARFARIN SODIUM 2 MG/1
TABLET ORAL
Qty: 120 TABLET | Refills: 0 | Status: SHIPPED | OUTPATIENT
Start: 2021-11-17 | End: 2022-04-04

## 2021-11-17 NOTE — TELEPHONE ENCOUNTER
"Routing refill request to provider for review/approval because:  Labs not current:  A1C      Requested Prescriptions   Pending Prescriptions Disp Refills     metFORMIN (GLUCOPHAGE) 500 MG tablet [Pharmacy Med Name: metFORMIN HCl 500 MG Oral Tablet] 180 tablet 0     Sig: TAKE 1 TABLET BY MOUTH TWICE DAILY WITH MEALS       Biguanide Agents Failed - 11/15/2021 12:21 PM        Failed - Patient has documented A1c within the specified period of time.     If HgbA1C is 8 or greater, it needs to be on file within the past 3 months.  If less than 8, must be on file within the past 6 months.     Recent Labs   Lab Test 11/18/20 0919   A1C 7.0*             Passed - Patient is age 10 or older        Passed - Patient's CR is NOT>1.4 OR Patient's EGFR is NOT<45 within past 12 mos.     Recent Labs   Lab Test 11/18/20 0919   GFRESTIMATED 53*   GFRESTBLACK 61       Recent Labs   Lab Test 11/18/20 0919   CR 1.04             Passed - Patient does NOT have a diagnosis of CHF.        Passed - Medication is active on med list        Passed - Patient is not pregnant        Passed - Patient has not had a positive pregnancy test within the past 12 mos.         Passed - Recent (6 mo) or future (30 days) visit within the authorizing provider's specialty     Patient had office visit in the last 6 months or has a visit in the next 30 days with authorizing provider or within the authorizing provider's specialty.  See \"Patient Info\" tab in inbasket, or \"Choose Columns\" in Meds & Orders section of the refill encounter.             Signed Prescriptions Disp Refills    warfarin ANTICOAGULANT (COUMADIN) 2 MG tablet 120 tablet 0     Sig: TAKE ONE (2 MG) TABLET BY MOUTH EVERY MONDAY, WEDNESDAY AND FRIDAY, AND TAKE ONE AND ONE-HALF TABLETS (3 MG) ALL OTHER DAYS OF THE WEEK       Vitamin K Antagonists Failed - 11/15/2021 12:21 PM        Failed - INR is within goal in the past 6 weeks     Confirm INR is within goal in the past 6 weeks.     Recent Labs " "  Lab Test 11/11/21  1658   INR 2.8*                       Passed - Recent (12 mo) or future (30 days) visit within the authorizing provider's specialty     Patient has had an office visit with the authorizing provider or a provider within the authorizing providers department within the previous 12 mos or has a future within next 30 days. See \"Patient Info\" tab in inbasket, or \"Choose Columns\" in Meds & Orders section of the refill encounter.              Passed - Medication is active on med list        Passed - Patient is 18 years of age or older        Passed - Patient is not pregnant        Passed - No positive pregnancy on file in past 12 months           Lori Cox RN    "

## 2021-12-09 ENCOUNTER — ANTICOAGULATION THERAPY VISIT (OUTPATIENT)
Dept: ANTICOAGULATION | Facility: CLINIC | Age: 76
End: 2021-12-09

## 2021-12-09 ENCOUNTER — LAB (OUTPATIENT)
Dept: LAB | Facility: CLINIC | Age: 76
End: 2021-12-09
Payer: MEDICARE

## 2021-12-09 DIAGNOSIS — I48.0 PAROXYSMAL ATRIAL FIBRILLATION (H): Primary | ICD-10-CM

## 2021-12-09 DIAGNOSIS — I48.0 PAROXYSMAL ATRIAL FIBRILLATION (H): ICD-10-CM

## 2021-12-09 DIAGNOSIS — Z79.01 LONG TERM (CURRENT) USE OF ANTICOAGULANTS: ICD-10-CM

## 2021-12-09 LAB — INR BLD: 2.1 (ref 0.9–1.1)

## 2021-12-09 PROCEDURE — 36415 COLL VENOUS BLD VENIPUNCTURE: CPT

## 2021-12-09 PROCEDURE — 85610 PROTHROMBIN TIME: CPT

## 2021-12-09 NOTE — PROGRESS NOTES
Anticoagulation Management    Unable to reach Gem/Kat today.    Today's INR result of 2.1 is therapeutic (goal INR of 2.0-3.0).  Result received from: Clinic Lab    Follow up required to confirm warfarin dose taken and assess for changes    Left message to continue current dose of warfarin 3 mg tonight.      Anticoagulation clinic to follow up    Chitra Morrison RN

## 2021-12-09 NOTE — PROGRESS NOTES
ANTICOAGULATION MANAGEMENT     Selvin Rockwell 76 year old female is on warfarin with therapeutic INR result. (Goal INR 2.0-3.0)    Recent labs: (last 7 days)     12/09/21  1706   INR 2.1*       ASSESSMENT     Source(s): Chart Review and Patient/Caregiver Call       Warfarin doses taken: Warfarin taken as instructed    Diet: No new diet changes identified    New illness, injury, or hospitalization: Yes: Fell on 12/7    Medication/supplement changes: None noted    Signs or symptoms of bleeding or clotting: No    Previous INR: Therapeutic last 2(+) visits    Additional findings: None     PLAN     Recommended plan for no diet, medication or health factor changes affecting INR     Dosing Instructions: Continue your current warfarin dose with next INR in 4 weeks       Summary  As of 12/9/2021    Full warfarin instructions:  2 mg every Mon, Wed, Fri; 3 mg all other days   Next INR check:  1/6/2022             Telephone call with  Kat who verbalizes understanding and agrees to plan    Lab visit scheduled    Education provided: Goal range and significance of current result    Plan made per [facility] anticoagulation protocol    Chitra Morrison RN  Anticoagulation Clinic  12/9/2021    _______________________________________________________________________     Anticoagulation Episode Summary     Current INR goal:  2.0-3.0   TTR:  66.3 % (1 y)   Target end date:  Indefinite   Send INR reminders to:  JERRY PEÑA    Indications    Paroxysmal atrial fibrillation (H) [I48.0]  Long term (current) use of anticoagulants [Z79.01]           Comments:  Daughter Kat with dosing         Anticoagulation Care Providers     Provider Role Specialty Phone number    Gurinder Ho MD Referring Family Medicine [phone]

## 2021-12-12 DIAGNOSIS — F41.9 ANXIETY: ICD-10-CM

## 2021-12-12 DIAGNOSIS — I10 HYPERTENSION GOAL BP (BLOOD PRESSURE) < 140/90: ICD-10-CM

## 2021-12-14 RX ORDER — PAROXETINE 20 MG/1
TABLET, FILM COATED ORAL
Qty: 90 TABLET | Refills: 0 | Status: SHIPPED | OUTPATIENT
Start: 2021-12-14 | End: 2022-03-19

## 2021-12-14 RX ORDER — ISOSORBIDE MONONITRATE 30 MG/1
TABLET, EXTENDED RELEASE ORAL
Qty: 90 TABLET | Refills: 0 | Status: SHIPPED | OUTPATIENT
Start: 2021-12-14 | End: 2022-03-19

## 2021-12-14 NOTE — TELEPHONE ENCOUNTER
Routing refill request to provider for review/approval because:  Drug not active on patient's medication list  BP Readings from Last 3 Encounters:   08/02/21 (!) 159/91   06/08/21 (!) 150/80   03/03/21 111/83

## 2021-12-21 ENCOUNTER — VIRTUAL VISIT (OUTPATIENT)
Dept: FAMILY MEDICINE | Facility: CLINIC | Age: 76
End: 2021-12-21
Payer: MEDICARE

## 2021-12-21 DIAGNOSIS — K21.9 GASTROESOPHAGEAL REFLUX DISEASE, UNSPECIFIED WHETHER ESOPHAGITIS PRESENT: ICD-10-CM

## 2021-12-21 DIAGNOSIS — Z91.81 HISTORY OF RECENT FALL: Primary | ICD-10-CM

## 2021-12-21 DIAGNOSIS — I48.0 PAROXYSMAL ATRIAL FIBRILLATION (H): ICD-10-CM

## 2021-12-21 DIAGNOSIS — Z79.01 LONG TERM (CURRENT) USE OF ANTICOAGULANTS: ICD-10-CM

## 2021-12-21 DIAGNOSIS — I10 HYPERTENSION GOAL BP (BLOOD PRESSURE) < 140/90: ICD-10-CM

## 2021-12-21 DIAGNOSIS — E11.21 TYPE 2 DIABETES MELLITUS WITH DIABETIC NEPHROPATHY, WITHOUT LONG-TERM CURRENT USE OF INSULIN (H): ICD-10-CM

## 2021-12-21 DIAGNOSIS — Z11.59 NEED FOR HEPATITIS C SCREENING TEST: ICD-10-CM

## 2021-12-21 DIAGNOSIS — Z13.220 SCREENING FOR HYPERLIPIDEMIA: ICD-10-CM

## 2021-12-21 DIAGNOSIS — F41.9 ANXIETY: ICD-10-CM

## 2021-12-21 DIAGNOSIS — E78.5 HYPERLIPIDEMIA WITH TARGET LDL LESS THAN 100: ICD-10-CM

## 2021-12-21 PROCEDURE — 99214 OFFICE O/P EST MOD 30 MIN: CPT | Mod: 95 | Performed by: PHYSICIAN ASSISTANT

## 2021-12-21 RX ORDER — SIMVASTATIN 10 MG
10 TABLET ORAL AT BEDTIME
Qty: 90 TABLET | Refills: 0 | Status: CANCELLED | OUTPATIENT
Start: 2021-12-21

## 2021-12-21 RX ORDER — WARFARIN SODIUM 2 MG/1
TABLET ORAL
Qty: 120 TABLET | Refills: 0 | Status: CANCELLED | OUTPATIENT
Start: 2021-12-21

## 2021-12-21 RX ORDER — ISOSORBIDE MONONITRATE 30 MG/1
30 TABLET, EXTENDED RELEASE ORAL DAILY
Qty: 90 TABLET | Refills: 0 | Status: CANCELLED | OUTPATIENT
Start: 2021-12-21

## 2021-12-21 RX ORDER — CARVEDILOL 6.25 MG/1
6.25 TABLET ORAL 2 TIMES DAILY
Qty: 180 TABLET | Refills: 3 | Status: CANCELLED | OUTPATIENT
Start: 2021-12-21

## 2021-12-21 RX ORDER — FAMOTIDINE 20 MG/1
20 TABLET, FILM COATED ORAL 2 TIMES DAILY
Qty: 60 TABLET | Refills: 1 | Status: SHIPPED | OUTPATIENT
Start: 2021-12-21 | End: 2022-01-13

## 2021-12-21 RX ORDER — PAROXETINE 20 MG/1
TABLET, FILM COATED ORAL
Qty: 90 TABLET | Refills: 0 | Status: CANCELLED | OUTPATIENT
Start: 2021-12-21

## 2021-12-21 RX ORDER — OMEPRAZOLE 40 MG/1
CAPSULE, DELAYED RELEASE ORAL
Qty: 90 CAPSULE | Refills: 2 | Status: CANCELLED | OUTPATIENT
Start: 2021-12-21

## 2021-12-21 NOTE — PROGRESS NOTES
Gem is a 76 year old who is being evaluated via a billable telephone visit.      What phone number would you like to be contacted at? [phone]  How would you like to obtain your AVS? Mail a copy    Assessment & Plan     Type 2 diabetes mellitus with diabetic nephropathy, without long-term current use of insulin (H)    - HEMOGLOBIN A1C; Future  - OPTOMETRY REFERRAL; Future  - BASIC METABOLIC PANEL; Future  - Albumin Random Urine Quantitative with Creat Ratio; Future  - Hemoglobin; Future  - AMB Adult Diabetes Educator Referral; Future    Hypertension goal BP (blood pressure) < 140/90    - AMB Adult Diabetes Educator Referral; Future    Paroxysmal atrial fibrillation (H)      Anxiety      Hyperlipidemia with target LDL less than 100    - Lipid panel reflex to direct LDL Fasting; Future    Long term (current) use of anticoagulants      Gastroesophageal reflux disease, unspecified whether esophagitis present    - famotidine (PEPCID) 20 MG tablet; Take 1 tablet (20 mg) by mouth 2 times daily    Need for hepatitis C screening test    - Hepatitis C Screen Reflex to HCV RNA Quant and Genotype; Future    Screening for hyperlipidemia      History of recent fall    - UA Macro with Reflex to Micro and Culture - lab collect; Future                 Return in about 1 day (around 12/22/2021) for for labs.    Alycia Garcia PA-C  [facility] MARIANA Rabago is a 76 year old who presents for the following health issues  accompanied by her daughter Alicia.    HPI     Diabetes Follow-up    How often are you checking your blood sugar? One time daily  What time of day are you checking your blood sugars (select all that apply)?  Before meals  Have you had any blood sugars above 200?  No  Have you had any blood sugars below 70?  No    What symptoms do you notice when your blood sugar is low?  Dizzy    What concerns do you have today about your diabetes? None     Do you have any of these symptoms?  (Select all that apply)  No numbness or tingling in feet.  No redness, sores or blisters on feet.  No complaints of excessive thirst.  No reports of blurry vision.  No significant changes to weight.    Have you had a diabetic eye exam in the last 12 months? No                Hyperlipidemia Follow-Up      Are you regularly taking any medication or supplement to lower your cholesterol?   Yes- taking simvastatin    Are you having muscle aches or other side effects that you think could be caused by your cholesterol lowering medication?  No    Hypertension Follow-up      Do you check your blood pressure regularly outside of the clinic? Yes     Are you following a low salt diet? Yes    Are your blood pressures ever more than 140 on the top number (systolic) OR more   than 90 on the bottom number (diastolic), for example 140/90? Yes    BP Readings from Last 2 Encounters:   08/02/21 (!) 159/91   06/08/21 (!) 150/80     Hemoglobin A1C POCT (%)   Date Value   11/18/2020 7.0 (H)   09/20/2019 6.5 (H)     LDL Cholesterol Calculated (mg/dL)   Date Value   11/18/2020 23   09/20/2019 49         How many servings of fruits and vegetables do you eat daily?  2-3    On average, how many sweetened beverages do you drink each day (Examples: soda, juice, sweet tea, etc.  Do NOT count diet or artificially sweetened beverages)?   0    How many days per week do you exercise enough to make your heart beat faster? none    How many minutes a day do you exercise enough to make your heart beat faster? none    How many days per week do you miss taking your medication? 0    Did review with daughter that she needs in person follow up.  Daughter is concerned that she will be unable to see her PCP before leaving over [facility].  Will assist in lab orders.  Patient ok with substituting Pepcid for Omeprazole.  Patient had 2 falls a couple of weeks ago.  Did encourage daughter that we would want to see her in clinic but need UA at a minimum.  DM education can  go through medications with family as well but MTM may be needed.     Review of Systems   Constitutional, HEENT, cardiovascular, pulmonary, gi and gu systems are negative, except as otherwise noted.      Objective           Vitals:  No vitals were obtained today due to virtual visit.    Physical Exam   Not completed                Phone call duration: 15 minutes

## 2021-12-23 ENCOUNTER — TELEPHONE (OUTPATIENT)
Dept: FAMILY MEDICINE | Facility: CLINIC | Age: 76
End: 2021-12-23

## 2021-12-23 ENCOUNTER — LAB (OUTPATIENT)
Dept: LAB | Facility: CLINIC | Age: 76
End: 2021-12-23
Payer: MEDICARE

## 2021-12-23 DIAGNOSIS — E78.5 HYPERLIPIDEMIA WITH TARGET LDL LESS THAN 100: ICD-10-CM

## 2021-12-23 DIAGNOSIS — E11.21 TYPE 2 DIABETES MELLITUS WITH DIABETIC NEPHROPATHY, WITHOUT LONG-TERM CURRENT USE OF INSULIN (H): ICD-10-CM

## 2021-12-23 DIAGNOSIS — Z91.81 HISTORY OF RECENT FALL: ICD-10-CM

## 2021-12-23 DIAGNOSIS — Z11.59 NEED FOR HEPATITIS C SCREENING TEST: ICD-10-CM

## 2021-12-23 LAB
ALBUMIN UR-MCNC: NEGATIVE MG/DL
APPEARANCE UR: CLEAR
BILIRUB UR QL STRIP: NEGATIVE
COLOR UR AUTO: YELLOW
GLUCOSE UR STRIP-MCNC: NEGATIVE MG/DL
HBA1C MFR BLD: 6.7 % (ref 0–5.6)
HGB BLD-MCNC: 12.8 G/DL (ref 11.7–15.7)
HGB UR QL STRIP: NEGATIVE
KETONES UR STRIP-MCNC: NEGATIVE MG/DL
LEUKOCYTE ESTERASE UR QL STRIP: ABNORMAL
NITRATE UR QL: NEGATIVE
PH UR STRIP: 5 [PH] (ref 5–7)
RBC #/AREA URNS AUTO: ABNORMAL /HPF
SP GR UR STRIP: >=1.03 (ref 1–1.03)
SQUAMOUS #/AREA URNS AUTO: ABNORMAL /LPF
UROBILINOGEN UR STRIP-ACNC: 0.2 E.U./DL
WBC #/AREA URNS AUTO: ABNORMAL /HPF

## 2021-12-23 PROCEDURE — 83036 HEMOGLOBIN GLYCOSYLATED A1C: CPT

## 2021-12-23 PROCEDURE — 80061 LIPID PANEL: CPT

## 2021-12-23 PROCEDURE — 86803 HEPATITIS C AB TEST: CPT

## 2021-12-23 PROCEDURE — 85018 HEMOGLOBIN: CPT

## 2021-12-23 PROCEDURE — 80048 BASIC METABOLIC PNL TOTAL CA: CPT

## 2021-12-23 PROCEDURE — 82043 UR ALBUMIN QUANTITATIVE: CPT

## 2021-12-23 PROCEDURE — 36415 COLL VENOUS BLD VENIPUNCTURE: CPT

## 2021-12-23 PROCEDURE — 81001 URINALYSIS AUTO W/SCOPE: CPT

## 2021-12-23 NOTE — TELEPHONE ENCOUNTER
Diabetes Education Scheduling Outreach #1:    Call to patient to schedule. Left message with phone number to call to schedule.    Plan for 2nd outreach attempt within 1 week.    Katherine Montague  [facility]  Diabetes and Nutrition Scheduling

## 2021-12-24 LAB
ANION GAP SERPL CALCULATED.3IONS-SCNC: 9 MMOL/L (ref 3–14)
BUN SERPL-MCNC: 24 MG/DL (ref 7–30)
CALCIUM SERPL-MCNC: 9 MG/DL (ref 8.5–10.1)
CHLORIDE BLD-SCNC: 112 MMOL/L (ref 94–109)
CO2 SERPL-SCNC: 23 MMOL/L (ref 20–32)
CREAT SERPL-MCNC: 0.92 MG/DL (ref 0.52–1.04)
CREAT UR-MCNC: 76 MG/DL
GFR SERPL CREATININE-BSD FRML MDRD: 65 ML/MIN/1.73M2
GLUCOSE BLD-MCNC: 154 MG/DL (ref 70–99)
HCV AB SERPL QL IA: NONREACTIVE
MICROALBUMIN UR-MCNC: 113 MG/L
MICROALBUMIN/CREAT UR: 148.68 MG/G CR (ref 0–25)
POTASSIUM BLD-SCNC: 4 MMOL/L (ref 3.4–5.3)
SODIUM SERPL-SCNC: 144 MMOL/L (ref 133–144)

## 2021-12-26 LAB
CHOLEST SERPL-MCNC: 133 MG/DL
FASTING STATUS PATIENT QL REPORTED: NO
HDLC SERPL-MCNC: 37 MG/DL
LDLC SERPL CALC-MCNC: 35 MG/DL
NONHDLC SERPL-MCNC: 96 MG/DL
TRIGL SERPL-MCNC: 305 MG/DL

## 2021-12-27 ENCOUNTER — TELEPHONE (OUTPATIENT)
Dept: FAMILY MEDICINE | Facility: CLINIC | Age: 76
End: 2021-12-27
Payer: MEDICARE

## 2021-12-27 NOTE — TELEPHONE ENCOUNTER
Patient's daughter called and said that patient recently was switched from Omeprazole to famotidine and that patient is complaining of stomach ache and discomfort in her esophagus. Daughter denies that patient is having any heart related chest pain. Daughter will have patient hold medication for tomorrow morning until she hears back from Alycia Garcia.  Please advise.  PLEASE leave a detailed voicemail for daughter # [phone]  Katherine Bonds RN on 12/27/2021 at 4:42 PM

## 2021-12-28 NOTE — TELEPHONE ENCOUNTER
Please contact patient to see if holding medication made any difference in symptoms.  Thank you.  Brenda NASH

## 2021-12-29 NOTE — TELEPHONE ENCOUNTER
Attempted to call to Alicia (daughter) at [phone]. This was the first attempt at calling and voice message left to return call to clinic at [phone].     Would like to see how pt is feeling after holding medication.     Mariia JAMES RN, BSN  [facility]

## 2021-12-31 NOTE — TELEPHONE ENCOUNTER
Left message on patient's daughter's unidentified voice mail.  Advised patient to call [phone] at her earliest convenience.    Please give the patient/patient's daughter message with provider's recommendations.

## 2021-12-31 NOTE — TELEPHONE ENCOUNTER
Routing to Alycia Garcia and  triage.    Have daughter call back when she is home to further triage?  RN received call from patients daughter.    Per daughter, patient states there has been no improvement since patient stopped taking the medication      Daughter stating when she is done with work, she may take her mother to thru Emergency room.    Daughter believes she has thrown up once.    Per daughter, patient has a heaviness in her chest.    RN attempted to gather more symptoms.  Daughter stated she really didn't know as she does not get much information from her mother.    PLEASE leave a detailed voicemail for daughter # [phone]         Michele Nowak, RN, BSN, PHN  [facility]

## 2021-12-31 NOTE — TELEPHONE ENCOUNTER
Pt and daughter came to the clinic at 4:45 pm asking to be seen for abdominal pain. Writer informed daughter and patient that the providers are leaving for the day and the clinic closes at 5 pm. Daughter informed we are unable to provide recommendations without pt seeing a provider. Pt was advised to go to the ER for evaluation of abdominal pain. This was also recommended by Alycia earlier today and a message was left for daughter this afternoon.     Daughter appeared frustrated and they walked out of the clinic.    Mariia JAMES RN, BSN  [facility]

## 2021-12-31 NOTE — TELEPHONE ENCOUNTER
2nd attempt:  Called used the  services.  Left message on patient's daughter's unidentified voice mail.  Advised patient to call [phone] at her earliest convenience and speak to one of the nurses.      .

## 2022-01-04 NOTE — TELEPHONE ENCOUNTER
Diabetes Education Scheduling Outreach #2:    Call to patient to schedule. Left message with phone number to call to schedule.    Katherine Montague  [facility] [facility]  Diabetes and Nutrition Scheduling

## 2022-01-06 ENCOUNTER — LAB (OUTPATIENT)
Dept: LAB | Facility: CLINIC | Age: 77
End: 2022-01-06
Payer: MEDICARE

## 2022-01-06 ENCOUNTER — ANTICOAGULATION THERAPY VISIT (OUTPATIENT)
Dept: ANTICOAGULATION | Facility: CLINIC | Age: 77
End: 2022-01-06

## 2022-01-06 DIAGNOSIS — I48.0 PAROXYSMAL ATRIAL FIBRILLATION (H): ICD-10-CM

## 2022-01-06 DIAGNOSIS — Z79.01 LONG TERM (CURRENT) USE OF ANTICOAGULANTS: ICD-10-CM

## 2022-01-06 DIAGNOSIS — I48.0 PAROXYSMAL ATRIAL FIBRILLATION (H): Primary | ICD-10-CM

## 2022-01-06 LAB — INR BLD: 2.9 (ref 0.9–1.1)

## 2022-01-06 PROCEDURE — 85610 PROTHROMBIN TIME: CPT

## 2022-01-06 PROCEDURE — 36416 COLLJ CAPILLARY BLOOD SPEC: CPT

## 2022-01-06 NOTE — PROGRESS NOTES
ANTICOAGULATION MANAGEMENT     Selvin Rockwell 76 year old female is on warfarin with therapeutic INR result. (Goal INR 2.0-3.0)    Recent labs: (last 7 days)     01/06/22  1721   INR 2.9*       ASSESSMENT     Source(s): Chart Review and Patient/Caregiver Call       Warfarin doses taken: Warfarin taken as instructed    Diet: No new diet changes identified    New illness, injury, or hospitalization: Yes: in ED on 12/31 for chest pain    Medication/supplement changes: None noted    Signs or symptoms of bleeding or clotting: No    Previous INR: Therapeutic last 2(+) visits    Additional findings: None     PLAN     Recommended plan for no diet, medication or health factor changes affecting INR     Dosing Instructions: Continue your current warfarin dose with next INR in 4 weeks       Summary  As of 1/6/2022    Full warfarin instructions:  2 mg every Mon, Wed, Fri; 3 mg all other days   Next INR check:               Telephone call with Kat who verbalizes understanding and agrees to plan    Patient offered & declined to schedule next visit    Education provided: Goal range and significance of current result    Plan made per [facility] anticoagulation protocol    Chitra Morrison RN  Anticoagulation Clinic  1/6/2022    _______________________________________________________________________     Anticoagulation Episode Summary     Current INR goal:  2.0-3.0   TTR:  71.8 % (1 y)   Target end date:  Indefinite   Send INR reminders to:  JERRY PEÑA    Indications    Paroxysmal atrial fibrillation (H) [I48.0]  Long term (current) use of anticoagulants [Z79.01]           Comments:  Daughter Kat with dosing         Anticoagulation Care Providers     Provider Role Specialty Phone number    Gurinder Ho MD Referring Family Medicine [phone]

## 2022-01-13 ENCOUNTER — TELEPHONE (OUTPATIENT)
Dept: FAMILY MEDICINE | Facility: CLINIC | Age: 77
End: 2022-01-13

## 2022-01-13 NOTE — TELEPHONE ENCOUNTER
"Patient's daughter calling to request alternative to :     - famotidine (PEPCID) 20 MG tablet; Take 1 tablet (20 mg) by mouth 2 times daily    Patient gets stomach upset on medication and feels \"icky\" Please advise.    Gabrielle Velasquez RN    "

## 2022-01-14 NOTE — TELEPHONE ENCOUNTER
RN TRIAGE:  Please call patient daughter next week for update (around 1/17/22).  See documentation below:     ------------------------------------------------------------------------------  MD called.    Reviewed chart   Spoke with daughter.     Stop metformin.            Will CC to RN to check on patient for next week  --- call daughter and see if abd symptoms have improved OFF of metformin    Meantime prn MOM or other    DBL is patient PCP.  Please forward to PCP for further concerns.  Thankyou

## 2022-01-17 NOTE — TELEPHONE ENCOUNTER
Left message on daughterAlicia's VM requesting a return call to [facility] [phone]     Jagjit Watkins RN  [facility]

## 2022-01-19 NOTE — TELEPHONE ENCOUNTER
Called using the  services.  Left message on patient's daughter (Alicia) unidentified voice mail.  Advised patient to call [phone] at her earliest convenience and speak to one of the nurses.    Called and spoke with patient using the  services.  She reports that she stopped taking the metFORMIN (GLUCOPHAGE) 500 MG tablet for 6 days.  She continues to have tightness to upper chest area to the throat area and headaches (tightness to the front): daily in the mornings and last all day.  Patient was evaluated and treated in the ER on 12/31/21.    Patient reports that she fell and hit head 3 weeks ago.    Patient wants to be evaluated and treated for on-going symptoms.    Assisted with scheduling a follow-up appointment (1/20/22) for further discuss symptoms and medication management.    Patient was advised to call the clinic [phone] or seek medical assistance at the nearest ER or ,  if the symptoms persist or worsen before scheduled appointment.    Patient verbalized understanding and has no further questions or concerns at this time.

## 2022-01-22 DIAGNOSIS — I10 HYPERTENSION GOAL BP (BLOOD PRESSURE) < 140/90: ICD-10-CM

## 2022-01-22 DIAGNOSIS — I48.0 PAROXYSMAL ATRIAL FIBRILLATION (H): ICD-10-CM

## 2022-01-25 RX ORDER — CARVEDILOL 6.25 MG/1
TABLET ORAL
Qty: 180 TABLET | Refills: 0 | Status: SHIPPED | OUTPATIENT
Start: 2022-01-25 | End: 2022-06-29

## 2022-01-25 NOTE — TELEPHONE ENCOUNTER
"Routing refill request to provider for review/approval because:  Blood Pressure out of range      Requested Prescriptions   Pending Prescriptions Disp Refills     carvedilol (COREG) 6.25 MG tablet [Pharmacy Med Name: Carvedilol 6.25 MG Oral Tablet] 180 tablet 0     Sig: Take 1 tablet by mouth twice daily       Beta-Blockers Protocol Failed - 1/22/2022  3:36 PM        Failed - Blood pressure under 140/90 in past 12 months     BP Readings from Last 3 Encounters:   08/02/21 (!) 159/91   06/08/21 (!) 150/80   03/03/21 111/83                 Passed - Patient is age 6 or older        Passed - Recent (12 mo) or future (30 days) visit within the authorizing provider's specialty     Patient has had an office visit with the authorizing provider or a provider within the authorizing providers department within the previous 12 mos or has a future within next 30 days. See \"Patient Info\" tab in inbasket, or \"Choose Columns\" in Meds & Orders section of the refill encounter.              Passed - Medication is active on med list           Katherine Bonds RN on 1/25/2022 at 1:26 PM    "

## 2022-01-29 DIAGNOSIS — E78.5 HYPERLIPIDEMIA WITH TARGET LDL LESS THAN 100: ICD-10-CM

## 2022-02-01 RX ORDER — SIMVASTATIN 10 MG
TABLET ORAL
Qty: 90 TABLET | Refills: 2 | Status: SHIPPED | OUTPATIENT
Start: 2022-02-01 | End: 2022-09-13

## 2022-02-01 NOTE — TELEPHONE ENCOUNTER
"Requested Prescriptions   Pending Prescriptions Disp Refills     simvastatin (ZOCOR) 10 MG tablet [Pharmacy Med Name: Simvastatin 10 MG Oral Tablet] 90 tablet 0     Sig: TAKE 1 TABLET BY MOUTH AT BEDTIME . APPOINTMENT REQUIRED FOR FUTURE REFILLS       Statins Protocol Passed - 1/29/2022  5:51 PM        Passed - LDL on file in past 12 months     Recent Labs   Lab Test 12/23/21  1726   LDL 35           Passed - No abnormal creatine kinase in past 12 months     No lab results found.             Passed - Recent (12 mo) or future (30 days) visit within the authorizing provider's specialty     Patient has had an office visit with the authorizing provider or a provider within the authorizing providers department within the previous 12 mos or has a future within next 30 days. See \"Patient Info\" tab in inbasket, or \"Choose Columns\" in Meds & Orders section of the refill encounter.              Passed - Medication is active on med list        Passed - Patient is age 18 or older        Passed - No active pregnancy on record        Passed - No positive pregnancy test in past 12 months           Prescription approved per [facility] Refill Protocol.      "

## 2022-02-07 ENCOUNTER — OFFICE VISIT (OUTPATIENT)
Dept: FAMILY MEDICINE | Facility: CLINIC | Age: 77
End: 2022-02-07
Payer: MEDICARE

## 2022-02-07 VITALS
SYSTOLIC BLOOD PRESSURE: 136 MMHG | HEIGHT: 58 IN | HEART RATE: 71 BPM | DIASTOLIC BLOOD PRESSURE: 80 MMHG | BODY MASS INDEX: 39.59 KG/M2 | RESPIRATION RATE: 17 BRPM | OXYGEN SATURATION: 98 % | WEIGHT: 188.6 LBS
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
## 2022-02-07 DIAGNOSIS — K21.9 GASTROESOPHAGEAL REFLUX DISEASE, UNSPECIFIED WHETHER ESOPHAGITIS PRESENT: ICD-10-CM

## 2022-02-07 DIAGNOSIS — Z09 FOLLOW UP: Primary | ICD-10-CM

## 2022-02-07 PROCEDURE — 99213 OFFICE O/P EST LOW 20 MIN: CPT | Performed by: FAMILY MEDICINE

## 2022-02-07 ASSESSMENT — MIFFLIN-ST. JEOR: SCORE: 1238.23

## 2022-02-07 NOTE — PROGRESS NOTES
"  Assessment & Plan     Follow up:    Tightness in chest improve.     BP was high in ER but down now.    Gastroesophageal reflux disease, unspecified whether esophagitis present    Symptoms consistent with acid reflux   Recommended continuing Omeprazole but if persisting will consider endoscopy     Falls:    Reports falling last one appears accidental, denies unusual frequent falls    Avoid rugs in the house.    BMI:   Estimated body mass index is 39.59 kg/m  as calculated from the following:    Height as of this encounter: 1.47 m (4' 9.87\").    Weight as of this encounter: 85.5 kg (188 lb 9.6 oz).   Weight management plan: Discussed healthy diet and exercise guidelines      Return in about 3 months (around 5/7/2022) for Routine Visit.    Braulio Altamirano MD  [facility] MARIANA Rabago is a 76 year old who presents for the following health issues  accompanied by her daughter.  HPI   Patient is still having pain, discuss medications and omeprazole discuss procedures. Discuss falls     Chief Complaint   Patient presents with     Upper Chest Pain   \"She continues to have tightness to upper chest area to the throat area and headaches (tightness to the front): daily in the mornings and last all day.  Patient was evaluated and treated in the ER on 12/31/21.   Selvin Rockwell is a 76 y.o. female who comes in complaining of a tightness, and according to family, this seems consistent with when she had problems with her esophagus in the past.   It looks like she did have a botulism injection along with some kind of debulking procedure about 3 years ago.   She did have some minimal relief with GI cocktail here. Remainder of her work-up here is reassuring.     Recommendations:  I will recommend that she go back on the omeprazole that she had been on previously, as she states that she feels her symptoms were worsened after she switched that, and recommend that they follow-up with " "gastroenterology for continued evaluation and possible repeat endoscopy if needed.   She is also incidentally noted to be hypertensive so recommend that she follow-up with her pcp      Patient reports that she fell and hit head 3 weeks ago.  Says falls been accidental.    Review of Systems   Constitutional, HEENT, cardiovascular, pulmonary, gi and gu systems are negative, except as otherwise noted.      Objective    /80   Pulse 71   Resp 17   Ht 1.47 m (4' 9.87\")   Wt 85.5 kg (188 lb 9.6 oz)   SpO2 98%   BMI 39.59 kg/m    Body mass index is 39.59 kg/m .  Physical Exam   GENERAL: healthy, alert and no distress  CRANIAL NERVES: Intact  RESP: lungs clear to auscultation - no rales, rhonchi or wheezes  CV: regular rate and rhythm, click or rub, no peripheral edema   ABDOMEN: soft, nontender, no masses and bowel sounds normal  NEURO: Mentatio and speech normal.    "

## 2022-02-07 NOTE — PROGRESS NOTES
She continues to have tightness to upper chest area to the throat area and headaches (tightness to the front): daily in the mornings and last all day.  Patient was evaluated and treated in the ER on 12/31/21.   Selvin Rockwell is a 76 y.o. female who comes in complaining of a tightness, and according to family, this seems consistent with when she had problems with her esophagus in the past.   It looks like she did have a botulism injection along with some kind of debulking procedure about 3 years ago.   She did have some minimal relief with GI cocktail here. Remainder of her work-up here is reassuring.     Recommendations:  I will recommend that she go back on the omeprazole that she had been on previously, as she states that she feels her symptoms were worsened after she switched that, and recommend that they follow-up with gastroenterology for continued evaluation and possible repeat endoscopy if needed.   She is also incidentally noted to be hypertensive so recommend that she follow-up with her pcp      Patient reports that she fell and hit head 3 weeks ago.

## 2022-02-07 NOTE — PATIENT INSTRUCTIONS

## 2022-02-17 ENCOUNTER — TELEPHONE (OUTPATIENT)
Dept: ANTICOAGULATION | Facility: CLINIC | Age: 77
End: 2022-02-17
Payer: MEDICARE

## 2022-02-17 NOTE — TELEPHONE ENCOUNTER
ANTICOAGULATION     Selvin Rockwell is overdue for INR check.      Left message for patient to call and schedule lab appointment as soon as possible. If returning call, please schedule.     Ke Petersen RN

## 2022-02-24 ENCOUNTER — TELEPHONE (OUTPATIENT)
Dept: FAMILY MEDICINE | Facility: CLINIC | Age: 77
End: 2022-02-24
Payer: MEDICARE

## 2022-02-24 NOTE — TELEPHONE ENCOUNTER
ANTICOAGULATION     Selvin Rockwell is overdue for INR check.      Left message for patient to call and schedule lab appointment as soon as possible. If returning call, please schedule.     Gracia Souza RN

## 2022-03-03 ENCOUNTER — DOCUMENTATION ONLY (OUTPATIENT)
Dept: FAMILY MEDICINE | Facility: CLINIC | Age: 77
End: 2022-03-03
Payer: MEDICARE

## 2022-03-03 NOTE — LETTER
March 3, 2022      Selvin Rockwell  [address] [place] [id number]      Dear Selvin,    You are currently under the care of [facility] for your warfarin (Coumadin ) therapy.  We are contacting you because our records show you were due for an INR on 2/10/22.    There are potentially serious risks when taking warfarin without careful monitoring and we want to make sure you are safely managed.  Routine INR monitoring is required for warfarin refills.     Please call [phone] as soon as possible to schedule an appointment.  If there has been a change in your care or other concerns, please let us know so we can help and or update our records.     Sincerely,       [facility]

## 2022-03-03 NOTE — PROGRESS NOTES
ANTICOAGULATION     Selvin Rockwell is overdue for INR check.      Reminder letter sent    Chitra Morrison RN

## 2022-03-15 ENCOUNTER — TRANSFERRED RECORDS (OUTPATIENT)
Dept: HEALTH INFORMATION MANAGEMENT | Facility: CLINIC | Age: 77
End: 2022-03-15

## 2022-03-15 LAB — RETINOPATHY: NORMAL

## 2022-03-17 ENCOUNTER — DOCUMENTATION ONLY (OUTPATIENT)
Dept: FAMILY MEDICINE | Facility: CLINIC | Age: 77
End: 2022-03-17
Payer: MEDICARE

## 2022-03-17 NOTE — PROGRESS NOTES
Anticoagulation clinic notificiation    Dr. Ho,    Your patient, Selvin Rockwell,  is past due for an INR check  The patient s last INR was 2.9 on 1/6/22 and was due to come back on 2/10/22.    Selvin Rockwell received phone calls and letters over the last several weeks in attempt to arrange a follow up appointment.  Selvin Rockwell will be sent another letter today.     No action is required from you unless you have additional information or if you would like to reach out personally to Selvin Rockwell.    Please don t hesitate to contact the Anticoagulation Management Program at [phone] if you have any questions or concerns.     Sincerely,     [facility]

## 2022-03-18 DIAGNOSIS — F41.9 ANXIETY: ICD-10-CM

## 2022-03-19 DIAGNOSIS — I10 HYPERTENSION GOAL BP (BLOOD PRESSURE) < 140/90: ICD-10-CM

## 2022-03-19 RX ORDER — ISOSORBIDE MONONITRATE 30 MG/1
TABLET, EXTENDED RELEASE ORAL
Qty: 90 TABLET | Refills: 0 | Status: SHIPPED | OUTPATIENT
Start: 2022-03-19 | End: 2022-05-23

## 2022-03-19 RX ORDER — PAROXETINE 20 MG/1
TABLET, FILM COATED ORAL
Qty: 90 TABLET | Refills: 0 | Status: SHIPPED | OUTPATIENT
Start: 2022-03-19 | End: 2022-10-12

## 2022-03-19 NOTE — TELEPHONE ENCOUNTER
Medication is being filled for 1 time refill only due to:  Patient needs to be seen because need recheck in May.

## 2022-03-24 ENCOUNTER — ANTICOAGULATION THERAPY VISIT (OUTPATIENT)
Dept: ANTICOAGULATION | Facility: CLINIC | Age: 77
End: 2022-03-24

## 2022-03-24 ENCOUNTER — LAB (OUTPATIENT)
Dept: LAB | Facility: CLINIC | Age: 77
End: 2022-03-24
Payer: MEDICARE

## 2022-03-24 DIAGNOSIS — I48.0 PAROXYSMAL ATRIAL FIBRILLATION (H): ICD-10-CM

## 2022-03-24 DIAGNOSIS — Z79.01 LONG TERM (CURRENT) USE OF ANTICOAGULANTS: ICD-10-CM

## 2022-03-24 DIAGNOSIS — I48.0 PAROXYSMAL ATRIAL FIBRILLATION (H): Primary | ICD-10-CM

## 2022-03-24 LAB — INR BLD: 4.5 (ref 0.9–1.1)

## 2022-03-24 PROCEDURE — 85610 PROTHROMBIN TIME: CPT

## 2022-03-24 PROCEDURE — 36415 COLL VENOUS BLD VENIPUNCTURE: CPT

## 2022-03-24 NOTE — PROGRESS NOTES
ANTICOAGULATION MANAGEMENT     Selvin Rockwell 76 year old female is on warfarin with supratherapeutic INR result. (Goal INR 2.0-3.0)    Recent labs: (last 7 days)     03/24/22  1342   INR 4.5*       ASSESSMENT       Source(s): Chart Review    Previous INR was Therapeutic last 2(+) visits    Medication, diet, health changes since last INR chart reviewed; none identified according to chart patient was told to resume Omeprazole, need to assess if restarted.            PLAN     Unable to reach Kat today.    Left message to hold warfarin tonight. Request call back for assessment.    Follow up required to confirm warfarin dose taken and assess for changes    Gracia Souza RN  Anticoagulation Clinic  3/24/2022

## 2022-03-24 NOTE — PROGRESS NOTES
ANTICOAGULATION MANAGEMENT     Selvin Rockwell 76 year old female is on warfarin with supratherapeutic INR result. (Goal INR 2.0-3.0)    Recent labs: (last 7 days)     03/24/22  1342   INR 4.5*       ASSESSMENT       Source(s): Chart Review and Patient/Caregiver Call       Warfarin doses taken: Warfarin taken as instructed    Diet: No new diet changes identified    New illness, injury, or hospitalization: No    Medication/supplement changes: Omeprazole started on 2/7/22 which has potential for interaction; increasing INR    Signs or symptoms of bleeding or clotting: No    Previous INR: Therapeutic last 2(+) visits    Additional findings: None       PLAN     Recommended plan for ongoing change(s) affecting INR     Dosing Instructions: Hold dose then Decrease your warfarin dose (11% change) with next INR in 2 weeks       Summary  As of 3/24/2022    Full warfarin instructions:  3/24: Hold; Otherwise 3 mg every Tue, Sat; 2 mg all other days   Next INR check:  4/7/2022             Telephone call with  Kat who verbalizes understanding and agrees to plan    Patient offered & declined to schedule next visit, patient is going out of town, will have her set up appt before she leaves.    Education provided: Goal range and significance of current result, Potential interaction between warfarin and Omeprazole, Monitoring for bleeding signs and symptoms and Monitoring for clotting signs and symptoms    Plan made per [facility] anticoagulation protocol    Gracia Souza, RN  Anticoagulation Clinic  3/24/2022    _______________________________________________________________________     Anticoagulation Episode Summary     Current INR goal:  2.0-3.0   TTR:  58.0 % (1 y)   Target end date:  Indefinite   Send INR reminders to:  JERRY PEÑA    Indications    Paroxysmal atrial fibrillation (H) [I48.0]  Long term (current) use of anticoagulants [Z79.01]           Comments:  Daughter Kat with dosing         Anticoagulation  Care Providers     Provider Role Specialty Phone number    Gurinder Ho MD Referring Family Medicine [phone]

## 2022-04-02 DIAGNOSIS — I48.0 PAROXYSMAL ATRIAL FIBRILLATION (H): ICD-10-CM

## 2022-04-02 DIAGNOSIS — Z79.01 LONG TERM (CURRENT) USE OF ANTICOAGULANTS: ICD-10-CM

## 2022-04-04 RX ORDER — WARFARIN SODIUM 2 MG/1
TABLET ORAL
Qty: 120 TABLET | Refills: 0 | Status: SHIPPED | OUTPATIENT
Start: 2022-04-04 | End: 2022-06-27

## 2022-04-21 ENCOUNTER — TELEPHONE (OUTPATIENT)
Dept: FAMILY MEDICINE | Facility: CLINIC | Age: 77
End: 2022-04-21
Payer: MEDICARE

## 2022-04-21 NOTE — TELEPHONE ENCOUNTER
ANTICOAGULATION     Selvin Rockwell is overdue for INR check.      Left message for patient to call and schedule lab appointment as soon as possible. If returning call, please schedule.     Chitra Morrison RN

## 2022-04-29 ENCOUNTER — TELEPHONE (OUTPATIENT)
Dept: FAMILY MEDICINE | Facility: CLINIC | Age: 77
End: 2022-04-29
Payer: MEDICARE

## 2022-05-03 ENCOUNTER — LAB (OUTPATIENT)
Dept: LAB | Facility: CLINIC | Age: 77
End: 2022-05-03
Payer: MEDICARE

## 2022-05-03 ENCOUNTER — ANTICOAGULATION THERAPY VISIT (OUTPATIENT)
Dept: ANTICOAGULATION | Facility: CLINIC | Age: 77
End: 2022-05-03

## 2022-05-03 DIAGNOSIS — Z79.01 LONG TERM (CURRENT) USE OF ANTICOAGULANTS: ICD-10-CM

## 2022-05-03 DIAGNOSIS — I48.0 PAROXYSMAL ATRIAL FIBRILLATION (H): ICD-10-CM

## 2022-05-03 DIAGNOSIS — I48.0 PAROXYSMAL ATRIAL FIBRILLATION (H): Primary | ICD-10-CM

## 2022-05-03 LAB — INR BLD: 2.1 (ref 0.9–1.1)

## 2022-05-03 PROCEDURE — 36416 COLLJ CAPILLARY BLOOD SPEC: CPT

## 2022-05-03 PROCEDURE — 85610 PROTHROMBIN TIME: CPT

## 2022-05-03 NOTE — PROGRESS NOTES
ANTICOAGULATION MANAGEMENT     Selvin Rockwell 77 year old female is on warfarin with therapeutic INR result. (Goal INR 2.0-3.0)    Recent labs: (last 7 days)     05/03/22  1710   INR 2.1*       ASSESSMENT       Source(s): Chart Review and Patient/Caregiver Call       Warfarin doses taken: Warfarin taken as instructed    Diet: No new diet changes identified    New illness, injury, or hospitalization: No    Medication/supplement changes: None noted    Signs or symptoms of bleeding or clotting: No    Previous INR: Supratherapeutic    Additional findings: None       PLAN     Recommended plan for no diet, medication or health factor changes affecting INR     Dosing Instructions: continue your current warfarin dose with next INR in 4 weeks       Summary  As of 5/3/2022    Full warfarin instructions:  3 mg every Tue, Sat; 2 mg all other days   Next INR check:  5/31/2022             Telephone call with  Kat who verbalizes understanding and agrees to plan    Patient offered & declined to schedule next visit    Education provided: Goal range and significance of current result    Plan made per [facility] anticoagulation protocol    Chitra Morrison RN  Anticoagulation Clinic  5/3/2022    _______________________________________________________________________     Anticoagulation Episode Summary     Current INR goal:  2.0-3.0   TTR:  58.9 % (1 y)   Target end date:  Indefinite   Send INR reminders to:  JERRY PEÑA    Indications    Paroxysmal atrial fibrillation (H) [I48.0]  Long term (current) use of anticoagulants [Z79.01]           Comments:  Daughter Kat with dosing         Anticoagulation Care Providers     Provider Role Specialty Phone number    Gurinder Ho MD Referring Family Medicine [phone]

## 2022-05-23 ENCOUNTER — OFFICE VISIT (OUTPATIENT)
Dept: FAMILY MEDICINE | Facility: CLINIC | Age: 77
End: 2022-05-23
Payer: MEDICARE

## 2022-05-23 VITALS
SYSTOLIC BLOOD PRESSURE: 132 MMHG | HEART RATE: 68 BPM | HEIGHT: 58 IN | TEMPERATURE: 97.4 F | BODY MASS INDEX: 40.93 KG/M2 | WEIGHT: 195 LBS | DIASTOLIC BLOOD PRESSURE: 84 MMHG | RESPIRATION RATE: 17 BRPM | OXYGEN SATURATION: 96 %

## 2022-05-23 DIAGNOSIS — Z79.899 ENCOUNTER FOR LONG-TERM (CURRENT) USE OF MEDICATIONS: ICD-10-CM

## 2022-05-23 DIAGNOSIS — R10.9 FLANK PAIN: ICD-10-CM

## 2022-05-23 DIAGNOSIS — I10 HYPERTENSION GOAL BP (BLOOD PRESSURE) < 140/90: ICD-10-CM

## 2022-05-23 DIAGNOSIS — K21.9 GASTROESOPHAGEAL REFLUX DISEASE WITHOUT ESOPHAGITIS: Primary | ICD-10-CM

## 2022-05-23 DIAGNOSIS — F33.1 MODERATE EPISODE OF RECURRENT MAJOR DEPRESSIVE DISORDER (H): ICD-10-CM

## 2022-05-23 LAB
ALBUMIN UR-MCNC: NEGATIVE MG/DL
APPEARANCE UR: ABNORMAL
BILIRUB UR QL STRIP: NEGATIVE
COLOR UR AUTO: YELLOW
GLUCOSE UR STRIP-MCNC: NEGATIVE MG/DL
HGB UR QL STRIP: ABNORMAL
KETONES UR STRIP-MCNC: NEGATIVE MG/DL
LEUKOCYTE ESTERASE UR QL STRIP: NEGATIVE
NITRATE UR QL: NEGATIVE
PH UR STRIP: 5 [PH] (ref 5–7)
RBC #/AREA URNS AUTO: ABNORMAL /HPF
SP GR UR STRIP: 1.02 (ref 1–1.03)
SQUAMOUS #/AREA URNS AUTO: ABNORMAL /LPF
UROBILINOGEN UR STRIP-ACNC: 0.2 E.U./DL
WBC #/AREA URNS AUTO: ABNORMAL /HPF

## 2022-05-23 PROCEDURE — 87086 URINE CULTURE/COLONY COUNT: CPT | Performed by: FAMILY MEDICINE

## 2022-05-23 PROCEDURE — 81001 URINALYSIS AUTO W/SCOPE: CPT | Performed by: FAMILY MEDICINE

## 2022-05-23 PROCEDURE — 99214 OFFICE O/P EST MOD 30 MIN: CPT | Performed by: FAMILY MEDICINE

## 2022-05-23 RX ORDER — ISOSORBIDE MONONITRATE 30 MG/1
TABLET, EXTENDED RELEASE ORAL
Qty: 90 TABLET | Refills: 0 | Status: SHIPPED | OUTPATIENT
Start: 2022-05-23 | End: 2022-08-08

## 2022-05-23 RX ORDER — PANTOPRAZOLE SODIUM 40 MG/1
40 TABLET, DELAYED RELEASE ORAL DAILY
Qty: 30 TABLET | Refills: 1 | Status: SHIPPED | OUTPATIENT
Start: 2022-05-23 | End: 2022-07-12

## 2022-05-23 RX ORDER — PAROXETINE 30 MG/1
30 TABLET, FILM COATED ORAL EVERY MORNING
Qty: 30 TABLET | Refills: 1 | Status: SHIPPED | OUTPATIENT
Start: 2022-05-23 | End: 2022-07-20

## 2022-05-23 ASSESSMENT — PAIN SCALES - GENERAL: PAINLEVEL: SEVERE PAIN (6)

## 2022-05-23 NOTE — PATIENT INSTRUCTIONS
For GERD: Hold Omperazole and start Protonix for 1 month  For Depression: Increase Paroxetine to 30 mg daily

## 2022-05-23 NOTE — TELEPHONE ENCOUNTER
"Requested Prescriptions   Signed Prescriptions Disp Refills    isosorbide mononitrate (IMDUR) 30 MG 24 hr tablet 90 tablet 0     Sig: TAKE 1 TABLET BY MOUTH ONCE DAILY APPOINTMENT  REQUIRED  FOR  FUTURE  REFILLS       Nitrates Passed - 5/23/2022  9:24 AM        Passed - Blood pressure under 140/90 in past 12 months     BP Readings from Last 3 Encounters:   02/07/22 136/80   08/02/21 (!) 159/91   06/08/21 (!) 150/80                 Passed - Pt is not on erectile dysfunction medications        Passed - Recent (12 mo) or future (30 days) visit within the authorizing provider's specialty     Patient has had an office visit with the authorizing provider or a provider within the authorizing providers department within the previous 12 mos or has a future within next 30 days. See \"Patient Info\" tab in inbasket, or \"Choose Columns\" in Meds & Orders section of the refill encounter.              Passed - Medication is active on med list        Passed - Patient is age 18 or older           Annette Gómez RN  [facility]     "

## 2022-05-23 NOTE — PROGRESS NOTES
Assessment & Plan   Gem is a 76 yo F with dm2, afib, htn, hx vanessa knee replacement, hpld, steffi, obesity, achalasia, adrenal adenoma, hx kidney stone, gout, chronic back pain, anticoaglant use, omega, ckd3.    Gastroesophageal reflux disease without esophagitis  This is been going on for a while, been following with [facility], but told her that there is nothing more they can do.  Discussed trying a different PPI for a month if will be covered by insurance  - pantoprazole (PROTONIX) 40 MG EC tablet; Take 1 tablet (40 mg) by mouth daily    Flank pain: Lt  Appears muscular but will check urine, possibly UTI  - UA reflex to Microscopic - lab collect; Future  - UA reflex to Microscopic - lab collect  - Urine Microscopic Exam  - Urine Culture Aerobic Bacterial - lab collect; Future    Encounter for long-term (current) use of medications   -Taking medications as prescribed    Moderate episode of recurrent major depressive disorder (H)  Mood related symptoms seem to be predominant and could be playing a big role in her symptoms.  Discussed increasing the dose of paroxetine to 30 mg daily and might escalate to 40 if tolerating.  - PARoxetine (PAXIL) 30 MG tablet; Take 1 tablet (30 mg) by mouth every morning    Return in about 1 month (around 6/23/2022) for Follow up for symptoms recheck.    Braulio Altamirano MD  [facility]    Marcy Rabago is a 77 year old who presents for the following health issues  accompanied by her friend.  HPI   Chief Complaint   Patient presents with     Discuss Acid Reflux     Had Achalasia and had POEM  On Omeprazole for GERD; we had discussed considering repeat endoscopy (last endoscopy 7/19)    GERD;   Tightness in chest; worse at home but better when out of home     [facility] had seen her and the did several procedure thinks there is not much they could do.    Sometimes she feel down; comes and goes     Been on Omeprazole for a while    She odes go out a couple of  "times a week    Lives with daughter; a dog and some birds.    Daughter at home with her lost job, has brother lives on his own    Review of Systems   Constitutional, HEENT, cardiovascular, pulmonary and systems are negative, except as otherwise noted.      Objective    /84 (BP Location: Left arm, Cuff Size: Adult Large)   Pulse 68   Temp 97.4  F (36.3  C) (Oral)   Resp 17   Ht 1.48 m (4' 10.27\")   Wt 88.5 kg (195 lb)   SpO2 96%   BMI 40.38 kg/m    Body mass index is 40.38 kg/m .  Physical Exam   GENERAL: Obese, alert and no distress  RESP: lungs clear to auscultation - no rales, rhonchi or wheezes  CV: regular rate and rhythm, no murmur, click or rub, no peripheral edema  ABDOMEN: soft, nontender, vague epigastric tenderness, no rebound or guarding, no masses and bowel sounds normal  MS: no gross musculoskeletal defects noted, no edema    "

## 2022-05-25 ENCOUNTER — TELEPHONE (OUTPATIENT)
Dept: FAMILY MEDICINE | Facility: CLINIC | Age: 77
End: 2022-05-25
Payer: MEDICARE

## 2022-05-25 NOTE — TELEPHONE ENCOUNTER
Patient's daughter calling to check status of UA/UC results from 5/23/2022 (signed consent to communicate PHI on file)  Updated her that the UC is still in process.  Should be back by tomorrow.    She asked that we call her once results are back.  Ok to leave a detailed message on her phone    Jgajit Watkins RN  [facility]

## 2022-05-26 LAB — BACTERIA UR CULT: NORMAL

## 2022-05-26 NOTE — TELEPHONE ENCOUNTER
The results are negative for infection.  She does not appear to have a UTI.  She does not require any antibiotics.    Gurinder Ho MD

## 2022-05-26 NOTE — TELEPHONE ENCOUNTER
Pt's daughter Alicia called again looking for results of the UA/UC. Results are in chart but no result note available to provide. Alicia and Gem are concerned about the results and would like to get an update today..    Dr. Crawford is out of the office today, will route to covering team to review results and provide recommendations.    Please call Alicia at [phone] and can leave a message on VM.    Mariia JAMES RN, BSN  [facility]

## 2022-05-26 NOTE — TELEPHONE ENCOUNTER
ANTICOAGULATION MANAGEMENT      Selvin Rockwell due for annual renewal of referral to anticoagulation monitoring. Order pended for your review and signature.      ANTICOAGULATION SUMMARY      Warfarin indication(s)     Atrial fibrillation    Heart valve present?  NO       Current goal range   INR: 2.0-3.0     Goal appropriate for indication? Yes, INR 2-3 appropriate for hx of DVT, PE, hypercoagulable state, Afib, LVAD, or bileaflet AVR without risk factors     Current duration of therapy Indefinite/long term therapy   Time in Therapeutic Range (TTR)  (Goal > 60%) 90.2 %       Office visit with referring provider's group within last year yes on 1/8/2020       Chitra Morrison RN        
Patent

## 2022-06-06 ENCOUNTER — TELEPHONE (OUTPATIENT)
Dept: FAMILY MEDICINE | Facility: CLINIC | Age: 77
End: 2022-06-06
Payer: MEDICARE

## 2022-06-06 NOTE — TELEPHONE ENCOUNTER
Daughter calling about patient's morning blood sugar readings. Morning readings have been above 130 for the few weeks, today 157. No symptoms. Please advise.     5/23/22 Paxil was increased, GERD med changed to protonix.     Numbers were going up before medication change.   No change in diet.   No symptoms.      Please advise     Gabrielle DING RN  [facility]

## 2022-06-06 NOTE — TELEPHONE ENCOUNTER
Reached out to patient's daughter, Kat with assistance of Jamaican .    Relayed provider's message as written below.    Scheduled for appt with Dr. Ho tomorrow.     BRIANA MercadoN RN  [facility]

## 2022-06-06 NOTE — TELEPHONE ENCOUNTER
There is no urgency, but I would recommend that she come in for an appointment to see 1 of us for a diabetes check.  I had seen her over a year and a half ago, but it looks like she has been seeing Dr. Altamirano lately.  She could see whoever she would like to do for this.    Gurinder Ho MD

## 2022-06-07 ENCOUNTER — OFFICE VISIT (OUTPATIENT)
Dept: FAMILY MEDICINE | Facility: CLINIC | Age: 77
End: 2022-06-07
Payer: MEDICARE

## 2022-06-07 VITALS
WEIGHT: 198 LBS | HEART RATE: 70 BPM | TEMPERATURE: 99 F | BODY MASS INDEX: 41 KG/M2 | DIASTOLIC BLOOD PRESSURE: 78 MMHG | SYSTOLIC BLOOD PRESSURE: 120 MMHG | OXYGEN SATURATION: 97 %

## 2022-06-07 DIAGNOSIS — E11.21 TYPE 2 DIABETES MELLITUS WITH DIABETIC NEPHROPATHY, WITHOUT LONG-TERM CURRENT USE OF INSULIN (H): Primary | ICD-10-CM

## 2022-06-07 DIAGNOSIS — F41.9 ANXIETY: ICD-10-CM

## 2022-06-07 DIAGNOSIS — I10 HYPERTENSION GOAL BP (BLOOD PRESSURE) < 140/90: ICD-10-CM

## 2022-06-07 DIAGNOSIS — E78.5 HYPERLIPIDEMIA WITH TARGET LDL LESS THAN 100: ICD-10-CM

## 2022-06-07 DIAGNOSIS — I48.0 PAROXYSMAL ATRIAL FIBRILLATION (H): ICD-10-CM

## 2022-06-07 DIAGNOSIS — F33.0 MILD RECURRENT MAJOR DEPRESSION (H): ICD-10-CM

## 2022-06-07 DIAGNOSIS — K21.9 GASTROESOPHAGEAL REFLUX DISEASE, UNSPECIFIED WHETHER ESOPHAGITIS PRESENT: ICD-10-CM

## 2022-06-07 LAB
ANION GAP SERPL CALCULATED.3IONS-SCNC: 7 MMOL/L (ref 3–14)
BUN SERPL-MCNC: 26 MG/DL (ref 7–30)
CALCIUM SERPL-MCNC: 8.9 MG/DL (ref 8.5–10.1)
CHLORIDE BLD-SCNC: 108 MMOL/L (ref 94–109)
CO2 SERPL-SCNC: 24 MMOL/L (ref 20–32)
CREAT SERPL-MCNC: 1.08 MG/DL (ref 0.52–1.04)
GFR SERPL CREATININE-BSD FRML MDRD: 53 ML/MIN/1.73M2
GLUCOSE BLD-MCNC: 140 MG/DL (ref 70–99)
HBA1C MFR BLD: 8.2 % (ref 0–5.6)
POTASSIUM BLD-SCNC: 4.3 MMOL/L (ref 3.4–5.3)
SODIUM SERPL-SCNC: 139 MMOL/L (ref 133–144)

## 2022-06-07 PROCEDURE — 83036 HEMOGLOBIN GLYCOSYLATED A1C: CPT | Performed by: FAMILY MEDICINE

## 2022-06-07 PROCEDURE — 99214 OFFICE O/P EST MOD 30 MIN: CPT | Performed by: FAMILY MEDICINE

## 2022-06-07 PROCEDURE — 36415 COLL VENOUS BLD VENIPUNCTURE: CPT | Performed by: FAMILY MEDICINE

## 2022-06-07 PROCEDURE — 80048 BASIC METABOLIC PNL TOTAL CA: CPT | Performed by: FAMILY MEDICINE

## 2022-06-07 ASSESSMENT — PAIN SCALES - GENERAL: PAINLEVEL: NO PAIN (0)

## 2022-06-07 ASSESSMENT — PATIENT HEALTH QUESTIONNAIRE - PHQ9: SUM OF ALL RESPONSES TO PHQ QUESTIONS 1-9: 2

## 2022-06-07 NOTE — LETTER
June 8, 2022      Gemrai Bernardadeel  [address] [place] [id number]        Dear ,    We are writing to inform you of your test results.    Your lab work shows that your blood sugars have indeed been running higher in recent months than desirable, so going back on the metformin would be appropriate (as we discussed).   I would recommend a follow-up visit in 3 to 4 months to recheck some of these lab values (if you are not in [place] at that time).         Resulted Orders   Basic metabolic panel   Result Value Ref Range    Sodium 139 133 - 144 mmol/L    Potassium 4.3 3.4 - 5.3 mmol/L    Chloride 108 94 - 109 mmol/L    Carbon Dioxide (CO2) 24 20 - 32 mmol/L    Anion Gap 7 3 - 14 mmol/L    Urea Nitrogen 26 7 - 30 mg/dL    Creatinine 1.08 (H) 0.52 - 1.04 mg/dL    Calcium 8.9 8.5 - 10.1 mg/dL    Glucose 140 (H) 70 - 99 mg/dL    GFR Estimate 53 (L) >60 mL/min/1.73m2      Comment:      Effective December 21, 2021 eGFRcr in adults is calculated using the 2021 CKD-EPI creatinine equation which includes age and gender (Rito et al., [facility], DOI: 10.1056/RNQHzx8833625)   Hemoglobin A1c   Result Value Ref Range    Hemoglobin A1C 8.2 (H) 0.0 - 5.6 %      Comment:      Normal <5.7%   Prediabetes 5.7-6.4%    Diabetes 6.5% or higher     Note: Adopted from ADA consensus guidelines.       If you have any questions or concerns, please call the clinic at the number listed above.       Sincerely,      Gurinder Ho MD/eduin

## 2022-06-08 NOTE — RESULT ENCOUNTER NOTE
Gem,  Your lab work shows that your blood sugars have indeed been running higher in recent months than desirable, so going back on the metformin would be appropriate (as we discussed).  I would recommend a follow-up visit in 3 to 4 months to recheck some of these lab values (if you are not in [place] at that time).    Gurinder Ho MD

## 2022-06-10 ENCOUNTER — TELEPHONE (OUTPATIENT)
Dept: FAMILY MEDICINE | Facility: CLINIC | Age: 77
End: 2022-06-10
Payer: MEDICARE

## 2022-06-14 ENCOUNTER — LAB (OUTPATIENT)
Dept: LAB | Facility: CLINIC | Age: 77
End: 2022-06-14
Payer: MEDICARE

## 2022-06-14 ENCOUNTER — ANTICOAGULATION THERAPY VISIT (OUTPATIENT)
Dept: ANTICOAGULATION | Facility: CLINIC | Age: 77
End: 2022-06-14

## 2022-06-14 DIAGNOSIS — I48.0 PAROXYSMAL ATRIAL FIBRILLATION (H): Primary | ICD-10-CM

## 2022-06-14 DIAGNOSIS — Z79.01 LONG TERM (CURRENT) USE OF ANTICOAGULANTS: ICD-10-CM

## 2022-06-14 DIAGNOSIS — I48.0 PAROXYSMAL ATRIAL FIBRILLATION (H): ICD-10-CM

## 2022-06-14 LAB — INR BLD: 2.8 (ref 0.9–1.1)

## 2022-06-14 PROCEDURE — 36415 COLL VENOUS BLD VENIPUNCTURE: CPT

## 2022-06-14 PROCEDURE — 85610 PROTHROMBIN TIME: CPT

## 2022-06-14 NOTE — PROGRESS NOTES
ANTICOAGULATION MANAGEMENT     Selvin Rockwell 77 year old female is on warfarin with therapeutic INR result. (Goal INR 2.0-3.0)    Recent labs: (last 7 days)     06/14/22  1013   INR 2.8*       ASSESSMENT       Source(s): Chart Review and Patient/Caregiver Call       Warfarin doses taken: Warfarin taken as instructed    Diet: No new diet changes identified    New illness, injury, or hospitalization: No    Medication/supplement changes: Protonix started on 5/23 which has potential for interaction; increasing INR    Paxil dose increased on 5/23 which has potential for interaction; increasing INR    Signs or symptoms of bleeding or clotting: No    Previous INR: Therapeutic last 2(+) visits    Additional findings: patient stopped Omeprazole on 5/23 when started Protonix.  Patient has been on both new meds since 5/23, INR remains in range.        PLAN     Recommended plan for ongoing change(s) affecting INR     Dosing Instructions: continue your current warfarin dose with next INR in 4 weeks       Summary  As of 6/14/2022    Full warfarin instructions:  3 mg every Tue, Sat; 2 mg all other days   Next INR check:  7/12/2022             Telephone call with Gem who verbalizes understanding and agrees to plan    Check at provider office visit    Education provided: Monitoring for bleeding signs and symptoms and Monitoring for clotting signs and symptoms    Plan made per [facility] anticoagulation protocol    Gracia Souza RN  Anticoagulation Clinic  6/14/2022    _______________________________________________________________________     Anticoagulation Episode Summary     Current INR goal:  2.0-3.0   TTR:  58.9 % (1 y)   Target end date:  Indefinite   Send INR reminders to:  JERRY PEÑA    Indications    Paroxysmal atrial fibrillation (H) [I48.0]  Long term (current) use of anticoagulants [Z79.01]           Comments:  Daughter Kat with dosing         Anticoagulation Care Providers     Provider Role Specialty  Phone number    Gurinder Ho MD [facility] Medicine [phone]

## 2022-06-25 DIAGNOSIS — Z79.01 LONG TERM (CURRENT) USE OF ANTICOAGULANTS: ICD-10-CM

## 2022-06-25 DIAGNOSIS — I48.0 PAROXYSMAL ATRIAL FIBRILLATION (H): Primary | ICD-10-CM

## 2022-06-27 RX ORDER — WARFARIN SODIUM 2 MG/1
TABLET ORAL
Qty: 120 TABLET | Refills: 0 | Status: SHIPPED | OUTPATIENT
Start: 2022-06-27 | End: 2022-10-18

## 2022-06-27 NOTE — TELEPHONE ENCOUNTER
ANTICOAGULATION MANAGEMENT:  Medication Refill    Anticoagulation Summary  As of 6/14/2022    Warfarin maintenance plan:  3 mg (2 mg x 1.5) every Tue, Sat; 2 mg (2 mg x 1) all other days   Next INR check:  7/12/2022   Target end date:  Indefinite    Indications    Paroxysmal atrial fibrillation (H) [I48.0]  Long term (current) use of anticoagulants [Z79.01]             Anticoagulation Care Providers     Provider Role Specialty Phone number    Gurinder Ho MD Referring Family Medicine [phone]          Visit with referring provider/group within last year: Yes    [facility] referral signed within last year: Yes    Styliani meets all criteria for refill (current [facility] referral, office visit with referring provider/group in last year, lab monitoring up to date or not exceeding 2 weeks overdue). Rx instructions and quantity supplied updated to match patient's current dosing plan. Warfarin 90 day supply with 1 refill granted per [facility] protocol     Kristi Fu RN  Anticoagulation Clinic

## 2022-06-28 DIAGNOSIS — I48.0 PAROXYSMAL ATRIAL FIBRILLATION (H): ICD-10-CM

## 2022-06-28 DIAGNOSIS — I10 HYPERTENSION GOAL BP (BLOOD PRESSURE) < 140/90: ICD-10-CM

## 2022-06-29 RX ORDER — CARVEDILOL 6.25 MG/1
6.25 TABLET ORAL 2 TIMES DAILY
Qty: 180 TABLET | Refills: 0 | Status: SHIPPED | OUTPATIENT
Start: 2022-06-29 | End: 2022-09-13

## 2022-06-29 NOTE — TELEPHONE ENCOUNTER
"Requested Prescriptions   Signed Prescriptions Disp Refills    carvedilol (COREG) 6.25 MG tablet 180 tablet 0     Sig: Take 1 tablet (6.25 mg) by mouth 2 times daily       Beta-Blockers Protocol Passed - 6/28/2022  9:41 AM        Passed - Blood pressure under 140/90 in past 12 months     BP Readings from Last 3 Encounters:   06/07/22 120/78   05/23/22 132/84   02/07/22 136/80                 Passed - Patient is age 6 or older        Passed - Recent (12 mo) or future (30 days) visit within the authorizing provider's specialty     Patient has had an office visit with the authorizing provider or a provider within the authorizing providers department within the previous 12 mos or has a future within next 30 days. See \"Patient Info\" tab in inbasket, or \"Choose Columns\" in Meds & Orders section of the refill encounter.              Passed - Medication is active on med list           Annette Gómez RN  [facility]     "

## 2022-07-07 ENCOUNTER — TELEPHONE (OUTPATIENT)
Dept: FAMILY MEDICINE | Facility: CLINIC | Age: 77
End: 2022-07-07

## 2022-07-07 NOTE — TELEPHONE ENCOUNTER
I would advise elevating her legs as much as possible, limiting salt intake, and consider use of compression stockings if her lower leg seems swollen.  Further recommendations can then be made by Dr. Altamirano next Tuesday when she is seen by him.    Gurinder Ho MD

## 2022-07-07 NOTE — TELEPHONE ENCOUNTER
Spoke with daughter. Provider message given as written. She verbalized understanding and will relay to patient.     Gabrielle DING RN  [facility]

## 2022-07-07 NOTE — TELEPHONE ENCOUNTER
"Received call from patient's daughter, Alicia. Consent to communicate on file.    She states that patient is consistently feeling 'tightness' in both ankles. She states that it is a nerve related issue, not d/t swelling. She states that patient is able ambulate normally, no redness, sores/wounds. No other symptoms. She does report intermittent BLE swelling which is not currently present.    She states that this was mentioned to both Dr. Ho and Dr. Altamirano. The only mention of this writer could find was in OV note, 06/07/2022 per Dr. Ho:      \" Do you have any of these symptoms? (Select all that apply)  No numbness or tingling in feet.  No redness, sores or blisters on feet.  No complaints of excessive thirst.  No reports of blurry vision.  No significant changes to weight. but swelling and tightness\"    She states she has upcoming appointment on Tuesday, 07/12/2022 with Dr. Altamirano but was curious if there were any recommendations in the meantime.    Callback #: [phone]    FELIPE Mercado RN  [facility] [place]  Primary Care  "

## 2022-07-12 ENCOUNTER — LAB (OUTPATIENT)
Dept: LAB | Facility: CLINIC | Age: 77
End: 2022-07-12
Payer: MEDICARE

## 2022-07-12 ENCOUNTER — OFFICE VISIT (OUTPATIENT)
Dept: FAMILY MEDICINE | Facility: CLINIC | Age: 77
End: 2022-07-12
Payer: MEDICARE

## 2022-07-12 ENCOUNTER — ANTICOAGULATION THERAPY VISIT (OUTPATIENT)
Dept: ANTICOAGULATION | Facility: CLINIC | Age: 77
End: 2022-07-12

## 2022-07-12 VITALS
DIASTOLIC BLOOD PRESSURE: 74 MMHG | OXYGEN SATURATION: 97 % | SYSTOLIC BLOOD PRESSURE: 132 MMHG | BODY MASS INDEX: 39.8 KG/M2 | RESPIRATION RATE: 19 BRPM | TEMPERATURE: 97.7 F | WEIGHT: 197.4 LBS | HEIGHT: 59 IN | HEART RATE: 71 BPM

## 2022-07-12 DIAGNOSIS — N18.31 STAGE 3A CHRONIC KIDNEY DISEASE (H): ICD-10-CM

## 2022-07-12 DIAGNOSIS — M62.89 LEG FASCIA TIGHTNESS: Primary | ICD-10-CM

## 2022-07-12 DIAGNOSIS — E66.01 MORBID OBESITY (H): ICD-10-CM

## 2022-07-12 DIAGNOSIS — G25.81 RESTLESS LEGS SYNDROME: ICD-10-CM

## 2022-07-12 DIAGNOSIS — Z79.01 LONG TERM (CURRENT) USE OF ANTICOAGULANTS: ICD-10-CM

## 2022-07-12 DIAGNOSIS — K21.9 GASTROESOPHAGEAL REFLUX DISEASE WITHOUT ESOPHAGITIS: ICD-10-CM

## 2022-07-12 DIAGNOSIS — I48.0 PAROXYSMAL ATRIAL FIBRILLATION (H): ICD-10-CM

## 2022-07-12 DIAGNOSIS — I48.0 PAROXYSMAL ATRIAL FIBRILLATION (H): Primary | ICD-10-CM

## 2022-07-12 DIAGNOSIS — G47.33 OSA (OBSTRUCTIVE SLEEP APNEA): ICD-10-CM

## 2022-07-12 DIAGNOSIS — Z79.899 ENCOUNTER FOR LONG-TERM (CURRENT) USE OF MEDICATIONS: ICD-10-CM

## 2022-07-12 LAB — INR BLD: 3.4 (ref 0.9–1.1)

## 2022-07-12 PROCEDURE — 36416 COLLJ CAPILLARY BLOOD SPEC: CPT

## 2022-07-12 PROCEDURE — 99214 OFFICE O/P EST MOD 30 MIN: CPT | Performed by: FAMILY MEDICINE

## 2022-07-12 PROCEDURE — 85610 PROTHROMBIN TIME: CPT

## 2022-07-12 RX ORDER — ACETAMINOPHEN 500 MG
500-1000 TABLET ORAL EVERY 6 HOURS PRN
COMMUNITY

## 2022-07-12 RX ORDER — PANTOPRAZOLE SODIUM 40 MG/1
40 TABLET, DELAYED RELEASE ORAL DAILY
Qty: 30 TABLET | Refills: 2 | Status: SHIPPED | OUTPATIENT
Start: 2022-07-12 | End: 2022-09-13

## 2022-07-12 RX ORDER — PRAMIPEXOLE DIHYDROCHLORIDE 0.25 MG/1
TABLET ORAL
Qty: 30 TABLET | Refills: 1 | Status: SHIPPED | OUTPATIENT
Start: 2022-07-12 | End: 2022-07-19 | Stop reason: SINTOL

## 2022-07-12 ASSESSMENT — PAIN SCALES - GENERAL: PAINLEVEL: NO PAIN (0)

## 2022-07-12 NOTE — PROGRESS NOTES
Assessment & Plan   Gem is a 76 yo F with dm2, afib, htn, hx vanessa knee replacement, hpld, tacho, obesity, achalasia, adrenal adenoma, hx kidney stone, gout, chronic back pain, anticoaglant use, omega, ckd3 here for follow up accompanied by her daughter.    Leg tightness   Symptoms consistent with restless legs; discussed trying pramipexole  - pramipexole (MIRAPEX) 0.25 MG tablet; Take 1 tablet (0.25 mg) once daily 2 to 3 hours before bedtime    Encounter for long-term (current) use of medications   -  Medications reviewed    Morbid obesity (H)    -  Counseled to make better food choices, exercise as tolerated, and lose weight. Activity limited by arthritis and SOBE    Stage 3a chronic kidney disease (H)    -  At baseline, avoid nephrotoxic medications.    Gastroesophageal reflux disease without esophagitis    PPI has been helping; would like a refill  - pantoprazole (PROTONIX) 40 MG EC tablet; Take 1 tablet (40 mg) by mouth daily    Restless legs syndrome   Mirapex and evaluation by sleep specialist  - Adult Sleep Eval & Management  Referral; Future  - pramipexole (MIRAPEX) 0.25 MG tablet; Take 1 tablet (0.25 mg) once daily 2 to 3 hours before bedtime    TACHO (obstructive sleep apnea)   Has CPAP; had for a long time; discussed evaluation by sleep team; might need resetting, renewal of supplies even machine  - Adult Sleep Eval & Management  Referral; Future    Return in about 2 weeks (around 7/26/2022) for call with update.    Braulio Altamirano MD  [facility]    Subjective   Gem is a 77 year old accompanied by her daughter, presenting for the following health issues:  Follow Up  (Last seen 6/7/2022 )    Depression: Taking Paroxetine; appears   Sleepiness: She has sleep apnea had same CPAP machine for several years and has not been rechecked/renewed  Tightness in the ankles and legs: Legs moving a lot especially at night    History of Present Illness       Reason for  "visit:  Follow up and tightness in the ankles    She eats 0-1 servings of fruits and vegetables daily.She consumes 1 sweetened beverage(s) daily.She exercises with enough effort to increase her heart rate 10 to 19 minutes per day.  She exercises with enough effort to increase her heart rate 3 or less days per week.   She is taking medications regularly.       Review of Systems   HEENT, cardiovascular, pulmonary, gi and gu systems are negative, except as otherwise noted.      Objective    /74 (BP Location: Right arm, Cuff Size: Adult Regular)   Pulse 71   Temp 97.7  F (36.5  C) (Oral)   Resp 19   Ht 1.5 m (4' 11.06\")   Wt 89.5 kg (197 lb 6.4 oz)   SpO2 97%   BMI 39.80 kg/m    Body mass index is 39.8 kg/m .  Physical Exam   GENERAL: anxious looking, alert and no distress  RESP: lungs clear to auscultation - no rales, rhonchi or wheezes  CV: regular rate and rhythm, no murmur, click or rub, no peripheral edema   MS: no gross musculoskeletal defects noted, no edema  PSYCH: mentation appears normal, affect normal/bright    "

## 2022-07-12 NOTE — PROGRESS NOTES
ANTICOAGULATION MANAGEMENT     Selvin Rockwell 77 year old female is on warfarin with supratherapeutic INR result. (Goal INR 2.0-3.0)    Recent labs: (last 7 days)     07/12/22  1020   INR 3.4*       ASSESSMENT       Source(s): Chart Review and Patient/Caregiver Call       Warfarin doses taken: Warfarin taken as instructed    Diet: No new diet changes identified    New illness, injury, or hospitalization: No    Medication/supplement changes: Increased protonix today    Signs or symptoms of bleeding or clotting: No    Previous INR: Therapeutic last 2(+) visits    Additional findings: None       PLAN     Recommended plan for ongoing change(s) affecting INR     Dosing Instructions: decrease your warfarin dose (6.2% change) with next INR in 2 weeks       Summary  As of 7/12/2022    Full warfarin instructions:  3 mg every Sat; 2 mg all other days   Next INR check:  7/26/2022             Telephone call with Gem who verbalizes understanding and agrees to plan    Lab visit scheduled    Education provided: Goal range and significance of current result    Plan made per [facility] anticoagulation protocol    Chitra Morrison RN  Anticoagulation Clinic  7/12/2022    _______________________________________________________________________     Anticoagulation Episode Summary     Current INR goal:  2.0-3.0   TTR:  59.7 % (1 y)   Target end date:  Indefinite   Send INR reminders to:  JERRY PEÑA    Indications    Paroxysmal atrial fibrillation (H) [I48.0]  Long term (current) use of anticoagulants [Z79.01]           Comments:  Daughter Kat with dosing         Anticoagulation Care Providers     Provider Role Specialty Phone number    Gurinder Ho MD Referring Family Medicine [phone]

## 2022-07-15 DIAGNOSIS — I48.0 PAROXYSMAL ATRIAL FIBRILLATION (H): Primary | ICD-10-CM

## 2022-07-15 DIAGNOSIS — Z79.01 LONG TERM (CURRENT) USE OF ANTICOAGULANTS: ICD-10-CM

## 2022-07-18 ENCOUNTER — TELEPHONE (OUTPATIENT)
Dept: FAMILY MEDICINE | Facility: CLINIC | Age: 77
End: 2022-07-18

## 2022-07-18 DIAGNOSIS — G25.81 RESTLESS LEGS SYNDROME (RLS): Primary | ICD-10-CM

## 2022-07-18 RX ORDER — PREGABALIN 50 MG/1
50 CAPSULE ORAL AT BEDTIME
Qty: 15 CAPSULE | Refills: 1 | Status: SHIPPED | OUTPATIENT
Start: 2022-07-18 | End: 2022-08-17

## 2022-07-18 NOTE — TELEPHONE ENCOUNTER
We respond to medications differently and before one tries a medication I cannot guarantee that this medication will not have any side effects. Secondly if we try 2 new medications at the same time it will behard to tell which one is causing the side effect as most side effects overlap.   I would recommend trying Lyrica for restless legs; if did not tolerate mirapex. Can cause dryness of mouth, fatigue and sometimes dizzines among other side effects

## 2022-07-19 DIAGNOSIS — F33.1 MODERATE EPISODE OF RECURRENT MAJOR DEPRESSIVE DISORDER (H): ICD-10-CM

## 2022-07-19 NOTE — TELEPHONE ENCOUNTER
Alicia notified of provider message as written. She verbalized good understanding.     Erika WANG, RN  [facility]

## 2022-07-19 NOTE — TELEPHONE ENCOUNTER
Called and spoke with the patient's daughter (Alicia). Consent to communicate on file.  Informed her of the provider's recommendations.    Alicia reports that patient stopped taking the PARoxetine (PAXIL) 30 MG tablet: Sig - Route: Take 1 tablet (30 mg) by mouth every morning - Oral. Due to side effects and she thought that the increase in the dose was the cause of the symptoms.    Would you like patient to resuming taking the PARoxetine (PAXIL) 30 MG tablet?

## 2022-07-19 NOTE — TELEPHONE ENCOUNTER
I think I would recommend resuming because prior to starting Mirapex she did not have concerns, but after mirapex  is when she reported side effects. She can resume Paroxetine; she has been taking Paroxetine 30 mg since 5/2022 or wants to reduce to 20 mg that will be okay.

## 2022-07-20 RX ORDER — PAROXETINE 30 MG/1
TABLET, FILM COATED ORAL
Qty: 30 TABLET | Refills: 0 | Status: SHIPPED | OUTPATIENT
Start: 2022-07-20 | End: 2022-08-29

## 2022-07-20 NOTE — TELEPHONE ENCOUNTER
Last Written Prescription Date:  5/23/22  Last Fill Quantity: 30,  # refills: 1   Last office visit: 7/12/2022 with prescribing provider:  Dr. Altamirano     Future Office Visit:   Next 5 appointments (look out 90 days)    Sep 13, 2022  9:00 AM  (Arrive by 8:40 AM)  Provider Visit with Gurinder Ho MD  [facility] ([facility] [address] [address]  [phone]         Prescription approved per [facility] Refill Protocol.    Pavithra Fiore, RN, BSN  [facility]

## 2022-07-22 ENCOUNTER — TRANSCRIBE ORDERS (OUTPATIENT)
Dept: OTHER | Age: 77
End: 2022-07-22

## 2022-07-22 DIAGNOSIS — H25.13 AGE-RELATED NUCLEAR CATARACT, BILATERAL: ICD-10-CM

## 2022-07-22 DIAGNOSIS — H02.413 MECHANICAL PTOSIS OF BILATERAL EYELIDS: Primary | ICD-10-CM

## 2022-07-22 DIAGNOSIS — H04.129 DRY EYE SYNDROME OF UNSPECIFIED LACRIMAL GLAND: ICD-10-CM

## 2022-07-26 ENCOUNTER — LAB (OUTPATIENT)
Dept: LAB | Facility: CLINIC | Age: 77
End: 2022-07-26
Payer: MEDICARE

## 2022-07-26 ENCOUNTER — DOCUMENTATION ONLY (OUTPATIENT)
Dept: ANTICOAGULATION | Facility: CLINIC | Age: 77
End: 2022-07-26

## 2022-07-26 ENCOUNTER — ANTICOAGULATION THERAPY VISIT (OUTPATIENT)
Dept: ANTICOAGULATION | Facility: CLINIC | Age: 77
End: 2022-07-26

## 2022-07-26 DIAGNOSIS — Z79.01 LONG TERM (CURRENT) USE OF ANTICOAGULANTS: ICD-10-CM

## 2022-07-26 DIAGNOSIS — I48.0 PAROXYSMAL ATRIAL FIBRILLATION (H): ICD-10-CM

## 2022-07-26 DIAGNOSIS — I48.0 PAROXYSMAL ATRIAL FIBRILLATION (H): Primary | ICD-10-CM

## 2022-07-26 LAB — INR BLD: 3.5 (ref 0.9–1.1)

## 2022-07-26 PROCEDURE — 85610 PROTHROMBIN TIME: CPT

## 2022-07-26 PROCEDURE — 36416 COLLJ CAPILLARY BLOOD SPEC: CPT

## 2022-07-26 NOTE — PROGRESS NOTES
ANTICOAGULATION MANAGEMENT     Selvin Rockwell 77 year old female is on warfarin with supratherapeutic INR result. (Goal INR 2.0-3.0)    Recent labs: (last 7 days)     07/26/22  1030   INR 3.5*       ASSESSMENT       Source(s): Chart Review and Patient/Caregiver Call       Warfarin doses taken: Warfarin taken as instructed    Diet: No new diet changes identified    New illness, injury, or hospitalization: No    Medication/supplement changes: Pramipexole started on 7/18 No interaction anticipated    stopped paroxetine x 2 days but has resumed, has been taking since 5/22, may increase bleeding/INR     Signs or symptoms of bleeding or clotting: No    Previous INR: Supratherapeutic    Additional findings: None       PLAN     Recommended plan for ongoing change(s) affecting INR     Dosing Instructions: hold dose then decrease your warfarin dose (7% change) with next INR in 2 weeks       Summary  As of 7/26/2022    Full warfarin instructions:  7/26: Hold; Otherwise 2 mg every day   Next INR check:  8/9/2022             Telephone call with  KATERIN who verbalizes understanding and agrees to plan    Lab visit scheduled    Education provided: Goal range and significance of current result, Monitoring for bleeding signs and symptoms and Monitoring for clotting signs and symptoms    Plan made per [facility] anticoagulation protocol    Gracia Souza, RN  Anticoagulation Clinic  7/26/2022    _______________________________________________________________________     Anticoagulation Episode Summary     Current INR goal:  2.0-3.0   TTR:  55.9 % (1 y)   Target end date:  Indefinite   Send INR reminders to:  JERRY PEÑA    Indications    Paroxysmal atrial fibrillation (H) [I48.0]  Long term (current) use of anticoagulants [Z79.01]           Comments:  Daughter Kat with dosing         Anticoagulation Care Providers     Provider Role Specialty Phone number    Gurinder Ho MD Referring Family Medicine [phone]

## 2022-07-26 NOTE — PROGRESS NOTES
ANTICOAGULATION CLINIC REFERRAL RENEWAL REQUEST       An annual renewal order is required for all patients referred to [facility]tico[facility].?  Please review and sign the pended referral order for Selvin Rockwell.       ANTICOAGULATION SUMMARY      Warfarin indication(s)   Atrial Fibrillation    Mechanical heart valve present?  NO       Current goal range   INR: 2.0-3.0     Goal appropriate for indication? Goal INR 2-3, standard for indication(s) above     Time in Therapeutic Range (TTR)  (Goal > 60%) 55%       Office visit with referring provider's group within last year yes on 7/12/22       Gracia Souza RN  [facility]ticoagulation [facility]

## 2022-08-01 NOTE — TELEPHONE ENCOUNTER
FUTURE VISIT INFORMATION      FUTURE VISIT INFORMATION:    Date: 9/27/22    Time: 8:00am    Location: [facility]  REFERRAL INFORMATION:    Referring provider:  Tio Vu Od, OD    Referring providers clinic:  [facility]    Reason for visit/diagnosis  Consult on Eyelid Surgery    RECORDS REQUESTED FROM:       Clinic name Comments Records Status Imaging Status   [facility] Request for recs sent [phone] received and sent to [facility] EPIC

## 2022-08-02 ENCOUNTER — TELEPHONE (OUTPATIENT)
Dept: FAMILY MEDICINE | Facility: CLINIC | Age: 77
End: 2022-08-02

## 2022-08-02 NOTE — TELEPHONE ENCOUNTER
Reason for Call:  Other call back    Detailed comments: Daughter called in stating her mother has been taking the medication you prescribed for over 2 weeks and the right ankle is still swollen. She wants to know if her mother should continue taking the medication    Phone Number Patient can be reached at: [phone]    Best Time: any    Can we leave a detailed message on this number? YES    Call taken on 8/2/2022 at 3:02 PM by Oanh Carrero

## 2022-08-03 NOTE — TELEPHONE ENCOUNTER
Spoke with pt dtr, stated she would like to bring mother in on 9th as she already has to have INR check and difficult/hard for pt to come in for appts. Put pt in same day with provider for follow up on edema in legs.     Thanks,  SERA Bryant  [facility]

## 2022-08-03 NOTE — TELEPHONE ENCOUNTER
Patient's daughter (Alicia) called the clinic.  Patient has been taking pregabalin (LYRICA) 50 MG capsule with no noted relief.    Swelling to right ankle is spreading upwards towards the knee for the last 3-4 days. Patient is experiencing tightness.    pregabalin (LYRICA) 50 MG capsule 15 capsule 1 7/18/2022  --   Sig - Route: Take 1 capsule (50 mg) by mouth At Bedtime - Oral   Sent to pharmacy as: Pregabalin 50 MG Oral Capsule (LYRICA)   Class: E-Prescribe   Order: [id number]   E-Prescribing Status: Receipt confirmed by pharmacy (7/18/2022  5:54 PM CDT)

## 2022-08-03 NOTE — TELEPHONE ENCOUNTER
If there is swelling which is increasing/spreading up to the knee will need evaluation; blood clot is unlikely given that she is on a blood thinner.   The Lyrica was for possible restless leg syndrome (reported leg was having some uncontrollable movements) and not for swelling

## 2022-08-09 ENCOUNTER — OFFICE VISIT (OUTPATIENT)
Dept: FAMILY MEDICINE | Facility: CLINIC | Age: 77
End: 2022-08-09
Payer: MEDICARE

## 2022-08-09 ENCOUNTER — ANTICOAGULATION THERAPY VISIT (OUTPATIENT)
Dept: ANTICOAGULATION | Facility: CLINIC | Age: 77
End: 2022-08-09

## 2022-08-09 ENCOUNTER — LAB (OUTPATIENT)
Dept: LAB | Facility: CLINIC | Age: 77
End: 2022-08-09

## 2022-08-09 VITALS
RESPIRATION RATE: 19 BRPM | OXYGEN SATURATION: 98 % | HEART RATE: 102 BPM | WEIGHT: 202 LBS | BODY MASS INDEX: 40.72 KG/M2 | SYSTOLIC BLOOD PRESSURE: 132 MMHG | DIASTOLIC BLOOD PRESSURE: 88 MMHG

## 2022-08-09 DIAGNOSIS — Z79.01 LONG TERM (CURRENT) USE OF ANTICOAGULANTS: ICD-10-CM

## 2022-08-09 DIAGNOSIS — I48.20 CHRONIC ATRIAL FIBRILLATION (H): ICD-10-CM

## 2022-08-09 DIAGNOSIS — R60.0 BILATERAL LEG EDEMA: Primary | ICD-10-CM

## 2022-08-09 DIAGNOSIS — R06.01 ORTHOPNEA: ICD-10-CM

## 2022-08-09 DIAGNOSIS — I48.0 PAROXYSMAL ATRIAL FIBRILLATION (H): ICD-10-CM

## 2022-08-09 DIAGNOSIS — I48.0 PAROXYSMAL ATRIAL FIBRILLATION (H): Primary | ICD-10-CM

## 2022-08-09 DIAGNOSIS — Z79.899 ENCOUNTER FOR LONG-TERM (CURRENT) USE OF MEDICATIONS: ICD-10-CM

## 2022-08-09 LAB
CREAT SERPL-MCNC: 1.02 MG/DL (ref 0.52–1.04)
GFR SERPL CREATININE-BSD FRML MDRD: 57 ML/MIN/1.73M2
INR BLD: 2.9 (ref 0.9–1.1)
INR PPP: 2.9 (ref 0.85–1.15)
NT-PROBNP SERPL-MCNC: 249 PG/ML (ref 0–1800)

## 2022-08-09 PROCEDURE — 36415 COLL VENOUS BLD VENIPUNCTURE: CPT | Performed by: FAMILY MEDICINE

## 2022-08-09 PROCEDURE — 85610 PROTHROMBIN TIME: CPT | Performed by: FAMILY MEDICINE

## 2022-08-09 PROCEDURE — 82565 ASSAY OF CREATININE: CPT | Performed by: FAMILY MEDICINE

## 2022-08-09 PROCEDURE — 99214 OFFICE O/P EST MOD 30 MIN: CPT | Performed by: FAMILY MEDICINE

## 2022-08-09 PROCEDURE — 83880 ASSAY OF NATRIURETIC PEPTIDE: CPT | Performed by: FAMILY MEDICINE

## 2022-08-09 PROCEDURE — 85610 PROTHROMBIN TIME: CPT

## 2022-08-09 RX ORDER — TORSEMIDE 10 MG/1
10 TABLET ORAL DAILY
Qty: 30 TABLET | Refills: 0 | Status: SHIPPED | OUTPATIENT
Start: 2022-08-09 | End: 2022-09-13

## 2022-08-09 ASSESSMENT — PAIN SCALES - GENERAL: PAINLEVEL: NO PAIN (0)

## 2022-08-09 NOTE — PROGRESS NOTES
ANTICOAGULATION MANAGEMENT     Selvin Rockwell 77 year old female is on warfarin with therapeutic INR result. (Goal INR 2.0-3.0)    Recent labs: (last 7 days)     08/09/22  1156   INR 2.9*       ASSESSMENT       Source(s): Chart Review and Patient/Caregiver Call       Warfarin doses taken: Warfarin taken as instructed    Diet: No new diet changes identified    New illness, injury, or hospitalization: Yes: increased swelling in her legs, OV today    Medication/supplement changes: torsemide started at OV today, subsequent INRs may increased.     Signs or symptoms of bleeding or clotting: No    Previous INR: Supratherapeutic    Additional findings: None       PLAN     Recommended plan for ongoing change(s) affecting INR     Dosing Instructions: Continue your current warfarin dose with next INR in 2 weeks       Summary  As of 8/9/2022    Full warfarin instructions:  2 mg every day   Next INR check:  8/23/2022             Telephone call with  daughter Alicia who verbalizes understanding and agrees to plan    Patient offered & declined to schedule next visit    Education provided: Goal range and significance of current result and Potential interaction between warfarin and torsemide    Plan made per [facility] anticoagulation protocol    Ree Gandhi RN  Anticoagulation Clinic  8/9/2022    _______________________________________________________________________     Anticoagulation Episode Summary     Current INR goal:  2.0-3.0   TTR:  52.7 % (1 y)   Target end date:  Indefinite   Send INR reminders to:  JERRY PEÑA    Indications    Paroxysmal atrial fibrillation (H) [I48.0]  Long term (current) use of anticoagulants [Z79.01]           Comments:           Anticoagulation Care Providers     Provider Role Specialty Phone number    Gurinder Ho MD Referring Family Medicine [phone]

## 2022-08-09 NOTE — PROGRESS NOTES
"  Assessment & Plan   Gem is a 78 yo F with dm2, afib, htn, hx vanessa knee replacement, hpld, steffi, obesity, achalasia, adrenal adenoma, hx kidney stone, gout, chronic back pain, anticoaglant use, omega, ckd3.  Bilateral leg edema  Differentials include; dependent edema, CHF, renal dysfunction, hypoalbuminemia.  Reviewed recent labs which were essentially normal.  We will recheck BNP and creatinine.  In the meantime we will start a low-dose water pill  - torsemide (DEMADEX) 10 MG tablet; Take 1 tablet (10 mg) by mouth daily  - Creatinine; Future  - BNP-N terminal pro; Future  - Creatinine  - BNP-N terminal pro    Orthopnea   - BNP-N terminal pro; Future  - BNP-N terminal pro    Chronic atrial fibrillation (H)  - INR; Future  - BNP-N terminal pro; Future  - INR  - BNP-N terminal pro    Encounter for long-term (current) use of medications    -medications reviewed     BMI:   Estimated body mass index is 40.72 kg/m  as calculated from the following:    Height as of 7/12/22: 1.5 m (4' 11.06\").    Weight as of this encounter: 91.6 kg (202 lb).   Weight management plan: Discussed healthy diet and exercise guidelines      Return for follow up with results.    Braulio Altamirano MD  [facility] CenterKALYN Rabago is a 77 year old accompanied by her daughter, presenting for the following health issues:  Check Edema in Legs     HPI     Bilateral leg edema    still swelling in legs - both    Denies any cough    Has SOBE    Weight:   Gained 5 lbs from last visit  Wt Readings from Last 10 Encounters:   08/09/22 91.6 kg (202 lb)   07/12/22 89.5 kg (197 lb 6.4 oz)   06/07/22 89.8 kg (198 lb)   05/23/22 88.5 kg (195 lb)   02/07/22 85.5 kg (188 lb 9.6 oz)   08/02/21 90.3 kg (199 lb)   06/08/21 90.5 kg (199 lb 8.3 oz)   11/16/20 89.8 kg (198 lb)   10/21/20 92.1 kg (203 lb)   09/22/20 89.8 kg (198 lb)   Review of Systems   Constitutional, HEENT, pulmonary, gi and gu systems are negative, except as otherwise " noted.      Objective    /88 (BP Location: Left arm)   Pulse 102   Resp 19   Wt 91.6 kg (202 lb)   SpO2 98%   BMI 40.72 kg/m    Body mass index is 40.72 kg/m .  Physical Exam   GENERAL: healthy, alert and no distress  RESP: lungs clear to auscultation - no rales, rhonchi or wheezes  CV: regular rate and rhythm, no murmur, click or rub, moderate peripheral edema   MS: no gross musculoskeletal defects noted, edema legs and ankles

## 2022-08-16 ENCOUNTER — TELEPHONE (OUTPATIENT)
Dept: FAMILY MEDICINE | Facility: CLINIC | Age: 77
End: 2022-08-16

## 2022-08-16 DIAGNOSIS — G25.81 RESTLESS LEGS SYNDROME (RLS): ICD-10-CM

## 2022-08-16 NOTE — TELEPHONE ENCOUNTER
Routing refill request to provider for review/approval because:  Drug not on the [facility] refill protocol     Requested Prescriptions   Pending Prescriptions Disp Refills    pregabalin (LYRICA) 50 MG capsule [Pharmacy Med Name: Pregabalin 50 MG Oral Capsule] 15 capsule 0     Sig: Take 1 capsule by mouth at bedtime        There is no refill protocol information for this order            Kesha Pritchard RN   [facility]

## 2022-08-16 NOTE — TELEPHONE ENCOUNTER
Stefany Nowak, RNRegistered NurseSigned  12:33 PM    Addend                 RN received call from patients daughter.     Daughter requesting refill on Lyrica.     RN noted med pended and in refill pool.     Daughter would like call when medication is filled.     Michele Nowak RN, BSN, PHN  [facility]

## 2022-08-16 NOTE — TELEPHONE ENCOUNTER
RN received call from patients daughter.    Daughter requesting refill on Lyrica.    RN noted med pended and in refill pool.    Daughter would like call when medication is filled.    Michele Nowak, RN, BSN, PHN  [facility]

## 2022-08-17 RX ORDER — PREGABALIN 50 MG/1
CAPSULE ORAL
Qty: 30 CAPSULE | Refills: 0 | Status: SHIPPED | OUTPATIENT
Start: 2022-08-17 | End: 2022-09-13 | Stop reason: DRUGHIGH

## 2022-08-17 NOTE — TELEPHONE ENCOUNTER
Attempted to call dtr, phone continues to ring and no vm. [facility] notifies patients once meds ready for . Closing encounter.     Thanks,  SERA Bryant  [facility]

## 2022-08-23 ENCOUNTER — LAB (OUTPATIENT)
Dept: LAB | Facility: CLINIC | Age: 77
End: 2022-08-23
Payer: MEDICARE

## 2022-08-23 ENCOUNTER — ANTICOAGULATION THERAPY VISIT (OUTPATIENT)
Dept: ANTICOAGULATION | Facility: CLINIC | Age: 77
End: 2022-08-23

## 2022-08-23 DIAGNOSIS — I48.0 PAROXYSMAL ATRIAL FIBRILLATION (H): Primary | ICD-10-CM

## 2022-08-23 DIAGNOSIS — Z79.01 LONG TERM (CURRENT) USE OF ANTICOAGULANTS: ICD-10-CM

## 2022-08-23 DIAGNOSIS — I48.0 PAROXYSMAL ATRIAL FIBRILLATION (H): ICD-10-CM

## 2022-08-23 LAB — INR BLD: 3.4 (ref 0.9–1.1)

## 2022-08-23 PROCEDURE — 85610 PROTHROMBIN TIME: CPT

## 2022-08-23 PROCEDURE — 36416 COLLJ CAPILLARY BLOOD SPEC: CPT

## 2022-08-23 NOTE — PROGRESS NOTES
ANTICOAGULATION MANAGEMENT     Selvin Rockwell 77 year old female is on warfarin with supratherapeutic INR result. (Goal INR 2.0-3.0)    Recent labs: (last 7 days)     08/23/22  1146   INR 3.4*       ASSESSMENT       Source(s): Chart Review and Patient/Caregiver Call       Warfarin doses taken: Warfarin taken as instructed    Diet: No new diet changes identified    New illness, injury, or hospitalization: No    Medication/supplement changes: None noted    Signs or symptoms of bleeding or clotting: No    Previous INR: Therapeutic last 2(+) visits    Additional findings: None       PLAN     Recommended plan for ongoing change(s) affecting INR     Dosing Instructions: decrease your warfarin dose (7.1% change) with next INR in 2 weeks       Summary  As of 8/23/2022    Full warfarin instructions:  1 mg every Tue; 2 mg all other days   Next INR check:  9/6/2022             Telephone call with  Kat who verbalizes understanding and agrees to plan    Lab visit scheduled    Education provided: Goal range and significance of current result    Plan made per [facility] anticoagulation protocol    Chitra Morrison RN  Anticoagulation Clinic  8/23/2022    _______________________________________________________________________     Anticoagulation Episode Summary     Current INR goal:  2.0-3.0   TTR:  49.6 % (1 y)   Target end date:  Indefinite   Send INR reminders to:  JERRY PEÑA    Indications    Paroxysmal atrial fibrillation (H) [I48.0]  Long term (current) use of anticoagulants [Z79.01]           Comments:           Anticoagulation Care Providers     Provider Role Specialty Phone number    Gurinder Ho MD Referring Family Medicine [phone]

## 2022-09-06 ENCOUNTER — ANTICOAGULATION THERAPY VISIT (OUTPATIENT)
Dept: ANTICOAGULATION | Facility: CLINIC | Age: 77
End: 2022-09-06

## 2022-09-06 ENCOUNTER — LAB (OUTPATIENT)
Dept: LAB | Facility: CLINIC | Age: 77
End: 2022-09-06
Payer: MEDICARE

## 2022-09-06 DIAGNOSIS — I48.0 PAROXYSMAL ATRIAL FIBRILLATION (H): Primary | ICD-10-CM

## 2022-09-06 DIAGNOSIS — Z79.01 LONG TERM (CURRENT) USE OF ANTICOAGULANTS: ICD-10-CM

## 2022-09-06 DIAGNOSIS — I48.0 PAROXYSMAL ATRIAL FIBRILLATION (H): ICD-10-CM

## 2022-09-06 LAB — INR BLD: 2.7 (ref 0.9–1.1)

## 2022-09-06 PROCEDURE — 85610 PROTHROMBIN TIME: CPT

## 2022-09-06 PROCEDURE — 36416 COLLJ CAPILLARY BLOOD SPEC: CPT

## 2022-09-06 NOTE — PROGRESS NOTES
ANTICOAGULATION MANAGEMENT     Selvin Rockwell 77 year old female is on warfarin with therapeutic INR result. (Goal INR 2.0-3.0)    Recent labs: (last 7 days)     09/06/22  1022   INR 2.7*       ASSESSMENT       Source(s): Chart Review and Patient/Caregiver Call       Warfarin doses taken: Warfarin taken as instructed    Diet: No new diet changes identified    New illness, injury, or hospitalization: No    Medication/supplement changes: None noted    Signs or symptoms of bleeding or clotting: No    Previous INR: Supratherapeutic    Additional findings: None       PLAN     Recommended plan for no diet, medication or health factor changes affecting INR     Dosing Instructions: Continue your current warfarin dose with next INR in 3 weeks       Summary  As of 9/6/2022    Full warfarin instructions:  1 mg every Tue; 2 mg all other days   Next INR check:  9/29/2022             Telephone call with Gem who verbalizes understanding and agrees to plan    Lab visit scheduled    Education provided: Goal range and significance of current result and Importance of therapeutic range    Plan made per [facility] anticoagulation protocol    Sergio Key RN  Anticoagulation Clinic  9/6/2022    _______________________________________________________________________     Anticoagulation Episode Summary     Current INR goal:  2.0-3.0   TTR:  47.4 % (1 y)   Target end date:  Indefinite   Send INR reminders to:  [facility]    Indications    Paroxysmal atrial fibrillation (H) [I48.0]  Long term (current) use of anticoagulants [Z79.01]           Comments:           Anticoagulation Care Providers     Provider Role Specialty Phone number    Gurinder Ho MD Referring Family Medicine [phone]

## 2022-09-13 ENCOUNTER — OFFICE VISIT (OUTPATIENT)
Dept: FAMILY MEDICINE | Facility: CLINIC | Age: 77
End: 2022-09-13
Payer: MEDICARE

## 2022-09-13 VITALS
OXYGEN SATURATION: 95 % | TEMPERATURE: 98.9 F | DIASTOLIC BLOOD PRESSURE: 69 MMHG | SYSTOLIC BLOOD PRESSURE: 125 MMHG | WEIGHT: 204 LBS | HEART RATE: 74 BPM | BODY MASS INDEX: 41.13 KG/M2

## 2022-09-13 DIAGNOSIS — I48.0 PAROXYSMAL ATRIAL FIBRILLATION (H): ICD-10-CM

## 2022-09-13 DIAGNOSIS — F33.1 MODERATE EPISODE OF RECURRENT MAJOR DEPRESSIVE DISORDER (H): ICD-10-CM

## 2022-09-13 DIAGNOSIS — G25.81 RESTLESS LEGS SYNDROME (RLS): ICD-10-CM

## 2022-09-13 DIAGNOSIS — I10 HYPERTENSION GOAL BP (BLOOD PRESSURE) < 140/90: ICD-10-CM

## 2022-09-13 DIAGNOSIS — E78.5 HYPERLIPIDEMIA WITH TARGET LDL LESS THAN 100: ICD-10-CM

## 2022-09-13 DIAGNOSIS — R60.0 BILATERAL LEG EDEMA: ICD-10-CM

## 2022-09-13 DIAGNOSIS — Z79.899 ENCOUNTER FOR LONG-TERM (CURRENT) USE OF MEDICATIONS: ICD-10-CM

## 2022-09-13 DIAGNOSIS — K21.9 GASTROESOPHAGEAL REFLUX DISEASE WITHOUT ESOPHAGITIS: ICD-10-CM

## 2022-09-13 DIAGNOSIS — E11.21 TYPE 2 DIABETES MELLITUS WITH DIABETIC NEPHROPATHY, WITHOUT LONG-TERM CURRENT USE OF INSULIN (H): Primary | ICD-10-CM

## 2022-09-13 LAB
ANION GAP SERPL CALCULATED.3IONS-SCNC: 9 MMOL/L (ref 3–14)
BUN SERPL-MCNC: 30 MG/DL (ref 7–30)
CALCIUM SERPL-MCNC: 8.7 MG/DL (ref 8.5–10.1)
CHLORIDE BLD-SCNC: 108 MMOL/L (ref 94–109)
CO2 SERPL-SCNC: 24 MMOL/L (ref 20–32)
CREAT SERPL-MCNC: 1.1 MG/DL (ref 0.52–1.04)
GFR SERPL CREATININE-BSD FRML MDRD: 52 ML/MIN/1.73M2
GLUCOSE BLD-MCNC: 163 MG/DL (ref 70–99)
HBA1C MFR BLD: 8 % (ref 0–5.6)
POTASSIUM BLD-SCNC: 4.3 MMOL/L (ref 3.4–5.3)
SODIUM SERPL-SCNC: 141 MMOL/L (ref 133–144)

## 2022-09-13 PROCEDURE — 99214 OFFICE O/P EST MOD 30 MIN: CPT | Performed by: FAMILY MEDICINE

## 2022-09-13 PROCEDURE — 83036 HEMOGLOBIN GLYCOSYLATED A1C: CPT | Performed by: FAMILY MEDICINE

## 2022-09-13 PROCEDURE — 36415 COLL VENOUS BLD VENIPUNCTURE: CPT | Performed by: FAMILY MEDICINE

## 2022-09-13 PROCEDURE — 80048 BASIC METABOLIC PNL TOTAL CA: CPT | Performed by: FAMILY MEDICINE

## 2022-09-13 RX ORDER — PREGABALIN 50 MG/1
CAPSULE ORAL
Qty: 30 CAPSULE | Refills: 0 | Status: CANCELLED | OUTPATIENT
Start: 2022-09-13

## 2022-09-13 RX ORDER — PANTOPRAZOLE SODIUM 40 MG/1
40 TABLET, DELAYED RELEASE ORAL DAILY
Qty: 90 TABLET | Refills: 1 | Status: SHIPPED | OUTPATIENT
Start: 2022-09-13 | End: 2023-06-01

## 2022-09-13 RX ORDER — PREGABALIN 100 MG/1
100 CAPSULE ORAL 2 TIMES DAILY
Qty: 60 CAPSULE | Refills: 5 | Status: SHIPPED | OUTPATIENT
Start: 2022-09-13 | End: 2022-12-12 | Stop reason: DRUGHIGH

## 2022-09-13 RX ORDER — CARVEDILOL 6.25 MG/1
6.25 TABLET ORAL 2 TIMES DAILY
Qty: 180 TABLET | Refills: 1 | Status: SHIPPED | OUTPATIENT
Start: 2022-09-13 | End: 2023-03-27

## 2022-09-13 RX ORDER — SIMVASTATIN 10 MG
TABLET ORAL
Qty: 90 TABLET | Refills: 1 | Status: SHIPPED | OUTPATIENT
Start: 2022-09-13 | End: 2022-11-14

## 2022-09-13 RX ORDER — PAROXETINE 30 MG/1
TABLET, FILM COATED ORAL
Qty: 90 TABLET | Refills: 1 | Status: SHIPPED | OUTPATIENT
Start: 2022-09-13 | End: 2023-05-01

## 2022-09-13 RX ORDER — TORSEMIDE 10 MG/1
10 TABLET ORAL DAILY
Qty: 90 TABLET | Refills: 1 | Status: SHIPPED | OUTPATIENT
Start: 2022-09-13 | End: 2022-10-12

## 2022-09-13 ASSESSMENT — PAIN SCALES - GENERAL: PAINLEVEL: NO PAIN (0)

## 2022-09-13 NOTE — LETTER
September 15, 2022    Gem BALWINDER Richiekipadeel  [address] [place] [id number]          Dear ,    We are writing to inform you of your test results.    Gem (and Alicia),   Your blood sugar and A1c results are still a bit higher than desirable, so I would advise increasing your metformin 500 mg to two tablets twice a day until they are gone, then filling your next prescription for the 1000 mg twice daily dose.  I sent a new prescription for the increased dose to your [facility] pharmacy.   I would advise a follow-up visit in about 3 months or so with a general physical and some repeat fasting lab work.       Resulted Orders   Hemoglobin A1c   Result Value Ref Range    Hemoglobin A1C 8.0 (H) 0.0 - 5.6 %      Comment:      Normal <5.7%   Prediabetes 5.7-6.4%    Diabetes 6.5% or higher     Note: Adopted from ADA consensus guidelines.   Basic metabolic panel   Result Value Ref Range    Sodium 141 133 - 144 mmol/L    Potassium 4.3 3.4 - 5.3 mmol/L    Chloride 108 94 - 109 mmol/L    Carbon Dioxide (CO2) 24 20 - 32 mmol/L    Anion Gap 9 3 - 14 mmol/L    Urea Nitrogen 30 7 - 30 mg/dL    Creatinine 1.10 (H) 0.52 - 1.04 mg/dL    Calcium 8.7 8.5 - 10.1 mg/dL    Glucose 163 (H) 70 - 99 mg/dL    GFR Estimate 52 (L) >60 mL/min/1.73m2      Comment:      Effective December 21, 2021 eGFRcr in adults is calculated using the 2021 CKD-EPI creatinine equation which includes age and gender ( et al., NEJ, DOI: 10.1056/GNOIat3640117)       If you have any questions or concerns, please call the clinic at the number listed above.       Sincerely,      Gurinder Ho MD

## 2022-09-13 NOTE — PROGRESS NOTES
Assessment & Plan       ICD-10-CM    1. Type 2 diabetes mellitus with diabetic nephropathy, without long-term current use of insulin (H)  E11.21 metFORMIN (GLUCOPHAGE) 500 MG tablet     pregabalin (LYRICA) 100 MG capsule     Hemoglobin A1c     Basic metabolic panel     Hemoglobin A1c     Basic metabolic panel   2. Paroxysmal atrial fibrillation (H)  I48.0 carvedilol (COREG) 6.25 MG tablet   3. Hypertension goal BP (blood pressure) < 140/90  I10 carvedilol (COREG) 6.25 MG tablet   4. Gastroesophageal reflux disease without esophagitis  K21.9 pantoprazole (PROTONIX) 40 MG EC tablet   5. Moderate episode of recurrent major depressive disorder (H)  F33.1 PARoxetine (PAXIL) 30 MG tablet   6. Restless legs syndrome (RLS)  G25.81    7. Hyperlipidemia with target LDL less than 100  E78.5 simvastatin (ZOCOR) 10 MG tablet   8. Bilateral leg edema  R60.0 torsemide (DEMADEX) 10 MG tablet   9. Encounter for long-term (current) use of medications  Z79.899      Blood pressure and other vital signs look fine today  We will increase her Lyrica to 100 mg nightly for now and potentially to 100 mg twice daily if needed  We will check a fasting BMP and A1c today  Continue other same meds as above  Plan a tentative recheck in 3 months or so with an annual physical and some repeat fasting lab work    Return in about 3 months (around 12/13/2022) for Physical Exam, Lab Work, Routine Visit.    Gurinder Ho MD  [facility] MARIANA Rabago is a 77 year old accompanied by her daughter, presenting for the following health issues:  RECHECK      HPI     Medication Followup of Pregabalin     Taking Medication as prescribed: yes    Side Effects:  None    Medication Helping Symptoms:  Yes    She has been taking Lyrica 50 mg nightly and it does seem to help her leg pains and tightness.  She can generally fall asleep okay, but she will often wake up in the middle the night.  She and her daughter wonder about trying a  higher dose of the medication.  She has been taking metformin again in recent months for control of her diabetes.  Her A1c was elevated above goal when last checked a few months ago.  She has still not taken her trip to [facility], but she may do so in the upcoming months.  She is needing some refills on other medications today as well.    Patient Active Problem List   Diagnosis     OA (OSTEOARTHRITIS) OF KNEE - right     Status Post Total Knee Replacement - bilateral     TACHO (obstructive sleep apnea)     Hyperlipidemia with target LDL less than 100     Hypertension goal BP (blood pressure) < 140/90     Morbid obesity (H)     Achalasia     Adrenal adenoma     Atrophy of left kidney     Calculus of kidney     Chronic low back pain     Gout     Paroxysmal atrial fibrillation (H)     Long term (current) use of anticoagulants     Anxiety     Spinal stenosis of lumbar region with neurogenic claudication     Varicose veins with pain     Venous stasis dermatitis of right lower extremity     Shoulder pain, left     Glenohumeral arthritis, left     Type 2 diabetes mellitus with diabetic nephropathy, without long-term current use of insulin (H)     Stage 3a chronic kidney disease (H)     Iron deficiency anemia, unspecified iron deficiency anemia type     Mild recurrent major depression (H)     Current Outpatient Medications   Medication     acetaminophen (TYLENOL) 500 MG tablet     carvedilol (COREG) 6.25 MG tablet     isosorbide mononitrate (IMDUR) 30 MG 24 hr tablet     metFORMIN (GLUCOPHAGE) 500 MG tablet     pantoprazole (PROTONIX) 40 MG EC tablet     PARoxetine (PAXIL) 30 MG tablet     pregabalin (LYRICA) 50 MG capsule     simvastatin (ZOCOR) 10 MG tablet     torsemide (DEMADEX) 10 MG tablet     warfarin ANTICOAGULANT (COUMADIN) 2 MG tablet     clotrimazole (LOTRIMIN) 1 % external cream     montelukast (SINGULAIR) 10 MG tablet     order for DME     order for DME     PARoxetine (PAXIL) 20 MG tablet     Current  Facility-Administered Medications   Medication     lidocaine (PF) (XYLOCAINE) 1 % injection 3 mL     lidocaine (PF) (XYLOCAINE) 1 % injection 4 mL     lidocaine 1 % 10 mL, sodium bicarbonate 1 mEq 11 mL injection     lidocaine 1% with EPINEPHrine 1:100,000 60 mL, sodium bicarbonate 12 mEq, sodium chloride 0.9% 500 mL 572 mL     methylPREDNISolone (DEPO-MEDROL) injection 80 mg         Review of Systems   Noncontributory except as above.      Objective    /69 (BP Location: Right arm, Patient Position: Sitting, Cuff Size: Adult Large)   Pulse 74   Temp 98.9  F (37.2  C) (Oral)   Wt 92.5 kg (204 lb)   SpO2 95%   BMI 41.13 kg/m    Body mass index is 41.13 kg/m .  Physical Exam   GENERAL: alert, no distress, obese and elderly  MS: no gross musculoskeletal defects noted, perhaps slight puffiness of the left lateral ankle    Previous lab results were reviewed

## 2022-09-14 DIAGNOSIS — E11.21 TYPE 2 DIABETES MELLITUS WITH DIABETIC NEPHROPATHY, WITHOUT LONG-TERM CURRENT USE OF INSULIN (H): Primary | ICD-10-CM

## 2022-09-14 NOTE — RESULT ENCOUNTER NOTE
Gem (and Alicia),  Your blood sugar and A1c results are still a bit higher than desirable, so I would advise increasing your metformin 500 mg to two tablets twice a day until they are gone, then filling your next prescription for the 1000 mg twice daily dose.  I sent a new prescription for the increased dose to your [facility] pharmacy.  I would advise a follow-up visit in about 3 months or so with a general physical and some repeat fasting lab work.    Gurinder Ho MD

## 2022-09-14 NOTE — PROGRESS NOTES
Blood sugar and A1c are still elevated higher than desirable, so we will increase her metformin from 500 mg twice a day to 1000 mg twice a day.    Gurinder Ho MD

## 2022-09-27 ENCOUNTER — OFFICE VISIT (OUTPATIENT)
Dept: OPHTHALMOLOGY | Facility: CLINIC | Age: 77
End: 2022-09-27
Payer: MEDICARE

## 2022-09-27 ENCOUNTER — PRE VISIT (OUTPATIENT)
Dept: OPHTHALMOLOGY | Facility: CLINIC | Age: 77
End: 2022-09-27

## 2022-09-27 ENCOUNTER — TELEPHONE (OUTPATIENT)
Dept: OPHTHALMOLOGY | Facility: CLINIC | Age: 77
End: 2022-09-27

## 2022-09-27 DIAGNOSIS — H25.13 AGE-RELATED NUCLEAR CATARACT, BILATERAL: ICD-10-CM

## 2022-09-27 DIAGNOSIS — H04.123 DRY EYE SYNDROME OF BOTH EYES: ICD-10-CM

## 2022-09-27 DIAGNOSIS — H02.413 MECHANICAL PTOSIS OF BILATERAL EYELIDS: ICD-10-CM

## 2022-09-27 DIAGNOSIS — H02.403 ACQUIRED INVOLUTIONAL PTOSIS OF BOTH EYELIDS: Primary | ICD-10-CM

## 2022-09-27 PROCEDURE — 92081 LIMITED VISUAL FIELD XM: CPT | Mod: GC | Performed by: OPHTHALMOLOGY

## 2022-09-27 PROCEDURE — 99204 OFFICE O/P NEW MOD 45 MIN: CPT | Mod: GC | Performed by: OPHTHALMOLOGY

## 2022-09-27 PROCEDURE — 92285 EXTERNAL OCULAR PHOTOGRAPHY: CPT | Mod: GC | Performed by: OPHTHALMOLOGY

## 2022-09-27 RX ORDER — ISOSORBIDE MONONITRATE 30 MG/1
30 TABLET, EXTENDED RELEASE ORAL DAILY
COMMUNITY
End: 2022-10-12

## 2022-09-27 ASSESSMENT — SLIT LAMP EXAM - LIDS: COMMENTS: HEAVY DERMATOCHALASIS RESTING ON LASHES, TRUE PTOSIS

## 2022-09-27 ASSESSMENT — CONF VISUAL FIELD
OS_NORMAL: 1
OD_NORMAL: 1

## 2022-09-27 ASSESSMENT — TONOMETRY
OD_IOP_MMHG: 20
IOP_METHOD: ICARE
OS_IOP_MMHG: 20

## 2022-09-27 ASSESSMENT — EXTERNAL EXAM - RIGHT EYE: OD_EXAM: NORMAL

## 2022-09-27 ASSESSMENT — VISUAL ACUITY
OD_CC+: -2
OS_CC: 20/40
OD_CC: 20/40
CORRECTION_TYPE: GLASSES
METHOD: SNELLEN - LINEAR

## 2022-09-27 ASSESSMENT — LAGOPHTHALMOS
OS_LAGOPHTHALMOS: 0
OD_LAGOPHTHALMOS: 0

## 2022-09-27 ASSESSMENT — MARGIN REFLEX DISTANCE
OD_MRD2: 6
OS_MRD2: 7
OS_MRD1: 0
OD_MRD1: 1

## 2022-09-27 ASSESSMENT — LEVATOR FUNCTION
OD_LEVATOR: 12
OS_LEVATOR: 15

## 2022-09-27 NOTE — TELEPHONE ENCOUNTER
Spoke with daughter they will call to schedule at a later date. Patient will be traveling to [place] in the next few months. Most likely after the first of the year.

## 2022-09-27 NOTE — PROGRESS NOTES
Oculoplastic Clinic New Patient    Patient: Selvin Rockwell MRN# [id number]   YOB: 1945 Age: 77 year old   Date of Visit: Sep 27, 2022    CC: Droopy eyelids obstructing vision.              HPI:     Chief Complaint(s) and History of Present Illness(es)     Dermatochalasis Evaluation     Associated signs and symptoms: Negative for lid swelling, eye pain,   muscle weakness and slurred speech    Comments: Pt here for Ptosis each eye, referred by [facility] in [place], [place] - Dr. Vu             Comments     Pt daughter Alicia (daughter) is interpreting. Left eye> right eye. Pt   notes vision is blocked by lids. Pt mentions eyes feel heavy/ and is   causing headaches.   Pt not using drops.   GLO Kirk 8:11 AM September 27, 2022                 Selvin Rockwell is a 77 year old female who has noted gradual onset of droopy eyelids over the past years. The droopy eyelid is interfering with activities of daily living including driving, and reading. The patient denies double vision, variability of the eyelid position, or dry eye symptoms.     Bothered by the sagging eyelid. It contributes to her headaches. Traveling to [facility] in ~1-2 months, and will be in [place] for a few months before returning to the [place].   No history of eye surgeries or eyelid surgery.   Past ocular history: dry eyes and cataracts.  Follow's with Dr. Vu (optometry) for annual eye exams.    EXAM:     MRD1:   Dermatochalasis with excess skin touching eyelashes: yes  Brow ptosis with brow resting below superior orbital rim: no  Eye lid skin: 28 mm & 30 mm    VISUAL FIELD:  Right eye untaped: 19 degrees Right eye taped: 50 degrees  Left eye untaped: 9 degrees   Left eye taped: 44 degrees    Assessment & Plan     Selvin Rockwell is a 77 year old female with the following diagnoses:   1. Acquired involutional ptosis of both eyelids    2. Mechanical ptosis of bilateral eyelids    3. Dry eye  syndrome of unspecified lacrimal gland    4. Age-related nuclear cataract, bilateral       Both upper eyelid blepharoplasty (s/ncf) and ptosis repair external levator approach  [id number]    After returns from [place].   ANTICOAGULATION:    Aspirin: no  Plavix: no  Coumadin: yes  Supplements (fish Oil, vitamin E): no    Can hold prior to surgery - will discuss with PCP  Will cc PCP regarding holding anticoagulation          Theodore Tuttle MD    PGY 2 ophthalmology   PHOTOS DEMONSTRATE:    Significant dermatochalasis with lids resting on eyelashes and obstructing visual axis  Blepharoptosis    Attending Physician Attestation: Complete documentation of historical and exam elements from today's encounter can be found in the full encounter summary report (not reduplicated in this progress note). I personally obtained the chief complaint(s) and history of present illness. I confirmed and edited as necessary the review of systems, past medical/surgical history, family history, social history, and examination findings as documented by others; and I examined the patient myself. I personally reviewed the relevant tests, images, and reports as documented above. I formulated and edited as necessary the assessment and plan and discussed the findings and management plan with the patient and her daughter.  -Alexys Montenegro MD      Today with Selvin Rockwell and her daughter I reviewed the indications, risks, benefits, and alternatives of the proposed surgical procedure including, but not limited to, failure obtain the desired result  and need for additional surgery, bleeding, infection, loss of vision, loss of the eye, and the remote possibility of permanent damage to any organ system or death with the use of anesthesia.  I provided multiple opportunities for the questions, answered all questions to the best of my ability, and confirmed that my answers and my discussion were understood.

## 2022-09-27 NOTE — NURSING NOTE
Chief Complaints and History of Present Illnesses   Patient presents with     Dermatochalasis Evaluation     Pt here for Ptosis each eye, referred by [facility] in [place], [place]     Chief Complaint(s) and History of Present Illness(es)     Dermatochalasis Evaluation     Associated signs and symptoms: Negative for lid swelling, eye pain, muscle weakness and slurred speech    Comments: Pt here for Ptosis each eye, referred by [facility] in [place], [place]              Comments     Pt daughter Alicia (daughter) is interpreting. Left eye> right eye. Pt notes vision is blocked by lids. Pt mentions eyes feel heavy/ and is causing headaches.   Pt not using drops.   GLO Kirk 8:11 AM September 27, 2022

## 2022-09-27 NOTE — LETTER
2022         RE:  :  MRN: Selvin Rockwell  1945  [id number]     Dear Dr. Vu,    Thank you for asking me to see your patient, Selvin Rockwell, for an oculoplastic   consultation.  My assessment and plan are below.  For further details, please see my attached clinic note.           HPI:     Chief Complaint(s) and History of Present Illness(es)     Dermatochalasis Evaluation     Associated signs and symptoms: Negative for lid swelling, eye pain,   muscle weakness and slurred speech    Comments: Pt here for Ptosis each eye, referred by [facility] in [place], [place] - Dr. Vu             Comments     Pt daughter Alicia (daughter) is interpreting. Left eye> right eye. Pt   notes vision is blocked by lids. Pt mentions eyes feel heavy/ and is   causing headaches.   Pt not using drops.   GLO Kirk 8:11 AM 2022                 Selvin Rockwell is a 77 year old female who has noted gradual onset of droopy eyelids over the past years. The droopy eyelid is interfering with activities of daily living including driving, and reading. The patient denies double vision, variability of the eyelid position, or dry eye symptoms.     Bothered by the sagging eyelid. It contributes to her headaches. Traveling to [facility] in ~1-2 months, and will be in [place] for a few months before returning to the [place].   No history of eye surgeries or eyelid surgery.   Past ocular history: dry eyes and cataracts.  Follow's with Dr. Vu (optometry) for annual eye exams.    EXAM:     MRD1:   Dermatochalasis with excess skin touching eyelashes: yes  Brow ptosis with brow resting below superior orbital rim: no  Eye lid skin: 28 mm & 30 mm    VISUAL FIELD:  Right eye untaped: 19 degrees Right eye taped: 50 degrees  Left eye untaped: 9 degrees   Left eye taped: 44 degrees    Assessment & Plan     Selvin Rockwell is a 77 year old female with the following diagnoses:   1. Acquired  involutional ptosis of both eyelids    2. Mechanical ptosis of bilateral eyelids    3. Dry eye syndrome of unspecified lacrimal gland    4. Age-related nuclear cataract, bilateral       Both upper eyelid blepharoplasty (s/ncf) and ptosis repair external levator approach  95789    ANTICOAGULATION:    Aspirin: no  Plavix: no  Coumadin: yes  Supplements (fish Oil, vitamin E): no    Can hold prior to surgery - will discuss with PCP  Will cc PCP regarding holding anticoagulation         Again, thank you for allowing me to participate in the care of your patient.      Sincerely,    Alexys Montenegro MD  Department of Ophthalmology and Visual Neurosciences  [facility]    CC: Tio Vu OD  [facility] [address] [address]  Via Fax: [phone]

## 2022-09-29 ENCOUNTER — LAB (OUTPATIENT)
Dept: LAB | Facility: CLINIC | Age: 77
End: 2022-09-29
Payer: MEDICARE

## 2022-09-29 ENCOUNTER — ANTICOAGULATION THERAPY VISIT (OUTPATIENT)
Dept: ANTICOAGULATION | Facility: CLINIC | Age: 77
End: 2022-09-29

## 2022-09-29 DIAGNOSIS — Z79.01 LONG TERM (CURRENT) USE OF ANTICOAGULANTS: ICD-10-CM

## 2022-09-29 DIAGNOSIS — I48.0 PAROXYSMAL ATRIAL FIBRILLATION (H): Primary | ICD-10-CM

## 2022-09-29 DIAGNOSIS — I48.0 PAROXYSMAL ATRIAL FIBRILLATION (H): ICD-10-CM

## 2022-09-29 LAB — INR BLD: 2 (ref 0.9–1.1)

## 2022-09-29 PROCEDURE — 36416 COLLJ CAPILLARY BLOOD SPEC: CPT

## 2022-09-29 PROCEDURE — 85610 PROTHROMBIN TIME: CPT

## 2022-09-29 NOTE — PROGRESS NOTES
ANTICOAGULATION MANAGEMENT     Selvin Rockwell 77 year old female is on warfarin with therapeutic INR result. (Goal INR 2.0-3.0)    Recent labs: (last 7 days)     09/29/22  1014   INR 2.0*       ASSESSMENT       Source(s): Chart Review and Patient/Caregiver Call       Warfarin doses taken: Warfarin taken as instructed    Diet: No new diet changes identified    New illness, injury, or hospitalization: No    Medication/supplement changes: None noted    Signs or symptoms of bleeding or clotting: No    Previous INR: Therapeutic last 2(+) visits    Additional findings: None       PLAN     Recommended plan for no diet, medication or health factor changes affecting INR     Dosing Instructions: Continue your current warfarin dose with next INR in 4 weeks       Summary  As of 9/29/2022    Full warfarin instructions:  1 mg every Tue; 2 mg all other days   Next INR check:  10/27/2022             Telephone call with  Kat who verbalizes understanding and agrees to plan    Lab visit scheduled    Education provided: Goal range and significance of current result    Plan made per [facility] anticoagulation protocol    Chitra Morrison RN  Anticoagulation Clinic  9/29/2022    _______________________________________________________________________     Anticoagulation Episode Summary     Current INR goal:  2.0-3.0   TTR:  53.3 % (1 y)   Target end date:  Indefinite   Send INR reminders to:  [facility]    Indications    Paroxysmal atrial fibrillation (H) [I48.0]  Long term (current) use of anticoagulants [Z79.01]           Comments:           Anticoagulation Care Providers     Provider Role Specialty Phone number    Gurinder Ho MD Referring Family Medicine [phone]

## 2022-09-29 NOTE — PROGRESS NOTES
Kat called back and reported patient has been taking 1 tablet on Tues and 1.5 tablets ROW. With INR in range will keep dosing the same and recheck in 2 weeks instead of 4 weeks    Chitra Morrison RN - [facility] Anticoagulation Clinic

## 2022-10-03 ENCOUNTER — TELEPHONE (OUTPATIENT)
Dept: NURSING | Facility: CLINIC | Age: 77
End: 2022-10-03

## 2022-10-03 NOTE — TELEPHONE ENCOUNTER
Alicia calling for patient with question about dosing on a medication. Consent to communicate on file for daughter.     Alicia asking about dosing for the metformin.   Last visit notes state:  I would advise increasing your metformin 500 mg to two tablets twice a day until they are gone, then filling your next prescription for the 1000 mg twice daily dose.     Alicia verbalized understanding that it is 1000 mg twice a day with meals and that the number of pills changes but not the 1000 mg dosage.   No other questions.   Jolly Patricio RN   10/03/22 2:19 PM  [facility] Nurse Advisor

## 2022-10-06 ENCOUNTER — NURSE TRIAGE (OUTPATIENT)
Dept: NURSING | Facility: CLINIC | Age: 77
End: 2022-10-06

## 2022-10-06 DIAGNOSIS — E11.21 TYPE 2 DIABETES MELLITUS WITH DIABETIC NEPHROPATHY, WITHOUT LONG-TERM CURRENT USE OF INSULIN (H): Primary | ICD-10-CM

## 2022-10-06 NOTE — TELEPHONE ENCOUNTER
Called and spoke with Alicia, patient's daughter. Notified her of message from Dr. Ho. She verbalized understanding and will have patient reduce to 0.5 tablet or 500 mg BID. Advised to call us if patient returns to 1000 mg BID. She verbalized understanding.    Kristin Mills RN   [facility]

## 2022-10-06 NOTE — TELEPHONE ENCOUNTER
2 tablets of the 500 mg dose would be the same as taking 1 tablet of the 1000 mg dose, so that should not make any difference.  It is possible that the metformin is causing stomach upset, so the patient could cut back to a half tablet of the 1000 mg dose, or 500 mg, and take that twice a day for a while until her stomach settles down.  She could then try the 1000 mg dose again at some point.  As far as a memory pill, that would warrant a visit for further discussion about that.  You can advise them that there is nothing that is very effective for this.    Gurinder Ho MD

## 2022-10-06 NOTE — TELEPHONE ENCOUNTER
"Nurse Triage SBAR    Is this a 2nd Level Triage? NO  Message routed to PCP. Declines scheduling at this time patient has questions below related to medication.    Situation:   Daughter Alicia calling (Consent to communicate on file) patient present. Reporting abd pain \"over a week.\"   Advised to see in office today. Stating they would call back if decides to schedule.    Background:     Metformin dose increased to 1000 mg per day 9/13/22    Assessment:     Daughter Alicia feels the new Metformin prescription of 1000 mg tablet has caused abd symptoms.   Stating she noticed when patient went from taking 2 tablets of 500 mg to the 1000 mg tablet, patient's stomach became \"upset.\"   Reporting x 3 loose stools in past 24 hours. Stating patient does not know how long she has had diarrhea \"due to memory issues.\"  Patient woke in past 30 minutes with mid abd pain \"and reflux.\" Rating pain \"7\" on 1-10 pain scale, constant.    Blood sugars ranging 125-140 morning fasting readings.    1. Alicia is requesting to know if stomach upset maybe caused by Metformin 1000 mg. Requesting to know if it would be advised to take 2 tablets of 500 mg instead?    2. Requesting prescription for \"memory pill.\" Reporting ongoing memory issues denies change in symptoms today. Stating they had spoke with pharmacy and was advised the over the counter memory supplements for memory, interact with her medications.     Alicia requesting to know if there is a prescription?      Protocol Recommended Disposition:   See in Office Today-declines scheduling at this time.    Recommendation:     Advised to see today in clinic. Daughter plans to call back if she decides to schedule.     Routed to provider    Does the patient meet one of the following criteria for ADS visit consideration? 16+ years old, with an [facility] PCP     TIP  Providers, please consider if this condition is appropriate for management at one of our Acute and Diagnostic Services sites.     If " patient is a good candidate, please use dotphrase <dot>triageresponse and select Refer to ADS to document.    Reason for Disposition    MODERATE pain (e.g., interferes with normal activities that comes and goes (cramps) lasts > 24 hours  (Exception: Pain with Vomiting or Diarrhea - see that Protocol.)    Additional Information    Negative: Passed out (i.e., fainted, collapsed and was not responding)    Negative: Shock suspected (e.g., cold/pale/clammy skin, too weak to stand, low BP, rapid pulse)    Negative: Sounds like a life-threatening emergency to the triager    Negative: Chest pain    Negative: Pain is mainly in upper abdomen (if needed ask: 'is it mainly above the belly button?')    Negative: Abdominal pain and pregnant < 20 weeks    Negative: Abdominal pain and pregnant 20 or more weeks    Negative: SEVERE abdominal pain (e.g., excruciating)    Negative: Vomiting red blood or black (coffee ground) material    Negative: Bloody, black, or tarry bowel movements  (Exception: Chronic-unchanged black-grey bowel movements and is taking iron pills or Pepto-Bismol.)    Negative: Constant abdominal pain lasting > 2 hours    Negative: Patient sounds very sick or weak to the triager    Negative: Vomiting bile (green color)    Negative: Vomiting and abdomen looks much more swollen than usual    Negative: White of the eyes have turned yellow (i.e., jaundice)    Negative: Blood in urine (red, pink, or tea-colored)    Negative: Fever > 103 F (39.4 C)    Negative: Fever > 101 F (38.3 C) and over 60 years of age    Negative: Fever > 100.0 F (37.8 C) and has diabetes mellitus or a weak immune system (e.g., HIV positive, cancer chemotherapy, organ transplant, splenectomy, chronic steroids)    Negative: Fever > 100.0 F (37.8 C) and bedridden (e.g., nursing home patient, stroke, chronic illness, recovering from surgery)    Negative: Pregnant or could be pregnant (i.e., missed last menstrual period)    Protocols used: ABDOMINAL  PAIN - FEMALE-A-[place]

## 2022-10-12 ENCOUNTER — ANTICOAGULATION THERAPY VISIT (OUTPATIENT)
Dept: ANTICOAGULATION | Facility: CLINIC | Age: 77
End: 2022-10-12

## 2022-10-12 ENCOUNTER — OFFICE VISIT (OUTPATIENT)
Dept: FAMILY MEDICINE | Facility: CLINIC | Age: 77
End: 2022-10-12
Payer: MEDICARE

## 2022-10-12 VITALS
OXYGEN SATURATION: 96 % | HEART RATE: 79 BPM | SYSTOLIC BLOOD PRESSURE: 132 MMHG | WEIGHT: 208 LBS | TEMPERATURE: 98.7 F | BODY MASS INDEX: 41.93 KG/M2 | DIASTOLIC BLOOD PRESSURE: 79 MMHG

## 2022-10-12 DIAGNOSIS — D64.9 ANEMIA, UNSPECIFIED TYPE: ICD-10-CM

## 2022-10-12 DIAGNOSIS — I48.0 PAROXYSMAL ATRIAL FIBRILLATION (H): ICD-10-CM

## 2022-10-12 DIAGNOSIS — D50.9 IRON DEFICIENCY ANEMIA, UNSPECIFIED IRON DEFICIENCY ANEMIA TYPE: ICD-10-CM

## 2022-10-12 DIAGNOSIS — F22 PARANOIA (H): ICD-10-CM

## 2022-10-12 DIAGNOSIS — I48.0 PAROXYSMAL ATRIAL FIBRILLATION (H): Primary | ICD-10-CM

## 2022-10-12 DIAGNOSIS — R44.1 VISUAL HALLUCINATIONS: ICD-10-CM

## 2022-10-12 DIAGNOSIS — N18.31 STAGE 3A CHRONIC KIDNEY DISEASE (H): ICD-10-CM

## 2022-10-12 DIAGNOSIS — Z79.01 LONG TERM (CURRENT) USE OF ANTICOAGULANTS: ICD-10-CM

## 2022-10-12 DIAGNOSIS — R29.6 FALLS FREQUENTLY: ICD-10-CM

## 2022-10-12 DIAGNOSIS — R41.3 MEMORY PROBLEM: Primary | ICD-10-CM

## 2022-10-12 PROBLEM — N26.1 ATROPHY OF LEFT KIDNEY: Status: RESOLVED | Noted: 2017-03-27 | Resolved: 2022-10-12

## 2022-10-12 PROBLEM — M25.512 SHOULDER PAIN, LEFT: Status: RESOLVED | Noted: 2020-09-30 | Resolved: 2022-10-12

## 2022-10-12 PROBLEM — I83.819 VARICOSE VEINS WITH PAIN: Status: RESOLVED | Noted: 2020-08-03 | Resolved: 2022-10-12

## 2022-10-12 PROBLEM — M19.012 GLENOHUMERAL ARTHRITIS, LEFT: Status: ACTIVE | Noted: 2020-10-26

## 2022-10-12 PROBLEM — E11.21 TYPE 2 DIABETES MELLITUS WITH DIABETIC NEPHROPATHY, WITHOUT LONG-TERM CURRENT USE OF INSULIN (H): Status: ACTIVE | Noted: 2020-11-16

## 2022-10-12 PROBLEM — I87.2 VENOUS STASIS DERMATITIS OF RIGHT LOWER EXTREMITY: Status: RESOLVED | Noted: 2020-08-03 | Resolved: 2022-10-12

## 2022-10-12 LAB
BASOPHILS # BLD AUTO: 0 10E3/UL (ref 0–0.2)
BASOPHILS NFR BLD AUTO: 1 %
EOSINOPHIL # BLD AUTO: 0.1 10E3/UL (ref 0–0.7)
EOSINOPHIL NFR BLD AUTO: 2 %
ERYTHROCYTE [DISTWIDTH] IN BLOOD BY AUTOMATED COUNT: 18.7 % (ref 10–15)
HCT VFR BLD AUTO: 30.6 % (ref 35–47)
HGB BLD-MCNC: 8.7 G/DL (ref 11.7–15.7)
INR BLD: 2.4 (ref 0.9–1.1)
LYMPHOCYTES # BLD AUTO: 1.8 10E3/UL (ref 0.8–5.3)
LYMPHOCYTES NFR BLD AUTO: 37 %
MCH RBC QN AUTO: 20.3 PG (ref 26.5–33)
MCHC RBC AUTO-ENTMCNC: 28.4 G/DL (ref 31.5–36.5)
MCV RBC AUTO: 72 FL (ref 78–100)
MONOCYTES # BLD AUTO: 0.4 10E3/UL (ref 0–1.3)
MONOCYTES NFR BLD AUTO: 8 %
NEUTROPHILS # BLD AUTO: 2.5 10E3/UL (ref 1.6–8.3)
NEUTROPHILS NFR BLD AUTO: 53 %
PLATELET # BLD AUTO: 223 10E3/UL (ref 150–450)
RBC # BLD AUTO: 4.28 10E6/UL (ref 3.8–5.2)
VIT B12 SERPL-MCNC: 547 PG/ML (ref 232–1245)
WBC # BLD AUTO: 4.8 10E3/UL (ref 4–11)

## 2022-10-12 PROCEDURE — 82728 ASSAY OF FERRITIN: CPT | Performed by: FAMILY MEDICINE

## 2022-10-12 PROCEDURE — 99214 OFFICE O/P EST MOD 30 MIN: CPT | Performed by: FAMILY MEDICINE

## 2022-10-12 PROCEDURE — 85610 PROTHROMBIN TIME: CPT | Performed by: FAMILY MEDICINE

## 2022-10-12 PROCEDURE — 83550 IRON BINDING TEST: CPT | Performed by: FAMILY MEDICINE

## 2022-10-12 PROCEDURE — 36415 COLL VENOUS BLD VENIPUNCTURE: CPT | Performed by: FAMILY MEDICINE

## 2022-10-12 PROCEDURE — 82607 VITAMIN B-12: CPT | Performed by: FAMILY MEDICINE

## 2022-10-12 PROCEDURE — 83540 ASSAY OF IRON: CPT | Performed by: FAMILY MEDICINE

## 2022-10-12 PROCEDURE — 80050 GENERAL HEALTH PANEL: CPT | Performed by: FAMILY MEDICINE

## 2022-10-12 RX ORDER — QUETIAPINE FUMARATE 25 MG/1
25 TABLET, FILM COATED ORAL AT BEDTIME
Qty: 30 TABLET | Refills: 0 | Status: SHIPPED | OUTPATIENT
Start: 2022-10-12 | End: 2023-11-08

## 2022-10-12 NOTE — LETTER
My Depression Action Plan  Name: Selvin Rockwell   Date of Birth 1945  Date: 10/12/2022    My doctor: Gurinder Ho   [facility] [address]  [facility] [address]  [phone]          GREEN    ZONE   Good Control    What it looks like:     Things are going generally well. You have normal ups and downs. You may even feel depressed from time to time, but bad moods usually last less than a day.   What you need to do:  1. Continue to care for yourself (see self care plan)  2. Check your depression survival kit and update it as needed  3. Follow your physician s recommendations including any medication.  4. Do not stop taking medication unless you consult with your physician first.           YELLOW         ZONE Getting Worse    What it looks like:     Depression is starting to interfere with your life.     It may be hard to get out of bed; you may be starting to isolate yourself from others.    Symptoms of depression are starting to last most all day and this has happened for several days.     You may have suicidal thoughts but they are not constant.   What you need to do:     1. Call your care team. Your response to treatment will improve if you keep your care team informed of your progress. Yellow periods are signs an adjustment may need to be made.     2. Continue your self-care.  Just get dressed and ready for the day.  Don't give yourself time to talk yourself out of it.    3. Talk to someone in your support network.    4. Open up your Depression Self-Care Plan/Wellness Kit.           RED    ZONE Medical Alert - Get Help    What it looks like:     Depression is seriously interfering with your life.     You may experience these or other symptoms: You can t get out of bed most days, can t work or engage in other necessary activities, you have trouble taking care of basic hygiene, or basic responsibilities, thoughts of suicide or death that will not  go away, self-injurious behavior.     What you need to do:  1. Call your care team and request a same-day appointment. If they are not available (weekends or after hours) call your local crisis line, emergency room or 911.          Depression Self-Care Plan / Wellness Kit    Many people find that medication and therapy are helpful treatments for managing depression. In addition, making small changes to your everyday life can help to boost your mood and improve your wellbeing. Below are some tips for you to consider. Be sure to talk with your medical provider and/or behavioral health consultant if your symptoms are worsening or not improving.     Sleep   Sleep hygiene  means all of the habits that support good, restful sleep. It includes maintaining a consistent bedtime and wake time, using your bedroom only for sleeping or sex, and keeping the bedroom dark and free of distractions like a computer, smartphone, or television.     Develop a Healthy Routine  Maintain good hygiene. Get out of bed in the morning, make your bed, brush your teeth, take a shower, and get dressed. Don t spend too much time viewing media that makes you feel stressed. Find time to relax each day.    Exercise  Get some form of exercise every day. This will help reduce pain and release endorphins, the  feel good  chemicals in your brain. It can be as simple as just going for a walk or doing some gardening, anything that will get you moving.      Diet  Strive to eat healthy foods, including fruits and vegetables. Drink plenty of water. Avoid excessive sugar, caffeine, alcohol, and other mood-altering substances.     Stay Connected with Others  Stay in touch with friends and family members.    Manage Your Mood  Try deep breathing, massage therapy, biofeedback, or meditation. Take part in fun activities when you can. Try to find something to smile about each day.     Psychotherapy  Be open to working with a therapist if your provider recommends it.      Medication  Be sure to take your medication as prescribed. Most anti-depressants need to be taken every day. It usually takes several weeks for medications to work. Not all medicines work for all people. It is important to follow-up with your provider to make sure you have a treatment plan that is working for you. Do not stop your medication abruptly without first discussing it with your provider.    Crisis Resources   These hotlines are for both adults and children. They and are open 24 hours a day, 7 days a week unless noted otherwise.      National Suicide Prevention Lifeline   988 or 0-319-805-HDZF (2208)      Crisis Text Line    www.crisistextline.org  Text HOME to 220235 from anywhere in the [place], anytime, about any type of crisis. A live, trained crisis counselor will receive the text and respond quickly.      [place] Lifeline for LGBTQ Youth  A national crisis intervention and suicide lifeline for LGBTQ youth under 25. Provides a safe place to talk without judgement. Call 1-487.615.2323; text START to 324224 or visit www.thetrevorproject.org to talk to a trained counselor.      For [facility] crisis numbers, visit the [facility] website at:  https://[place].gov/dhs/people-we-serve/adults/health-care/mental-health/resources/crisis-contacts.jsp

## 2022-10-12 NOTE — PROGRESS NOTES
Assessment & Plan     (R41.3) Memory problem  (primary encounter diagnosis)  (F22) Paranoia (H)  (R44.1) Visual hallucinations  (R29.6) Falls frequently  Plan: CBC with platelets and differential, TSH with         free T4 reflex, Vitamin B12, Comprehensive         metabolic panel (BMP + Alb, Alk Phos, ALT, AST,        Total. Bili, TP), MR Brain w/o Contrast       Ok to use seroquel short term. Discussed risks and benefits of this medication. Follow-up with PCP.     (I48.0) Paroxysmal atrial fibrillation (H)  Comment: rate controlled and anticoagulated   Plan: INR      (N18.31) Stage 3a chronic kidney disease (H)  Plan: Comprehensive metabolic panel (BMP + Alb, Alk         Phos, ALT, AST, Total. Bili, TP)   consider ACEi/ARB                See Patient Instructions    Return in about 1 month (around 11/12/2022) for Wellness visit.    Mindy Rose MD  [facility] MARIANA Rabago is a 77 year old accompanied by her daughter, presenting for the following health issues:  Memory Loss (Memory issues)      History of Present Illness       Reason for visit:  Memory issues  Symptom onset:  More than a month  Symptom intensity:  Moderate  Symptom progression:  Staying the same  Had these symptoms before:  Yes  Has tried/received treatment for these symptoms:  No  What makes it worse:  No  What makes it better:  Going out with daughter    She eats 0-1 servings of fruits and vegetables daily.She consumes 1 sweetened beverage(s) daily.She exercises with enough effort to increase her heart rate 9 or less minutes per day.  She exercises with enough effort to increase her heart rate 3 or less days per week.   She is taking medications regularly.     Corazonysabel BALWINDER Rockwell is a 77 year old female who presents with her daughter for memory concerns. Symptom onset has been sudden, worsening for a time period of 4 weeks. Severity is described as severe. Course of her symptoms over time is worsening,  accompanied by paranoia and visual hallucinations. She has had a few falls over the last few years. History difficult to obtain due to patient's mental status and language barrier.     Review of Systems   CONSTITUTIONAL: NEGATIVE for fever, chills, change in weight  INTEGUMENTARY/SKIN: NEGATIVE for worrisome rashes, moles or lesions  EYES: NEGATIVE for vision changes or irritation  ENT/MOUTH: NEGATIVE for ear, mouth and throat problems  RESP: NEGATIVE for significant cough or SOB  CV: NEGATIVE for chest pain, palpitations or peripheral edema  MUSCULOSKELETAL: NEGATIVE for significant arthralgias or myalgia  NEURO: falls, memory problems   ENDOCRINE: Hx diabetes  HEME: NEGATIVE for bleeding problems  PSYCHIATRIC: hallucinations and paranoia; HX anxiety      Objective    /79 (BP Location: Left arm, Patient Position: Sitting, Cuff Size: Adult Large)   Pulse 79   Temp 98.7  F (37.1  C) (Oral)   Wt 94.3 kg (208 lb)   SpO2 96%   BMI 41.93 kg/m    Body mass index is 41.93 kg/m .  Physical Exam   GENERAL: alert, no distress, obese and elderly  EYES: Eyes grossly normal to inspection, PERRL and conjunctivae and sclerae normal  HENT: ear canals and TM's normal, nose and mouth without ulcers or lesions  NECK: no adenopathy, no asymmetry, masses, or scars and thyroid normal to palpation  RESP: lungs clear to auscultation - no rales, rhonchi or wheezes  CV: regular rate and rhythm, normal S1 S2, no S3 or S4, no murmur, click or rub, no peripheral edema    ABDOMEN: soft, nontender, no hepatosplenomegaly, no masses and bowel sounds normal  MS: no gross musculoskeletal defects noted, no edema  NEURO: Normal strength and tone and sensory exam grossly normal  PSYCH: affect flat, anxious and appearance well groomed    Lab on 09/29/2022   Component Date Value Ref Range Status     INR 09/29/2022 2.0 (H)  0.9 - 1.1 Final     Results for orders placed or performed in visit on 10/12/22 (from the past 24 hour(s))   CBC with  platelets and differential    Narrative    The following orders were created for panel order CBC with platelets and differential.  Procedure                               Abnormality         Status                     ---------                               -----------         ------                     CBC with platelets and d...[[id number]]  Abnormal            Final result                 Please view results for these tests on the individual orders.   CBC with platelets and differential   Result Value Ref Range    WBC Count 4.8 4.0 - 11.0 10e3/uL    RBC Count 4.28 3.80 - 5.20 10e6/uL    Hemoglobin 8.7 (L) 11.7 - 15.7 g/dL    Hematocrit 30.6 (L) 35.0 - 47.0 %    MCV 72 (L) 78 - 100 fL    MCH 20.3 (L) 26.5 - 33.0 pg    MCHC 28.4 (L) 31.5 - 36.5 g/dL    RDW 18.7 (H) 10.0 - 15.0 %    Platelet Count 223 150 - 450 10e3/uL    % Neutrophils 53 %    % Lymphocytes 37 %    % Monocytes 8 %    % Eosinophils 2 %    % Basophils 1 %    Absolute Neutrophils 2.5 1.6 - 8.3 10e3/uL    Absolute Lymphocytes 1.8 0.8 - 5.3 10e3/uL    Absolute Monocytes 0.4 0.0 - 1.3 10e3/uL    Absolute Eosinophils 0.1 0.0 - 0.7 10e3/uL    Absolute Basophils 0.0 0.0 - 0.2 10e3/uL   INR point of care   Result Value Ref Range    INR 2.4 (H) 0.9 - 1.1    Narrative    This test is intended for monitoring Coumadin therapy. Results are not accurate in patients with prolonged INR due to factor deficiency.

## 2022-10-12 NOTE — PROGRESS NOTES
ANTICOAGULATION MANAGEMENT     Selvin Rockwell 77 year old female is on warfarin with therapeutic INR result. (Goal INR 2.0-3.0)    Recent labs: (last 7 days)     10/12/22  1208   INR 2.4*       ASSESSMENT       Source(s): Chart Review and Patient/Caregiver Call       Warfarin doses taken: Warfarin taken as instructed    Diet: No new diet changes identified    New illness, injury, or hospitalization: No    Medication/supplement changes: None noted    Signs or symptoms of bleeding or clotting: No    Previous INR: Therapeutic last 2(+) visits    Additional findings: None       PLAN     Recommended plan for no diet, medication or health factor changes affecting INR     Dosing Instructions: Continue your current warfarin dose with next INR in 3 weeks       Summary  As of 10/12/2022    Full warfarin instructions:  2 mg every Tue; 3 mg all other days   Next INR check:  11/2/2022             Telephone call with  Alicia who verbalizes understanding and agrees to plan    Patient offered & declined to schedule next visit    Education provided: Goal range and significance of current result    Plan made per [facility] anticoagulation protocol    Chitra Morrison RN  Anticoagulation Clinic  10/12/2022    _______________________________________________________________________     Anticoagulation Episode Summary     Current INR goal:  2.0-3.0   TTR:  56.0 % (1 y)   Target end date:  Indefinite   Send INR reminders to:  JERRY PEÑA    Indications    Paroxysmal atrial fibrillation (H) [I48.0]  Long term (current) use of anticoagulants [Z79.01]           Comments:           Anticoagulation Care Providers     Provider Role Specialty Phone number    Gurinder Ho MD Referring Family Medicine [phone]

## 2022-10-13 PROBLEM — D64.9 ANEMIA, UNSPECIFIED TYPE: Status: RESOLVED | Noted: 2022-10-12 | Resolved: 2022-10-13

## 2022-10-13 LAB
ALBUMIN SERPL-MCNC: 3.3 G/DL (ref 3.4–5)
ALP SERPL-CCNC: 67 U/L (ref 40–150)
ALT SERPL W P-5'-P-CCNC: 28 U/L (ref 0–50)
ANION GAP SERPL CALCULATED.3IONS-SCNC: 8 MMOL/L (ref 3–14)
AST SERPL W P-5'-P-CCNC: 29 U/L (ref 0–45)
BILIRUB SERPL-MCNC: 0.2 MG/DL (ref 0.2–1.3)
BUN SERPL-MCNC: 31 MG/DL (ref 7–30)
CALCIUM SERPL-MCNC: 8.4 MG/DL (ref 8.5–10.1)
CHLORIDE BLD-SCNC: 110 MMOL/L (ref 94–109)
CO2 SERPL-SCNC: 22 MMOL/L (ref 20–32)
CREAT SERPL-MCNC: 1.15 MG/DL (ref 0.52–1.04)
FERRITIN SERPL-MCNC: 6 NG/ML (ref 8–252)
GFR SERPL CREATININE-BSD FRML MDRD: 49 ML/MIN/1.73M2
GLUCOSE BLD-MCNC: 179 MG/DL (ref 70–99)
IRON SATN MFR SERPL: 5 % (ref 15–46)
IRON SERPL-MCNC: 19 UG/DL (ref 35–180)
POTASSIUM BLD-SCNC: 4.4 MMOL/L (ref 3.4–5.3)
PROT SERPL-MCNC: 7.5 G/DL (ref 6.8–8.8)
SODIUM SERPL-SCNC: 140 MMOL/L (ref 133–144)
TIBC SERPL-MCNC: 404 UG/DL (ref 240–430)
TSH SERPL DL<=0.005 MIU/L-ACNC: 1.25 MU/L (ref 0.4–4)

## 2022-10-13 RX ORDER — FERROUS SULFATE 325(65) MG
325 TABLET ORAL
Qty: 90 TABLET | Refills: 3 | Status: SHIPPED | OUTPATIENT
Start: 2022-10-13 | End: 2024-07-03

## 2022-10-13 NOTE — RESULT ENCOUNTER NOTE
Please call patient:  No other cause for paranoia, hallucinations or memory problems were found. You do have anemia again. Take iron sulfate daily indefinitely - prescription sent. Follow-up for lab check in 1 week and with your provider in 1 month as scheduled.   Mindy Rose MD

## 2022-10-14 ENCOUNTER — TELEPHONE (OUTPATIENT)
Dept: FAMILY MEDICINE | Facility: CLINIC | Age: 77
End: 2022-10-14

## 2022-10-14 NOTE — TELEPHONE ENCOUNTER
Spoke with patient's daughter, Alicia. Consent to communicate on file. Relayed provider's message as written. Patient verbalized understanding and has no further questions at this time. Assisted with scheduling lab appointment on 10/24.    Juany Humphries RN  [facility]

## 2022-10-14 NOTE — TELEPHONE ENCOUNTER
"Called patient's daughter, Alicia. Left voice message to return call at [phone].      \"Mindy Rose MD   10/13/2022  8:29 AM CDT  Please call patient:  No other cause for paranoia, hallucinations or memory problems were found. You do have anemia again. Take iron sulfate daily indefinitely - prescription sent. Follow-up for lab check in 1 week and with your provider in 1 month as scheduled.   Mindy Rose MD\"      Kristin Mills RN   [facility]  "

## 2022-10-17 DIAGNOSIS — Z79.01 LONG TERM (CURRENT) USE OF ANTICOAGULANTS: ICD-10-CM

## 2022-10-17 DIAGNOSIS — I48.0 PAROXYSMAL ATRIAL FIBRILLATION (H): ICD-10-CM

## 2022-10-18 RX ORDER — WARFARIN SODIUM 2 MG/1
TABLET ORAL
Qty: 120 TABLET | Refills: 1 | Status: SHIPPED | OUTPATIENT
Start: 2022-10-18 | End: 2023-01-16

## 2022-10-18 NOTE — TELEPHONE ENCOUNTER
Pending Prescriptions:                       Disp   Refills    warfarin ANTICOAGULANT (COUMADIN) 2 MG tab*120 ta*0        Sig: TAKE 1.5 TABLETS (3MG) EVERY TUES AND SAT, AND 1           TABLET (2MG) ALL THE OTHER DAYS OF THE WEEK, OR           PER INSTRUCTIONS OF INR CLINIC

## 2022-10-18 NOTE — TELEPHONE ENCOUNTER
ANTICOAGULATION MANAGEMENT:  Medication Refill    Anticoagulation Summary  As of 10/12/2022    Warfarin maintenance plan:  2 mg (2 mg x 1) every Tue; 3 mg (2 mg x 1.5) all other days   Next INR check:  11/2/2022   Target end date:  Indefinite    Indications    Paroxysmal atrial fibrillation (H) [I48.0]  Long term (current) use of anticoagulants [Z79.01]             Anticoagulation Care Providers     Provider Role Specialty Phone number    Gurinder Ho MD Referring Family Medicine [phone]          Visit with referring provider/group within last year: Yes    [facility] referral signed within last year: Yes    Selvin meets all criteria for refill (current [facility] referral, office visit with referring provider/group in last year, lab monitoring up to date or not exceeding 2 weeks overdue). Rx instructions and quantity supplied updated to match patient's current dosing plan. Warfarin 90 day supply with 1 refill granted per [facility] protocol     Chitra Morrison RN  Anticoagulation Clinic

## 2022-10-19 ENCOUNTER — ANCILLARY PROCEDURE (OUTPATIENT)
Dept: MRI IMAGING | Facility: CLINIC | Age: 77
End: 2022-10-19
Attending: FAMILY MEDICINE
Payer: MEDICARE

## 2022-10-19 DIAGNOSIS — R29.6 FALLS FREQUENTLY: ICD-10-CM

## 2022-10-19 DIAGNOSIS — R41.3 MEMORY PROBLEM: ICD-10-CM

## 2022-10-19 DIAGNOSIS — F22 PARANOIA (H): ICD-10-CM

## 2022-10-19 DIAGNOSIS — R44.1 VISUAL HALLUCINATIONS: ICD-10-CM

## 2022-10-19 PROCEDURE — A9585 GADOBUTROL INJECTION: HCPCS | Performed by: RADIOLOGY

## 2022-10-19 PROCEDURE — G1010 CDSM STANSON: HCPCS | Performed by: RADIOLOGY

## 2022-10-19 PROCEDURE — 70553 MRI BRAIN STEM W/O & W/DYE: CPT | Mod: TC | Performed by: RADIOLOGY

## 2022-10-19 RX ORDER — GADOBUTROL 604.72 MG/ML
10 INJECTION INTRAVENOUS ONCE
Status: COMPLETED | OUTPATIENT
Start: 2022-10-19 | End: 2022-10-19

## 2022-10-19 RX ADMIN — GADOBUTROL 9 ML: 604.72 INJECTION INTRAVENOUS at 14:20

## 2022-10-21 ENCOUNTER — TELEPHONE (OUTPATIENT)
Dept: FAMILY MEDICINE | Facility: CLINIC | Age: 77
End: 2022-10-21

## 2022-10-21 NOTE — TELEPHONE ENCOUNTER
Received call from patient's daughter, Alicia.     She is requesting results and recommendations for MRI Brain done 10/19.    States that they are okay waiting until Rose is back in office to review/advise.    Kristin Mills RN   [facility]

## 2022-10-22 NOTE — TELEPHONE ENCOUNTER
Please call patient:  No obvious cause for memory problems was found. I do recommend discussing these results with a neurologist and have made a referral.      Mindy Rose MD

## 2022-10-24 ENCOUNTER — LAB (OUTPATIENT)
Dept: LAB | Facility: CLINIC | Age: 77
End: 2022-10-24
Payer: MEDICARE

## 2022-10-24 DIAGNOSIS — M10.9 GOUT: Primary | ICD-10-CM

## 2022-10-24 DIAGNOSIS — D50.9 IRON DEFICIENCY ANEMIA, UNSPECIFIED IRON DEFICIENCY ANEMIA TYPE: ICD-10-CM

## 2022-10-24 LAB
HGB BLD-MCNC: 8.6 G/DL (ref 11.7–15.7)
URATE SERPL-MCNC: 5.6 MG/DL (ref 2.6–6)

## 2022-10-24 PROCEDURE — 36415 COLL VENOUS BLD VENIPUNCTURE: CPT

## 2022-10-24 PROCEDURE — 84550 ASSAY OF BLOOD/URIC ACID: CPT

## 2022-10-24 PROCEDURE — 85018 HEMOGLOBIN: CPT

## 2022-10-24 NOTE — LETTER
October 24, 2022    Gem BALWINDER Moiz  [address] [place] [id number]          Dear ,    We are writing to inform you of your test results.  You have anemia which is stable since your last visit. You have previously had extensive evaluation for anemia. This has been thought to be due to achalasia in the past. Continue taking the iron supplement. Discuss possible further testing at your upcoming appointment.       Resulted Orders   Hemoglobin   Result Value Ref Range    Hemoglobin 8.6 (L) 11.7 - 15.7 g/dL       If you have any questions or concerns, please call the clinic at the number listed above.       Sincerely,      Mindy Rose MD

## 2022-10-24 NOTE — RESULT ENCOUNTER NOTE
Mail letter:  You have anemia which is stable since your last visit. You have previously had extensive evaluation for anemia. This has been thought to be due to achalasia in the past. Continue taking the iron supplement. Discuss possible further testing at your upcoming appointment.     Mindy Rose MD

## 2022-10-24 NOTE — TELEPHONE ENCOUNTER
Patient notified of provider message as written. Patient verbalized understanding.     Gabrielle Velasquez RN

## 2022-11-03 ENCOUNTER — TRANSFERRED RECORDS (OUTPATIENT)
Dept: HEALTH INFORMATION MANAGEMENT | Facility: CLINIC | Age: 77
End: 2022-11-03

## 2022-11-12 DIAGNOSIS — I10 HYPERTENSION GOAL BP (BLOOD PRESSURE) < 140/90: ICD-10-CM

## 2022-11-14 ENCOUNTER — ANTICOAGULATION THERAPY VISIT (OUTPATIENT)
Dept: ANTICOAGULATION | Facility: CLINIC | Age: 77
End: 2022-11-14

## 2022-11-14 ENCOUNTER — OFFICE VISIT (OUTPATIENT)
Dept: FAMILY MEDICINE | Facility: CLINIC | Age: 77
End: 2022-11-14
Payer: MEDICARE

## 2022-11-14 VITALS
TEMPERATURE: 98.6 F | HEIGHT: 59 IN | WEIGHT: 209 LBS | SYSTOLIC BLOOD PRESSURE: 130 MMHG | BODY MASS INDEX: 42.13 KG/M2 | HEART RATE: 83 BPM | OXYGEN SATURATION: 98 % | DIASTOLIC BLOOD PRESSURE: 70 MMHG

## 2022-11-14 DIAGNOSIS — I48.0 PAROXYSMAL ATRIAL FIBRILLATION (H): Primary | ICD-10-CM

## 2022-11-14 DIAGNOSIS — Z00.00 ENCOUNTER FOR MEDICARE ANNUAL WELLNESS EXAM: Primary | ICD-10-CM

## 2022-11-14 DIAGNOSIS — F33.0 MILD RECURRENT MAJOR DEPRESSION (H): ICD-10-CM

## 2022-11-14 DIAGNOSIS — D50.9 IRON DEFICIENCY ANEMIA, UNSPECIFIED IRON DEFICIENCY ANEMIA TYPE: ICD-10-CM

## 2022-11-14 DIAGNOSIS — Z79.01 LONG TERM (CURRENT) USE OF ANTICOAGULANTS: ICD-10-CM

## 2022-11-14 DIAGNOSIS — E11.21 TYPE 2 DIABETES MELLITUS WITH DIABETIC NEPHROPATHY, WITHOUT LONG-TERM CURRENT USE OF INSULIN (H): ICD-10-CM

## 2022-11-14 DIAGNOSIS — I10 HYPERTENSION GOAL BP (BLOOD PRESSURE) < 140/90: ICD-10-CM

## 2022-11-14 DIAGNOSIS — N18.31 STAGE 3A CHRONIC KIDNEY DISEASE (H): ICD-10-CM

## 2022-11-14 DIAGNOSIS — I48.0 PAROXYSMAL ATRIAL FIBRILLATION (H): ICD-10-CM

## 2022-11-14 DIAGNOSIS — E66.01 MORBID OBESITY (H): ICD-10-CM

## 2022-11-14 DIAGNOSIS — M48.062 SPINAL STENOSIS OF LUMBAR REGION WITH NEUROGENIC CLAUDICATION: ICD-10-CM

## 2022-11-14 DIAGNOSIS — E78.5 HYPERLIPIDEMIA WITH TARGET LDL LESS THAN 100: ICD-10-CM

## 2022-11-14 DIAGNOSIS — F41.9 ANXIETY: ICD-10-CM

## 2022-11-14 LAB
ALT SERPL W P-5'-P-CCNC: 30 U/L (ref 0–50)
ANION GAP SERPL CALCULATED.3IONS-SCNC: 5 MMOL/L (ref 3–14)
BUN SERPL-MCNC: 32 MG/DL (ref 7–30)
CALCIUM SERPL-MCNC: 8.7 MG/DL (ref 8.5–10.1)
CHLORIDE BLD-SCNC: 113 MMOL/L (ref 94–109)
CHOLEST SERPL-MCNC: 131 MG/DL
CO2 SERPL-SCNC: 24 MMOL/L (ref 20–32)
CREAT SERPL-MCNC: 0.94 MG/DL (ref 0.52–1.04)
ERYTHROCYTE [DISTWIDTH] IN BLOOD BY AUTOMATED COUNT: 20.9 % (ref 10–15)
FASTING STATUS PATIENT QL REPORTED: NO
GFR SERPL CREATININE-BSD FRML MDRD: 63 ML/MIN/1.73M2
GLUCOSE BLD-MCNC: 327 MG/DL (ref 70–99)
HCT VFR BLD AUTO: 32.5 % (ref 35–47)
HDLC SERPL-MCNC: 41 MG/DL
HGB BLD-MCNC: 8.9 G/DL (ref 11.7–15.7)
INR BLD: 2.9 (ref 0.9–1.1)
LDLC SERPL CALC-MCNC: 54 MG/DL
MCH RBC QN AUTO: 20.1 PG (ref 26.5–33)
MCHC RBC AUTO-ENTMCNC: 27.4 G/DL (ref 31.5–36.5)
MCV RBC AUTO: 73 FL (ref 78–100)
NONHDLC SERPL-MCNC: 90 MG/DL
PLATELET # BLD AUTO: 238 10E3/UL (ref 150–450)
POTASSIUM BLD-SCNC: 4.2 MMOL/L (ref 3.4–5.3)
RBC # BLD AUTO: 4.43 10E6/UL (ref 3.8–5.2)
SODIUM SERPL-SCNC: 142 MMOL/L (ref 133–144)
TRIGL SERPL-MCNC: 179 MG/DL
WBC # BLD AUTO: 5.1 10E3/UL (ref 4–11)

## 2022-11-14 PROCEDURE — 99207 PR FOOT EXAM NO CHARGE: CPT | Performed by: FAMILY MEDICINE

## 2022-11-14 PROCEDURE — 36416 COLLJ CAPILLARY BLOOD SPEC: CPT | Performed by: FAMILY MEDICINE

## 2022-11-14 PROCEDURE — 84460 ALANINE AMINO (ALT) (SGPT): CPT | Performed by: FAMILY MEDICINE

## 2022-11-14 PROCEDURE — G0438 PPPS, INITIAL VISIT: HCPCS | Performed by: FAMILY MEDICINE

## 2022-11-14 PROCEDURE — 82043 UR ALBUMIN QUANTITATIVE: CPT | Performed by: FAMILY MEDICINE

## 2022-11-14 PROCEDURE — 80061 LIPID PANEL: CPT | Performed by: FAMILY MEDICINE

## 2022-11-14 PROCEDURE — 85027 COMPLETE CBC AUTOMATED: CPT | Performed by: FAMILY MEDICINE

## 2022-11-14 PROCEDURE — 36415 COLL VENOUS BLD VENIPUNCTURE: CPT | Performed by: FAMILY MEDICINE

## 2022-11-14 PROCEDURE — 80048 BASIC METABOLIC PNL TOTAL CA: CPT | Performed by: FAMILY MEDICINE

## 2022-11-14 PROCEDURE — 99214 OFFICE O/P EST MOD 30 MIN: CPT | Mod: 25 | Performed by: FAMILY MEDICINE

## 2022-11-14 PROCEDURE — 85610 PROTHROMBIN TIME: CPT | Performed by: FAMILY MEDICINE

## 2022-11-14 RX ORDER — ISOSORBIDE MONONITRATE 30 MG/1
TABLET, EXTENDED RELEASE ORAL
Qty: 90 TABLET | Refills: 0 | Status: SHIPPED | OUTPATIENT
Start: 2022-11-14 | End: 2022-11-14

## 2022-11-14 RX ORDER — ISOSORBIDE MONONITRATE 30 MG/1
TABLET, EXTENDED RELEASE ORAL
Qty: 90 TABLET | Refills: 1 | Status: SHIPPED | OUTPATIENT
Start: 2022-11-14 | End: 2023-08-28

## 2022-11-14 RX ORDER — SIMVASTATIN 10 MG
TABLET ORAL
Qty: 90 TABLET | Refills: 1 | Status: SHIPPED | OUTPATIENT
Start: 2022-11-14 | End: 2023-08-24

## 2022-11-14 ASSESSMENT — ENCOUNTER SYMPTOMS
DIARRHEA: 1
JOINT SWELLING: 1
HEMATOCHEZIA: 0
DIZZINESS: 1
EYE PAIN: 1
ARTHRALGIAS: 1
NERVOUS/ANXIOUS: 1
MYALGIAS: 1
HEADACHES: 1
SHORTNESS OF BREATH: 1
FREQUENCY: 1
CHILLS: 0
NAUSEA: 1
HEARTBURN: 1
BREAST MASS: 0
WEAKNESS: 1
SORE THROAT: 0
PALPITATIONS: 0
PARESTHESIAS: 0
ABDOMINAL PAIN: 1
DYSURIA: 0
HEMATURIA: 0

## 2022-11-14 ASSESSMENT — PAIN SCALES - GENERAL: PAINLEVEL: NO PAIN (0)

## 2022-11-14 ASSESSMENT — PATIENT HEALTH QUESTIONNAIRE - PHQ9
SUM OF ALL RESPONSES TO PHQ QUESTIONS 1-9: 15
10. IF YOU CHECKED OFF ANY PROBLEMS, HOW DIFFICULT HAVE THESE PROBLEMS MADE IT FOR YOU TO DO YOUR WORK, TAKE CARE OF THINGS AT HOME, OR GET ALONG WITH OTHER PEOPLE: SOMEWHAT DIFFICULT
SUM OF ALL RESPONSES TO PHQ QUESTIONS 1-9: 15

## 2022-11-14 ASSESSMENT — ACTIVITIES OF DAILY LIVING (ADL)
CURRENT_FUNCTION: HOUSEWORK REQUIRES ASSISTANCE
CURRENT_FUNCTION: TRANSPORTATION REQUIRES ASSISTANCE

## 2022-11-14 NOTE — PROGRESS NOTES
"SUBJECTIVE:   Gem is a 77 year old who presents for a Preventive Visit and follow-up on various baseline health conditions.      Patient has been advised of split billing requirements and indicates understanding: Yes  Are you in the first 12 months of your Medicare coverage?      Healthy Habits:     In general, how would you rate your overall health?  Fair    Frequency of exercise:  2-3 days/week    Duration of exercise:  N/A    Do you usually eat at least 4 servings of fruit and vegetables a day, include whole grains    & fiber and avoid regularly eating high fat or \"junk\" foods?  No    Taking medications regularly:  Yes    Medication side effects:  Other    Ability to successfully perform activities of daily living:  Transportation requires assistance and housework requires assistance    Home Safety:  No safety concerns identified    Hearing Impairment:  Difficulty understanding soft or whispered speech    In the past 6 months, have you been bothered by leaking of urine?  No    In general, how would you rate your overall mental or emotional health?  Fair      PHQ-2 Total Score: 4    Additional concerns today:  Yes    Do you feel safe in your environment? Yes    Have you ever done Advance Care Planning? (For example, a Health Directive, POLST, or a discussion with a medical provider or your loved ones about your wishes): No, advance care planning information given to patient to review.  Patient declined advance care planning discussion at this time.       Fall risk  Fallen 2 or more times in the past year?: Yes  Any fall with injury in the past year?: No     19 seconds    Cognitive Screening   1) Repeat 3 items (Leader, Season, Table)    2) Clock draw: ABNORMAL .  3) 3 item recall: Recalls NO objects   Results: ABNORMAL clock, 1-2 items recalled: PROBABLE COGNITIVE IMPAIRMENT, **INFORM PROVIDER**    Mini-CogTM Copyright NATALEE Boyle. Licensed by the author for use in [facility]; reprinted with " permission ([email or web address]). All rights reserved.      Do you have sleep apnea, excessive snoring or daytime drowsiness?: yes    Reviewed and updated as needed this visit by clinical staff                  Reviewed and updated as needed this visit by Provider                 Social History     Tobacco Use     Smoking status: Never     Smokeless tobacco: Never   Substance Use Topics     Alcohol use: No     If you drink alcohol do you typically have >3 drinks per day or >7 drinks per week? No    Alcohol Use 11/14/2022   Prescreen: >3 drinks/day or >7 drinks/week? No     We had increased her metformin to 1000 mg twice a day after her A1c was elevated to 8.0 couple of months ago, but the increased dose seemed to cause GI side effects, so she is back to taking 500 mg (1/2 tablet of the 1000 mg dose) twice daily.  She is on other medication as below.  She still complains of lower extremity paresthesias.  She is on Lyrica for that.  She has a fairly positive review of systems as noted below.    Current providers sharing in care for this patient include:   Patient Care Team:  Gurinder Ho MD as PCP - General (Family Practice)  Gurinder Martinez MD as Assigned Musculoskeletal Provider  Braulio Altamirano MD as Assigned PCP  Alex Renee MD as Assigned Heart and Vascular Provider  Alexsy Montenegro MD as Assigned Surgical Provider    The following health maintenance items are reviewed in Epic and correct as of today:  Health Maintenance   Topic Date Due     DEXA  Never done     DIABETIC FOOT EXAM  Never done     ZOSTER IMMUNIZATION (1 of 2) Never done     MEDICARE ANNUAL WELLNESS VISIT  Never done     INFLUENZA VACCINE (1) 09/01/2022     A1C  12/13/2022     ANNUAL REVIEW OF HM ORDERS  12/21/2022     LIPID  12/23/2022     MICROALBUMIN  12/23/2022     EYE EXAM  03/15/2023     BMP  10/12/2023     URIC ACID  10/24/2023     HEMOGLOBIN  10/24/2023     FALL RISK ASSESSMENT  11/14/2023     PHQ-9  11/14/2023     ADVANCE  CARE PLANNING  11/29/2024     DTAP/TDAP/TD IMMUNIZATION (4 - Td or Tdap) 11/16/2030     HEPATITIS C SCREENING  Completed     DEPRESSION ACTION PLAN  Completed     Pneumococcal Vaccine: 65+ Years  Completed     URINALYSIS  Completed     COVID-19 Vaccine  Completed     IPV IMMUNIZATION  Aged Out     MENINGITIS IMMUNIZATION  Aged Out     MAMMO SCREENING  Discontinued     COLORECTAL CANCER SCREENING  Discontinued     Patient Active Problem List   Diagnosis     Status Post Total Knee Replacement - bilateral     TACHO (obstructive sleep apnea)     Hyperlipidemia with target LDL less than 70     Hypertension goal BP (blood pressure) < 140/90     Morbid obesity (H)     Achalasia     Chronic low back pain     Gout     Paroxysmal atrial fibrillation (H)     Long term (current) use of anticoagulants     Anxiety     Spinal stenosis of lumbar region with neurogenic claudication     Glenohumeral arthritis, left     Type 2 diabetes mellitus with diabetic nephropathy, without long-term current use of insulin (H)     Stage 3a chronic kidney disease (H)     Anemia, iron deficiency     Mild recurrent major depression (H)     Memory problem     Falls frequently     Paranoia (H)     Past Surgical History:   Procedure Laterality Date     AS ESOPHAGOSCOPY, DIAGNOSTIC  07/2019     CARPAL TUNNEL RELEASE RT/LT       CATHETER, ABLATION  2017    for PAF     COLONOSCOPY  07/2019     HC LIG/DIV/EXCIS VARICOSE VEIN       TONSILLECTOMY       [facility] HAND/FINGER SURGERY UNLISTED       [facility] REMOVAL OF KIDNEY STONE       [facility] TOTAL KNEE ARTHROPLASTY  07/21/2010    left     [facility] TOTAL KNEE ARTHROPLASTY  12/12/2012    Right       Social History     Tobacco Use     Smoking status: Never     Smokeless tobacco: Never   Substance Use Topics     Alcohol use: No     Family History   Problem Relation Age of Onset     Glaucoma No family hx of      Macular Degeneration No family hx of          Current Outpatient Medications   Medication Sig Dispense Refill      acetaminophen (TYLENOL) 500 MG tablet Take 500-1,000 mg by mouth every 6 hours as needed for mild pain       carvedilol (COREG) 6.25 MG tablet Take 1 tablet (6.25 mg) by mouth 2 times daily 180 tablet 1     ferrous sulfate (FEROSUL) 325 (65 Fe) MG tablet Take 1 tablet (325 mg) by mouth daily (with breakfast) 90 tablet 3     metFORMIN (GLUCOPHAGE) 500 MG tablet Take 1 tablet (500 mg) by mouth 2 times daily (with meals) 30 tablet 0     pantoprazole (PROTONIX) 40 MG EC tablet Take 1 tablet (40 mg) by mouth daily 90 tablet 1     PARoxetine (PAXIL) 30 MG tablet TAKE 1 TABLET BY MOUTH IN THE MORNING 90 tablet 1     pregabalin (LYRICA) 100 MG capsule Take 1 capsule (100 mg) by mouth 2 times daily 60 capsule 5     simvastatin (ZOCOR) 10 MG tablet TAKE 1 TABLET BY MOUTH AT BEDTIME . APPOINTMENT REQUIRED FOR FUTURE REFILLS 90 tablet 1     warfarin ANTICOAGULANT (COUMADIN) 2 MG tablet Take 2 mg on Tuesday and 3 mg all other days of the week OR as directed by INR Clinic 120 tablet 1     isosorbide mononitrate (IMDUR) 30 MG 24 hr tablet TAKE 1 TABLET BY MOUTH ONCE DAILY . APPOINTMENT REQUIRED FOR FUTURE REFILLS 90 tablet 0     QUEtiapine (SEROQUEL) 25 MG tablet Take 1 tablet (25 mg) by mouth At Bedtime (Patient not taking: Reported on 11/14/2022) 30 tablet 0     Allergies   Allergen Reactions     Gabapentin      Rash and itching     Nsaids      Other reaction(s): Gastrointestinal  Former PPI user, EGD 2/2018 showed LA Grade D esophagitis, plus duodenitis and gastritis.  Famotidine on med list but may not have been taking - rx was 3 years old.      Allopurinol Rash     Itchy red rash on feet (stopped immediately - failed to come to clinic for exam).   Itchy red rash on feet (stopped immediately - failed to come to clinic for exam).        Other Environmental Allergy Rash     RASH ALL OVER FOUND IN DR. RAMIREZ DICTATION OF 10/11/11     Sulfa Drugs Other (See Comments) and Rash     Per 11-4-13 H&P by Dr. Fei Arias.  RASH ALL  "OVER FOUND IN DR. RAMIREZ DICTATION OF 10/11/11  BACTRIM-  RASH ALL OVER  Per 11-4-13 H&P by Dr. Fei Arias.  RASH ALL OVER FOUND IN DR. RAMIREZ DICTATION OF 10/11/11  Per 11-4-13 H&P by Dr. Fei Arias.  RASH ALL OVER FOUND IN DR. RAMIREZ DICTATION OF 10/11/11  BACTRIM-  RASH ALL OVER       Trimethoprim Rash         Pertinent mammograms are reviewed under the imaging tab.    Review of Systems   Constitutional: Negative for chills.   HENT: Positive for hearing loss. Negative for ear pain and sore throat.    Eyes: Positive for pain. Negative for visual disturbance.   Respiratory: Positive for shortness of breath.    Cardiovascular: Positive for peripheral edema. Negative for chest pain and palpitations.   Gastrointestinal: Positive for abdominal pain, diarrhea, heartburn and nausea. Negative for hematochezia.   Breasts:  Negative for tenderness, breast mass and discharge.   Genitourinary: Positive for frequency. Negative for dysuria, genital sores, hematuria, pelvic pain, urgency, vaginal bleeding and vaginal discharge.   Musculoskeletal: Positive for arthralgias, joint swelling and myalgias.   Skin: Negative for rash.   Neurological: Positive for dizziness, weakness and headaches. Negative for paresthesias.   Psychiatric/Behavioral: Positive for mood changes. The patient is nervous/anxious.          OBJECTIVE:   /70 (BP Location: Right arm, Patient Position: Sitting)   Pulse 83   Temp 98.6  F (37  C) (Oral)   Ht 1.499 m (4' 11\")   Wt 94.8 kg (209 lb)   SpO2 98%   BMI 42.21 kg/m   Estimated body mass index is 42.21 kg/m  as calculated from the following:    Height as of this encounter: 1.499 m (4' 11\").    Weight as of this encounter: 94.8 kg (209 lb).  Physical Exam  GENERAL: alert, no distress, obese and elderly  EYES: Eyes grossly normal to inspection, PERRL and conjunctivae and sclerae normal  HENT: Grossly normal  NECK: no adenopathy, no asymmetry, masses, or scars and thyroid normal to " palpation  RESP: lungs clear to auscultation - no rales, rhonchi or wheezes  CV: regular rate and rhythm, normal S1 S2, no S3 or S4, no murmur, click or rub, trace lower extremity peripheral edema   ABDOMEN: soft, nontender, no hepatosplenomegaly, no masses   MS: no gross musculoskeletal defects noted  SKIN: no suspicious lesions or rashes  NEURO: Normal strength and tone, sensation in feet is intact to light touch, mentation is somewhat difficult to assess due to language issues  PSYCH: affect is somewhat flat    Diagnostic Test Results:  Labs reviewed in Epic    ASSESSMENT / PLAN:       ICD-10-CM    1. Encounter for Medicare annual wellness exam  Z00.00       2. Type 2 diabetes mellitus with diabetic nephropathy, without long-term current use of insulin (H)  E11.21 Albumin Random Urine Quantitative with Creat Ratio     Basic metabolic panel     Albumin Random Urine Quantitative with Creat Ratio     Basic metabolic panel     metFORMIN (GLUCOPHAGE) 1000 MG tablet     FOOT EXAM      3. Iron deficiency anemia, unspecified iron deficiency anemia type  D50.9 CBC with platelets     CBC with platelets      4. Paroxysmal atrial fibrillation (H)  I48.0 INR point of care      5. Long term (current) use of anticoagulants  Z79.01 INR point of care      6. Hypertension goal BP (blood pressure) < 140/90  I10 isosorbide mononitrate (IMDUR) 30 MG 24 hr tablet      7. Hyperlipidemia with target LDL less than 70  E78.5 Lipid panel reflex to direct LDL Non-fasting     ALT     Lipid panel reflex to direct LDL Non-fasting     ALT      8. Morbid obesity (H)  E66.01       9. Stage 3a chronic kidney disease (H)  N18.31       10. Spinal stenosis of lumbar region with neurogenic claudication  M48.062       11. Hyperlipidemia with target LDL less than 100  E78.5 simvastatin (ZOCOR) 10 MG tablet      12. Mild recurrent major depression (H)  F33.0       13. Anxiety  F41.9         Blood pressure and other vital signs look good  We discussed the  "above items  We will check some nonfasting lab work today as above  We can continue her metformin at 500 mg twice a day, but I encouraged her to increase it to a full tablet (1000 mg) twice daily if she can tolerate it  We will continue her other same baseline meds for now  The patient and her daughter think that they had a flu shot this fall already, so we will pass on that today  Plan a tentative recheck in about 4 months or so    Patient has been advised of split billing requirements and indicates understanding: Yes      COUNSELING:  Reviewed preventive health counseling, as reflected in patient instructions       Regular exercise       Healthy diet/nutrition    Estimated body mass index is 42.21 kg/m  as calculated from the following:    Height as of this encounter: 1.499 m (4' 11\").    Weight as of this encounter: 94.8 kg (209 lb).    Weight management plan: Discussed healthy diet and exercise guidelines    She reports that she has never smoked. She has never used smokeless tobacco.      Appropriate preventive services were discussed with this patient, including applicable screening as appropriate for cardiovascular disease, diabetes, osteopenia/osteoporosis, and glaucoma.  As appropriate for age/gender, discussed screening for colorectal cancer, prostate cancer, breast cancer, and cervical cancer. Checklist reviewing preventive services available has been given to the patient.    Reviewed patients plan of care and provided an AVS. The Basic Care Plan (routine screening as documented in Health Maintenance) for Selvin meets the Care Plan requirement. This Care Plan has been established and reviewed with the Patient and Daughter.    Counseling Resources:  ATP IV Guidelines  Pooled Cohorts Equation Calculator  Breast Cancer Risk Calculator  Breast Cancer: Medication to Reduce Risk  FRAX Risk Assessment  ICSI Preventive Guidelines  Dietary Guidelines for Americans, 2010  USDA's MyPlate  ASA Prophylaxis  Lung CA " Screening    Gurinder Ho MD  [facility]    Identified Health Risks:  Answers for HPI/ROS submitted by the patient on 11/14/2022  If you checked off any problems, how difficult have these problems made it for you to do your work, take care of things at home, or get along with other people?: Somewhat difficult  PHQ9 TOTAL SCORE: 15

## 2022-11-14 NOTE — PROGRESS NOTES
ANTICOAGULATION MANAGEMENT     Selvin Rockwell 77 year old female is on warfarin with therapeutic INR result. (Goal INR 2.0-3.0)    Recent labs: (last 7 days)     11/14/22  1211   INR 2.9*       ASSESSMENT       Source(s): Chart Review and Patient/Caregiver Call       Warfarin doses taken: Warfarin taken as instructed    Diet: No new diet changes identified    New illness, injury, or hospitalization: Yes: ED visit on 10/14/22 for head injury. Negative CT.    Medication/supplement changes: None noted    Signs or symptoms of bleeding or clotting: No    Previous INR: Therapeutic last 2(+) visits    Additional findings: None       PLAN     Recommended plan for no diet, medication or health factor changes affecting INR     Dosing Instructions: Continue your current warfarin dose with next INR in 4 weeks       Summary  As of 11/14/2022    Full warfarin instructions:  2 mg every Tue; 3 mg all other days; Starting 11/14/2022   Next INR check:  12/12/2022             Telephone call with  daughterAlicia who verbalizes understanding and agrees to plan and who agrees to plan and repeated back plan correctly    Patient offered & declined to schedule next visit    Education provided:     Goal range and lab monitoring: goal range and significance of current result    Contact [phone]  with any changes, questions or concerns.     Plan made per [facility] anticoagulation protocol    Gloria Calloway RN  Anticoagulation Clinic  11/14/2022    _______________________________________________________________________     Anticoagulation Episode Summary     Current INR goal:  2.0-3.0   TTR:  56.0 % (1 y)   Target end date:  Indefinite   Send INR reminders to:  JERRY PEÑA    Indications    Paroxysmal atrial fibrillation (H) [I48.0]  Long term (current) use of anticoagulants [Z79.01]           Comments:           Anticoagulation Care Providers     Provider Role Specialty Phone number    Gurinder Ho MD Referring Family Medicine  [phone]

## 2022-11-14 NOTE — PROGRESS NOTES
"    The patient was provided with suggestions to help her develop a healthy physical lifestyle.  The patient was counseled and encouraged to consider modifying their diet and eating habits. She was provided with information on recommended healthy diet options.  The patient was provided with written information regarding signs of hearing loss.  The patient was provided with suggestions to help her develop a healthy emotional lifestyle.  She is at risk for falling and has been provided with information to reduce the risk of falling at home.  Answers for HPI/ROS submitted by the patient on 11/14/2022  If you checked off any problems, how difficult have these problems made it for you to do your work, take care of things at home, or get along with other people?: Somewhat difficult  PHQ9 TOTAL SCORE: 15  In general, how would you rate your overall physical health?: fair  Frequency of exercise:: 2-3 days/week  Do you usually eat at least 4 servings of fruit and vegetables a day, include whole grains & fiber, and avoid regularly eating high fat or \"junk\" foods? : No  Taking medications regularly:: Yes  Medication side effects:: Other  Activities of Daily Living: transportation requires assistance, housework requires assistance  Home safety: no safety concerns identified  Hearing Impairment:: difficulty understanding soft or whispered speech  In the past 6 months, have you been bothered by leaking of urine?: No  abdominal pain: Yes  Blood in stool: No  Blood in urine: No  chest pain: No  chills: No  diarrhea: Yes  dizziness: Yes  ear pain: No  eye pain: Yes  nervous/anxious: Yes  frequency: Yes  genital sores: No  headaches: Yes  hearing loss: Yes  heartburn: Yes  arthralgias: Yes  joint swelling: Yes  peripheral edema: Yes  mood changes: Yes  myalgias: Yes  nausea: Yes  dysuria: No  palpitations: No  Skin sensation changes: No  sore throat: No  urgency: No  rash: No  shortness of breath: Yes  visual disturbance: " No  weakness: Yes  pelvic pain: No  vaginal bleeding: No  vaginal discharge: No  tenderness: No  breast mass: No  breast discharge: No  In general, how would you rate your overall mental or emotional health?: fair  Additional concerns today:: Yes  Duration of exercise:: N/A

## 2022-11-14 NOTE — LETTER
November 15, 2022    Gem BALWINDER Bernardadeel  [address] [place] [id number]          Dear ,    We are writing to inform you of your test results.    Your hemoglobin is slowly coming up, so please continue with your iron supplement daily.  Your blood sugar was quite high, so I would encourage you to try taking a full tablet of the metformin periodically to see if your stomach can handle that.  It would be preferable to take a full tablet twice daily, but at least take 1/2 tablet twice daily.  We could always add an additional medicine to help with blood sugar control in the future if need be.   The rest of your laboratory tests generally look good and/or stable from the past, so please continue your other same medications.   I would advise a follow-up visit in about 4 months or so to recheck your diabetes and blood counts.       Resulted Orders   Albumin Random Urine Quantitative with Creat Ratio   Result Value Ref Range    Creatinine Urine mg/dL 50 mg/dL    Albumin Urine mg/L 76 mg/L    Albumin Urine mg/g Cr 152.00 (H) 0.00 - 25.00 mg/g Cr   Basic metabolic panel   Result Value Ref Range    Sodium 142 133 - 144 mmol/L    Potassium 4.2 3.4 - 5.3 mmol/L    Chloride 113 (H) 94 - 109 mmol/L    Carbon Dioxide (CO2) 24 20 - 32 mmol/L    Anion Gap 5 3 - 14 mmol/L    Urea Nitrogen 32 (H) 7 - 30 mg/dL    Creatinine 0.94 0.52 - 1.04 mg/dL    Calcium 8.7 8.5 - 10.1 mg/dL    Glucose 327 (H) 70 - 99 mg/dL    GFR Estimate 63 >60 mL/min/1.73m2      Comment:      Effective December 21, 2021 eGFRcr in adults is calculated using the 2021 CKD-EPI creatinine equation which includes age and gender (Rito jacobson al., NEJ, DOI: 10.1056/GXFRyf0328919)   Lipid panel reflex to direct LDL Non-fasting   Result Value Ref Range    Cholesterol 131 <200 mg/dL    Triglycerides 179 (H) <150 mg/dL    Direct Measure HDL 41 (L) >=50 mg/dL    LDL Cholesterol Calculated 54 <=100 mg/dL    Non HDL Cholesterol 90 <130 mg/dL    Patient  Fasting > 8hrs? No     Narrative    Cholesterol  Desirable:  <200 mg/dL    Triglycerides  Normal:  Less than 150 mg/dL  Borderline High:  150-199 mg/dL  High:  200-499 mg/dL  Very High:  Greater than or equal to 500 mg/dL    Direct Measure HDL  Female:  Greater than or equal to 50 mg/dL   Male:  Greater than or equal to 40 mg/dL    LDL Cholesterol  Desirable:  <100mg/dL  Above Desirable:  100-129 mg/dL   Borderline High:  130-159 mg/dL   High:  160-189 mg/dL   Very High:  >= 190 mg/dL    Non HDL Cholesterol  Desirable:  130 mg/dL  Above Desirable:  130-159 mg/dL  Borderline High:  160-189 mg/dL  High:  190-219 mg/dL  Very High:  Greater than or equal to 220 mg/dL   CBC with platelets   Result Value Ref Range    WBC Count 5.1 4.0 - 11.0 10e3/uL    RBC Count 4.43 3.80 - 5.20 10e6/uL    Hemoglobin 8.9 (L) 11.7 - 15.7 g/dL    Hematocrit 32.5 (L) 35.0 - 47.0 %    MCV 73 (L) 78 - 100 fL    MCH 20.1 (L) 26.5 - 33.0 pg    MCHC 27.4 (L) 31.5 - 36.5 g/dL    RDW 20.9 (H) 10.0 - 15.0 %    Platelet Count 238 150 - 450 10e3/uL   ALT   Result Value Ref Range    ALT 30 0 - 50 U/L       If you have any questions or concerns, please call the clinic at the number listed above.       Sincerely,      Gurinder Ho MD

## 2022-11-14 NOTE — PATIENT INSTRUCTIONS
Patient Education   Personalized Prevention Plan  You are due for the preventive services outlined below.  Your care team is available to assist you in scheduling these services.  If you have already completed any of these items, please share that information with your care team to update in your medical record.  Health Maintenance Due   Topic Date Due     Osteoporosis Screening  Never done     Diabetic Foot Exam  Never done     Zoster (Shingles) Vaccine (1 of 2) Never done     Annual Wellness Visit  Never done     Flu Vaccine (1) 09/01/2022     Your Health Risk Assessment indicates you feel you are not in good health    A healthy lifestyle helps keep the body fit and the mind alert. It helps protect you from disease, helps you fight disease, and helps prevent chronic disease (disease that doesn't go away) from getting worse. This is important as you get older and begin to notice twinges in muscles and joints and a decline in the strength and stamina you once took for granted. A healthy lifestyle includes good healthcare, good nutrition, weight control, recreation, and regular exercise. Avoid harmful substances and do what you can to keep safe. Another part of a healthy lifestyle is stay mentally active and socially involved.    Good healthcare     Have a wellness visit every year.     If you have new symptoms, let us know right away. Don't wait until the next checkup.     Take medicines exactly as prescribed and keep your medicines in a safe place. Tell us if your medicine causes problems.   Healthy diet and weight control     Eat 3 or 4 small, nutritious, low-fat, high-fiber meals a day. Include a variety of fruits, vegetables, and whole-grain foods.     Make sure you get enough calcium in your diet. Calcium, vitamin D, and exercise help prevent osteoporosis (bone thinning).     If you live alone, try eating with others when you can. That way you get a good meal and have company while you eat it.     Try to keep  a healthy weight. If you eat more calories than your body uses for energy, it will be stored as fat and you will gain weight.     Recreation   Recreation is not limited to sports and team events. It includes any activity that provides relaxation, interest, enjoyment, and exercise. Recreation provides an outlet for physical, mental, and social energy. It can give a sense of worth and achievement. It can help you stay healthy.    Mental Exercise and Social Involvement  Mental and emotional health is as important as physical health. Keep in touch with friends and family. Stay as active as possible. Continue to learn and challenge yourself.   Things you can do to stay mentally active are:    Learn something new, like a foreign language or musical instrument.     Play SCRABBLE or do crossword puzzles. If you cannot find people to play these games with you at home, you can play them with others on your computer through the Internet.     Join a games club--anything from card games to chess or checkers or lawn bowling.     Start a new hobby.     Go back to school.     Volunteer.     Read.   Keep up with world events.    Understanding USDA MyPlate  The USDA has guidelines to help you make healthy food choices. These are called MyPlate. MyPlate shows the food groups that make up healthy meals using the image of a place setting. Before you eat, think about the healthiest choices for what to put on your plate or in your cup or bowl. To learn more about building a healthy plate, visit www.choosemyplate.gov.    The food groups    Fruits. Any fruit or 100% fruit juice counts as part of the Fruit Group. Fruits may be fresh, canned, frozen, or dried, and may be whole, cut-up, or pureed. Make 1/2 of your plate fruits and vegetables.    Vegetables. Any vegetable or 100% vegetable juice counts as a member of the Vegetable Group. Vegetables may be fresh, frozen, canned, or dried. They can be served raw or cooked and may be whole,  cut-up, or mashed. Make 1/2 of your plate fruits and vegetables.    Grains. All foods made from grains are part of the Grains Group. These include wheat, rice, oats, cornmeal, and barley. Grains are often used to make foods such as bread, pasta, oatmeal, cereal, tortillas, and grits. Grains should be no more than 1/4 of your plate. At least half of your grains should be whole grains.    Protein. This group includes meat, poultry, seafood, beans and peas, eggs, processed soy products (such as tofu), nuts (including nut butters), and seeds. Make protein choices no more than 1/4 of your plate. Meat and poultry choices should be lean or low fat.    Dairy. The Dairy Group includes all fluid milk products and foods made from milk that contain calcium, such as yogurt and cheese. (Foods that have little calcium, such as cream, butter, and cream cheese, are not part of this group.) Most dairy choices should be low-fat or fat-free.    Oils. Oils aren't a food group, but they do contain essential nutrients. However it's important to watch your intake of oils. These are fats that are liquid at room temperature. They include canola, corn, olive, soybean, vegetable, and sunflower oil. Foods that are mainly oil include mayonnaise, certain salad dressings, and soft margarines. You likely already get your daily oil allowance from the foods you eat.  Things to limit  Eating healthy also means limiting these things in your diet:       Salt (sodium). Many processed foods have a lot of sodium. To keep sodium intake down, eat fresh vegetables, meats, poultry, and seafood when possible. Purchase low-sodium, reduced-sodium, or no-salt-added food products at the store. And don't add salt to your meals at home. Instead, season them with herbs and spices such as dill, oregano, cumin, and paprika. Or try adding flavor with lemon or lime zest and juice.    Saturated fat. Saturated fats are most often found in animal products such as beef, pork,  and chicken. They are often solid at room temperature, such as butter. To reduce your saturated fat intake, choose leaner cuts of meat and poultry. And try healthier cooking methods such as grilling, broiling, roasting, or baking. For a simple lower-fat swap, use plain nonfat yogurt instead of mayonnaise when making potato salad or macaroni salad.    Added sugars. These are sugars added to foods. They are in foods such as ice cream, candy, soda, fruit drinks, sports drinks, energy drinks, cookies, pastries, jams, and syrups. Cut down on added sugars by sharing sweet treats with a family member or friend. You can also choose fruit for dessert, and drink water or other unsweetened beverages.     [employer] last reviewed this educational content on 6/1/2020 2000-2021 [employer]. All rights reserved. This information is not intended as a substitute for professional medical care. Always follow your healthcare professional's instructions.        Activities of Daily Living    Your Health Risk Assessment indicates you have difficulties with activities of daily living such as housework, bathing, preparing meals, taking medication, etc. Please make a follow up appointment for us to address this issue in more detail.    Signs of Hearing Loss      Hearing much better with one ear can be a sign of hearing loss.   Hearing loss is a problem shared by many people. In fact, it is one of the most common health problems, particularly as people age. Most people age 65 and older have some hearing loss. By age 80, almost everyone does. Hearing loss often occurs slowly over the years. So you may not realize your hearing has gotten worse.  Have your hearing checked  Call your healthcare provider if you:    Have to strain to hear normal conversation    Have to watch other people s faces very carefully to follow what they re saying    Need to ask people to repeat what they ve said    Often misunderstand what people are  saying    Turn the volume of the television or radio up so high that others complain    Feel that people are mumbling when they re talking to you    Find that the effort to hear leaves you feeling tired and irritated    Notice, when using the phone, that you hear better with one ear than the other  [employer] last reviewed this educational content on 1/1/2020 2000-2021 [employer]. All rights reserved. This information is not intended as a substitute for professional medical care. Always follow your healthcare professional's instructions.        Your Health Risk Assessment indicates you feel you are not in good emotional health.    Recreation   Recreation is not limited to sports and team events. It includes any activity that provides relaxation, interest, enjoyment, and exercise. Recreation provides an outlet for physical, mental, and social energy. It can give a sense of worth and achievement. It can help you stay healthy.    Mental Exercise and Social Involvement  Mental and emotional health is as important as physical health. Keep in touch with friends and family. Stay as active as possible. Continue to learn and challenge yourself.   Things you can do to stay mentally active are:    Learn something new, like a foreign language or musical instrument.     Play SCRABBLE or do crossword puzzles. If you cannot find people to play these games with you at home, you can play them with others on your computer through the Internet.     Join a games club--anything from card games to chess or checkers or lawn bowling.     Start a new hobby.     Go back to school.     Volunteer.     Read.   Keep up with world events.    Depression and Suicide in Older Adults    Nearly 2 million older Americans have some type of depression. Some of them even take their own lives. Yet depression among older adults is often ignored. Learn the warning signs. You may help spare a loved one needless pain. You may also save a life.  "  What is depression?  Depression is a common and serious illness that affects the way you think and feel. It is not a normal part of aging, nor is it a sign of weakness, a character flaw, or something you can snap out of. Most people with depression need treatment to get better. The most common symptom is a feeling of deep sadness. People who are depressed also may seem tired and listless. And nothing seems to give them pleasure. It s normal to grieve or be sad sometimes. But sadness lessens or passes with time. Depression rarely goes away or improves on its own. A person with clinical depression can't \"snap out of it.\" Other symptoms of depression are:     Sleeping more or less than normal    Eating more or less than normal    Having headaches, stomachaches, or other pains that don t go away    Feeling nervous,  empty,  or worthless    Crying a great deal    Thinking or talking about suicide or death    Loss of interest in activities previously enjoyed    Social isolation    Feeling confused or forgetful  What causes it?  The causes of depression aren t fully known. But it is thought to result from a complex blend of these factors:     Biochemistry. Certain chemicals in the brain play a role.    Genes. Depression does run in families.    Life stress. Life stresses can also trigger depression in some people. Older adults often face many stressors, such as death of friends or a spouse, health problems, and financial concerns.    Chronic conditions. This includes conditions such as diabetes, heart disease, or cancer. These can cause symptoms of depression. Medicine side effects can cause changes in thoughts and behaviors.  How you can help  Often, depressed people may not want to ask for help. When they do, they may be ignored. Or, they may receive the wrong treatment. You can help by showing parents and older friends love and support. If they seem depressed, don t lecture the person, ignore the symptoms, or discount " the symptoms as a  normal  part of aging -which they are not. Get involved, listen, and show interest and support.   Help them understand that depression is a treatable illness. Tell them you can help them find the right treatment. Offer to go to their healthcare provider's appointment with them for support when the symptoms are discussed. With their approval, contact a local mental health center, social service agency, or hospital about services.   You can be an advocate for him or her at healthcare appointments. Many older adults have chronic illnesses that can cause symptoms of depression. Medicine side effects can change thoughts and behaviors. You can help make sure that the healthcare provider looks at all of these factors. He or she should refer your family member or friend to a mental healthcare provider when needed. in some cases, untreated depression can lead to a misdiagnosis. A person may be diagnosed with a brain disorder such as dementia. If the healthcare provider does not take the issue of depression seriously, help your family member or friend to find another provider.   Don't be afraid to ask  If you think an older person you care about could be suicidal, ask,  Have you thought about suicide?  Most people will tell you the truth. If they say  yes,  they may already have a plan for how and when they will attempt it. Find out as much as you can. The more detailed the plan, and the easier it is to carry out, the more danger the person is in right now. Tell the person you are there for them and do not want them to harm him or herself. Don't wait to get help for the person. Call the person's healthcare provider, local hospital, or emergency services.   To learn more    National Suicide Prevention Lifeline (crisis hotline) 624-566-THBK (324-842-1620)    National [place] of Mental Gtkrze569-297-8377lvp.nimh.nih.gov    National [place] on Mental Ozoykck915-646-2085ipk.james.org    Mental Health  Pyupjqn735-969-1533dcv.[facility] [[facility]].org    National Suicide Vbkaska230-HQQQAJV (805-317-7572)    Call 911  Never leave the person alone. A person who is actively suicidal needs psychiatric care right away. They will need constant supervision. Never leave the person out of sight. Call 911 or the [facility] 24-hour suicide crisis hotline at 370-689-HHGD (510-542-3015). You can also take the person to the closest emergency room.   [employer] last reviewed this educational content on 5/1/2020 2000-2021 [employer]. All rights reserved. This information is not intended as a substitute for professional medical care. Always follow your healthcare professional's instructions.          Preventing Falls at Home  A person can fall for many reasons. Older adults may fall because reaction time slows down as we age. Your muscles and joints may get stiff, weak, or less flexible because of illness, medicines, or a physical condition.   Other health problems that make falls more likely include:     Arthritis    Dizziness or lightheadedness when you stand up (orthostatic hypotension)    History of a stroke    Dizziness    Anemia    Certain medicines taken for mental illness or to control blood pressure.    Problems with balance or gait    Bladder or urinary problems    History of falling    Changes in vision (vision impairment)    Changes in thinking skills and memory (cognitive impairment)  Injuries from a fall can include serious injuries such as broken bones, dislocated joints, internal bleeding and cuts. Injuries like these can limit your independence.   Prevention tips  To help prevent falls and fall-related injuries, follow the tips below.    Floors  To make floors safer:     Put nonskid pads under area rugs.    Remove small rugs.    Replace worn floor coverings.    Tack carpets firmly to each step on carpeted stairs. Put nonskid strips on the edges of uncarpeted stairs.    Keep floors and stairs free of clutter and  cords.    Arrange furniture so there are clear pathways.    Clean up any spills right away.  Bathrooms    To make bathrooms safer:     Install grab bars in the tub or shower.    Apply nonskid strips or put a nonskid rubber mat in the tub or shower.    Sit on a bath chair to bathe.    Use bathmats with nonskid backing.  Lighting  To improve visibility in your home:      Keep a flashlight in each room. Or put a lamp next to the bed within easy reach.    Put nightlights in the bedrooms, hallways, kitchen, and bathrooms.    Make sure all stairways have good lighting.    Take your time when going up and down stairs.    Put handrails on both sides of stairs and in walkways for more support. To prevent injury to your wrist or arm, don t use handrails to pull yourself up.    Install grab bars to pull yourself up.    Move or rearrange items that you use often. This will make them easier to find or reach.    Look at your home to find any safety hazards. Especially look at doorways, walkways, and the driveway. Remove or repair any safety problems that you find.  Other changes to make    Look around to find any safety hazards. Look closely at doorways, walkways, and the driveway. Remove or repair any safety problems that you find.    Wear shoes that fit well.    Take your time when going up and down stairs.    Put handrails on both sides of stairs and in walkways for more support. To prevent injury to your wrist or arm, don t use handrails to pull yourself up.    Install grab bars wherever needed to pull yourself up.    Arrange items that you use often. This will make them easier to find or reach.    [employer] last reviewed this educational content on 3/1/2020    5745-8917 [employer]. All rights reserved. This information is not intended as a substitute for professional medical care. Always follow your healthcare professional's instructions.

## 2022-11-15 LAB
CREAT UR-MCNC: 50 MG/DL
MICROALBUMIN UR-MCNC: 76 MG/L
MICROALBUMIN/CREAT UR: 152 MG/G CR (ref 0–25)

## 2022-11-15 NOTE — RESULT ENCOUNTER NOTE
Gem (and Alicia),  Your hemoglobin is slowly coming up, so please continue with your iron supplement daily.  Your blood sugar was quite high, so I would encourage you to try taking a full tablet of the metformin periodically to see if your stomach can handle that.  It would be preferable to take a full tablet twice daily, but at least take 1/2 tablet twice daily.  We could always add an additional medicine to help with blood sugar control in the future if need be.  The rest of your laboratory tests generally look good and/or stable from the past, so please continue your other same medications.  I would advise a follow-up visit in about 4 months or so to recheck your diabetes and blood counts.    Gurinder Ho MD

## 2022-11-16 ENCOUNTER — TELEPHONE (OUTPATIENT)
Dept: FAMILY MEDICINE | Facility: CLINIC | Age: 77
End: 2022-11-16

## 2022-11-16 NOTE — TELEPHONE ENCOUNTER
Patient's daughter, Alicia, called to inquire about recent lab results. Consent to communicate on file.    Read lab result letter from PCP to Alicia from 11/15.    Alicia verbalized understanding and agreed to plan.    Efrem Peterson RN

## 2022-12-12 ENCOUNTER — TELEPHONE (OUTPATIENT)
Dept: FAMILY MEDICINE | Facility: CLINIC | Age: 77
End: 2022-12-12

## 2022-12-12 DIAGNOSIS — E11.21 TYPE 2 DIABETES MELLITUS WITH DIABETIC NEPHROPATHY, WITHOUT LONG-TERM CURRENT USE OF INSULIN (H): ICD-10-CM

## 2022-12-12 RX ORDER — PREGABALIN 150 MG/1
150 CAPSULE ORAL 2 TIMES DAILY
Qty: 60 CAPSULE | Refills: 5 | Status: SHIPPED | OUTPATIENT
Start: 2022-12-12 | End: 2023-07-03

## 2022-12-12 NOTE — TELEPHONE ENCOUNTER
We could increase the dose of her Lyrica.  She could start taking 100 mg 3 times a day until those capsules are used up, then fill the next prescription for 150 mg twice a day.  I sent in a new prescription for that dose.  As far as other concerns, they could schedule either a virtual visit or in person visit for further evaluation and discussion about those items.    Gurinder Ho MD

## 2022-12-12 NOTE — TELEPHONE ENCOUNTER
"Spoke with daughter and gave PCP message:     \"We could increase the dose of her Lyrica.  She could start taking 100 mg 3 times a day until those capsules are used up, then fill the next prescription for 150 mg twice a day.  I sent in a new prescription for that dose.  As far as other concerns, they could schedule either a virtual visit or in person visit for further evaluation and discussion about those items.     Gurinder Ho MD\"     Daughter verbalized understanding; states she will discuss with her mom scheduling an appointment.     Ale Valenzuela RN  [facility]    "

## 2022-12-12 NOTE — TELEPHONE ENCOUNTER
Pts mom is wondering if Dr Ho could see pt in person tomorrow. Pts daughter starts a new job and wont be available to help her out. Please call. [phone].

## 2022-12-12 NOTE — TELEPHONE ENCOUNTER
"Lyrica is not effective for patient anymore; was taking it for her BLE paresthesia; states it was helpful but does not work as well anymore.  100mg BID.     Dose increase possibly?    Patient also reporting \"skin crawling/itching\" sensation up on her head; has been happening off/on for more than 1 week. No discoloration/rash/ no open areas.     Also reporting increased paranoia, worse at nighttime; daughter reporting early stages of dementia.   Daughter looking for any recommendations for this.     Ale Valenzuela RN  [facility]              "

## 2022-12-13 NOTE — TELEPHONE ENCOUNTER
Pt needs to be seen through PCP. Pt daughter was called and informed that  is willing to see pt later this week or sometime next week. Pt daughter was not wanting to scheduled with  and disconnected call. Cancelled appointment with PA as this is a ongoing issue and provider that pt was scheduled with is an acute care provider only.     Jaylene Graham  Lead     [facility]

## 2022-12-13 NOTE — TELEPHONE ENCOUNTER
No, I can't see her tomorrow, I have no open slots, but I could see her later this week on Thursday or Friday or sometime next week in any available slot.    Gurinder Ho MD

## 2022-12-21 ENCOUNTER — TELEPHONE (OUTPATIENT)
Dept: ANTICOAGULATION | Facility: CLINIC | Age: 77
End: 2022-12-21

## 2022-12-21 NOTE — TELEPHONE ENCOUNTER
ANTICOAGULATION     Selvin Rockwell is overdue for INR check.      Spoke with her daughter Alicia who declined to schedule a lab appointment at this time. If calling back please schedule as soon as possible.  Having car trouble at this time.     Gracia Souza RN

## 2022-12-28 ENCOUNTER — TELEPHONE (OUTPATIENT)
Dept: ANTICOAGULATION | Facility: CLINIC | Age: 77
End: 2022-12-28

## 2022-12-28 NOTE — TELEPHONE ENCOUNTER
ANTICOAGULATION     Selvin Rockwell is overdue for INR check.      Spoke with Alicia and scheduled lab appointment on 1/2/2023    Kristi Fu RN

## 2023-01-02 ENCOUNTER — LAB (OUTPATIENT)
Dept: LAB | Facility: CLINIC | Age: 78
End: 2023-01-02
Payer: MEDICARE

## 2023-01-02 ENCOUNTER — ANTICOAGULATION THERAPY VISIT (OUTPATIENT)
Dept: ANTICOAGULATION | Facility: CLINIC | Age: 78
End: 2023-01-02

## 2023-01-02 DIAGNOSIS — I48.0 PAROXYSMAL ATRIAL FIBRILLATION (H): ICD-10-CM

## 2023-01-02 DIAGNOSIS — Z79.01 LONG TERM (CURRENT) USE OF ANTICOAGULANTS: ICD-10-CM

## 2023-01-02 DIAGNOSIS — I48.0 PAROXYSMAL ATRIAL FIBRILLATION (H): Primary | ICD-10-CM

## 2023-01-02 LAB — INR BLD: 2.8 (ref 0.9–1.1)

## 2023-01-02 PROCEDURE — 36415 COLL VENOUS BLD VENIPUNCTURE: CPT

## 2023-01-02 PROCEDURE — 85610 PROTHROMBIN TIME: CPT

## 2023-01-14 DIAGNOSIS — Z79.01 LONG TERM (CURRENT) USE OF ANTICOAGULANTS: ICD-10-CM

## 2023-01-14 DIAGNOSIS — I48.0 PAROXYSMAL ATRIAL FIBRILLATION (H): ICD-10-CM

## 2023-01-16 RX ORDER — WARFARIN SODIUM 2 MG/1
TABLET ORAL
Qty: 135 TABLET | Refills: 0 | Status: SHIPPED | OUTPATIENT
Start: 2023-01-16 | End: 2023-03-24

## 2023-01-30 ENCOUNTER — TELEPHONE (OUTPATIENT)
Dept: FAMILY MEDICINE | Facility: CLINIC | Age: 78
End: 2023-01-30
Payer: MEDICARE

## 2023-01-30 ENCOUNTER — TELEPHONE (OUTPATIENT)
Dept: NURSING | Facility: CLINIC | Age: 78
End: 2023-01-30
Payer: MEDICARE

## 2023-01-30 NOTE — TELEPHONE ENCOUNTER
calling. Patient is with caller. Verbal consent given.    Medication question: patient reports she has increased fatigue and low energy; is not taking the Seroquel. Sleeping until 10:00, then going to couch and sleeping more. Wondering if she is on too many medications or if the dosing is too strong. Would like appointment to do medication reconciliation.  reports they will call back to make appointment. Declined triage.    Demi Casanova RN on 1/30/2023 at 9:42 AM

## 2023-01-30 NOTE — TELEPHONE ENCOUNTER
Patient's spouse calling reporting that patient is sleeping much more often; very fatigued.    Spouse has been out of state for the past 9 months d/t family emergency; states he is wondering if she has changed any of her medications.     Consent to communicate NOT on file for spouse; requested spouse to have patient giver verbal consent to discuss with him, spouse stated patient is sleeping and doesn't want to wake her.     Spouse stated he will call back when patient is awake and they can discuss medications together.    Ale Valenzuela RN  [facility]

## 2023-02-13 ENCOUNTER — TELEPHONE (OUTPATIENT)
Dept: ANTICOAGULATION | Facility: CLINIC | Age: 78
End: 2023-02-13
Payer: MEDICARE

## 2023-02-13 NOTE — TELEPHONE ENCOUNTER
ANTICOAGULATION     Selvin Rockwell is overdue for INR check.      Spoke with Daughter who declined to schedule a lab appointment at this time. If calling back please schedule as soon as possible.    Ke Petersen RN

## 2023-02-22 ENCOUNTER — TELEPHONE (OUTPATIENT)
Dept: ANTICOAGULATION | Facility: CLINIC | Age: 78
End: 2023-02-22
Payer: MEDICARE

## 2023-02-22 NOTE — TELEPHONE ENCOUNTER
ANTICOAGULATION     Selvin Rockwell is overdue for INR check.      Spoke with daughter who declined to schedule a lab appointment at this time. If calling back please schedule as soon as possible.    Portia Coronado RN

## 2023-03-02 ENCOUNTER — DOCUMENTATION ONLY (OUTPATIENT)
Dept: ANTICOAGULATION | Facility: CLINIC | Age: 78
End: 2023-03-02
Payer: MEDICARE

## 2023-03-02 NOTE — PROGRESS NOTES
ANTICOAGULATION     Selvin Rockwell is overdue for INR check.      Reminder letter sent    Suma Sandoval RN

## 2023-03-02 NOTE — LETTER
[facility] ANTICOAGULATION CLINIC  [address] [facility] [address]  Phone: [phone]  Fax: [phone]   March 2, 2023        Selvin Rockwell  [address] [place] [id number]            Dear Selvin,    You are currently under the care of [facility]ticoagulation [facility] for your warfarin (Coumadin , Jantoven ) therapy.  We are contacting you because our records show you were due for an INR on 2/6/23.    There are potentially serious risks when taking warfarin without careful monitoring and we want to make sure you are safely managed.  Routine lab monitoring is required for warfarin refills.     Please call [phone]  as soon as possible to schedule an appointment.  If there has been a change in your care or other concerns, please let us know so we can help and or update our records.     Sincerely,       [facility]

## 2023-03-09 ENCOUNTER — TELEPHONE (OUTPATIENT)
Dept: FAMILY MEDICINE | Facility: CLINIC | Age: 78
End: 2023-03-09
Payer: MEDICARE

## 2023-03-09 DIAGNOSIS — Z79.01 LONG TERM (CURRENT) USE OF ANTICOAGULANTS: ICD-10-CM

## 2023-03-09 DIAGNOSIS — I48.0 PAROXYSMAL ATRIAL FIBRILLATION (H): Primary | ICD-10-CM

## 2023-03-09 NOTE — TELEPHONE ENCOUNTER
"Dr. Ho,     Patient's daughter called regarding INR checks. Per pts dtr stated that she is having a very difficult time trying to get pt in for INR ab checks as she works until 4:30 pm and lab is not able to see patient after 5 pm. Per dtr \" I really don't know what to do because my mother doesn't have insurance covergae in her medications so we use discounts and I know the other medications are more expensive\", and, \"we tried to do the home monitor but that was getting too expensive\". Pt is currently over due for INR recheck. Could we have a community paramedic do a one time home visit for lab?    Dtr asking if there is a way that lab would be able to see pt right at 5 pm or a little later or is she able to take pt to [facility] for her INR checks? From my understanding lab cannot take samples after said time as  will not be available. I am unsure if we are able to send order to [place] for labs? Please advise.       [phone], ok for detailed voice mail.        Thanks,  SERA Bryant  [facility]     "

## 2023-03-13 ENCOUNTER — PATIENT OUTREACH (OUTPATIENT)
Dept: CARE COORDINATION | Facility: CLINIC | Age: 78
End: 2023-03-13
Payer: MEDICARE

## 2023-03-13 DIAGNOSIS — I48.0 PAROXYSMAL ATRIAL FIBRILLATION (H): Primary | ICD-10-CM

## 2023-03-13 NOTE — PROGRESS NOTES
Community Paramedic Program  Community Health Worker Outreach    UTC/Voicemail    Outreach attempted x 1.      Left message on pt's daughter Alicia's voicemail with call back information and requested return call.    CHW Follow-up Plan: will try to reach patient again in 1-2 business days.    Note: Available with CP1 tomorrow at 5 pm or with CP5 on 3/20 at 5 pm? Routing to ACC RN and PCP for awareness. Will also request venous INR order from PCP.    Referral source: Pt's PCP & clinic RN  Reason(s) for visit: Labs/Injections (please ensure orders have been placed in Epic)   Goals for visit(s): Decrease ER/Clinic/Ambulance Utilization   How often should patient be seen: Monthly   Preference on when patient should be seen: 1-2 Days     Lab order(s) placed: no, need to request INR order (venous) from pt's PCP    Timed or fasting lab requested: no

## 2023-03-13 NOTE — TELEPHONE ENCOUNTER
A community paramedic referral has been placed for a one-time INR check.  [facility] lab testing would not be a great option because they are outside of our system and we need to manage her INRs.  Perhaps the daughter could leave work a few minutes early on the once every 4 to 6 weeks that the patient will need her INR checked in our lab.    Gurinder Ho MD

## 2023-03-13 NOTE — TELEPHONE ENCOUNTER
Called patient's daughter, Alicia, and relayed message from Dr. Ho. She states that leaving early from work is not an option for her because she just started a new job. Advised to speak with INR nurse to see if they have any other suggestions. Discussed that if they used a lab outside of [facility], they would need the orders faxed over and then would need to get results back to INR nurse. Alicia states that they already looked into a home finger stick INR device and this was too expensive. Advised that they could look into [facility] to see if they are an option. Gave her their phone number.     Kristin Mills RN   [facility]

## 2023-03-14 ENCOUNTER — TELEPHONE (OUTPATIENT)
Dept: ANTICOAGULATION | Facility: CLINIC | Age: 78
End: 2023-03-14
Payer: MEDICARE

## 2023-03-14 NOTE — TELEPHONE ENCOUNTER
Discussed with Clinical Pharmacist. Okay to schedule patient later at the lab with the understanding that ACC may not get the result same day and for patient to continue same dosing if INR in range and ACC will follow up next day with dosing instructions. [place] lab is not an option for late appointments per lab staff, [place]. [place] or [place] can do later appointments since they have urgent care. Spoke with Alicia and she declined to schedule at [place] or [place] stating she is not familiar with the area. Asked that she consider it as an option because unfortunately [place] lab is not open as late as she needs. She again declined this. Since Community Paramedic note reports a huddle happening today, will see if other options are available she she is not interested in trying another lab location. Routing to Desiree Casanova as current referral has been placed to Community Paramedic and PCP as FYI as we work to find options for INR monitoring for this patient.     Suma Sandoval RN, BSN  [facility]tico[facility]  [phone]

## 2023-03-14 NOTE — TELEPHONE ENCOUNTER
Tsering, daughter calling regarding over due INR. She is not able to get patient in for appointment at this time. Time frame for in clinic appointments would be after 5:30 PM. Home meter is not an option due to patient cost.  Requesting call from Acc RN Contact number: [phone], preferably before 1 PM, as she is on break.

## 2023-03-14 NOTE — TELEPHONE ENCOUNTER
Writer spoke with Tsering, she is verbalizing frustration over not being able to get her mom into the lab and is aware her INR is overdue. She states she has no help from others to provide transportation to lab; she gets done with work at 4:30 and can make it to the lab by 5:00-5:30 PM.     A home meter is not an option due to cost. A referral has been made to Community Paramedics, per note there will be a discussion today regarding patient. In-home labs has been brought up as an option. Tsering states she has some concerns regarding possible symptoms of dementia or Alzheimer's in her mother, advised to speak with Provider or be seen if she has these concerns.     For now, will wait to see updated notes following discussion from Community Paramedic team, and will follow up with Tsering. She states if she does not answer her phone, it is okay to leave a detail voicemail.     Suma Sandoval RN, BSN  [facility]ticoagulation [facility]  [phone]

## 2023-03-16 ENCOUNTER — PATIENT OUTREACH (OUTPATIENT)
Dept: CARE COORDINATION | Facility: CLINIC | Age: 78
End: 2023-03-16
Payer: MEDICARE

## 2023-03-16 DIAGNOSIS — I48.0 PAROXYSMAL ATRIAL FIBRILLATION (H): Primary | ICD-10-CM

## 2023-03-16 NOTE — PROGRESS NOTES
"Community Paramedic Program  Community Health Worker Initial Outreach    Referral source: Pt's PCP & ACC RN  Reason(s) for visit: Labs/Injections (please ensure orders have been placed in Epic)   Goals for visit(s): Decrease ER/Clinic/Ambulance Utilization   How often should patient be seen: Monthly   Preference on when patient should be seen: 1-2 Days     Comment: PCP:  Gurinder Ho      Other info that would be helpful:  Patient needs her INR checked and daughter is having a hard time bringing her in to clinic as she is not done with work until our lab is closed. We are working on a permanent solution but need a temporary lab draw with a home visit.     Lab order(s) placed: yes (3/15/23)    Timed or fasting lab requested: no    Initial visit: Tuesday, March 21st / 5 pm / CP Cristina (date/time/CP)       Additional information:   Spoke with pt's daughter Alicia. I noted her call back yesterday and that she requested that I reach out during her lunch break, which starts at noon. Alicia confirmed that she speaks English and declined my offer for an . Gave pt's daughter a summary of my understanding of the barrier to getting her mom into the clinic for an INR check, and she agreed. Alicia shared that she recently started a new job in [place] and can't leave work earlier than 4:30 pm. She said she usually arrives home around 5 pm, depending on the weather and traffic, and \"it takes a long time to get my mom ready to leave the house.\"     Answered Alicia's questions about the CP program. Shared that our program is free due to being amrit funded, and Alicia expressed relief as her mom has Medicare Part A&B coverage only. She asked if a CP could come out regularly in the future to do INR draws for her mom, and I shared that the referral we received was for a one-time only visit as they work on a long term solution for pt's INR management. Alicia asked if there was any flexibility in more than one visit, and I " "told her that could be possible but we'd need a new referral and advance notice, due to our capacity and size of our team. Alicia said she understood.    Alicia said she has looked into an INR machine at home for her mom but \"it's too expensive.\" She also said she doesn't want to take her mom to another clinic that has later lab hours \"because I'm not familiar with those areas.\" I also noted [facility] as another resource for pt, but shared with Alicia that visits are about $75 each. She said she was aware of that resource but unsure if they are available for visits at 5 pm.    Offered to schedule a visit on Monday, but Alicia declined due to having other plans after work that day. She agreed to Tuesday at 5 pm. Confirmed pt's home address. She said she will be present during the visit. Alicia said her mom is not comfortable having \"someone she doesn't know come to the house without me there.\" She said their home [place] the corner and \"[place].\" She asked the CP to park on the street or driveway and come to the front door when she arrives. Alicia wrote down the visit details during our call, including my contact information. I asked her to reach out with questions or to reschedule if needed. She agreed.    Alicia also shared that her mom is planning a trip out of the country and may be gone for 2-3 months. She asked if I knew how her mom would get her INR checked during that time and \"how they will send the information to the [facility] clinic.\" I told her I wasn't sure but offered to pass this along to pt's PCP and the ACC for awareness. She expressed appreciation for the call and offer to visit pt at home.     Routing to pt's PCP, ACC and CP.        "

## 2023-03-21 ENCOUNTER — ALLIED HEALTH/NURSE VISIT (OUTPATIENT)
Dept: OTHER | Facility: CLINIC | Age: 78
End: 2023-03-21
Attending: FAMILY MEDICINE
Payer: MEDICARE

## 2023-03-21 ENCOUNTER — NURSE TRIAGE (OUTPATIENT)
Dept: NURSING | Facility: CLINIC | Age: 78
End: 2023-03-21

## 2023-03-21 VITALS
OXYGEN SATURATION: 94 % | RESPIRATION RATE: 22 BRPM | SYSTOLIC BLOOD PRESSURE: 156 MMHG | TEMPERATURE: 97.9 F | HEART RATE: 110 BPM | DIASTOLIC BLOOD PRESSURE: 102 MMHG

## 2023-03-21 DIAGNOSIS — I48.0 PAROXYSMAL ATRIAL FIBRILLATION (H): ICD-10-CM

## 2023-03-21 DIAGNOSIS — Z79.01 LONG TERM (CURRENT) USE OF ANTICOAGULANTS: ICD-10-CM

## 2023-03-21 LAB — INR PPP: 6.43 (ref 0.85–1.15)

## 2023-03-21 PROCEDURE — 85610 PROTHROMBIN TIME: CPT | Performed by: FAMILY MEDICINE

## 2023-03-21 PROCEDURE — 99207 PR COMMUNITY PARAMEDIC - PATIENT NOT BILLABLE: CPT

## 2023-03-21 PROCEDURE — 36415 COLL VENOUS BLD VENIPUNCTURE: CPT

## 2023-03-22 ENCOUNTER — ANTICOAGULATION THERAPY VISIT (OUTPATIENT)
Dept: ANTICOAGULATION | Facility: CLINIC | Age: 78
End: 2023-03-22
Payer: MEDICARE

## 2023-03-22 ENCOUNTER — PATIENT OUTREACH (OUTPATIENT)
Dept: CARE COORDINATION | Facility: CLINIC | Age: 78
End: 2023-03-22
Payer: MEDICARE

## 2023-03-22 DIAGNOSIS — I48.0 PAROXYSMAL ATRIAL FIBRILLATION (H): Primary | ICD-10-CM

## 2023-03-22 DIAGNOSIS — Z79.01 LONG TERM (CURRENT) USE OF ANTICOAGULANTS: ICD-10-CM

## 2023-03-22 NOTE — TELEPHONE ENCOUNTER
Received message that community paramedic cannot come out to draw the patient tomorrow.  The patient should therefore have an INR point-of-care in the clinic either late tomorrow afternoon or sometime on Friday.    Gurinder Ho MD

## 2023-03-22 NOTE — TELEPHONE ENCOUNTER
I would argue that it is not the only option, but it is the easiest/best option for them right now.  I will go ahead and place a community paramedic order to have her INR done one more time tomorrow, but that should not be the ongoing solution for them.    Gurinder Ho MD

## 2023-03-22 NOTE — TELEPHONE ENCOUNTER
I do not think these community paramedic draws are a good option.  These require a lot more time and energy and resources and they are done late in the day and then the result gets called to the on-call provider who has to deal with it.  I think they should figure out a way to get the point-of-care INR checks done.    Gurinder Ho MD

## 2023-03-22 NOTE — PROGRESS NOTES
ANTICOAGULATION MANAGEMENT     Selvin Rockwell 77 year old female is on warfarin with supratherapeutic INR result. (Goal INR 2.0-3.0)    Recent labs: (last 7 days)     03/21/23  1800   INR 6.43*       ASSESSMENT       Source(s): Chart Review and Patient/Caregiver Call       Warfarin doses taken: Warfarin taken as instructed    Diet: No new diet changes identified    New illness, injury, or hospitalization: No    Medication/supplement changes: None noted    Signs or symptoms of bleeding or clotting: Yes: bloody noses yesterday     About 3 times, were not uncontrollable - lasted less than 10 minutes    Previous INR: Therapeutic last 2(+) visits    Additional findings: None     PLAN     Recommended plan for no diet, medication or health factor changes affecting INR     Dosing Instructions: Hold warfarin X 2 days with next INR in 2 days;  Decrease maintenance dose 15%    Routing to PCP as FYI    Summary  As of 3/22/2023    Full warfarin instructions:  3/22: Hold; Otherwise 3 mg every Mon, Wed, Fri; 2 mg all other days   Next INR check:  3/23/2023             Telephone call with  Alicia who verbalizes understanding and agrees to plan    Alicia will call community paramedics and her FP to set up appt    Education provided:     Please call back if any changes to your diet, medications or how you've been taking warfarin    Symptom monitoring: monitoring for bleeding signs and symptoms    Plan made per ACC anticoagulation protocol    Portia Coronado RN  Anticoagulation Clinic  3/22/2023    _______________________________________________________________________     Anticoagulation Episode Summary     Current INR goal:  2.0-3.0   TTR:  54.7 % (1 y)   Target end date:  Indefinite   Send INR reminders to:  JERRY PEÑA    Indications    Paroxysmal atrial fibrillation (H) [I48.0]  Long term (current) use of anticoagulants [Z79.01]           Comments:           Anticoagulation Care Providers     Provider Role Specialty  Phone number    Gurinder Ho MD [facility] [phone]

## 2023-03-22 NOTE — TELEPHONE ENCOUNTER
Discussed with Alicia - she states that it is her only option at this time. Discussed the message in detail below with her.    Will route to provider. INR was supra therpautic 3/21.  Portia Coronado RN on 3/22/2023 at 11:07 AM

## 2023-03-22 NOTE — PROGRESS NOTES
Called patient for a critical value of INR 6.4.  Talked to patient's daughter.  Advice to stop Warfarin for 2 days (today and tomorrow) and recheck INR on Thursday.

## 2023-03-22 NOTE — TELEPHONE ENCOUNTER
Discussed INR result with Alicia. Is INR able to be drawn by paramedics tomorrow 3/23?    Thanks.  Portia Coronado RN on 3/22/2023 at 9:17 AM

## 2023-03-22 NOTE — TELEPHONE ENCOUNTER
Jarvis w/ Alicia - informed her of the message below.   She thanked RN and ended the call.  Portia Coronado RN on 3/22/2023 at 12:03 PM

## 2023-03-22 NOTE — TELEPHONE ENCOUNTER
Angela with [facility] calling to report a critical lab ordered by Dr. Gurinder Ho, Family Medicine with [facility] [address] [facility].   Warm transferred caller to [place] answering service per protocol.    Dianna Snyder RN  [facility]

## 2023-03-22 NOTE — PROGRESS NOTES
Community Paramedic Program    CHW Community/Care Team Outreach    Spoke with the CP, who shared that pt had a critical INR value yesterday. Received new referral for another, emergent INR draw tomorrow. Per current schedules and capacity, CP will be unable to visit pt tomorrow for lab draw at home. Soonest visit availability is Tuesday, March 28th.    Received voicemail messages from pt's daughter this afternoon requesting call back to schedule emergent CP visit for INR check.    Forwarding to pt's PCP, ACC and CP who visited pt for awareness. Will wait for PCP's & ACC's recommendations/response before calling pt's daughter back.

## 2023-03-23 ENCOUNTER — TELEPHONE (OUTPATIENT)
Dept: NURSING | Facility: CLINIC | Age: 78
End: 2023-03-23
Payer: MEDICARE

## 2023-03-23 ENCOUNTER — TELEPHONE (OUTPATIENT)
Dept: FAMILY MEDICINE | Facility: CLINIC | Age: 78
End: 2023-03-23
Payer: MEDICARE

## 2023-03-23 ENCOUNTER — PATIENT OUTREACH (OUTPATIENT)
Dept: OTHER | Facility: CLINIC | Age: 78
End: 2023-03-23
Payer: MEDICARE

## 2023-03-23 DIAGNOSIS — Z79.01 LONG TERM (CURRENT) USE OF ANTICOAGULANTS: ICD-10-CM

## 2023-03-23 DIAGNOSIS — I48.0 PAROXYSMAL ATRIAL FIBRILLATION (H): Primary | ICD-10-CM

## 2023-03-23 DIAGNOSIS — I48.0 PAROXYSMAL ATRIAL FIBRILLATION (H): ICD-10-CM

## 2023-03-23 NOTE — TELEPHONE ENCOUNTER
Sure, you can give them the [employer] prescription assistance phone number.  Any of the DOAC medications that do not require blood test monitoring (such as Eliquis, Xarelto, etc.) will be very expensive, so it does not sound like that is a great option for them if she does not have insurance drug coverage, and less they were able to get prescription assistance.  Otherwise, she should stay on the warfarin and they will have to figure out a way to have regular INR checks done during the clinic hours.

## 2023-03-23 NOTE — TELEPHONE ENCOUNTER
Noted -- thanks.  We have indeed presented them with a number of different options and have already spent a lot of time and resources trying to help them figure this out.  They will have to decide how to make this work for them.    Gurinder Ho MD

## 2023-03-23 NOTE — TELEPHONE ENCOUNTER
I tried calling the daughter's cell phone a couple of times and there was no answer, so I left a detailed message on her voicemail.  I would recommend using a DOAC in place of warfarin given the difficulty with INR checks.  I did acknowledge that the medication will be more expensive than warfarin, but hopefully insurance coverage is adequate, and then we would not need to deal with the INR checks.  I therefore sent in a prescription for Eliquis 5 mg twice a day to the patient's pharmacy.  Alternatively, she could use a Xarelto 20 mg daily if insurance coverage is better for that medication.    If they are willing to use one of these 2 medications, then we could forego any further INR checks.    Gurinder Ho MD

## 2023-03-23 NOTE — TELEPHONE ENCOUNTER
This needs to go to PCP for review. [facility] and community paramedics have already told this patient's daughter extensively that paramedic INR draws are not long term, nor are they able to accommodate re draws in a timely manner.     This patient is not compliant with INR checks and has been given many options that they are not willing to sign up for such as home meter and in home lab draws from a company that does that. We have also offered other lab options with later hours and approval to go in after ACC hours but daughter refused.     PCP, please review and call daughter to discuss that this medication needs to be monitored and is unsafe left unchecked like it has been. Please review if an alternate medication is an option or if it is safe to keep patient on warfarin without the proper follow up.    Thank you,  Ale Vasquez, RN, BSN, PHN  Anticoagulation Clinic

## 2023-03-23 NOTE — PROGRESS NOTES
Community Paramedic Program  Community Health Worker Outreach    UTC/Voicemail    Outreach attempted x 1.      Left message on pt's daughter Alicia's voicemail with call back information and requested return call.    CHW Follow-up Plan: will try to reach patient again in 3-5 business days.    Note: Received 6 messages from pt's daughter yesterday & this morning regarding an emergent CP visit for an INR draw for her mom today. Was not able to call pt's daughter back yesterday due to a required team training. Shared that we're not able to assist this week due to our CP schedules being full. Noted pt's PCP's voicemail message this morning and encouraged Alicia to call pt's clinic back to discuss further.    CP also reached out to pt's PCP and care team today to ask how else we can assist pt and her family. If helpful, we can place an order for Care Coordination to assist pt's family with other potential resources.

## 2023-03-23 NOTE — TELEPHONE ENCOUNTER
**Consent on file to speak with the patient's daughter Alicia**    Patient's daughter calling about a paramedic visit to draw an INR. Relayed message from Dr. Ho. Patient's daughter stated she cannot bring her mother to the clinic. Transferred patients daughter to the clinic to discuss. No triage.     NEHA FRANK RN

## 2023-03-23 NOTE — TELEPHONE ENCOUNTER
"Called daughter Alicia and relayed provider's message.   She stated, \"there are no other options?\"  Explained that we have provided her with multiple options (different anticoag med options, alternative lab times and locations, home INR monitor, outside lab sources such as [facility], etc) but she has to be willing to proceed with one of the options.  Alicia stated, \"I don't understand why he can't just keep ordering the community paramedics to come out.\"  Explained to her that this would not be an ideal solution as community paramedics have limited availability and may not always be available when needed.  Advised that there needs to be a long term solution.   Offered to give her [facility] Rx assistance phone number but she hung up the phone abruptly     Jagjit Watkins RN  [facility]    "

## 2023-03-23 NOTE — TELEPHONE ENCOUNTER
See message from community paramedics yesterday- Soonest visit availability is not til Tuesday, March 28th.    Jagjit Watkins RN  [facility]

## 2023-03-23 NOTE — TELEPHONE ENCOUNTER
Received call from pt's daughter, Alicia (has consent to communicate on file). She is calling regarding apixaban (Eliquis). It was $600. Not affordable for patient. Even with a discount, still too expensive. She asked the pharmacy if there are other medications that don't require a blood test and they said that there are, but the doctor has to prescribe. Patient does not have prescription coverage through her medicare per daughter. Patient has gone without medication for a couple of day, so daughter is worried and does not want anything to happen to patient.  Would it be beneficial or appropriate to give [employer] prescription assistance phone or would it not be helpful in this situation?     If needed [facility] prescription assistance phone: [phone]    Ph daughter: [phone]. It is okay to leave a detailed voice message if she does not answer.     Kristin Mills RN   [facility]

## 2023-03-23 NOTE — TELEPHONE ENCOUNTER
Reason for Call:  Appointment Request    Patient requesting this type of appt:  INR Lab appointment    Reason patient unable to be scheduled: Daughter needs 5:00 pm or later for work.    [facility] lab schedule is only till 4:00 pm. I offered that time.     When does patient want to be seen/preferred time: Same day    Comments: Patient's daughter was told this needs to get done today or tomorrow by the nurse.     Daughter asked to see if community paramedic could come out to draw this. This is not with in scope.     Okay to leave a detailed message?: Yes at Cell number on file:    Telephone Information:   Mobile [phone]     Call taken on 3/23/2023 at 8:03 AM by Ganesh Bowen

## 2023-03-24 ENCOUNTER — TELEPHONE (OUTPATIENT)
Dept: FAMILY MEDICINE | Facility: CLINIC | Age: 78
End: 2023-03-24
Payer: MEDICARE

## 2023-03-24 RX ORDER — WARFARIN SODIUM 2 MG/1
TABLET ORAL
Qty: 135 TABLET | Refills: 0 | COMMUNITY
Start: 2023-03-24 | End: 2023-04-03

## 2023-03-24 NOTE — TELEPHONE ENCOUNTER
Called and spoke to patient's daughter and relayed information from PCP, daughter states that it comes down to either losing her job or bringing her mother in for INR checks and she states that she will not lose her job.    Writer listened to caller and discussed options that had been given within previous encounters, patient's daughter still denied.    Writer relayed that medication had been sent in to pharmacy and instructed on dosage.    Patient's daughter ended call.    Routing to PCP as CARLOS ALBERTO.    BRIANA CastilloN RN  [facility]

## 2023-03-24 NOTE — TELEPHONE ENCOUNTER
Talked with Alicia who confirmed Gem is holding warfarin since high inr and will continue to hold until next inr is done. We did discuss options below for labs

## 2023-03-24 NOTE — TELEPHONE ENCOUNTER
See TE's below.    Patient's daughter calling again to report she has left two messages with the Assistance Program (#[phone]) regarding Eliquis/Xarelto cost and they are not calling her back.    Re-stated messages from Dr. Ho (below); daughter keeps stating she can't bring patient in for INR lab checks due to work schedule; states patient's spouse and son (who also live with patient) are out of town and she doesn't know when they will return; states spouse could give patient a ride for lab appointment if he returns.    Daughter stating she can't afford alternative medication and can't miss work for lab appointment needed at this time; states patient hasn't had medication in the last 4 days.    Not sure what else this writer can tell patient; not willing to do any of the options given; routing to PCP to advise.    Galina Arguello RN

## 2023-03-24 NOTE — TELEPHONE ENCOUNTER
Talked with daughter and relayed Dr Ho's message below. Alicia is doubtful of doac affordability but I did encourage her to check with the pharmacy.   She wondered if inrs can be checked at [facility].  She will call them to find out where we should fax standing orders and leave us a message with fax # as this is a possibility.

## 2023-03-24 NOTE — TELEPHONE ENCOUNTER
This is going to be a problem if she cannot come in for INR checks.  If they want her to stay on warfarin, they are going to need to figure that out.  For now, I believe her INR was elevated when she was taking 3 mg of warfarin per day.  We should cut that dose down.  She could resume the warfarin at 2 mg every other day on even days alternating with 3 mg every other day on odd days.  She should have her INR rechecked in a week or 2.  She should have this monitored through our INR clinic and have them give further instruction on her warfarin dosing.  This is not supposed to be an MD role...    Gurinder Ho MD

## 2023-03-24 NOTE — PROGRESS NOTES
See 03/24/2023 TE for Follow up    Kiera Casanova, PharmD BCACP  Anticoagulation Clinical Pharmacist

## 2023-03-24 NOTE — TELEPHONE ENCOUNTER
Chart reviewed with [facility] RN at time of encounter.    [id number] = 5 (age ++, HTN, DM, female) no HX TIA/stroke.  In light of elevated INR and no HX CVA or TIA, advise hold until can recheck INR    Although not idea, [facility] are open 9-5 Sat & Sunday and until 8PM M-F for an immediate option until family follows up on a more permanent option.       Kiera Casanova, PharmD BCACP  Anticoagulation Clinical Pharmacist

## 2023-03-25 DIAGNOSIS — Z79.01 LONG TERM (CURRENT) USE OF ANTICOAGULANTS: ICD-10-CM

## 2023-03-25 DIAGNOSIS — I10 HYPERTENSION GOAL BP (BLOOD PRESSURE) < 140/90: ICD-10-CM

## 2023-03-25 DIAGNOSIS — I48.0 PAROXYSMAL ATRIAL FIBRILLATION (H): ICD-10-CM

## 2023-03-27 ENCOUNTER — TELEPHONE (OUTPATIENT)
Dept: FAMILY MEDICINE | Facility: CLINIC | Age: 78
End: 2023-03-27
Payer: MEDICARE

## 2023-03-27 DIAGNOSIS — I48.0 PAROXYSMAL ATRIAL FIBRILLATION (H): Primary | ICD-10-CM

## 2023-03-27 RX ORDER — CARVEDILOL 6.25 MG/1
TABLET ORAL
Qty: 180 TABLET | Refills: 1 | Status: SHIPPED | OUTPATIENT
Start: 2023-03-27 | End: 2023-09-18

## 2023-03-27 RX ORDER — WARFARIN SODIUM 2 MG/1
TABLET ORAL
Qty: 120 TABLET | Refills: 0 | OUTPATIENT
Start: 2023-03-27

## 2023-03-27 NOTE — TELEPHONE ENCOUNTER
Dr. Ho,     Standing order for INR checks to be done through [facility].   Fax orders to: [phone]    Cued for signature.     [phone], ok for detailed vm.     Thanks,  SERA Bryant  [facility]

## 2023-03-27 NOTE — LETTER
[address] [address]  Phone: [phone]    March 27, 2023        Selvin Rockwell  [address] [place] [address]          To whom it may concern:    RE: Erumanivalysabel Rocwkell    Please use this letter as a standing order for her to get regular INR checks at [facility].  Please fax the results to our anticoagulation clinic at [phone] as they will manage her INR results.      Sincerely,        Gurinder Ho MD

## 2023-03-27 NOTE — TELEPHONE ENCOUNTER
Order signed in chart and also letter generated (in team blue bin) that can be faxed to [facility] for this purpose.    Gurinder Ho MD

## 2023-03-27 NOTE — TELEPHONE ENCOUNTER
If we have her get the INR checks done through [facility], then the results would have to be faxed to our INR clinic.  What fax number should we give [facility] to send the results to?    Gurinder Ho MD

## 2023-03-27 NOTE — TELEPHONE ENCOUNTER
Her warfarin prescription is up-to-date as of 3 days ago.  She apparently will be getting her INR checks done through [facility] and the results faxed to our anticoagulation clinic.    Gurinder Ho MD

## 2023-03-28 NOTE — TELEPHONE ENCOUNTER
Patient's daughter calling stating that she spoke with [facility] and they said that they never received the fax/orders.     She believes that [phone] is their fax number. Writer called [facility] and their fax number is [phone].    Can we re-fax the orders and letter?     Thank you!    Kristin Mills RN   [facility]

## 2023-03-28 NOTE — TELEPHONE ENCOUNTER
Daughter calling to check on status of standing order. Advised this has been faxed. Daughter will contact [facility] to arrange INR appt.    Desiree Sen RN  [facility]

## 2023-03-28 NOTE — TELEPHONE ENCOUNTER
Daughter, Alicia called and will  copy of the lab order and the letter at the first floor  tomorrow after work.    Rohini Gary,

## 2023-03-29 LAB — INR (EXTERNAL): 1.3 (ref 0.9–1.1)

## 2023-03-30 ENCOUNTER — ANTICOAGULATION THERAPY VISIT (OUTPATIENT)
Dept: ANTICOAGULATION | Facility: CLINIC | Age: 78
End: 2023-03-30
Payer: MEDICARE

## 2023-03-30 DIAGNOSIS — Z79.01 LONG TERM (CURRENT) USE OF ANTICOAGULANTS: ICD-10-CM

## 2023-03-30 DIAGNOSIS — I48.0 PAROXYSMAL ATRIAL FIBRILLATION (H): Primary | ICD-10-CM

## 2023-03-30 NOTE — PROGRESS NOTES
ANTICOAGULATION MANAGEMENT     Selvin Rockwell 77 year old female is on warfarin with subtherapeutic INR result. (Goal INR 2.0-3.0)    Recent labs: (last 7 days)     03/29/23  1804   INR 1.3*       ASSESSMENT       Source(s): Chart Review and Patient/Caregiver Call       Warfarin doses taken: Held for supra INR 8 days ago  recently which may be affecting INR    Diet: No new diet changes identified    New illness, injury, or hospitalization: No    Medication/supplement changes: None noted    Signs or symptoms of bleeding or clotting: No    Previous INR: Supratherapeutic resulting in a MD reduction    Additional findings: patient held warfarin for over a week due to supra INR and not being able to get to a [facility] lab. She is now set up for labs at [facility] which will work well for her         PLAN     Recommended plan for temporary change(s) affecting INR     Dosing Instructions: booster dose then continue your current warfarin dose with next INR in 5-7 days       Summary  As of 3/30/2023    Full warfarin instructions:  3/30: 4 mg; Otherwise 3 mg every Mon, Wed, Fri; 2 mg all other days   Next INR check:  4/5/2023             Telephone call with  Alicia who verbalizes understanding and agrees to plan    Patient using outside facility for labs    Education provided:     Please call back if any changes to your diet, medications or how you've been taking warfarin    Symptom monitoring: monitoring for clotting signs and symptoms, monitoring for stroke signs and symptoms and when to seek medical attention/emergency care    Contact [phone]  with any changes, questions or concerns.     Plan made per [facility] anticoagulation protocol    Ale Vasquez RN  Anticoagulation Clinic  3/30/2023    _______________________________________________________________________     Anticoagulation Episode Summary     Current INR goal:  2.0-3.0   TTR:  55.1 % (1 y)   Target end date:  Indefinite   Send INR reminders to:  JERRY  FRIDLEY    Indications    Paroxysmal atrial fibrillation (H) [I48.0]  Long term (current) use of anticoagulants [Z79.01]           Comments:           Anticoagulation Care Providers     Provider Role Specialty Phone number    Gurinder Ho MD [facility] Medicine [phone]

## 2023-03-31 NOTE — PROGRESS NOTES
"Community Paramedic One Time Visit    March 21, 2023; 5:30 PM    Selvin Rockwell is a 77 year old year old adult being seen at home visit    Present at appointment:  Pt Gem, her daughter Alicia and Community Paramedic Cristina    Vitals:  BP Readings from Last 1 Encounters:   03/21/23 (!) 156/102     Pulse Readings from Last 1 Encounters:   03/21/23 110     Wt Readings from Last 1 Encounters:   11/14/22 94.8 kg (209 lb)     Ht Readings from Last 1 Encounters:   11/14/22 1.499 m (4' 11\")         Clinical Concerns:  Current Medical Concerns:  Need for one time INR Lab draw/sample    Education Provided to patient: Monitor for signs and symptoms of allergic reaction to vaccine, call 911 if needed   Flu Shot given: No  COVID Vaccine Given: No  Lab draw or specimen collection: Yes      Plan:   Graduate pt from the Community Paramedic program if no additional needs/requests.     Time spent with patient: 45    The patient meets one or more of the following criteria:  * Requires services to prevent readmission to a nursing home or hospital    Acute concern/Follow-up recommendations: One- time visit, will need new CP referral if additional needs arise    Issues for Provider to follow up on: [facility] Paramedic visited patient in home, provided one-time visit.     I used aseptic technique and a straight stick needle to obtain an INR lab. Pt expressed discomfort during the procedure but said she was okay for me to proceed and complete the lab draw. There was no redness, swelling, or bleeding noted afterward. I brought the labs to [facility] lab immediately following the appt.     Update: I received a phone call from a  at [facility] to let me know the INR lab value was critical. Due to it being late in the evening the tech and I discussed her paging the pt's primary and if she was unable to reach the PCP to return a call to me. I did not hear back from the . I reviewed the chart and it was noted the " PCP and MTM received the page/message and called pt to address the concern.

## 2023-04-04 ENCOUNTER — ANTICOAGULATION THERAPY VISIT (OUTPATIENT)
Dept: ANTICOAGULATION | Facility: CLINIC | Age: 78
End: 2023-04-04
Payer: MEDICARE

## 2023-04-04 ENCOUNTER — TELEPHONE (OUTPATIENT)
Dept: FAMILY MEDICINE | Facility: CLINIC | Age: 78
End: 2023-04-04
Payer: MEDICARE

## 2023-04-04 DIAGNOSIS — I48.0 PAROXYSMAL ATRIAL FIBRILLATION (H): Primary | ICD-10-CM

## 2023-04-04 DIAGNOSIS — Z79.01 LONG TERM (CURRENT) USE OF ANTICOAGULANTS: ICD-10-CM

## 2023-04-04 LAB — INR (EXTERNAL): 1.7 (ref 0.9–1.1)

## 2023-04-04 NOTE — PROGRESS NOTES
Below dosing reviewed with jeffery. She will try to bring patient to lab on 4-10-23 in the morning.    Ale Vasquez RN, BSN, PHN

## 2023-04-04 NOTE — TELEPHONE ENCOUNTER
Patient returning Ale's call from INR.  Please call back before 1:00 today as she is on lunch break.

## 2023-04-04 NOTE — PROGRESS NOTES
ANTICOAGULATION MANAGEMENT     Selvin Rockwell 77 year old female is on warfarin with subtherapeutic INR result. (Goal INR 2.0-3.0)    Recent labs: (last 7 days)     04/03/23  0809   INR 1.7*       ASSESSMENT       Source(s): Chart Review    Previous INR was Subtherapeutic    Medication, diet, health changes since last INR chart reviewed; none identified             PLAN     Recommended plan for temporary change(s) affecting INR     Dosing Instructions: booster dose then continue your current warfarin dose with next INR in 1 week       Summary  As of 4/4/2023    Full warfarin instructions:  4/4: 3 mg; Otherwise 3 mg every Mon, Wed, Fri; 2 mg all other days   Next INR check:  4/10/2023             Detailed voice message left for  Alicia with dosing instructions and follow up date. Dosing given to Alicia for 4-10-23 in case the lab result does not come back that day.    Patient using outside facility for labs    Education provided:     Please call back if any changes to your diet, medications or how you've been taking warfarin    Contact [phone]  with any changes, questions or concerns.     Plan made per [facility] anticoagulation protocol    Ale Vasquez RN  Anticoagulation Clinic  4/4/2023    _______________________________________________________________________     Anticoagulation Episode Summary     Current INR goal:  2.0-3.0   TTR:  55.2 % (1 y)   Target end date:  Indefinite   Send INR reminders to:  JERRY PEÑA    Indications    Paroxysmal atrial fibrillation (H) [I48.0]  Long term (current) use of anticoagulants [Z79.01]           Comments:           Anticoagulation Care Providers     Provider Role Specialty Phone number    Gurinder Ho MD Referring Family Medicine [phone]

## 2023-04-04 NOTE — TELEPHONE ENCOUNTER
Patient Returning Call    Reason for call:  Speak to INR nurse    Information relayed to patient:  n/a    Patient has additional questions:  No      Okay to leave a detailed message?: Yes at Cell number on file:    Telephone Information:   Mobile [phone]     **Alicia, daughter stated call back after 12pm

## 2023-04-10 ENCOUNTER — TELEPHONE (OUTPATIENT)
Dept: FAMILY MEDICINE | Facility: CLINIC | Age: 78
End: 2023-04-10
Payer: MEDICARE

## 2023-04-10 LAB — INR (EXTERNAL): 2.9 (ref 0.9–1.1)

## 2023-04-10 NOTE — TELEPHONE ENCOUNTER
This is meant for [facility]. We are aware she may go to [place] for her INR lab in the afternoon. Closing encounter at this time.    Ale Vasquez RN, BSN, PHN

## 2023-04-10 NOTE — TELEPHONE ENCOUNTER
FYI - Status Update    Who is Calling: family member, Alicia    Update: Daughter want to let lab know that she won't be able to take [facility] to do INR in the morning it will have to be in the afternoon for now.Can leave a detailed message on daughter phone if have questions.     Does caller want a call/response back: No

## 2023-04-11 ENCOUNTER — ANTICOAGULATION THERAPY VISIT (OUTPATIENT)
Dept: ANTICOAGULATION | Facility: CLINIC | Age: 78
End: 2023-04-11
Payer: MEDICARE

## 2023-04-11 ENCOUNTER — PATIENT OUTREACH (OUTPATIENT)
Dept: OTHER | Facility: CLINIC | Age: 78
End: 2023-04-11
Payer: MEDICARE

## 2023-04-11 DIAGNOSIS — Z79.01 LONG TERM (CURRENT) USE OF ANTICOAGULANTS: ICD-10-CM

## 2023-04-11 DIAGNOSIS — I48.0 PAROXYSMAL ATRIAL FIBRILLATION (H): Primary | ICD-10-CM

## 2023-04-11 NOTE — PROGRESS NOTES
ANTICOAGULATION MANAGEMENT     Selvin BRITT Moiz 77 year old female is on warfarin with therapeutic INR result. (Goal INR 2.0-3.0)    Recent labs: (last 7 days)     04/10/23  1730   INR 2.9*       ASSESSMENT       Source(s): Chart Review    Previous INR was Subtherapeutic    Medication, diet, health changes since last INR chart reviewed; none identified             PLAN     Unable to reach Alicia today.    Alicia stated to call back after 12 but when writer called back, she answered and said she could not talk and would call [facility] back    Follow up required to confirm warfarin dose taken and assess for changes    Ale Vasquez RN  Anticoagulation Clinic  4/11/2023

## 2023-04-11 NOTE — PROGRESS NOTES
ANTICOAGULATION MANAGEMENT     Selvin Rockwell 77 year old female is on warfarin with therapeutic INR result. (Goal INR 2.0-3.0)    Recent labs: (last 7 days)     04/10/23  1730   INR 2.9*       ASSESSMENT       Source(s): Chart Review and Patient/Caregiver Call       Warfarin doses taken: Warfarin taken as instructed    Diet: No new diet changes identified    New illness, injury, or hospitalization: No    Medication/supplement changes: None noted    Signs or symptoms of bleeding or clotting: No    Previous INR: Subtherapeutic    Additional findings: patient planning to go to [facility] for 3 months. Writer let Alicia know she will need to be monitored by a provider in [facility] while there and to let [facility] know exact dates when she has them         PLAN     Recommended plan for no diet, medication or health factor changes affecting INR     Dosing Instructions: Continue your current warfarin dose with next INR in 2 weeks       Summary  As of 4/11/2023    Full warfarin instructions:  3 mg every Mon, Wed, Fri; 2 mg all other days   Next INR check:  4/24/2023             Telephone call with  Alicia who verbalizes understanding and agrees to plan    Patient using outside facility for labs    Education provided:     Please call back if any changes to your diet, medications or how you've been taking warfarin    Importance of notifying anticoagulation clinic for: plan to leave the country    Contact [phone]  with any changes, questions or concerns.     Plan made per [facility] anticoagulation protocol    Ale Vasquez RN  Anticoagulation Clinic  4/11/2023    _______________________________________________________________________     Anticoagulation Episode Summary     Current INR goal:  2.0-3.0   TTR:  56.7 % (1 y)   Target end date:  Indefinite   Send INR reminders to:  JERRY PEÑA    Indications    Paroxysmal atrial fibrillation (H) [I48.0]  Long term (current) use of anticoagulants [Z79.01]           Comments:            Anticoagulation Care Providers     Provider Role Specialty Phone number    Gurinder Ho MD Referring Family Medicine [phone]

## 2023-04-24 ENCOUNTER — TELEPHONE (OUTPATIENT)
Dept: FAMILY MEDICINE | Facility: CLINIC | Age: 78
End: 2023-04-24
Payer: MEDICARE

## 2023-04-24 LAB — INR (EXTERNAL): 3.5 (ref 0.8–1.1)

## 2023-04-24 NOTE — TELEPHONE ENCOUNTER
Writer spoke with patient's daughter, confirmed that INR is due today if she is able to get patient to [place] for her INR lab draw. She states she will bring patient after she gets off work today.     Suma Sandoval RN, BSN  [facility] Anticoagulation Clinic  [phone]

## 2023-04-24 NOTE — TELEPHONE ENCOUNTER
Reason for Call:  INR follow up    Detailed comments: Daughter is calling. Would like to know when patient is due to go to [facility] for INR draw? She thinks it could be today. Please call to advise    Phone Number Patient can be reached at: Other phone number:  [phone]     Best Time: before 1 pm or after 4;30 pm    Can we leave a detailed message on this number? YES    Call taken on 4/24/2023 at 12:46 PM by Sarai Wong

## 2023-04-25 ENCOUNTER — ANTICOAGULATION THERAPY VISIT (OUTPATIENT)
Dept: ANTICOAGULATION | Facility: CLINIC | Age: 78
End: 2023-04-25
Payer: MEDICARE

## 2023-04-25 DIAGNOSIS — Z79.01 LONG TERM (CURRENT) USE OF ANTICOAGULANTS: ICD-10-CM

## 2023-04-25 DIAGNOSIS — I48.0 PAROXYSMAL ATRIAL FIBRILLATION (H): Primary | ICD-10-CM

## 2023-04-25 NOTE — PROGRESS NOTES
ANTICOAGULATION MANAGEMENT     Selvin Rockwell 77 year old female is on warfarin with supratherapeutic INR result. (Goal INR 2.0-3.0)    Recent labs: (last 7 days)     04/24/23  1735   INR 3.5*       ASSESSMENT       Source(s): Chart Review and Patient/Caregiver Call       Warfarin doses taken: Warfarin taken as instructed    Diet: No new diet changes identified    Medication/supplement changes: None noted    New illness, injury, or hospitalization: Judy- Alicia reports possibly more stress. Patient also wants to travel for 3 months to [facility]. Discussed the option of finding a local Provider to help manage her warfarin dosing while there as she is not yet back on a stable dose. Also to please notify [facility] of travel dates when she knows them, she reports may be leaving sometime in May.     Signs or symptoms of bleeding or clotting: No    Previous result: Therapeutic last visit; previously outside of goal range    Additional findings: None         PLAN     Recommended plan for no diet, medication or health factor changes affecting INR     Dosing Instructions: decrease your warfarin dose (5% change) with next INR in 2 weeks       Summary  As of 4/25/2023    Full warfarin instructions:  3 mg every Mon, Fri; 2 mg all other days   Next INR check:  5/8/2023             Telephone call with  Alicia who verbalizes understanding and agrees to plan    Patient using outside facility for labs    Education provided:     Symptom monitoring: monitoring for bleeding signs and symptoms and when to seek medical attention/emergency care    Contact [phone]  with any changes, questions or concerns.     Plan made per [facility] anticoagulation protocol    Suma Sandoval, RN  Anticoagulation Clinic  4/25/2023    _______________________________________________________________________     Anticoagulation Episode Summary     Current INR goal:  2.0-3.0   TTR:  55.6 % (1 y)   Target end date:  Indefinite   Send INR reminders to:  JERRY  FRIDLEY    Indications    Paroxysmal atrial fibrillation (H) [I48.0]  Long term (current) use of anticoagulants [Z79.01]           Comments:           Anticoagulation Care Providers     Provider Role Specialty Phone number    Gurinder Ho MD [facility] Medicine [phone]

## 2023-04-29 DIAGNOSIS — F33.1 MODERATE EPISODE OF RECURRENT MAJOR DEPRESSIVE DISORDER (H): ICD-10-CM

## 2023-05-01 RX ORDER — PAROXETINE 30 MG/1
TABLET, FILM COATED ORAL
Qty: 90 TABLET | Refills: 0 | Status: SHIPPED | OUTPATIENT
Start: 2023-05-01 | End: 2023-08-09

## 2023-05-08 DIAGNOSIS — I48.0 PAROXYSMAL ATRIAL FIBRILLATION (H): ICD-10-CM

## 2023-05-08 DIAGNOSIS — Z79.01 LONG TERM (CURRENT) USE OF ANTICOAGULANTS: ICD-10-CM

## 2023-05-09 ENCOUNTER — ANTICOAGULATION THERAPY VISIT (OUTPATIENT)
Dept: ANTICOAGULATION | Facility: CLINIC | Age: 78
End: 2023-05-09
Payer: MEDICARE

## 2023-05-09 DIAGNOSIS — Z79.01 LONG TERM (CURRENT) USE OF ANTICOAGULANTS: ICD-10-CM

## 2023-05-09 DIAGNOSIS — I48.0 PAROXYSMAL ATRIAL FIBRILLATION (H): Primary | ICD-10-CM

## 2023-05-09 LAB — INR (EXTERNAL): 2.4 (ref 0.9–1.1)

## 2023-05-09 RX ORDER — WARFARIN SODIUM 2 MG/1
TABLET ORAL
Qty: 34 TABLET | Refills: 1 | Status: SHIPPED | OUTPATIENT
Start: 2023-05-09 | End: 2023-07-03

## 2023-05-09 NOTE — TELEPHONE ENCOUNTER
ANTICOAGULATION MANAGEMENT:  Medication Refill    Anticoagulation Summary  As of 5/9/2023    Warfarin maintenance plan:  3 mg (2 mg x 1.5) every Mon, Fri; 2 mg (2 mg x 1) all other days   Next INR check:  5/22/2023   Target end date:  Indefinite    Indications    Paroxysmal atrial fibrillation (H) [I48.0]  Long term (current) use of anticoagulants [Z79.01]             Anticoagulation Care Providers     Provider Role Specialty Phone number    Gurinder Ho MD Referring Family Medicine [phone]          Visit with referring provider/group within last year: Yes    [facility] referral signed within last year: Yes    Corazoni meets all criteria for refill (current [facility] referral, office visit with referring provider/group in last year, lab monitoring up to date or not exceeding 2 weeks overdue). Rx instructions and quantity supplied updated to match patient's current dosing plan. 30 day supply with 1 refills granted per [facility] protocol     Ke Petersen RN  Anticoagulation Clinic

## 2023-05-09 NOTE — PROGRESS NOTES
ANTICOAGULATION MANAGEMENT     Selvin Rockwell 78 year old female is on warfarin with therapeutic INR result. (Goal INR 2.0-3.0)    Recent labs: (last 7 days)     05/08/23  0759   INR 2.4*       ASSESSMENT       Source(s): Chart Review    Previous INR was Supratherapeutic    Medication, diet, health changes since last INR chart reviewed; none identified             PLAN     Recommended plan for no diet, medication or health factor changes affecting INR     Dosing Instructions: Continue your current warfarin dose with next INR in 2 weeks       Summary  As of 5/9/2023    Full warfarin instructions:  3 mg every Mon, Fri; 2 mg all other days   Next INR check:  5/22/2023             Detailed voice message left for  Daughter Alicia with dosing instructions and follow up date.     Patient using outside facility for labs    Education provided:     Please call back if any changes to your diet, medications or how you've been taking warfarin    Plan made per [facility] anticoagulation protocol    Ke Petersen RN  Anticoagulation Clinic  5/9/2023    _______________________________________________________________________     Anticoagulation Episode Summary     Current INR goal:  2.0-3.0   TTR:  53.8 % (1 y)   Target end date:  Indefinite   Send INR reminders to:  JERRY PEÑA    Indications    Paroxysmal atrial fibrillation (H) [I48.0]  Long term (current) use of anticoagulants [Z79.01]           Comments:           Anticoagulation Care Providers     Provider Role Specialty Phone number    Gurinder Ho MD Referring Family Medicine [phone]

## 2023-05-10 NOTE — PATIENT INSTRUCTIONS
Patient is here to re discuss the leg pain.    She is on a fixed income so wants to have the surgery as that is what will be covered.   So will plan to do as her right leg bothers her the most a right greater saphenous vein ablation and ablation or removal of the perforators that she has on the lateral shin area.           Progress Notes        Patient is here to go over the ultrasound.    She did wear the compression stockings off and on for a week. She did not notice any difference.  Also tried an ace wrap and it did not help either.   Patient feel a the ultrasound site and is going to follow up with the doctor that saw then with a phone call today.   I offered to have her see ortho but they will wait for the other doctor to call.           Right leg her pain is more anterior where I see 3 veins lat look like they are perforators. Hard to tell if the varicose veins in the back of the leg that seem to go anterior are connected.          Incompetent 4 mm diameter varicosities in the  anterior calf are  related to the  .  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      vein #1: Diameter 10 x 0.2 mm,  Location proximal calf,  22  cm from medial malleolus.     Probably a greater saphenous vein ligation and stripping or ablation, but it is only incompetent  From mid thigh to the knee.   But the perforators may be separate.  And this is where she has the pain at.     So a chance of sclerotherapy  At these sites. Starting in the posterior knee area and including the perforators that I can feel on the anterior shin area.   And these are tender when pressed.                For the left leg:     she has some incompetence of the greater saphenous vein but  very limited.   So ablation of the left greater saphenous vein form the knee up and then go after perforators as needed with either abaltion or removal in the OR.     Left leg:     Great saphenous vein (GSV)      sapheno-femoral junction: Competent      prox thigh: Competent      mid thigh: Competent      dist thigh: Competent      knee: Incompetent      prox calf: Competent      mid calf: Competent      ankle: Competent     Incompetent 2 mm diameter varicosities in the  posterior calf are  isolated.      There are no incompetent perforators identified.      Need to get second ultrasound set up 2 days after surgery.     Please wear compression stockings and see if they help your discomfort.  Then when you come back to see me let me know.  You will need to bring these with you for sclerotherapy or for your post op visit after surgery.  I would suggest going to either the [employer] medical supply store next to [facility] or to [employer] ([phone]) on [address].  They will need to measure your legs to get the right fit.  If you go somewhere else and they do not measure your legs do not get them there.  It is important that you get the right size.  Please allow several days after you are measured to get your stockings.  They may not have your size in stock and will have to order them.    Please wear your compression stocking as some insurance companies will want to wear them for at least 3 months before they even consider paying for your surgery or sclerotherapy.                VEIN ABLATION POST OPERATIVE INSTRUCTIONS.   DR. STEFAN HARRIS    1. You may resume your regular diet when you feel you are ready to. DO NOT drink alcoholic beverages for 24 hours or while you are taking prescription medication.    2. Limit your activities for the first 48 hours. Gradually, increase them as tolerated. You may use stairs. I encourage you to walk as tolerated. No lifting greater  that 20 pounds for 2 weeks.    3. You will have some discomfort at the incision sites. This is expected. This should improve over the next 2-3 days. Ice and pain medication will help with this pain. Use prescribed pain medication as instructed.  You may also feel some pulling in the area that the vein was ablated at.  Gentle stretching will help.     4. Bruising and mild swelling is normal after surgery. This will resolve slowly over the next several months. If you feel the pain is increasing and cannot explain it by increasing activity please call us at [phone].    5. For the first 2 days your leg will be wrapped from toe to groin in an ace wrap. Please leave it on until Friday then you may remove dressing and take shower before your clinic visit. If it feels too tight please cut and tape the ace wrap at area of tightness. It is normal to have drainage on the dressings. Remember we have put 1 to 2 liters of IV fluid in your leg and some of it will leak out. Protect your clothes and bedding with plastic and old towels.    6. Make sure you bring compression stockings to the clinic with you. We will help you put them on for the first time.    7. During the immediate post op period, avoid prolong sitting or standing. We encourage you to walk and when lying down, elevate your leg higher than your heart. Try to elevate your legs for at least 15 minutes twice a day.    8. Return to the office this Friday. Please call to make an appointment if not already done. [phone].    We also need you to have an utrasound done on that Friday also.  Please call  to get this set up.  This can be done at [place] also so best if done before I see you in clinic.  This is only one floor below my clinic in [place]    9. You will be bruised and have areas beneath your incisions that are lumpy/bumpy, and you may experience numbness, tingling, burning or intermittent sharp shooting pains. These will usually resolve  over a period of a few weeks.    10. After your bandages have been removed please wear your compression stocking at all times for the next 3 days, except to take a shower and to massage them. After the first 3 days wear them during the days when you are on your feet, for another 7 days.  At night elevate your leg on pillows.    11. Avoid aspirin for the first 72 hours after the procedure. This medication may increase the tendency to bleed.    Use the following medications (in addition to your normal meds) as shown:  A. Percocet 5 mg 1-2 every 6 hours as needed for pain. This contains 500 mg of Tylenol (acetaminophen) per tablet. Please do not take more than 4 grams of Tylenol (acetaminophen) per day. For example, you may take 1 Percocet and 1 Tylenol, or 2 Percocet and no Tylenol, or 2 Tylenol and no Percocet every 6 hours.  B. Tylenol (acetaminophen) 500 mg every 6 hors as needed for pain. Do not take more than 1000 mg every 6 hours (see above).  C. Motrin (ibuprofen) 200-800 mg every 6 hours as needed for pain. Take with food.    12. Notify Dr. Reinoso s clinic at [phone] if:    Your discomfort is not relieved by your pain medication.    You have signs of infection such as temperature above 100.5 degrees orally, chills, or increasing daily discomfort.    You incision site is becoming more red and/or there is purulent drainage.    You have questions or concerns.    13. We do have physical therapy that will help reduce swelling quicker. Please let us know if you are interested. It is usually started the week after the surgery.    14. When taking narcotics (pain medication more than Tylenol (acetaminophen) and Motrin (ibuprofen) it is important to keep your stools soft to avoid constipation and pain with straining. This is best done by drinking fluids (non-alcoholic and non-caffeinated) and taking a stool softener i.e. Metamucil or milk of magnesia. You may be able to use non-narcotic for pain relief  especially by the 3rd post-operative day. Tylenol 500 mg  every 6 hours and/or Motrin (ibuprofen) 200-800 mg every 6 hours. Please do not take more than 4 grams of Tylenol (acetaminophen) per day. Remember your Percocet does have Tylenol (acetaminophen) already in it. Please take Motrin (ibuprofen) with food to help protect the stomach.If you have a history of stomach ulcers or stomach problems, do not take Motrin (ibuprofen).     15. Do not drive or operate heavy machinery for 24 hours after surgery or when taking narcotics. You may resume driving when you feel that you can safely avoid an accident and are not taking narcotics. This is usually 5-7 days after surgery. You should not be alone for 24 hours after surgery.    16. Have milk of magnesia available at home so that when you take the pain medications you take 1-2 teaspoons a day, to help reduce problems with constipation.    \     Cimetidine Counseling:  I discussed with the patient the risks of Cimetidine including but not limited to gynecomastia, headache, diarrhea, nausea, drowsiness, arrhythmias, pancreatitis, skin rashes, psychosis, bone marrow suppression and kidney toxicity.

## 2023-05-22 ENCOUNTER — TRANSFERRED RECORDS (OUTPATIENT)
Dept: HEALTH INFORMATION MANAGEMENT | Facility: CLINIC | Age: 78
End: 2023-05-22
Payer: MEDICARE

## 2023-05-22 LAB
INR (EXTERNAL): 2.2 (ref 0.9–1.1)
INR (EXTERNAL): 2.2 (ref 2–3)

## 2023-05-23 ENCOUNTER — ANTICOAGULATION THERAPY VISIT (OUTPATIENT)
Dept: ANTICOAGULATION | Facility: CLINIC | Age: 78
End: 2023-05-23
Payer: MEDICARE

## 2023-05-23 DIAGNOSIS — I48.0 PAROXYSMAL ATRIAL FIBRILLATION (H): Primary | ICD-10-CM

## 2023-05-23 DIAGNOSIS — Z79.01 LONG TERM (CURRENT) USE OF ANTICOAGULANTS: ICD-10-CM

## 2023-05-23 NOTE — PROGRESS NOTES
ANTICOAGULATION MANAGEMENT     Selvin Rockwell 78 year old female is on warfarin with therapeutic INR result. (Goal INR 2.0-3.0)    Recent labs: (last 7 days)     05/22/23  1724   INR 2.2*       ASSESSMENT       Source(s): Chart Review and Patient/Caregiver Call       Warfarin doses taken: Warfarin taken as instructed    Diet: No new diet changes identified    Medication/supplement changes: None noted    New illness, injury, or hospitalization: No    Signs or symptoms of bleeding or clotting: No    Previous result: Therapeutic last visit; previously outside of goal range    Additional findings: None         PLAN     Recommended plan for no diet, medication or health factor changes affecting INR     Dosing Instructions: Continue your current warfarin dose with next INR in 3 weeks       Summary  As of 5/23/2023    Full warfarin instructions:  3 mg every Mon, Fri; 2 mg all other days   Next INR check:  6/12/2023             Telephone call with  Alicia who verbalizes understanding and agrees to plan    Patient using outside facility for labs    Education provided:     Please call back if any changes to your diet, medications or how you've been taking warfarin    Contact [phone]  with any changes, questions or concerns.     Plan made per [facility] anticoagulation protocol    Ale Vasquez RN  Anticoagulation Clinic  5/23/2023    _______________________________________________________________________     Anticoagulation Episode Summary     Current INR goal:  2.0-3.0   TTR:  53.9 % (1 y)   Target end date:  Indefinite   Send INR reminders to:  JERRY PEÑA    Indications    Paroxysmal atrial fibrillation (H) [I48.0]  Long term (current) use of anticoagulants [Z79.01]           Comments:           Anticoagulation Care Providers     Provider Role Specialty Phone number    Gurinder Ho MD Referring Family Medicine [phone]

## 2023-05-25 ENCOUNTER — ANTICOAGULATION THERAPY VISIT (OUTPATIENT)
Dept: ANTICOAGULATION | Facility: CLINIC | Age: 78
End: 2023-05-25
Payer: MEDICARE

## 2023-05-25 DIAGNOSIS — Z79.01 LONG TERM (CURRENT) USE OF ANTICOAGULANTS: ICD-10-CM

## 2023-05-25 DIAGNOSIS — I48.0 PAROXYSMAL ATRIAL FIBRILLATION (H): Primary | ICD-10-CM

## 2023-06-12 ENCOUNTER — TELEPHONE (OUTPATIENT)
Dept: FAMILY MEDICINE | Facility: CLINIC | Age: 78
End: 2023-06-12
Payer: MEDICARE

## 2023-06-12 NOTE — TELEPHONE ENCOUNTER
General Call      Reason for Call:  INR    What are your questions or concerns:  Daughter wondering when her next INR is due    Date of last appointment with provider: 1-2-23    Okay to leave a detailed message?: Yes at Other phone number:  Daughter [phone] Alicia

## 2023-06-13 ENCOUNTER — DOCUMENTATION ONLY (OUTPATIENT)
Dept: ANTICOAGULATION | Facility: CLINIC | Age: 78
End: 2023-06-13
Payer: MEDICARE

## 2023-06-13 ENCOUNTER — ANTICOAGULATION THERAPY VISIT (OUTPATIENT)
Dept: ANTICOAGULATION | Facility: CLINIC | Age: 78
End: 2023-06-13
Payer: MEDICARE

## 2023-06-13 DIAGNOSIS — I48.0 PAROXYSMAL ATRIAL FIBRILLATION (H): Primary | ICD-10-CM

## 2023-06-13 DIAGNOSIS — Z79.01 LONG TERM (CURRENT) USE OF ANTICOAGULANTS: ICD-10-CM

## 2023-06-13 LAB — INR (EXTERNAL): 2.3 (ref 0.9–1.1)

## 2023-06-13 NOTE — PROGRESS NOTES
ANTICOAGULATION CLINIC REFERRAL RENEWAL REQUEST       An annual renewal order is required for all patients referred to [facility].?  Please review and sign the pended referral order for Selvin Rockwell.       ANTICOAGULATION SUMMARY      Warfarin indication(s)   Atrial Fibrillation    Mechanical heart valve present?  NO       Current goal range   INR: 2.0-3.0     Goal appropriate for indication? Goal INR 2-3, standard for indication(s) above     Time in Therapeutic Range (TTR)  (Goal > 60%) 53.8%       Office visit with referring provider's group within last year yes on 11-       Ale Vasquez RN  [facility]

## 2023-06-13 NOTE — PROGRESS NOTES
ANTICOAGULATION MANAGEMENT     Selvin Rockwell 78 year old female is on warfarin with therapeutic INR result. (Goal INR 2.0-3.0)    Recent labs: (last 7 days)     06/12/23  0758   INR 2.3*       ASSESSMENT       Source(s): Chart Review and Patient/Caregiver Call       Warfarin doses taken: Warfarin taken as instructed    Diet: No new diet changes identified    Medication/supplement changes: None noted    New illness, injury, or hospitalization: No    Signs or symptoms of bleeding or clotting: No    Previous result: Therapeutic last 2(+) visits    Additional findings: patient planning a visit for 3 [place] outside of the [place]. Writer advised she will need to establish with a provider in that country and have them monitor while she is there. Alicia to let [facility] know when this gets scheduled as she does not have dates set up yet         PLAN     Recommended plan for no diet, medication or health factor changes affecting INR     Dosing Instructions: Continue your current warfarin dose with next INR in 4 weeks       Summary  As of 6/13/2023    Full warfarin instructions:  3 mg every Mon, Fri; 2 mg all other days   Next INR check:  7/10/2023             Telephone call with  Alicia who verbalizes understanding and agrees to plan    Patient using outside facility for labs    Education provided:     Please call back if any changes to your diet, medications or how you've been taking warfarin    Contact [phone]  with any changes, questions or concerns.     Plan made per [facility] anticoagulation protocol    Ale Vasquez RN  Anticoagulation Clinic  6/13/2023    _______________________________________________________________________     Anticoagulation Episode Summary     Current INR goal:  2.0-3.0   TTR:  53.8 % (1 y)   Target end date:  Indefinite   Send INR reminders to:  JERRY PEÑA    Indications    Paroxysmal atrial fibrillation (H) [I48.0]  Long term (current) use of anticoagulants [Z79.01]           Comments:            Anticoagulation Care Providers     Provider Role Specialty Phone number    Gurinder Ho MD Referring Family Medicine [phone]

## 2023-07-14 ENCOUNTER — TELEPHONE (OUTPATIENT)
Dept: ANTICOAGULATION | Facility: CLINIC | Age: 78
End: 2023-07-14
Payer: MEDICARE

## 2023-07-14 NOTE — TELEPHONE ENCOUNTER
ANTICOAGULATION     Selvin Rockwell is overdue for INR check.      Spoke with Alicia. She will have her mother get an INR at outside facility today or early next week    Ale Vasquez RN

## 2023-07-19 ENCOUNTER — ANTICOAGULATION THERAPY VISIT (OUTPATIENT)
Dept: ANTICOAGULATION | Facility: CLINIC | Age: 78
End: 2023-07-19
Payer: MEDICARE

## 2023-07-19 DIAGNOSIS — Z79.01 LONG TERM (CURRENT) USE OF ANTICOAGULANTS: ICD-10-CM

## 2023-07-19 DIAGNOSIS — I48.0 PAROXYSMAL ATRIAL FIBRILLATION (H): Primary | ICD-10-CM

## 2023-07-19 LAB — INR (EXTERNAL): 2.8 (ref 0.9–1.1)

## 2023-07-19 NOTE — PROGRESS NOTES
ANTICOAGULATION MANAGEMENT     Selvin Rockwell 78 year old female is on warfarin with therapeutic INR result. (Goal INR 2.0-3.0)    Recent labs: (last 7 days)     07/18/23  0000   INR 2.8*       ASSESSMENT       Source(s): Chart Review and Patient/Caregiver Call       Warfarin doses taken: Warfarin taken as instructed    Diet: No new diet changes identified    Medication/supplement changes: None noted    New illness, injury, or hospitalization: No    Signs or symptoms of bleeding or clotting: No    Previous result: Therapeutic last 2(+) visits    Additional findings: None and jeffery states they had a bad experience with lab draw yesterday, needs to have drawn from [facility] and tech was moving needle arond aggressively to find vein. she will call and speak with [facility] to let them know of her complant         PLAN     Recommended plan for no diet, medication or health factor changes affecting INR     Dosing Instructions: Continue your current warfarin dose with next INR in 5 weeks       Summary  As of 7/19/2023    Full warfarin instructions:  3 mg every Mon, Fri; 2 mg all other days   Next INR check:  8/23/2023             Telephone call with jeffery who verbalizes understanding and agrees to plan    Patient using outside facility for labs    Education provided:     Please call back if any changes to your diet, medications or how you've been taking warfarin    Goal range and lab monitoring: goal range and significance of current result, Importance of therapeutic range and Importance of following up at instructed interval    Plan made per [facility] anticoagulation protocol    Kristi Fu, RN  Anticoagulation Clinic  7/19/2023    _______________________________________________________________________     Anticoagulation Episode Summary     Current INR goal:  2.0-3.0   TTR:  60.7 % (1 y)   Target end date:  Indefinite   Send INR reminders to:  JERRY PEÑA    Indications    Paroxysmal atrial fibrillation (H)  [I48.0]  Long term (current) use of anticoagulants [Z79.01]           Comments:           Anticoagulation Care Providers     Provider Role Specialty Phone number    Gurinder Ho MD Referring Family Medicine [phone]        Route barrett [phone], today until 5, or p anticoag

## 2023-07-30 DIAGNOSIS — I48.0 PAROXYSMAL ATRIAL FIBRILLATION (H): ICD-10-CM

## 2023-07-30 DIAGNOSIS — Z79.01 LONG TERM (CURRENT) USE OF ANTICOAGULANTS: ICD-10-CM

## 2023-07-31 RX ORDER — WARFARIN SODIUM 2 MG/1
TABLET ORAL
Qty: 100 TABLET | Refills: 1 | Status: SHIPPED | OUTPATIENT
Start: 2023-07-31 | End: 2023-11-08

## 2023-07-31 NOTE — TELEPHONE ENCOUNTER
ANTICOAGULATION MANAGEMENT:  Medication Refill    Anticoagulation Summary  As of 7/19/2023      Warfarin maintenance plan:  3 mg (2 mg x 1.5) every Mon, Fri; 2 mg (2 mg x 1) all other days   Next INR check:  8/22/2023   Target end date:  Indefinite    Indications    Paroxysmal atrial fibrillation (H) [I48.0]  Long term (current) use of anticoagulants [Z79.01]                 Anticoagulation Care Providers       Provider Role Specialty Phone number    Gurinder Ho MD Referring Family Medicine [phone]            Refill Criteria    Visit with referring provider/group: Meets criteria: office visit within referring provider group in the last 1 year on 11/2022    [facility] referral signed last signed: 06/13/2023; within last year: Yes    Lab monitoring not exceeding 2 weeks overdue:  Yes    Corazoni meets all criteria for refill. Rx instructions and quantity match patient's current dosing plan. Warfarin 90 day supply with 1 refill granted per [facility] protocol     Suma Sandoval RN  Anticoagulation Clinic

## 2023-08-08 DIAGNOSIS — E11.21 TYPE 2 DIABETES MELLITUS WITH DIABETIC NEPHROPATHY, WITHOUT LONG-TERM CURRENT USE OF INSULIN (H): ICD-10-CM

## 2023-08-08 DIAGNOSIS — M48.062 SPINAL STENOSIS OF LUMBAR REGION WITH NEUROGENIC CLAUDICATION: Primary | ICD-10-CM

## 2023-08-08 RX ORDER — PREGABALIN 150 MG/1
CAPSULE ORAL
Qty: 60 CAPSULE | Refills: 5 | Status: SHIPPED | OUTPATIENT
Start: 2023-08-08 | End: 2023-11-08

## 2023-08-08 NOTE — TELEPHONE ENCOUNTER
Daughter, Alicia billings. Consent to communicate is on file. States pt is almost out of medication. Writer advised that refill request has been received and has been routed to provider. Advised to allow up to 72 hours for processing.    Desiree Sen RN  [facility]

## 2023-08-09 DIAGNOSIS — F33.1 MODERATE EPISODE OF RECURRENT MAJOR DEPRESSIVE DISORDER (H): ICD-10-CM

## 2023-08-09 RX ORDER — PAROXETINE 30 MG/1
TABLET, FILM COATED ORAL
Qty: 90 TABLET | Refills: 0 | Status: SHIPPED | OUTPATIENT
Start: 2023-08-09 | End: 2023-11-08

## 2023-08-14 ENCOUNTER — TELEPHONE (OUTPATIENT)
Dept: FAMILY MEDICINE | Facility: CLINIC | Age: 78
End: 2023-08-14
Payer: MEDICARE

## 2023-08-14 NOTE — TELEPHONE ENCOUNTER
Reason for Call:  Appointment Request    Patient requesting this type of appt:  Hospital/ED Follow-Up     Requested provider: Gurinder Ho or available provider    Reason patient unable to be scheduled: Not within requested timeframe    When does patient want to be seen/preferred time: 1-2 days    Comments: Gem had redness and tightness on her legs, and went to the ER, she was given antibiotics, but it's spreading and getting worse, and she would like to be seen as soon as possible.      Okay to leave a detailed message?: Yes at Cell number on file:    Telephone Information:   Mobile [phone]       Call taken on 8/14/2023 at 5:29 PM by Desiree Cohn

## 2023-08-15 NOTE — TELEPHONE ENCOUNTER
She should probably be seen today by whoever has an opening.  I only work a half day today and do not have any openings in my schedule.  She could be seen in another nearby clinic or urgent care setting if needed.    Gurinder Ho MD

## 2023-08-15 NOTE — TELEPHONE ENCOUNTER
Spoke with patient's daughter. She is aware of note below by Dr. Ho. She was very rude to writer as assisting to schedule; no openings available at multiple locations that were checked and this was very irritating to her. She hung up on writer.     FELIPE Mercado RN  [facility]

## 2023-08-24 ENCOUNTER — ANTICOAGULATION THERAPY VISIT (OUTPATIENT)
Dept: ANTICOAGULATION | Facility: CLINIC | Age: 78
End: 2023-08-24
Payer: MEDICARE

## 2023-08-24 ENCOUNTER — TELEPHONE (OUTPATIENT)
Dept: ANTICOAGULATION | Facility: CLINIC | Age: 78
End: 2023-08-24
Payer: MEDICARE

## 2023-08-24 DIAGNOSIS — E78.5 HYPERLIPIDEMIA WITH TARGET LDL LESS THAN 100: ICD-10-CM

## 2023-08-24 DIAGNOSIS — Z79.01 LONG TERM (CURRENT) USE OF ANTICOAGULANTS: ICD-10-CM

## 2023-08-24 DIAGNOSIS — I48.0 PAROXYSMAL ATRIAL FIBRILLATION (H): Primary | ICD-10-CM

## 2023-08-24 LAB — INR (EXTERNAL): 3 (ref 0.9–1.1)

## 2023-08-24 RX ORDER — SIMVASTATIN 10 MG
TABLET ORAL
Qty: 90 TABLET | Refills: 0 | Status: SHIPPED | OUTPATIENT
Start: 2023-08-24 | End: 2023-11-08

## 2023-08-24 NOTE — TELEPHONE ENCOUNTER
ANTICOAGULATION     Selvin Rockwell is overdue for INR check.      Left message for patient to call and schedule lab appointment as soon as possible. If returning call, please schedule. Patient uses outside lab and was reminded to call [facility] the day the lab is drawn if they don't hear from us by 4pm.    Ale Vasquez RN

## 2023-08-24 NOTE — PROGRESS NOTES
ANTICOAGULATION MANAGEMENT     Selvin Rockwell 78 year old female is on warfarin with therapeutic INR result. (Goal INR 2.0-3.0)    Recent labs: (last 7 days)     08/24/23  1347   INR 3.0*       ASSESSMENT     Source(s): Chart Review  Previous INR was Therapeutic last 2(+) visits  Medication, diet, health changes since last INR chart reviewed; none identified         PLAN     Recommended plan for no diet, medication or health factor changes affecting INR     Dosing Instructions: Continue your current warfarin dose with next INR in 4 weeks       Summary  As of 8/24/2023      Full warfarin instructions:  3 mg every Mon, Fri; 2 mg all other days   Next INR check:  9/21/2023               Detailed voice message left for Alicia with dosing instructions and follow up date.     Patient using outside facility for labs    Education provided:   Please call back if any changes to your diet, medications or how you've been taking warfarin  Goal range and lab monitoring: goal range and significance of current result    Plan made per [facility] anticoagulation protocol    Chitra Morrison RN  Anticoagulation Clinic  8/24/2023    _______________________________________________________________________     Anticoagulation Episode Summary       Current INR goal:  2.0-3.0   TTR:  69.3 % (1 y)   Target end date:  Indefinite   Send INR reminders to:  JERRY PEÑA    Indications    Paroxysmal atrial fibrillation (H) [I48.0]  Long term (current) use of anticoagulants [Z79.01]             Comments:               Anticoagulation Care Providers       Provider Role Specialty Phone number    Gurinder Ho MD Referring Family Medicine [phone]

## 2023-08-26 DIAGNOSIS — I10 HYPERTENSION GOAL BP (BLOOD PRESSURE) < 140/90: ICD-10-CM

## 2023-08-28 RX ORDER — ISOSORBIDE MONONITRATE 30 MG/1
TABLET, EXTENDED RELEASE ORAL
Qty: 90 TABLET | Refills: 0 | Status: SHIPPED | OUTPATIENT
Start: 2023-08-28 | End: 2023-09-20

## 2023-09-09 DIAGNOSIS — K21.9 GASTROESOPHAGEAL REFLUX DISEASE WITHOUT ESOPHAGITIS: ICD-10-CM

## 2023-09-11 RX ORDER — PANTOPRAZOLE SODIUM 40 MG/1
TABLET, DELAYED RELEASE ORAL
Qty: 90 TABLET | Refills: 0 | Status: SHIPPED | OUTPATIENT
Start: 2023-09-11 | End: 2023-11-08

## 2023-09-16 DIAGNOSIS — I10 HYPERTENSION GOAL BP (BLOOD PRESSURE) < 140/90: ICD-10-CM

## 2023-09-16 DIAGNOSIS — I48.0 PAROXYSMAL ATRIAL FIBRILLATION (H): ICD-10-CM

## 2023-09-18 RX ORDER — CARVEDILOL 6.25 MG/1
TABLET ORAL
Qty: 180 TABLET | Refills: 0 | Status: SHIPPED | OUTPATIENT
Start: 2023-09-18 | End: 2023-11-08

## 2023-09-18 RX ORDER — ISOSORBIDE MONONITRATE 30 MG/1
TABLET, EXTENDED RELEASE ORAL
Qty: 90 TABLET | Refills: 0 | OUTPATIENT
Start: 2023-09-18

## 2023-09-19 ENCOUNTER — TELEPHONE (OUTPATIENT)
Dept: FAMILY MEDICINE | Facility: CLINIC | Age: 78
End: 2023-09-19
Payer: MEDICARE

## 2023-09-19 DIAGNOSIS — E11.21 TYPE 2 DIABETES MELLITUS WITH DIABETIC NEPHROPATHY, WITHOUT LONG-TERM CURRENT USE OF INSULIN (H): Primary | ICD-10-CM

## 2023-09-19 NOTE — TELEPHONE ENCOUNTER
Daughter, Alicia returned call. Consent to communicate is on file. Reviewed below message with daughter. Daughter states she is not able to get her diabetic supplies as she has to go through a different supplier now. Pt was in the hospital for 1 day and has no diabetic supplies. Daughter is requesting a prescription for a meter and test strips.     Pt has an appt scheduled for tomorrow with another provider, but states this is for a different issue.    Desiree Sen RN  [facility]

## 2023-09-19 NOTE — TELEPHONE ENCOUNTER
Medication Question or Refill    Contacts         Type Contact Phone/Fax    09/19/2023 12:52 PM CDT Phone (Incoming) JENIJEFFERY (Emergency Contact) [phone]            What medication are you calling about (include dose and sig)?: Medical supplies glucose monitor etc needs new order sent     Preferred Pharmacy:   [facility] - [place] - [address]  [address] [address]  Phone: [phone] Fax: [phone]    WRITTEN PRESCRIPTION REQUESTED  No address on file      [facility] [address] [facility] [employer] - [place], [place] - [address]  [address] [facility] [address]  Phone: [phone] Fax: [phone]    [id number] - [place], [place] - [address]  [address] [facility] [address]  Phone: [phone] Fax: [phone]      Controlled Substance Agreement on file:   CSA -- Patient Level:    CSA: None found at the patient level.       Who prescribed the medication?: Dr Ho    Do you need a refill? Yes    When did you use the medication last? today    Patient offered an appointment? No    Do you have any questions or concerns?  Yes: gonna be out       Okay to leave a detailed message?: Yes at Home number on file [phone] jeffery- daughter(home)

## 2023-09-19 NOTE — TELEPHONE ENCOUNTER
Called patient with assistance with . Called patient. Left voice message to return call at [phone].    When patient returns call, please inform that per records, she had last received diabetic supplies from [facility] therefore she should request further refills from [facility] or make follow-up appointment at PCP office.    BRIANA SaenzN RN  [facility]

## 2023-09-20 ENCOUNTER — VIRTUAL VISIT (OUTPATIENT)
Dept: FAMILY MEDICINE | Facility: CLINIC | Age: 78
End: 2023-09-20
Payer: MEDICARE

## 2023-09-20 DIAGNOSIS — I10 HYPERTENSION GOAL BP (BLOOD PRESSURE) < 140/90: ICD-10-CM

## 2023-09-20 DIAGNOSIS — E11.21 TYPE 2 DIABETES MELLITUS WITH DIABETIC NEPHROPATHY, WITHOUT LONG-TERM CURRENT USE OF INSULIN (H): Primary | ICD-10-CM

## 2023-09-20 PROCEDURE — 99214 OFFICE O/P EST MOD 30 MIN: CPT | Mod: 95 | Performed by: PHYSICIAN ASSISTANT

## 2023-09-20 RX ORDER — LANCETS
EACH MISCELLANEOUS
Qty: 100 EACH | Refills: 6 | Status: SHIPPED | OUTPATIENT
Start: 2023-09-20 | End: 2024-07-03

## 2023-09-20 RX ORDER — ISOSORBIDE MONONITRATE 30 MG/1
TABLET, EXTENDED RELEASE ORAL
Qty: 90 TABLET | Refills: 1 | Status: SHIPPED | OUTPATIENT
Start: 2023-09-20 | End: 2024-05-14

## 2023-09-20 NOTE — TELEPHONE ENCOUNTER
Called patient's daughter and relayed provider's message, no further questions.    BRIANA CastilloN RN  [facility]

## 2023-09-20 NOTE — PROGRESS NOTES
"Gem is a 78 year old who is being evaluated via a billable video visit.      How would you like to obtain your AVS? Mail a copy  If the video visit is dropped, the invitation should be resent by: Text to cell phone: [phone]  Will anyone else be joining your video visit? No          Assessment & Plan     Type 2 diabetes mellitus with diabetic nephropathy, without long-term current use of insulin (H)  Due recheck  - Hemoglobin A1c; Future  Glucose monitor getting set up. Get A1c done to monitor sugars. Follow up with maninder 3-6 months    Hypertension goal BP (blood pressure) < 140/90  Ongoing  - isosorbide mononitrate (IMDUR) 30 MG 24 hr tablet; TAKE 1 TABLET BY MOUTH ONCE DAILY  Refilled x 6 months             BMI:   Estimated body mass index is 42.21 kg/m  as calculated from the following:    Height as of 11/14/22: 1.499 m (4' 11\").    Weight as of 11/14/22: 94.8 kg (209 lb).   Weight management plan: Discussed healthy diet and exercise guidelines    Follow up with Maninder 3-6 months     CHRIS DUMAS PA-C  [facility]   Gem is a 78 year old, presenting for the following health issues:  Refill Request (Needing refills on all prescriptions )        9/20/2023     5:16 PM   Additional Questions   Roomed by Jeovany BROWN   Accompanied by KATERIN NGO (daughter)       History of Present Illness       Diabetes:   She presents for follow up of diabetes.  She is checking home blood glucose one time daily.   She checks blood glucose before meals.  Blood glucose is never over 200 and never under 70. She is aware of hypoglycemia symptoms including none.    She has no concerns regarding her diabetes at this time.  She is having weight gain.  The patient has not had a diabetic eye exam in the last 12 months.          Hypertension: She presents for follow up of hypertension.  She does check blood pressure  regularly outside of the clinic. Outpatient blood pressures have not been " over 140/90. She follows a low salt diet.                   Review of Systems   Constitutional, HEENT, cardiovascular, pulmonary, gi and gu systems are negative, except as otherwise noted.      Objective       Tc - [facility] A1c fax fr[place]ley     Vitals:  No vitals were obtained today due to virtual visit.    Physical Exam   GENERAL: Healthy, alert and no distress  EYES: Eyes grossly normal to inspection.  No discharge or erythema, or obvious scleral/conjunctival abnormalities.  RESP: No audible wheeze, cough, or visible cyanosis.  No visible retractions or increased work of breathing.    SKIN: Visible skin clear. No significant rash, abnormal pigmentation or lesions.  NEURO: Cranial nerves grossly intact.  Mentation and speech appropriate for age.  PSYCH: Mentation appears normal, affect normal/bright, judgement and insight intact, normal speech and appearance well-groomed.                Video-Visit Details    Type of service:  Video Visit   Video Start Time:  5:20 pm  Video End Time:5:33 PM    Originating Location (pt. Location): Home    Distant Location (provider location):  On-site  Platform used for Video Visit: Cecille

## 2023-09-21 ENCOUNTER — TELEPHONE (OUTPATIENT)
Dept: FAMILY MEDICINE | Facility: CLINIC | Age: 78
End: 2023-09-21
Payer: MEDICARE

## 2023-09-21 NOTE — TELEPHONE ENCOUNTER
This was ordered and faxed by [place] provider  - Please verify and notify patient/daughter that this was done      Jagjit Watkins RN  [facility] [place]

## 2023-09-21 NOTE — TELEPHONE ENCOUNTER
Patient Returning Call    Reason for call:  Patient's daughter called requesting next INR due date for patient.      Information relayed to patient:      Patient has additional questions:        Okay to leave a detailed message?: Yes at Cell number on file:    Telephone Information:   Mobile [phone]     Patient will be on her break until 114 pm if possible to call prior to that, if not, ok to leave a detailed voicemail.

## 2023-09-21 NOTE — TELEPHONE ENCOUNTER
Spoke with patient's daughter and she is currently taking her mom to [facility] for her INR's due to her work schedule.  She is aware that her mom is due for an INR.  She was told by PCP office that they would fax an order for the Hemoglobin A1C to [place] also so that she can have them both done at the same time.  Please fax an order for the Hemoglobin A1C to the [facility] and let the daughter know when this is done.  You can leave a detailed message if she is not available.[employer] Fax number is [phone].     Gracia Souza RN  [facility]

## 2023-09-21 NOTE — TELEPHONE ENCOUNTER
Progress Notes  Joselyn Lao (Non-Clinical)  Faxed lab orders to [facility] @ [phone]  Thank you,  Joselyn ROSSI    [phone]       Called and informed patient's daughter that this was done this morning.Adali Garcai [facility]

## 2023-09-28 ENCOUNTER — TELEPHONE (OUTPATIENT)
Dept: FAMILY MEDICINE | Facility: CLINIC | Age: 78
End: 2023-09-28
Payer: MEDICARE

## 2023-09-28 ENCOUNTER — TELEPHONE (OUTPATIENT)
Dept: ANTICOAGULATION | Facility: CLINIC | Age: 78
End: 2023-09-28
Payer: MEDICARE

## 2023-09-28 DIAGNOSIS — I48.0 PAROXYSMAL ATRIAL FIBRILLATION (H): Primary | ICD-10-CM

## 2023-09-28 DIAGNOSIS — Z79.01 LONG TERM (CURRENT) USE OF ANTICOAGULANTS: ICD-10-CM

## 2023-09-28 NOTE — TELEPHONE ENCOUNTER
Writer spoke with Alicia. She is back to work and cannot bring her mother to clinic until after 5 pm. She was going to [place] for these labs but they also are closed at 5 pm. They are available on Saturdays, however, [facility] does not have clinic open to address INR results on Saturdays. A home meter is not an option as she has looked into this and it is too expensive.     Writer advised calling in home labs to see if patient's insurance covers that. If not, we will have to see if it's okay for the patient to check INRs at an outside lab on Saturdays. There is no one else who can take this patient to lab.     Alicia will call [facility] back with an update.    Ale Vasquez, RN, BSN, PHN

## 2023-09-28 NOTE — TELEPHONE ENCOUNTER
ANTICOAGULATION     Selvin Rockwell is overdue for INR check.      Left message for patient to call and schedule lab appointment as soon as possible. If returning call, please schedule. If patient is going to use outside labs, needs to do this ASAP.    Ale Vasquez RN

## 2023-09-28 NOTE — TELEPHONE ENCOUNTER
General Call    Contacts         Type Contact Phone/Fax    09/28/2023 05:01 PM CDT Phone (Incoming) KATERIN NGO (Emergency Contact) [phone]          Reason for Call:    PT DAUGHTER WANTED THE INR NURSE TO KNOW THE HOURS FOR THE CLINIC AND HOSPITAL CONFLICT WITH HER WORKING SCHEDULE. PLEASE GIVE A CALL BACK FOR FURTHER DETAILS.    What are your questions or concerns:  SEE ABOVE    Date of last appointment with provider: UNKNOWN NORBERTO TAKES HER TO AN OUTSIDE PLACE FOR INR    Okay to leave a detailed message?: Yes at Cell number on file:    Telephone Information:   Mobile [phone]

## 2023-09-29 ENCOUNTER — TELEPHONE (OUTPATIENT)
Dept: FAMILY MEDICINE | Facility: CLINIC | Age: 78
End: 2023-09-29
Payer: MEDICARE

## 2023-09-29 NOTE — TELEPHONE ENCOUNTER
Daughter Kat left a VM on the home care line at 1:06 pm asking for a call back. She is unable to use In Home Lab due to cost. Also due to her Work scheduled, she is unable to use a lab opened 8-5. She would like to take her Mom to a hospital lab tomorrow. Please call her to discuss.  Tana Parker, RN  Anticoagulation Nurse - [facility], [place]

## 2023-09-29 NOTE — TELEPHONE ENCOUNTER
Patient will be brought to [place] tomorrow. She will have an A1C and INR done on Saturday. Informed her that we will not receive the INR until Monday. She is aware. Advised to call triage line if she were to have an out of range INR.  Alicia verbalizes understanding and agrees to plan. No further questions or concerns.  Notified Kiera Casanova [facility].  Portia Coronado RN on 9/29/2023 at 1:26 PM

## 2023-10-02 ENCOUNTER — ANTICOAGULATION THERAPY VISIT (OUTPATIENT)
Dept: ANTICOAGULATION | Facility: CLINIC | Age: 78
End: 2023-10-02
Payer: MEDICARE

## 2023-10-02 DIAGNOSIS — Z79.01 LONG TERM (CURRENT) USE OF ANTICOAGULANTS: ICD-10-CM

## 2023-10-02 DIAGNOSIS — I48.0 PAROXYSMAL ATRIAL FIBRILLATION (H): Primary | ICD-10-CM

## 2023-10-02 LAB — INR (EXTERNAL): 2.9 (ref 0.9–1.1)

## 2023-10-02 NOTE — PROGRESS NOTES
ANTICOAGULATION MANAGEMENT     Selvin Rockwell 78 year old female is on warfarin with therapeutic INR result. (Goal INR 2.0-3.0)    Recent labs: (last 7 days)     10/02/23  1339   INR 2.9*       ASSESSMENT     Source(s): Chart Review and Patient/Caregiver Call     Warfarin doses taken: Warfarin taken as instructed  Diet: No new diet changes identified  Medication/supplement changes: None noted  New illness, injury, or hospitalization: No  Signs or symptoms of bleeding or clotting: No  Previous result: Therapeutic last 2(+) visits  Additional findings: None       PLAN     Recommended plan for no diet, medication or health factor changes affecting INR     Dosing Instructions: Continue your current warfarin dose with next INR in 5 weeks       Summary  As of 10/2/2023      Full warfarin instructions:  3 mg every Mon, Fri; 2 mg all other days   Next INR check:  11/6/2023               Telephone call with Alicia who verbalizes understanding and agrees to plan    Lab visit scheduled    Education provided:   Please call back if any changes to your diet, medications or how you've been taking warfarin  Goal range and lab monitoring: goal range and significance of current result    Plan made per [facility] anticoagulation protocol    Kristi Fu RN  Anticoagulation Clinic  10/2/2023    _______________________________________________________________________     Anticoagulation Episode Summary       Current INR goal:  2.0-3.0   TTR:  71.4 % (1 y)   Target end date:  Indefinite   Send INR reminders to:  JERRY PEÑA    Indications    Paroxysmal atrial fibrillation (H) [I48.0]  Long term (current) use of anticoagulants [Z79.01]             Comments:               Anticoagulation Care Providers       Provider Role Specialty Phone number    Gurinder Ho MD Referring Family Medicine [phone]

## 2023-10-17 ENCOUNTER — TELEPHONE (OUTPATIENT)
Dept: FAMILY MEDICINE | Facility: CLINIC | Age: 78
End: 2023-10-17
Payer: MEDICARE

## 2023-10-17 DIAGNOSIS — E11.21 TYPE 2 DIABETES MELLITUS WITH DIABETIC NEPHROPATHY, WITHOUT LONG-TERM CURRENT USE OF INSULIN (H): Primary | ICD-10-CM

## 2023-10-17 NOTE — TELEPHONE ENCOUNTER
Patient is requesting the results of the Hgb A1C ordered by Imtiaz Rodriguez PA-c not the INR, results should be in care everywhere.    Chitra Morrison RN   [facility]

## 2023-10-17 NOTE — TELEPHONE ENCOUNTER
It appears the patient is looking for her INR result from October 2 from what I can gather.  Please forward this to our INR team to address.    Gurinder Ho MD

## 2023-10-17 NOTE — TELEPHONE ENCOUNTER
First time I am seeing this lab result due to the location it was drawn at. A1c was 8.4 and above goal.     I added Jardiance to take once daily for the blood sugar. Instead of 500-1000 mg tablet of metformin twice daily should take 1 full tablet 1000 mg twice daily with meals.    Should repeat this lab in 3 months and should follow with primary care provider.    CHRIS DUMAS PA-C

## 2023-10-17 NOTE — TELEPHONE ENCOUNTER
Test Results        Who ordered the test:  dora andino    Type of test: Lab    Date of test:  a week ago    Where was the test performed:  [place] [facility]    What are your questions/concerns?:  Results    Okay to leave a detailed message?: Yes at Cell number on file:    Telephone Information:   Mobile [phone]

## 2023-10-18 NOTE — TELEPHONE ENCOUNTER
or MA - please schedule patient for an appointment to discuss diabetes medications.     Jagjit Watkins RN  [facility]

## 2023-10-18 NOTE — TELEPHONE ENCOUNTER
Pt daughter (Alicia, consent to communicate on file) returning call. RN relayed provider's message below.     Alicia stated that jardiance too expensive and requesting pcp to review diabetes management care plan. RN informed Alicia that she can call pt's insurance to see which medications similar to Jardiance are covered by her plan to prevent delay of care. Alicia stated that she does not have the time to do this due to work and requesting pcp to determine which medications are best for pt and covered under insurance. Alicia also stated that pt has been taking half tablets and not as directed for metformin, RN encouraged Alicia to discuss medication compliance from treatment plan.     Do you want an appointment to discuss diabetes medication change?    Ayla Cantor RN on 10/18/2023 at 1:27 PM

## 2023-10-23 NOTE — TELEPHONE ENCOUNTER
Patient's daughter returned call.  Daughter insisted she needed an appointment after 5 due to her work schedule, since she will bring her mom, and said she was not able to get off work any earlier.    Virtual visit scheduled for Wednesday, November 8th at 5:30 pm.    If virtual won't work, please call daughter back and let her know.    Rohini Gary,

## 2023-10-30 NOTE — TELEPHONE ENCOUNTER
Patient Returning Call    Reason for call:  Follow up on the message below.    Let her know the scheduled appointment is ok    Information relayed to patient:  yes    Patient has additional questions:  No    Okay to leave a detailed message?: Yes at Cell number on file:    Telephone Information:   Mobile [phone]

## 2023-11-07 DIAGNOSIS — E78.5 HYPERLIPIDEMIA WITH TARGET LDL LESS THAN 100: ICD-10-CM

## 2023-11-07 RX ORDER — SIMVASTATIN 10 MG
TABLET ORAL
Qty: 90 TABLET | Refills: 0 | OUTPATIENT
Start: 2023-11-07

## 2023-11-08 ENCOUNTER — VIRTUAL VISIT (OUTPATIENT)
Dept: FAMILY MEDICINE | Facility: CLINIC | Age: 78
End: 2023-11-08
Payer: MEDICARE

## 2023-11-08 DIAGNOSIS — I48.0 PAROXYSMAL ATRIAL FIBRILLATION (H): ICD-10-CM

## 2023-11-08 DIAGNOSIS — G31.83 LEWY BODY DEMENTIA, UNSPECIFIED DEMENTIA SEVERITY, UNSPECIFIED WHETHER BEHAVIORAL, PSYCHOTIC, OR MOOD DISTURBANCE OR ANXIETY (H): ICD-10-CM

## 2023-11-08 DIAGNOSIS — E66.01 MORBID OBESITY (H): ICD-10-CM

## 2023-11-08 DIAGNOSIS — M48.062 SPINAL STENOSIS OF LUMBAR REGION WITH NEUROGENIC CLAUDICATION: ICD-10-CM

## 2023-11-08 DIAGNOSIS — I10 HYPERTENSION GOAL BP (BLOOD PRESSURE) < 140/90: ICD-10-CM

## 2023-11-08 DIAGNOSIS — Z79.01 LONG TERM (CURRENT) USE OF ANTICOAGULANTS: ICD-10-CM

## 2023-11-08 DIAGNOSIS — N18.31 STAGE 3A CHRONIC KIDNEY DISEASE (H): ICD-10-CM

## 2023-11-08 DIAGNOSIS — E11.21 TYPE 2 DIABETES MELLITUS WITH DIABETIC NEPHROPATHY, WITHOUT LONG-TERM CURRENT USE OF INSULIN (H): Primary | ICD-10-CM

## 2023-11-08 DIAGNOSIS — K21.9 GASTROESOPHAGEAL REFLUX DISEASE WITHOUT ESOPHAGITIS: ICD-10-CM

## 2023-11-08 DIAGNOSIS — F02.80 LEWY BODY DEMENTIA, UNSPECIFIED DEMENTIA SEVERITY, UNSPECIFIED WHETHER BEHAVIORAL, PSYCHOTIC, OR MOOD DISTURBANCE OR ANXIETY (H): ICD-10-CM

## 2023-11-08 DIAGNOSIS — F33.1 MODERATE EPISODE OF RECURRENT MAJOR DEPRESSIVE DISORDER (H): ICD-10-CM

## 2023-11-08 DIAGNOSIS — M10.9 GOUT, UNSPECIFIED CAUSE, UNSPECIFIED CHRONICITY, UNSPECIFIED SITE: ICD-10-CM

## 2023-11-08 DIAGNOSIS — E78.5 HYPERLIPIDEMIA WITH TARGET LDL LESS THAN 100: ICD-10-CM

## 2023-11-08 PROBLEM — F22 PARANOIA (H): Status: RESOLVED | Noted: 2022-10-12 | Resolved: 2023-11-08

## 2023-11-08 PROCEDURE — 99443 PR PHYSICIAN TELEPHONE EVALUATION 21-30 MIN: CPT | Mod: 95 | Performed by: FAMILY MEDICINE

## 2023-11-08 RX ORDER — WARFARIN SODIUM 2 MG/1
TABLET ORAL
Qty: 100 TABLET | Refills: 1 | Status: SHIPPED | OUTPATIENT
Start: 2023-11-08 | End: 2024-08-07

## 2023-11-08 RX ORDER — PAROXETINE 30 MG/1
TABLET, FILM COATED ORAL
Qty: 90 TABLET | Refills: 1 | Status: SHIPPED | OUTPATIENT
Start: 2023-11-08 | End: 2024-04-17

## 2023-11-08 RX ORDER — SIMVASTATIN 10 MG
TABLET ORAL
Qty: 90 TABLET | Refills: 0 | Status: SHIPPED | OUTPATIENT
Start: 2023-11-08 | End: 2023-11-08

## 2023-11-08 RX ORDER — CARVEDILOL 6.25 MG/1
6.25 TABLET ORAL 2 TIMES DAILY
Qty: 180 TABLET | Refills: 1 | Status: SHIPPED | OUTPATIENT
Start: 2023-11-08 | End: 2024-06-03

## 2023-11-08 RX ORDER — SIMVASTATIN 10 MG
TABLET ORAL
Qty: 90 TABLET | Refills: 3 | Status: SHIPPED | OUTPATIENT
Start: 2023-11-08

## 2023-11-08 RX ORDER — PREGABALIN 150 MG/1
150 CAPSULE ORAL 2 TIMES DAILY
Qty: 180 CAPSULE | Refills: 1 | Status: SHIPPED | OUTPATIENT
Start: 2023-11-08 | End: 2024-07-03

## 2023-11-08 RX ORDER — PANTOPRAZOLE SODIUM 40 MG/1
40 TABLET, DELAYED RELEASE ORAL DAILY
Qty: 90 TABLET | Refills: 1 | Status: SHIPPED | OUTPATIENT
Start: 2023-11-08 | End: 2024-07-03

## 2023-11-08 RX ORDER — GLIPIZIDE 10 MG/1
10 TABLET, FILM COATED, EXTENDED RELEASE ORAL DAILY
Qty: 90 TABLET | Refills: 1 | Status: SHIPPED | OUTPATIENT
Start: 2023-11-08 | End: 2024-07-03

## 2023-11-08 NOTE — PROGRESS NOTES
Gem is a 78 year old who is being evaluated via a billable telephone visit.      What phone number would you like to be contacted at? [phone]  How would you like to obtain your AVS? Mail a copy    Distant Location (provider location):  On-site    Assessment & Plan       ICD-10-CM    1. Type 2 diabetes mellitus with diabetic nephropathy, without long-term current use of insulin (H)  E11.21 glipiZIDE (GLUCOTROL XL) 10 MG 24 hr tablet      2. Hypertension goal BP (blood pressure) < 140/90  I10 carvedilol (COREG) 6.25 MG tablet      3. Paroxysmal atrial fibrillation (H)  I48.0 carvedilol (COREG) 6.25 MG tablet     warfarin ANTICOAGULANT (COUMADIN) 2 MG tablet      4. Gastroesophageal reflux disease without esophagitis  K21.9 pantoprazole (PROTONIX) 40 MG EC tablet      5. Moderate episode of recurrent major depressive disorder (H)  F33.1 PARoxetine (PAXIL) 30 MG tablet      6. Spinal stenosis of lumbar region with neurogenic claudication  M48.062 pregabalin (LYRICA) 150 MG capsule      7. Hyperlipidemia with target LDL less than 100  E78.5 simvastatin (ZOCOR) 10 MG tablet      8. Long term (current) use of anticoagulants  Z79.01 warfarin ANTICOAGULANT (COUMADIN) 2 MG tablet      9. Lewy body dementia, unspecified dementia severity, unspecified whether behavioral, psychotic, or mood disturbance or anxiety (H)  G31.83     F02.80       10. Morbid obesity (H)  E66.01       11. Stage 3a chronic kidney disease (H)  N18.31       12. Gout, unspecified cause, unspecified chronicity, unspecified site  M10.9         We reviewed various health conditions that the patient has on various medications that she is taking for these conditions  Her diabetes has not been well controlled  Cost is an important factor in adding an additional medicine on, so we will use glipizide 10 mg XL each morning in addition to metformin  We will try to have her return in the next week or so for a fasting lab only visit to get some of the labs  done as ordered above  Continue other same baseline meds for now  Plan a tentative recheck again in about 3 months to follow-up on the diabetes, preferably with an in person visit         Gurinder Ho MD  [facility] MARIANA Rabago is a 78 year old, presenting for the following health issues:  Diabetes      11/8/2023     5:11 PM   Additional Questions   Roomed by cam   Accompanied by daughter         11/8/2023     5:11 PM   Patient Reported Additional Medications   Patient reports taking the following new medications none       HPI     Diabetes Follow-up    How often are you checking your blood sugar? One time daily  What time of day are you checking your blood sugars (select all that apply)?  Before meals  Have you had any blood sugars above 200?  No  Have you had any blood sugars below 70?  No  What symptoms do you notice when your blood sugar is low?  None  What concerns do you have today about your diabetes? None   Do you have any of these symptoms? (Select all that apply)  No numbness or tingling in feet.  No redness, sores or blisters on feet.  No complaints of excessive thirst.  No reports of blurry vision.  No significant changes to weight.  Have you had a diabetic eye exam in the last 12 months? No        BP Readings from Last 2 Encounters:   03/21/23 (!) 156/102   11/14/22 130/70     Hemoglobin A1C (%)   Date Value   09/13/2022 8.0 (H)   06/07/2022 8.2 (H)   11/18/2020 7.0 (H)   09/20/2019 6.5 (H)     LDL Cholesterol Calculated (mg/dL)   Date Value   11/14/2022 54   12/23/2021 35   11/18/2020 23   09/20/2019 49         How many servings of fruits and vegetables do you eat daily?  0-1  On average, how many sweetened beverages do you drink each day (Examples: soda, juice, sweet tea, etc.  Do NOT count diet or artificially sweetened beverages)?   0  How many days per week do you exercise enough to make your heart beat faster?   How many minutes a day do you exercise enough to  make your heart beat faster?   How many days per week do you miss taking your medication? 0    The patient's daughter, Alicia, helps out with the patient's healthcare and does the talking today on behalf of the patient.  The patient was there for the phone visit as well.  The patient is overdue for labs.  She did have an A1c done at [facility] last month which came back at 8.4.  She was supposed to be taking Jardiance in addition to metformin, but was too expensive, so she is only on the metformin.  Her A1c values were generally doing well 2 years ago and prior, but have crept up in the last year and a half or so.  She is on numerous other baseline medications as well.  Alicia works during the day and is unable to get away to bring the patient in for an office visit.  The patient is due for follow-up on a variety of her baseline health conditions, which are fairly numerous.  She apparently is generally getting along okay at this time.  She is on baseline medications as listed below.    Patient Active Problem List   Diagnosis    Status Post Total Knee Replacement - bilateral    TACHO (obstructive sleep apnea)    Hyperlipidemia with target LDL less than 70    Hypertension goal BP (blood pressure) < 140/90    Morbid obesity (H)    Achalasia    Chronic low back pain    Gout    Paroxysmal atrial fibrillation (H)    Long term (current) use of anticoagulants    Anxiety    Spinal stenosis of lumbar region with neurogenic claudication    Glenohumeral arthritis, left    Type 2 diabetes mellitus with diabetic nephropathy, without long-term current use of insulin (H)    Stage 3a chronic kidney disease (H)    Anemia, iron deficiency    Mild recurrent major depression (H24)    Memory problem    Falls frequently    Lewy body dementia, unspecified dementia severity, unspecified whether behavioral, psychotic, or mood disturbance or anxiety (H)     Current Outpatient Medications   Medication    acetaminophen (TYLENOL) 500 MG tablet     blood glucose (NO BRAND SPECIFIED) test strip    blood glucose monitoring (NO BRAND SPECIFIED) meter device kit    carvedilol (COREG) 6.25 MG tablet        isosorbide mononitrate (IMDUR) 30 MG 24 hr tablet    metFORMIN (GLUCOPHAGE) 1000 MG tablet    pantoprazole (PROTONIX) 40 MG EC tablet    PARoxetine (PAXIL) 30 MG tablet    pregabalin (LYRICA) 150 MG capsule    simvastatin (ZOCOR) 10 MG tablet    thin (NO BRAND SPECIFIED) lancets    warfarin ANTICOAGULANT (COUMADIN) 2 MG tablet    ferrous sulfate (FEROSUL) 325 (65 Fe) MG tablet     Current Facility-Administered Medications   Medication    lidocaine (PF) (XYLOCAINE) 1 % injection 3 mL    lidocaine (PF) (XYLOCAINE) 1 % injection 4 mL    lidocaine 1 % 10 mL, sodium bicarbonate 1 mEq 11 mL injection    lidocaine 1% with EPINEPHrine 1:100,000 60 mL, sodium bicarbonate 12 mEq, sodium chloride 0.9% 500 mL 572 mL    methylPREDNISolone (DEPO-MEDROL) injection 80 mg           Review of Systems   Noncontributory except as above.      Objective           Vitals:  No vitals were obtained today due to virtual visit.    Physical Exam     The patient's daughter did most of the talking today on behalf of the patient.    RESP: No cough, no audible wheezing, able to talk in full sentences  Remainder of exam unable to be completed due to telephone visits    Previous lab results and office notes were reviewed.            Phone call duration: 21 minutes

## 2023-11-09 ENCOUNTER — TELEPHONE (OUTPATIENT)
Dept: FAMILY MEDICINE | Facility: CLINIC | Age: 78
End: 2023-11-09
Payer: MEDICARE

## 2023-11-09 ENCOUNTER — TELEPHONE (OUTPATIENT)
Dept: ANTICOAGULATION | Facility: CLINIC | Age: 78
End: 2023-11-09
Payer: MEDICARE

## 2023-11-09 NOTE — TELEPHONE ENCOUNTER
Letter at TC desk. Waiting to hear back from Bill regarding letter retrieval. If patient calls back please advise- would he like to  letter from  or have it mailed to home address?     Tiffanie Ang -    [facility]

## 2023-11-09 NOTE — TELEPHONE ENCOUNTER
[facility] contact information is:    Fax: [phone]    Phone: [phone]    Awaiting on confirmation of faxed orders:        Teodora Bowen,

## 2023-11-09 NOTE — TELEPHONE ENCOUNTER
"Received call from pt's spouse, Bear (Colin). He is calling with some concerns that he thinks his daughter did not relay to PCP at visit yesterday () and requesting a letter from PCP that he can have in the home stating that patient requires 24/7 supervision.    He states that for awhile now he has noticed some forgetfulness. This has been going on for a long time, at least a year.  He states that for example, after emptying toilet paper roll, patient will toss the empty rolls in the laundry hamper instead of the trash. He tries to sort through the laundry before putting in the wash, but sometimes misses the rolls, there are always multiple tubes in the laundry hamper and then it results in a big mess with tiny little pieces of cardboard all over the wet laundry. He has tried explaining this to her and it still is not registering.   He is mostly patient's primary care taker, but daughter helps too.  Colin states regarding patient that, \"her mind is not there.\"  States that pt does not have an in home PCA.   He is worried that she could/might burn down the home if left unsupervised and she was at home by herself.  States that maybe he would want to pay the neighbor or something to be with her when he is gone. States that he had to be gone for a few days last year for relatives , so it would be in these type of situations that he would want someone with her.  States that yesterday pt went to pour juice and half was going into the glass and a lot of it was going on the table/floor.    He states that pt will also blame everything on their neighbors when they are good people.  Note dx of lewy body dementia. Asked if patient is seeing any specialist currently for dx of lewy body dementia or who she saw for initial dx. Colin states that she is not seeing any specialist/neurologist that he is aware of. States that pt refuses follow-up appointments, women's exams etc.    Kristin Mills RN   [facility] " [facility]

## 2023-11-09 NOTE — TELEPHONE ENCOUNTER
I will generate a letter stating that the patient requires such supervision.  The family can either pick this up or we can mail it to them.  It is in our team blue bin.    Gurinder Ho MD

## 2023-11-09 NOTE — TELEPHONE ENCOUNTER
General Call    Contacts         Type Contact Phone/Fax    11/09/2023 08:23 AM CST Phone (Incoming) KATERIN NGO (Emergency Contact) [phone]        Reason for Call:  Daughter is calling and would like the labs that were ordered for her mother to be sent over to [facility] lab, so the labs can get drawn there as there isn't openings soon enough with in [facility] tomorrow for fasting.    What are your questions or concerns:  Please fax over lab orders    Date of last appointment with provider: 11/8/2023    Okay to leave a detailed message?: Yes at Other phone number:  [phone]

## 2023-11-09 NOTE — LETTER
November 9, 2023      Selvin Rockwell  [address] [facility] [address]        To Whom It May Concern:    I provide primary care for the above individual, Selvin Rockwell.  She has dementia and requires 24/7 supervision because of this.  Her , Helen (Colin), provides this for her, along with her daughter, Alicia.  Thank you for your consideration.      Sincerely,        Gurinder Ho MD

## 2023-11-10 ENCOUNTER — ANTICOAGULATION THERAPY VISIT (OUTPATIENT)
Dept: ANTICOAGULATION | Facility: CLINIC | Age: 78
End: 2023-11-10
Payer: MEDICARE

## 2023-11-10 ENCOUNTER — TRANSFERRED RECORDS (OUTPATIENT)
Dept: HEALTH INFORMATION MANAGEMENT | Facility: CLINIC | Age: 78
End: 2023-11-10
Payer: MEDICARE

## 2023-11-10 DIAGNOSIS — Z79.01 LONG TERM (CURRENT) USE OF ANTICOAGULANTS: ICD-10-CM

## 2023-11-10 DIAGNOSIS — I48.0 PAROXYSMAL ATRIAL FIBRILLATION (H): Primary | ICD-10-CM

## 2023-11-10 LAB
CHOLESTEROL (EXTERNAL): 106 MG/DL (ref 100–199)
HDLC SERPL-MCNC: 35 MG/DL
INR (EXTERNAL): 1.8 (ref 0.9–1.1)
LDL CHOLESTEROL CALCULATED (EXTERNAL): 38 MG/DL
NON HDL CHOLESTEROL (EXTERNAL): 71 MG/DL
TRIGLYCERIDES (EXTERNAL): 165 MG/DL

## 2023-11-10 NOTE — PROGRESS NOTES
ANTICOAGULATION MANAGEMENT     Selvin Rockwell 78 year old female is on warfarin with therapeutic INR result. (Goal INR 2.0-3.0)    Recent labs: (last 7 days)     11/10/23  0802   INR 1.8*       ASSESSMENT     Source(s): Chart Review and Patient/Caregiver Call     Warfarin doses taken: Missed dose(s) may be affecting INR  Diet: No new diet changes identified  Medication/supplement changes: None noted  New illness, injury, or hospitalization: No  Signs or symptoms of bleeding or clotting: No  Previous result: Therapeutic last 2(+) visits  Additional findings: None       PLAN     Recommended plan for temporary change(s) affecting INR     Dosing Instructions: Continue your current warfarin dose with next INR in 2 weeks       Summary  As of 11/10/2023      Full warfarin instructions:  3 mg every Mon, Fri; 2 mg all other days   Next INR check:  11/24/2023               Telephone call with Alicia who verbalizes understanding and agrees to plan    Patient using outside facility for labs    Education provided:   Goal range and lab monitoring: goal range and significance of current result    Plan made per [facility] anticoagulation protocol    Chitra Morrison RN  Anticoagulation Clinic  11/10/2023    _______________________________________________________________________     Anticoagulation Episode Summary       Current INR goal:  2.0-3.0   TTR:  69.4% (1 y)   Target end date:  Indefinite   Send INR reminders to:  JERRY PEÑA    Indications    Paroxysmal atrial fibrillation (H) [I48.0]  Long term (current) use of anticoagulants [Z79.01]             Comments:               Anticoagulation Care Providers       Provider Role Specialty Phone number    Gurinder Ho MD Referring Family Medicine [phone]

## 2023-11-10 NOTE — PROGRESS NOTES
Received a fax INR result for Gem from [facility]    INR result dated 11/10/23 is 1.8    Desiree BROWN RN, BSN  Anticoagulation Clinic

## 2023-11-17 ENCOUNTER — TELEPHONE (OUTPATIENT)
Dept: FAMILY MEDICINE | Facility: CLINIC | Age: 78
End: 2023-11-17
Payer: MEDICARE

## 2023-11-17 NOTE — TELEPHONE ENCOUNTER
Her outside lab results were from November 10 and can be seen in her chart (at least by us).  Her lab work showed that her cholesterol values look fine.  She still has mildly impaired kidney function, but it is fairly stable.  Her uric acid level looks okay.  Her diabetes has not been optimally controlled, but the extra glipizide should help.  She should stay on her same medications including the new glipizide and have another recheck in about 3 months.    Gurinder Ho MD

## 2023-11-17 NOTE — TELEPHONE ENCOUNTER
Patient's daughter (Alicia- consent to communicate on file, no  needed) calling. States that Dr. Gurinder Ho had patient complete lab work at [facility] on 11/10/23, results sent to Dr. Gurinder Ho, asking for provider interpretation of results.    Alicia (daughter) #: [phone], please leave DVM    Thanks,  FELIPE Castillo RN  [facility]

## 2023-11-20 NOTE — TELEPHONE ENCOUNTER
Daughter, Alicia was given Provider's message as written. Verbalized understanding and agrees with plan. Offered to assist with scheduling. Daughter states she will call back to schedule.    Desiree Sen RN  [facility]

## 2023-12-01 ENCOUNTER — TELEPHONE (OUTPATIENT)
Dept: ANTICOAGULATION | Facility: CLINIC | Age: 78
End: 2023-12-01
Payer: MEDICARE

## 2023-12-01 NOTE — TELEPHONE ENCOUNTER
ANTICOAGULATION     Selvin Rockwell is overdue for an INR check.     Left message for patient to call and schedule lab appointment as soon as possible. If returning call, please schedule.     Kristi Fu RN

## 2023-12-08 ENCOUNTER — TELEPHONE (OUTPATIENT)
Dept: ANTICOAGULATION | Facility: CLINIC | Age: 78
End: 2023-12-08
Payer: MEDICARE

## 2023-12-08 NOTE — TELEPHONE ENCOUNTER
ANTICOAGULATION     Selvin Rockwell is overdue for an INR check.     Left message for leanna to get Inr done at her outside labs as soon as possible    Kristi Fu RN

## 2023-12-16 LAB — INR (EXTERNAL): 3.6 (ref 0.9–1.1)

## 2023-12-18 ENCOUNTER — ANTICOAGULATION THERAPY VISIT (OUTPATIENT)
Dept: ANTICOAGULATION | Facility: CLINIC | Age: 78
End: 2023-12-18
Payer: MEDICARE

## 2023-12-18 DIAGNOSIS — Z79.01 LONG TERM (CURRENT) USE OF ANTICOAGULANTS: ICD-10-CM

## 2023-12-18 DIAGNOSIS — I48.0 PAROXYSMAL ATRIAL FIBRILLATION (H): Primary | ICD-10-CM

## 2023-12-18 NOTE — PROGRESS NOTES
ANTICOAGULATION MANAGEMENT     Selvin Rockwell 78 year old female is on warfarin with supratherapeutic INR result. (Goal INR 2.0-3.0)    Recent labs: (last 7 days)     12/16/23  1230   INR 3.6*       ASSESSMENT     Source(s): Chart Review and Patient/Caregiver Call     Warfarin doses taken: Warfarin taken as instructed  Diet: No new diet changes identified6  Medication/supplement changes: None noted  New illness, injury, or hospitalization: No  Signs or symptoms of bleeding or clotting: No  Previous result: Subtherapeutic  Additional findings: None       PLAN     Recommended plan for no diet, medication or health factor changes affecting INR     Dosing Instructions: hold dose then decrease your warfarin dose (6.2% change) with next INR in 2 weeks       Summary  As of 12/18/2023      Full warfarin instructions:  12/18: Hold; Otherwise 3 mg every Mon; 2 mg all other days   Next INR check:  1/2/2024               Telephone call with Alicia who agrees to plan and repeated back plan correctly    Patient using outside facility for labs    Education provided:   Please call back if any changes to your diet, medications or how you've been taking warfarin  Goal range and lab monitoring: goal range and significance of current result, Importance of therapeutic range, and Importance of following up at instructed interval    Plan made per [facility] anticoagulation protocol    Kristi Fu RN  Anticoagulation Clinic  12/18/2023    _______________________________________________________________________     Anticoagulation Episode Summary       Current INR goal:  2.0-3.0   TTR:  64.8% (1 y)   Target end date:  Indefinite   Send INR reminders to:  JERRY PEÑA    Indications    Paroxysmal atrial fibrillation (H) [I48.0]  Long term (current) use of anticoagulants [Z79.01]             Comments:               Anticoagulation Care Providers       Provider Role Specialty Phone number    Gurinder Ho MD Referring Family Medicine  [phone]               ambulatory

## 2024-01-04 ENCOUNTER — TELEPHONE (OUTPATIENT)
Dept: ANTICOAGULATION | Facility: CLINIC | Age: 79
End: 2024-01-04
Payer: MEDICARE

## 2024-01-04 NOTE — TELEPHONE ENCOUNTER
ANTICOAGULATION     Selivn Rockwell is overdue for an INR check.     Spoke with Alicia who declined to schedule a lab appointment at this time. If calling back please schedule as soon as possible.    Suma Sandoval RN

## 2024-01-13 LAB — INR (EXTERNAL): 4.2 (ref 0.9–1.1)

## 2024-01-17 ENCOUNTER — TELEPHONE (OUTPATIENT)
Dept: ANTICOAGULATION | Facility: CLINIC | Age: 79
End: 2024-01-17
Payer: MEDICARE

## 2024-01-17 DIAGNOSIS — I48.0 PAROXYSMAL ATRIAL FIBRILLATION (H): Primary | ICD-10-CM

## 2024-01-17 DIAGNOSIS — Z79.01 LONG TERM (CURRENT) USE OF ANTICOAGULANTS: ICD-10-CM

## 2024-01-17 NOTE — TELEPHONE ENCOUNTER
"ANTICOAGULATION  MANAGEMENT: Discharge Review    Selvin Rockwell chart reviewed for anticoagulation continuity of care    Emergency room visit on 1/13/24 for chest pain.    Discharge disposition: Home    Results:    No results for input(s): \"INR\", \"[id number]\", \"F2\", \"ALMWH\", \"AAUFH\" in the last 168 hours.  Anticoagulation inpatient management:     not applicable     Anticoagulation discharge instructions:     Warfarin dosing: home regimen continued   Bridging: No   INR goal change: No      Medication changes affecting anticoagulation: Yes: hydrocodone-acetaminophen 5-325 mg/tab for epigastric pain (patient has not picked up prescription at this time)    Additional factors affecting anticoagulation: Yes: increase in stress pe Alicia     PLAN     INR 4.2 [place] 1/13/24    Confirmed no changes were made to patients dosing for supra therapeutic INR    Recommend to adjust dose to hold warfarin today 1/17/24 then resume maintenance. Recheck INR by Monday 1/22/24 or sooner.    Spoke with Alicia    Anticoagulation Calendar updated    Education on increase risk of bleed    Portia Coronado RN   "

## 2024-01-23 ENCOUNTER — TELEPHONE (OUTPATIENT)
Dept: ANTICOAGULATION | Facility: CLINIC | Age: 79
End: 2024-01-23
Payer: MEDICARE

## 2024-01-23 NOTE — TELEPHONE ENCOUNTER
ANTICOAGULATION     Selvin Rockwell is overdue for an INR check.     Left message for patient to call and schedule lab appointment as soon as possible. If returning call, please schedule.     Ale Vasquez RN

## 2024-01-27 LAB — INR (EXTERNAL): 2.6 (ref 0.9–1.1)

## 2024-01-29 ENCOUNTER — ANTICOAGULATION THERAPY VISIT (OUTPATIENT)
Dept: ANTICOAGULATION | Facility: CLINIC | Age: 79
End: 2024-01-29
Payer: MEDICARE

## 2024-01-29 DIAGNOSIS — I48.0 PAROXYSMAL ATRIAL FIBRILLATION (H): Primary | ICD-10-CM

## 2024-01-29 DIAGNOSIS — Z79.01 LONG TERM (CURRENT) USE OF ANTICOAGULANTS: ICD-10-CM

## 2024-01-29 NOTE — PROGRESS NOTES
ANTICOAGULATION MANAGEMENT     Selvin Rockwell 78 year old female is on warfarin with therapeutic INR result. (Goal INR 2.0-3.0)    Recent labs: (last 7 days)     01/27/24  1254   INR 2.6*       ASSESSMENT     Source(s): Chart Review and Patient/Caregiver Call     Warfarin doses taken: Warfarin taken as instructed  Diet: No new diet changes identified  Medication/supplement changes: None noted  New illness, injury, or hospitalization: No  Signs or symptoms of bleeding or clotting: No  Previous result: Supratherapeutic  Additional findings: None     PLAN     Recommended plan for no diet, medication or health factor changes affecting INR     Dosing Instructions: Continue your current warfarin dose with next INR in 3 weeks       Summary  As of 1/29/2024      Full warfarin instructions:  3 mg every Mon; 2 mg all other days   Next INR check:  2/16/2024               Telephone call with Alicia who verbalizes understanding and agrees to plan    Patient using outside facility for labs    Education provided:   Please call back if any changes to your diet, medications or how you've been taking warfarin    Plan made per [facility] anticoagulation protocol    Portia Coronado RN  Anticoagulation Clinic  1/29/2024    _______________________________________________________________________     Anticoagulation Episode Summary       Current INR goal:  2.0-3.0   TTR:  60.3% (1 y)   Target end date:  Indefinite   Send INR reminders to:  [facility]    Indications    Paroxysmal atrial fibrillation (H) [I48.0]  Long term (current) use of anticoagulants [Z79.01]             Comments:               Anticoagulation Care Providers       Provider Role Specialty Phone number    Gurinder Ho MD Referring Family Medicine [phone]

## 2024-02-11 ENCOUNTER — TRANSFERRED RECORDS (OUTPATIENT)
Dept: MULTI SPECIALTY CLINIC | Facility: CLINIC | Age: 79
End: 2024-02-11

## 2024-02-11 ENCOUNTER — NURSE TRIAGE (OUTPATIENT)
Dept: NURSING | Facility: CLINIC | Age: 79
End: 2024-02-11
Payer: MEDICARE

## 2024-02-11 LAB
ALT SERPL-CCNC: 5 IU/L (ref 10–35)
AST SERPL-CCNC: 16 IU/L (ref 10–35)
CREATININE (EXTERNAL): 0.94 MG/DL (ref 0.5–0.9)
GFR ESTIMATED (EXTERNAL): 63 ML/MIN/1.73M2
GLUCOSE (EXTERNAL): 142 MG/DL (ref 70–99)
POTASSIUM (EXTERNAL): 3.9 MMOL/L (ref 3.5–5.1)

## 2024-02-11 NOTE — TELEPHONE ENCOUNTER
"Nurse Triage SBAR    Is this a 2nd Level Triage? NO    Situation:  Daughter Alicia calling with patient (consent to communicate on file). Requesting to know if she should bring patient back to ED?    Background:   See 1/13/24 ED visit Epigastric pain    Assessment:     \"She is having anxiety acid reflux type symptoms and breathing issue.\"   Symptoms starting yesterday ongoing today.  Daughter reporting some difficulty breathing, denies struggling.  Reporting pain in upper stomach radiating into chest, constant this morning. Daughter stating she can tell by watching patient that it is still present.  Intermittent since yesterday.  Patient is able to walk, reporting some dizziness. Denies sweating.    Protocol Recommended Disposition:     See in ED advised. Daughter agrees to transport patient to ED.     Reason for Disposition   [1] Pain lasts > 10 minutes AND [2] age > 50    Additional Information   Negative: SEVERE difficulty breathing (e.g., struggling for each breath, speaks in single words)   Negative: Shock suspected (e.g., cold/pale/clammy skin, too weak to stand, low BP, rapid pulse)   Negative: Difficult to awaken or acting confused (e.g., disoriented, slurred speech)   Negative: Passed out (i.e., lost consciousness, collapsed and was not responding)   Negative: Visible sweat on face or sweat dripping down face   Negative: Sounds like a life-threatening emergency to the triager   Negative: [1] SEVERE pain (e.g., excruciating) AND [2] present > 1 hour   Negative: Followed an abdomen (stomach) injury   Negative: Chest pain   Negative: [1] Abdominal pain AND [2] pregnant < 20 weeks   Negative: [1] Abdominal pain AND [2] pregnant 20 or more weeks   Negative: [1] Abdominal pain AND [2] postpartum (from 0 to 6 weeks after delivery)   Negative: Abdomen bloating or swelling are main symptoms    Protocols used: Abdominal Pain - Upper-A-AH    "

## 2024-02-23 ENCOUNTER — DOCUMENTATION ONLY (OUTPATIENT)
Dept: FAMILY MEDICINE | Facility: CLINIC | Age: 79
End: 2024-02-23
Payer: MEDICARE

## 2024-02-23 NOTE — PROGRESS NOTES
ANTICOAGULATION     Selvin Rockwell is overdue for an INR check.     Spoke with pt's daughter Alicia. Alicia reports that due to her work schedule she finds it difficult to bring pt in for lab during [facility] business hours. Alicia states that she will bring pt to [facility] for INR check on Saturday 2/24/24. Alicia advised that [facility] will not get the result until Monday 2/26/24. Alicia to contact [facility] on Monday afternoon 2/26/24 if she has not heard from us. Alicia stated understanding.    Padmini Cobos, RN

## 2024-02-24 LAB — INR (EXTERNAL): 1.7 (ref 0.9–1.1)

## 2024-02-26 ENCOUNTER — ANTICOAGULATION THERAPY VISIT (OUTPATIENT)
Dept: ANTICOAGULATION | Facility: CLINIC | Age: 79
End: 2024-02-26
Payer: MEDICARE

## 2024-02-26 DIAGNOSIS — I48.0 PAROXYSMAL ATRIAL FIBRILLATION (H): Primary | ICD-10-CM

## 2024-02-26 DIAGNOSIS — Z79.01 LONG TERM (CURRENT) USE OF ANTICOAGULANTS: ICD-10-CM

## 2024-02-26 NOTE — PROGRESS NOTES
ANTICOAGULATION MANAGEMENT     Selvin Rockwell 78 year old female is on warfarin with subtherapeutic INR result. (Goal INR 2.0-3.0)    Recent labs: (last 7 days)     02/24/24  1200   INR 1.7*       ASSESSMENT     Source(s): Chart Review and Patient/Caregiver Call     Warfarin doses taken: Warfarin taken as instructed  Diet: No new diet changes identified  Medication/supplement changes: None noted  New illness, injury, or hospitalization: No  Signs or symptoms of bleeding or clotting: No  Previous result: Therapeutic last visit; previously outside of goal range  Additional findings: None       PLAN     Recommended plan for no diet, medication or health factor changes affecting INR     Dosing Instructions: Increase your warfarin dose (6.7% change) with next INR in 2 weeks       Summary  As of 2/26/2024      Full warfarin instructions:  3 mg every Mon, Thu; 2 mg all other days   Next INR check:  3/9/2024               Telephone call with Alicia who verbalizes understanding and agrees to plan    Patient using outside facility for labs    Education provided:   Importance of notifying anticoagulation clinic for: changes in medications; a sooner lab recheck maybe needed and diarrhea, nausea/vomiting, reduced intake, cold/flu, and/or infections; a sooner lab recheck maybe needed  Contact [phone]  with any changes, questions or concerns.     Plan made per [facility] anticoagulation protocol    Suma Sandoval RN  Anticoagulation Clinic  2/26/2024    _______________________________________________________________________     Anticoagulation Episode Summary       Current INR goal:  2.0-3.0   TTR:  65.5% (1 y)   Target end date:  Indefinite   Send INR reminders to:  JERRY PEÑA    Indications    Paroxysmal atrial fibrillation (H) [I48.0]  Long term (current) use of anticoagulants [Z79.01]             Comments:               Anticoagulation Care Providers       Provider Role Specialty Phone number    Gurinder Ho,  MD [facility] [phone]

## 2024-03-12 ENCOUNTER — TELEPHONE (OUTPATIENT)
Dept: FAMILY MEDICINE | Facility: CLINIC | Age: 79
End: 2024-03-12
Payer: MEDICARE

## 2024-03-12 NOTE — TELEPHONE ENCOUNTER
Routing to  anticoagulation team      Daughter Alicia calling  regarding patients INR when traveling    Patient wants to travel out of country for 3 months and is wondering how INR would be done in [place].    Please reach out to daughter at [phone] to discuss.    Thank you,    Christine M Klisch, RN

## 2024-03-15 NOTE — TELEPHONE ENCOUNTER
ANTICOAGULATION  MANAGEMENT- Travel planning    Daughter reports upcoming travel plans to [place].    Departure and return date: TBD    INR monitoring plan:     Spoke w/ daughter Alicia - she is unsure of the patient's travel plans at this time. Patient would like to travel for about 3 months to [place].  Informed daughter that [facility] is usually unable to dose patient if she is outside of the country.   Alicia confirmed that pt will be having an INR checked tomorrow. Last INR was subtherapeutic. [facility] to discuss further once INR is checked tomorrow and when travel plans are further established. Alicia aware [facility] won't receive results until Monday 3/18.    Instructed to call the Anticoauglation Clinic for any changes, questions or concerns. [phone]   ?   Portia Coronado RN

## 2024-03-18 ENCOUNTER — ANTICOAGULATION THERAPY VISIT (OUTPATIENT)
Dept: ANTICOAGULATION | Facility: CLINIC | Age: 79
End: 2024-03-18
Payer: MEDICARE

## 2024-03-18 DIAGNOSIS — I48.0 PAROXYSMAL ATRIAL FIBRILLATION (H): Primary | ICD-10-CM

## 2024-03-18 DIAGNOSIS — Z79.01 LONG TERM (CURRENT) USE OF ANTICOAGULANTS: ICD-10-CM

## 2024-03-18 LAB — INR (EXTERNAL): 3.2 (ref 0.9–1.1)

## 2024-03-18 NOTE — PROGRESS NOTES
ANTICOAGULATION MANAGEMENT     Selvin Rockwell 78 year old female is on warfarin with supratherapeutic INR result. (Goal INR 2.0-3.0)    Recent labs: (last 7 days)     03/16/24  0809   INR 3.2*       ASSESSMENT     Source(s): Chart Review and Patient/Caregiver Call     Warfarin doses taken: Warfarin taken as instructed  Diet: No new diet changes identified  Medication/supplement changes: None noted  New illness, injury, or hospitalization: patient hurt her leg a few days ago  Hx of having procedures done on her legs  Signs or symptoms of bleeding or clotting: bruising from bumping into things  Previous result: Subtherapeutic  Additional findings: None     PLAN     Recommended plan for temporary change(s) affecting INR     Dosing Instructions: Continue your current warfarin dose with next INR in 2 weeks       Alicia typically has INR checks done on Saturday - continue maintenance until the following Monday. If bruises worsen - please be seen.    Summary  As of 3/18/2024      Full warfarin instructions:  3 mg every Mon, Thu; 2 mg all other days   Next INR check:  3/29/2024               Telephone call with Alicia who verbalizes understanding and agrees to plan    Lab visit scheduled    Education provided:   Please call back if any changes to your diet, medications or how you've been taking warfarin  Symptom monitoring: monitoring for bleeding signs and symptoms    Plan made per [facility] anticoagulation protocol    Portia Coronado RN  Anticoagulation Clinic  3/18/2024    _______________________________________________________________________     Anticoagulation Episode Summary       Current INR goal:  2.0-3.0   TTR:  69.3% (1 y)   Target end date:  Indefinite   Send INR reminders to:  JERRY PEÑA    Indications    Paroxysmal atrial fibrillation (H) [I48.0]  Long term (current) use of anticoagulants [Z79.01]             Comments:               Anticoagulation Care Providers       Provider Role Specialty Phone number     Gurinder Ho MD [facility] [phone]

## 2024-04-05 ENCOUNTER — TELEPHONE (OUTPATIENT)
Dept: ANTICOAGULATION | Facility: CLINIC | Age: 79
End: 2024-04-05
Payer: MEDICARE

## 2024-04-08 ENCOUNTER — TELEPHONE (OUTPATIENT)
Dept: FAMILY MEDICINE | Facility: CLINIC | Age: 79
End: 2024-04-08
Payer: MEDICARE

## 2024-04-08 ENCOUNTER — ANTICOAGULATION THERAPY VISIT (OUTPATIENT)
Dept: ANTICOAGULATION | Facility: CLINIC | Age: 79
End: 2024-04-08
Payer: MEDICARE

## 2024-04-08 DIAGNOSIS — I48.0 PAROXYSMAL ATRIAL FIBRILLATION (H): Primary | ICD-10-CM

## 2024-04-08 DIAGNOSIS — Z79.01 LONG TERM (CURRENT) USE OF ANTICOAGULANTS: ICD-10-CM

## 2024-04-08 LAB — INR (EXTERNAL): 3.3 (ref 0.9–1.1)

## 2024-04-08 NOTE — TELEPHONE ENCOUNTER
Lab calling from [facility]     Pt is at lab right now for INR    Orders for INR do need to be faxed directly to lab [phone]    Last standing  order for INR  2023      Odette, RN    Triage Nurse  [facility]

## 2024-04-08 NOTE — TELEPHONE ENCOUNTER
General Call      Reason for Call:  INR    What are your questions or concerns:  needing order for INR sent to [facility]. She is there now. She states we should have the fax number because its been faxed before.     Date of last appointment with provider: 11-8-23    Okay to leave a detailed message?: No at Other phone number:  [phone] Alicia

## 2024-04-08 NOTE — PROGRESS NOTES
ANTICOAGULATION MANAGEMENT     Selvin Rockwell 78 year old female is on warfarin with supratherapeutic INR result. (Goal INR 2.0-3.0)    Recent labs: (last 7 days)     04/08/24  1402   INR 3.3*       ASSESSMENT     Source(s): Chart Review and Patient/Caregiver Call     Warfarin doses taken: Warfarin taken as instructed  Diet: No new diet changes identified  Medication/supplement changes: None noted  New illness, injury, or hospitalization: No  Signs or symptoms of bleeding or clotting: No  Previous result: Supratherapeutic  Additional findings:  Gem has been constipated but no other changes       PLAN     Recommended plan for no diet, medication or health factor changes affecting INR     Dosing Instructions: decrease your warfarin dose (6.2% change) with next INR in 2 weeks       Summary  As of 4/8/2024      Full warfarin instructions:  3 mg every Thu; 2 mg all other days   Next INR check:  4/22/2024               Telephone call with Alicia who verbalizes understanding and agrees to plan    Patient using outside facility for labs    Education provided:   Please call back if any changes to your diet, medications or how you've been taking warfarin  Contact [phone]  with any changes, questions or concerns.     Plan made per [facility] anticoagulation protocol    Ale Vasquez RN  Anticoagulation Clinic  4/8/2024    _______________________________________________________________________     Anticoagulation Episode Summary       Current INR goal:  2.0-3.0   TTR:  67.4% (1 y)   Target end date:  Indefinite   Send INR reminders to:  JERRY PEÑA    Indications    Paroxysmal atrial fibrillation (H) [I48.0]  Long term (current) use of anticoagulants [Z79.01]             Comments:               Anticoagulation Care Providers       Provider Role Specialty Phone number    Gurinder Ho MD Referring Family Medicine [phone]

## 2024-04-15 ENCOUNTER — TELEPHONE (OUTPATIENT)
Dept: FAMILY MEDICINE | Facility: CLINIC | Age: 79
End: 2024-04-15
Payer: MEDICARE

## 2024-04-15 NOTE — LETTER
April 15, 2024    To  Selvin Rockwell  [address] [facility] [address]    Your team at [facility] cares about your health. We have reviewed your chart and based on our findings; we are making the following recommendations to better manage your health.     You are in particular need of attention regarding the following:     HYPERTENSION FOLLOW UP: Office Visit  PREVENTATIVE VISIT: Annual Medicare Wellness:Schedule an Annual Medicare Wellness Exam. Please call your [facility] clinic to set up your appointment.    Please call [phone] to schedule.    If you have already completed these items, please contact the clinic via phone or   MyChart so your care team can review and update your records. Thank you for   choosing [facility] for your healthcare needs. For any questions,   concerns, or to schedule an appointment please contact our clinic.    Healthy Regards,      Your [facility] Care Team

## 2024-04-15 NOTE — TELEPHONE ENCOUNTER
Patient Quality Outreach    Patient is due for the following:   Hypertension -  Hypertension follow-up visit  Wellness exam  Next Steps:   See below    Type of outreach:    Sent letter.    Next Steps:  Reach out within 90 days via  done .    Max number of attempts reached: Yes. Will try again in 90 days if patient still on fail list.    Questions for provider review:    None           Jayleen Santiago [facility]  Chart routed to closing encounter  .      
Initial (On Arrival)

## 2024-04-17 ENCOUNTER — OFFICE VISIT (OUTPATIENT)
Dept: FAMILY MEDICINE | Facility: CLINIC | Age: 79
End: 2024-04-17
Payer: MEDICARE

## 2024-04-17 VITALS
OXYGEN SATURATION: 99 % | DIASTOLIC BLOOD PRESSURE: 77 MMHG | BODY MASS INDEX: 38.73 KG/M2 | HEART RATE: 78 BPM | RESPIRATION RATE: 18 BRPM | TEMPERATURE: 97.8 F | SYSTOLIC BLOOD PRESSURE: 121 MMHG | WEIGHT: 192.13 LBS | HEIGHT: 59 IN

## 2024-04-17 DIAGNOSIS — E11.21 TYPE 2 DIABETES MELLITUS WITH DIABETIC NEPHROPATHY, WITHOUT LONG-TERM CURRENT USE OF INSULIN (H): ICD-10-CM

## 2024-04-17 DIAGNOSIS — F41.9 ANXIETY: Primary | ICD-10-CM

## 2024-04-17 DIAGNOSIS — R53.83 FATIGUE, UNSPECIFIED TYPE: ICD-10-CM

## 2024-04-17 DIAGNOSIS — F43.21 GRIEF REACTION: ICD-10-CM

## 2024-04-17 DIAGNOSIS — G47.00 INSOMNIA, UNSPECIFIED TYPE: ICD-10-CM

## 2024-04-17 DIAGNOSIS — I10 HYPERTENSION GOAL BP (BLOOD PRESSURE) < 140/90: ICD-10-CM

## 2024-04-17 LAB
BASOPHILS # BLD AUTO: 0 10E3/UL (ref 0–0.2)
BASOPHILS NFR BLD AUTO: 0 %
EOSINOPHIL # BLD AUTO: 0.1 10E3/UL (ref 0–0.7)
EOSINOPHIL NFR BLD AUTO: 2 %
ERYTHROCYTE [DISTWIDTH] IN BLOOD BY AUTOMATED COUNT: 17 % (ref 10–15)
HBA1C MFR BLD: 7.7 % (ref 0–5.6)
HCT VFR BLD AUTO: 42.5 % (ref 35–47)
HGB BLD-MCNC: 13.3 G/DL (ref 11.7–15.7)
IMM GRANULOCYTES # BLD: 0 10E3/UL
IMM GRANULOCYTES NFR BLD: 0 %
LYMPHOCYTES # BLD AUTO: 1.8 10E3/UL (ref 0.8–5.3)
LYMPHOCYTES NFR BLD AUTO: 29 %
MCH RBC QN AUTO: 25.2 PG (ref 26.5–33)
MCHC RBC AUTO-ENTMCNC: 31.3 G/DL (ref 31.5–36.5)
MCV RBC AUTO: 81 FL (ref 78–100)
MONOCYTES # BLD AUTO: 0.4 10E3/UL (ref 0–1.3)
MONOCYTES NFR BLD AUTO: 6 %
NEUTROPHILS # BLD AUTO: 3.9 10E3/UL (ref 1.6–8.3)
NEUTROPHILS NFR BLD AUTO: 63 %
PLATELET # BLD AUTO: 213 10E3/UL (ref 150–450)
RBC # BLD AUTO: 5.28 10E6/UL (ref 3.8–5.2)
WBC # BLD AUTO: 6.2 10E3/UL (ref 4–11)

## 2024-04-17 PROCEDURE — 36415 COLL VENOUS BLD VENIPUNCTURE: CPT | Performed by: FAMILY MEDICINE

## 2024-04-17 PROCEDURE — 85025 COMPLETE CBC W/AUTO DIFF WBC: CPT | Performed by: FAMILY MEDICINE

## 2024-04-17 PROCEDURE — 99214 OFFICE O/P EST MOD 30 MIN: CPT | Performed by: FAMILY MEDICINE

## 2024-04-17 PROCEDURE — 84443 ASSAY THYROID STIM HORMONE: CPT | Performed by: FAMILY MEDICINE

## 2024-04-17 PROCEDURE — 80053 COMPREHEN METABOLIC PANEL: CPT | Performed by: FAMILY MEDICINE

## 2024-04-17 PROCEDURE — 83036 HEMOGLOBIN GLYCOSYLATED A1C: CPT | Performed by: FAMILY MEDICINE

## 2024-04-17 RX ORDER — RESPIRATORY SYNCYTIAL VIRUS VACCINE 120MCG/0.5
0.5 KIT INTRAMUSCULAR ONCE
Qty: 1 EACH | Refills: 0 | Status: CANCELLED | OUTPATIENT
Start: 2024-04-17 | End: 2024-04-17

## 2024-04-17 ASSESSMENT — ANXIETY QUESTIONNAIRES
GAD7 TOTAL SCORE: 16
3. WORRYING TOO MUCH ABOUT DIFFERENT THINGS: NEARLY EVERY DAY
5. BEING SO RESTLESS THAT IT IS HARD TO SIT STILL: NOT AT ALL
1. FEELING NERVOUS, ANXIOUS, OR ON EDGE: NEARLY EVERY DAY
7. FEELING AFRAID AS IF SOMETHING AWFUL MIGHT HAPPEN: NEARLY EVERY DAY
GAD7 TOTAL SCORE: 16
IF YOU CHECKED OFF ANY PROBLEMS ON THIS QUESTIONNAIRE, HOW DIFFICULT HAVE THESE PROBLEMS MADE IT FOR YOU TO DO YOUR WORK, TAKE CARE OF THINGS AT HOME, OR GET ALONG WITH OTHER PEOPLE: EXTREMELY DIFFICULT
2. NOT BEING ABLE TO STOP OR CONTROL WORRYING: NEARLY EVERY DAY
6. BECOMING EASILY ANNOYED OR IRRITABLE: SEVERAL DAYS

## 2024-04-17 ASSESSMENT — PATIENT HEALTH QUESTIONNAIRE - PHQ9
10. IF YOU CHECKED OFF ANY PROBLEMS, HOW DIFFICULT HAVE THESE PROBLEMS MADE IT FOR YOU TO DO YOUR WORK, TAKE CARE OF THINGS AT HOME, OR GET ALONG WITH OTHER PEOPLE: SOMEWHAT DIFFICULT
SUM OF ALL RESPONSES TO PHQ QUESTIONS 1-9: 14
SUM OF ALL RESPONSES TO PHQ QUESTIONS 1-9: 14
5. POOR APPETITE OR OVEREATING: NEARLY EVERY DAY

## 2024-04-17 ASSESSMENT — ENCOUNTER SYMPTOMS: NERVOUS/ANXIOUS: 1

## 2024-04-17 ASSESSMENT — PAIN SCALES - GENERAL: PAINLEVEL: NO PAIN (0)

## 2024-04-17 NOTE — PROGRESS NOTES
"Marcy Rabago is a 78 year old, presenting for the following health issues:  Anxiety        2024     2:05 PM   Additional Questions   Roomed by Jailyn Lopez   Accompanied by Daughter     History of Present Illness       Reason for visit:  Anxiety    She eats 0-1 servings of fruits and vegetables daily.She consumes 1 sweetened beverage(s) daily.She exercises with enough effort to increase her heart rate 9 or less minutes per day.  She exercises with enough effort to increase her heart rate 3 or less days per week.   She is taking medications regularly.           Patient here with daughter    Some paranoia and hallucinations    Had small tragedy recently kicked up anxiety and stress    No prescription coverage    Wondering about meds    On paxil for depr, not helping much  On this for over a year    No motivation to exercise    Dog      Not sleeping well    Had some sleep issues before pet     Reviewed recent emergency room note in detail  The seroquel did not help, so not taking   Also got hydroxyzine which did not help     Daughter thinks some dementia    3-4 hours of sleep since pet     Only 5 hours before that          Objective    /77 (BP Location: Right arm, Patient Position: Chair, Cuff Size: Adult Regular)   Pulse 78   Temp 97.8  F (36.6  C) (Temporal)   Resp 18   Ht 1.499 m (4' 11\")   Wt 87.1 kg (192 lb 2 oz)   SpO2 99%   BMI 38.80 kg/m    Body mass index is 38.8 kg/m .  Physical Exam  Constitutional:       Appearance: She is well-developed.   HENT:      Head: Normocephalic and atraumatic.   Eyes:      Conjunctiva/sclera: Conjunctivae normal.   Neck:      Vascular: No carotid bruit.   Cardiovascular:      Rate and Rhythm: Normal rate and regular rhythm.      Heart sounds: Normal heart sounds.   Pulmonary:      Effort: Pulmonary effort is normal. No respiratory distress.      Breath sounds: Normal breath sounds.   Neurological:      Mental Status: She is alert " and oriented to person, place, and time.      Slight pretibial pitting edema    Radials symmetric      Reviewed past labs    Labs pending today       ASSESSMENT / PLAN:  (F41.9) Anxiety  (primary encounter diagnosis)  Comment: discussed in detail with patient.  The seroquel and hydroxyzine from emergency room did not help.  Advised against benzo class.  Trial of alternative ssri.    Plan: sertraline (ZOLOFT) 50 MG tablet             (G47.00) Insomnia, unspecified type  Comment: advised melatonin prn   Plan: see AVS     (E11.21) Type 2 diabetes mellitus with diabetic nephropathy, without long-term current use of insulin (H)  Comment:  check labs   Plan: Hemoglobin A1c             (R53.83) Fatigue, unspecified type  Comment: check   Plan: CBC with Platelets & Differential,         Comprehensive metabolic panel, TSH with free T4        reflex             (I10) Hypertension goal BP (blood pressure) < 140/90  Comment: on carvedilol, blood pressure fine here  Plan: as above    Grief reaction: the beloved pet's sudden passing 3 days ago was a shock to patient/ family.  Offered referral for mental health/ counselor.  They declined at this time.     Advised patient follow up with primary provider in next few weeks      I reviewed the patient's medications, allergies, medical history, family history, and social history.    Justin Rojas MD          Signed Electronically by: Justin Rojas MD

## 2024-04-17 NOTE — PATIENT INSTRUCTIONS
Go to 1/2 tablet paroxetine for 3-4 days, then off for 3-4 days , then start sertraline    Initially take 1/2 pill daily for a week, then if tolerating go to one pill daily    Use over the counter melatonin at bedtime.  Start 1-3 mg.  Could gradually increase, max 10 at bedtime.    Increase walking/ activity.    Follow up with Dr. Ho in several weeks    We will send you lab results

## 2024-04-17 NOTE — LETTER
April 19, 2024    Gem D Richiekipadeel  [address] [place] [address]          Dear ,    We are writing to inform you of your test results.  Overall diabetes test ( hemoglobin a1c ) is a little better than last time.     Red blood count ( hemoglobin ) is back to normal.     Other labs are okay.   Resulted Orders   Hemoglobin A1c   Result Value Ref Range    Hemoglobin A1C 7.7 (H) 0.0 - 5.6 %      Comment:      Normal <5.7%   Prediabetes 5.7-6.4%    Diabetes 6.5% or higher     Note: Adopted from ADA consensus guidelines.   Comprehensive metabolic panel   Result Value Ref Range    Sodium 141 135 - 145 mmol/L      Comment:      Reference intervals for this test were updated on 09/26/2023 to more accurately reflect our healthy population. There may be differences in the flagging of prior results with similar values performed with this method. Interpretation of those prior results can be made in the context of the updated reference intervals.     Potassium 4.2 3.4 - 5.3 mmol/L    Carbon Dioxide (CO2) 20 (L) 22 - 29 mmol/L    Anion Gap 15 7 - 15 mmol/L    Urea Nitrogen 21.0 8.0 - 23.0 mg/dL    Creatinine 1.10 (H) 0.51 - 0.95 mg/dL    GFR Estimate 51 (L) >60 mL/min/1.73m2    Calcium 9.1 8.8 - 10.2 mg/dL    Chloride 106 98 - 107 mmol/L    Glucose 250 (H) 70 - 99 mg/dL    Alkaline Phosphatase 82 40 - 150 U/L      Comment:      Reference intervals for this test were updated on 11/14/2023 to more accurately reflect our healthy population. There may be differences in the flagging of prior results with similar values performed with this method. Interpretation of those prior results can be made in the context of the updated reference intervals.    AST 29 0 - 45 U/L      Comment:      Reference intervals for this test were updated on 6/12/2023 to more accurately reflect our healthy population. There may be differences in the flagging of prior results with similar values performed with this method. Interpretation  of those prior results can be made in the context of the updated reference intervals.    ALT 20 0 - 50 U/L      Comment:      Reference intervals for this test were updated on 6/12/2023 to more accurately reflect our healthy population. There may be differences in the flagging of prior results with similar values performed with this method. Interpretation of those prior results can be made in the context of the updated reference intervals.      Protein Total 7.6 6.4 - 8.3 g/dL    Albumin 3.9 3.5 - 5.2 g/dL    Bilirubin Total 0.3 <=1.2 mg/dL   TSH with free T4 reflex   Result Value Ref Range    TSH 1.57 0.30 - 4.20 uIU/mL   CBC with platelets and differential   Result Value Ref Range    WBC Count 6.2 4.0 - 11.0 10e3/uL    RBC Count 5.28 (H) 3.80 - 5.20 10e6/uL    Hemoglobin 13.3 11.7 - 15.7 g/dL    Hematocrit 42.5 35.0 - 47.0 %    MCV 81 78 - 100 fL    MCH 25.2 (L) 26.5 - 33.0 pg    MCHC 31.3 (L) 31.5 - 36.5 g/dL    RDW 17.0 (H) 10.0 - 15.0 %    Platelet Count 213 150 - 450 10e3/uL    % Neutrophils 63 %    % Lymphocytes 29 %    % Monocytes 6 %    % Eosinophils 2 %    % Basophils 0 %    % Immature Granulocytes 0 %    Absolute Neutrophils 3.9 1.6 - 8.3 10e3/uL    Absolute Lymphocytes 1.8 0.8 - 5.3 10e3/uL    Absolute Monocytes 0.4 0.0 - 1.3 10e3/uL    Absolute Eosinophils 0.1 0.0 - 0.7 10e3/uL    Absolute Basophils 0.0 0.0 - 0.2 10e3/uL    Absolute Immature Granulocytes 0.0 <=0.4 10e3/uL     If you have any questions or concerns, please call the clinic at the number listed above.       Sincerely,      Justin Rojas MD

## 2024-04-18 LAB
ALBUMIN SERPL BCG-MCNC: 3.9 G/DL (ref 3.5–5.2)
ALP SERPL-CCNC: 82 U/L (ref 40–150)
ALT SERPL W P-5'-P-CCNC: 20 U/L (ref 0–50)
ANION GAP SERPL CALCULATED.3IONS-SCNC: 15 MMOL/L (ref 7–15)
AST SERPL W P-5'-P-CCNC: 29 U/L (ref 0–45)
BILIRUB SERPL-MCNC: 0.3 MG/DL
BUN SERPL-MCNC: 21 MG/DL (ref 8–23)
CALCIUM SERPL-MCNC: 9.1 MG/DL (ref 8.8–10.2)
CHLORIDE SERPL-SCNC: 106 MMOL/L (ref 98–107)
CREAT SERPL-MCNC: 1.1 MG/DL (ref 0.51–0.95)
DEPRECATED HCO3 PLAS-SCNC: 20 MMOL/L (ref 22–29)
EGFRCR SERPLBLD CKD-EPI 2021: 51 ML/MIN/1.73M2
GLUCOSE SERPL-MCNC: 250 MG/DL (ref 70–99)
POTASSIUM SERPL-SCNC: 4.2 MMOL/L (ref 3.4–5.3)
PROT SERPL-MCNC: 7.6 G/DL (ref 6.4–8.3)
SODIUM SERPL-SCNC: 141 MMOL/L (ref 135–145)
TSH SERPL DL<=0.005 MIU/L-ACNC: 1.57 UIU/ML (ref 0.3–4.2)

## 2024-04-19 NOTE — RESULT ENCOUNTER NOTE
Overall diabetes test ( hemoglobin a1c ) is a little better than last time.    Red blood count ( hemoglobin ) is back to normal.    Other labs are okay.    Justin Rojas MD

## 2024-04-23 ENCOUNTER — TELEPHONE (OUTPATIENT)
Dept: ANTICOAGULATION | Facility: CLINIC | Age: 79
End: 2024-04-23
Payer: MEDICARE

## 2024-04-23 NOTE — TELEPHONE ENCOUNTER
ANTICOAGULATION     Selvin Rockwell is overdue for an INR check.     Left message for patient to call and schedule lab appointment as soon as possible. If returning call, please schedule.  Uses outside lab, requested they have INR checked.     Suma Sandoval RN

## 2024-04-30 ENCOUNTER — TELEPHONE (OUTPATIENT)
Dept: ANTICOAGULATION | Facility: CLINIC | Age: 79
End: 2024-04-30
Payer: MEDICARE

## 2024-05-02 ENCOUNTER — TELEPHONE (OUTPATIENT)
Dept: FAMILY MEDICINE | Facility: CLINIC | Age: 79
End: 2024-05-02
Payer: MEDICARE

## 2024-05-02 NOTE — TELEPHONE ENCOUNTER
Noted -- thanks.  I will plan to address these concerns at her next visit with me.    Gurinder Ho MD

## 2024-05-02 NOTE — TELEPHONE ENCOUNTER
Writer called patient's spouse Colin (consent to communicate on file) and relayed provider's message, Colin thanks Dr. Gurinder Ho for his attention and will have daughter make the appointment for patient.    States that patient has done some very odd things that do not make sense, notes that last week the toilet was plugged and he ended up finding that patient has flushed her wrist brace. Notes that her memory concerns and reasoning questions continue. Writer advised that PCP will address at the visit.    FELIPE Castillo RN  [facility]

## 2024-05-07 ENCOUNTER — DOCUMENTATION ONLY (OUTPATIENT)
Dept: ANTICOAGULATION | Facility: CLINIC | Age: 79
End: 2024-05-07
Payer: MEDICARE

## 2024-05-07 NOTE — PROGRESS NOTES
ANTICOAGULATION     Selvin Rockwell is overdue for an INR check.     Reminder letter sent    Suma Sandoval RN

## 2024-05-07 NOTE — LETTER
[facility]  [address] [facility] [address]  Phone: [phone]  Fax: [phone]   May 7, 2024        Selvin Rockwell  [address] [place] [address]            Dear Selvin,    You are currently under the care of [facility] for your warfarin (Coumadin , Jantoven ) therapy.  We are contacting you because our records show you were due for an INR on 4/29/2024.    There are potentially serious risks when taking warfarin without careful monitoring and we want to make sure you are safely managed.  Routine lab monitoring is required for warfarin refills.     Please schedule an appointment at your local lab as soon as possible. If you recently tested, but have not yet heard from us, please reply or give us a call at [phone]  so we can work with you and your local lab to obtain the results. If there has been a change in your care or other concerns, please let us know so we can help and/or update our records.         Sincerely,       [facility]

## 2024-05-10 ENCOUNTER — TELEPHONE (OUTPATIENT)
Dept: FAMILY MEDICINE | Facility: CLINIC | Age: 79
End: 2024-05-10
Payer: MEDICARE

## 2024-05-10 DIAGNOSIS — I10 HYPERTENSION GOAL BP (BLOOD PRESSURE) < 140/90: ICD-10-CM

## 2024-05-10 RX ORDER — ISOSORBIDE MONONITRATE 30 MG/1
TABLET, EXTENDED RELEASE ORAL
Qty: 90 TABLET | Refills: 1 | OUTPATIENT
Start: 2024-05-10

## 2024-05-14 RX ORDER — ISOSORBIDE MONONITRATE 30 MG/1
TABLET, EXTENDED RELEASE ORAL
Qty: 90 TABLET | Refills: 1 | Status: SHIPPED | OUTPATIENT
Start: 2024-05-14

## 2024-05-14 NOTE — TELEPHONE ENCOUNTER
[facility] calling to check the status on this request  She is out of this medication  Request looks to be denied, but no reason or next steps given to patient.  Can patient receive courtesy refill?  Please advise  Thank you,  Joselyn ROSSI    [phone]

## 2024-05-14 NOTE — TELEPHONE ENCOUNTER
I believe the refill request had come to me and I had indicated that the originating or specialty provider should refill it.  I did not originate that prescription for her, but since she is out of it, I will refill it for her now.    Gurinder Ho MD

## 2024-05-15 NOTE — TELEPHONE ENCOUNTER
Per chart review, primary care providers have been refilling isosorbide.  Patient was on isosorbide since she was seeing [facility] provider in 2018.  Was refilled by Shelby Juarez during hospital follow-up appointment in 2019  Has been refilled by Gurinder Villafuerte since 2020  However, last refill was from AUGUSTIN Stevens ([facility] provider) during virtual visit for DM and refills.    Please advise if patient needs an appointment with PCP/other provider to discuss or if you are willing to provide ongoing refills.        Jagjit Watkins RN  [facility]

## 2024-05-20 ENCOUNTER — ANTICOAGULATION THERAPY VISIT (OUTPATIENT)
Dept: ANTICOAGULATION | Facility: CLINIC | Age: 79
End: 2024-05-20
Payer: MEDICARE

## 2024-05-20 ENCOUNTER — DOCUMENTATION ONLY (OUTPATIENT)
Dept: ANTICOAGULATION | Facility: CLINIC | Age: 79
End: 2024-05-20
Payer: MEDICARE

## 2024-05-20 DIAGNOSIS — I48.0 PAROXYSMAL ATRIAL FIBRILLATION (H): Primary | ICD-10-CM

## 2024-05-20 DIAGNOSIS — Z79.01 LONG TERM (CURRENT) USE OF ANTICOAGULANTS: ICD-10-CM

## 2024-05-20 LAB — INR (EXTERNAL): 2.3 (ref 0.9–1.1)

## 2024-05-20 NOTE — PROGRESS NOTES
ANTICOAGULATION CLINIC REFERRAL RENEWAL REQUEST       An annual renewal order is required for all patients referred to [facility].?  Please review and sign the pended referral order for Selvin Rockewll.       ANTICOAGULATION SUMMARY      Warfarin indication(s)   Atrial Fibrillation    Mechanical heart valve present?  NO       Current goal range   INR: 2.0-3.0     Goal appropriate for indication? Goal INR 2-3, standard for indication(s) above     Time in Therapeutic Range (TTR)  (Goal > 60%) 68.9%       Office visit with referring provider's group within last year yes on 4-17-24       Ale Vasquez RN  [facility]

## 2024-05-20 NOTE — PROGRESS NOTES
Incoming fax from  [facility]    Date of result 5/20/24    INR result 2.3    Routing to managing [facility] pool         Sergio Key RN

## 2024-05-20 NOTE — PROGRESS NOTES
ANTICOAGULATION MANAGEMENT     Selvin Rockwell 79 year old female is on warfarin with therapeutic INR result. (Goal INR 2.0-3.0)    Recent labs: (last 7 days)     05/20/24  0000   INR 2.3*       ASSESSMENT     Source(s): Chart Review  Previous INR was Supratherapeutic  Medication, diet, health changes since last INR: change from paroxetine to zoloft about a month ago         PLAN     Recommended plan for no diet, medication or health factor changes affecting INR     Dosing Instructions: Continue your current warfarin dose with next INR in 4 weeks       Summary  As of 5/20/2024      Full warfarin instructions:  3 mg every Thu; 2 mg all other days   Next INR check:  6/17/2024               Detailed voice message left for Alicia with dosing instructions and follow up date.     Patient using outside facility for labs    Education provided:   Please call back if any changes to your diet, medications or how you've been taking warfarin  Contact [phone]  with any changes, questions or concerns.     Plan made per [facility] anticoagulation protocol    Ale Vasquez RN  Anticoagulation Clinic  5/20/2024    _______________________________________________________________________     Anticoagulation Episode Summary       Current INR goal:  2.0-3.0   TTR:  68.9% (1 y)   Target end date:  Indefinite   Send INR reminders to:  [facility]    Indications    Paroxysmal atrial fibrillation (H) [I48.0]  Long term (current) use of anticoagulants [Z79.01]             Comments:               Anticoagulation Care Providers       Provider Role Specialty Phone number    Gurinder Ho MD Referring Family Medicine [phone]

## 2024-05-20 NOTE — PROGRESS NOTES
Writer spoke with Alicia after she called [facility] back. Reviewed plan below. No changes or questions.    Ale Vasquez RN, BSN, PHN

## 2024-05-24 ENCOUNTER — VIRTUAL VISIT (OUTPATIENT)
Dept: FAMILY MEDICINE | Facility: CLINIC | Age: 79
End: 2024-05-24
Payer: MEDICARE

## 2024-05-24 DIAGNOSIS — N18.31 STAGE 3A CHRONIC KIDNEY DISEASE (H): ICD-10-CM

## 2024-05-24 DIAGNOSIS — G31.83 LEWY BODY DEMENTIA, UNSPECIFIED DEMENTIA SEVERITY, UNSPECIFIED WHETHER BEHAVIORAL, PSYCHOTIC, OR MOOD DISTURBANCE OR ANXIETY (H): ICD-10-CM

## 2024-05-24 DIAGNOSIS — F02.80 LEWY BODY DEMENTIA, UNSPECIFIED DEMENTIA SEVERITY, UNSPECIFIED WHETHER BEHAVIORAL, PSYCHOTIC, OR MOOD DISTURBANCE OR ANXIETY (H): ICD-10-CM

## 2024-05-24 DIAGNOSIS — E11.21 TYPE 2 DIABETES MELLITUS WITH DIABETIC NEPHROPATHY, WITHOUT LONG-TERM CURRENT USE OF INSULIN (H): Primary | ICD-10-CM

## 2024-05-24 DIAGNOSIS — F33.0 MILD RECURRENT MAJOR DEPRESSION (H): ICD-10-CM

## 2024-05-24 DIAGNOSIS — R44.3 HALLUCINATIONS: ICD-10-CM

## 2024-05-24 DIAGNOSIS — I48.0 PAROXYSMAL ATRIAL FIBRILLATION (H): ICD-10-CM

## 2024-05-24 PROCEDURE — 99441 PR PHYSICIAN TELEPHONE EVALUATION 5-10 MIN: CPT | Mod: 93 | Performed by: FAMILY MEDICINE

## 2024-05-24 RX ORDER — QUETIAPINE FUMARATE 100 MG/1
100 TABLET, FILM COATED ORAL AT BEDTIME
Qty: 30 TABLET | Refills: 0 | Status: SHIPPED | OUTPATIENT
Start: 2024-05-24 | End: 2024-05-29

## 2024-05-24 ASSESSMENT — ANXIETY QUESTIONNAIRES
6. BECOMING EASILY ANNOYED OR IRRITABLE: MORE THAN HALF THE DAYS
IF YOU CHECKED OFF ANY PROBLEMS ON THIS QUESTIONNAIRE, HOW DIFFICULT HAVE THESE PROBLEMS MADE IT FOR YOU TO DO YOUR WORK, TAKE CARE OF THINGS AT HOME, OR GET ALONG WITH OTHER PEOPLE: SOMEWHAT DIFFICULT
2. NOT BEING ABLE TO STOP OR CONTROL WORRYING: NEARLY EVERY DAY
7. FEELING AFRAID AS IF SOMETHING AWFUL MIGHT HAPPEN: MORE THAN HALF THE DAYS
5. BEING SO RESTLESS THAT IT IS HARD TO SIT STILL: MORE THAN HALF THE DAYS
1. FEELING NERVOUS, ANXIOUS, OR ON EDGE: MORE THAN HALF THE DAYS
3. WORRYING TOO MUCH ABOUT DIFFERENT THINGS: NEARLY EVERY DAY
7. FEELING AFRAID AS IF SOMETHING AWFUL MIGHT HAPPEN: MORE THAN HALF THE DAYS
4. TROUBLE RELAXING: MORE THAN HALF THE DAYS
GAD7 TOTAL SCORE: 16
8. IF YOU CHECKED OFF ANY PROBLEMS, HOW DIFFICULT HAVE THESE MADE IT FOR YOU TO DO YOUR WORK, TAKE CARE OF THINGS AT HOME, OR GET ALONG WITH OTHER PEOPLE?: SOMEWHAT DIFFICULT
GAD7 TOTAL SCORE: 16
GAD7 TOTAL SCORE: 16

## 2024-05-24 ASSESSMENT — PATIENT HEALTH QUESTIONNAIRE - PHQ9
SUM OF ALL RESPONSES TO PHQ QUESTIONS 1-9: 8
SUM OF ALL RESPONSES TO PHQ QUESTIONS 1-9: 8
10. IF YOU CHECKED OFF ANY PROBLEMS, HOW DIFFICULT HAVE THESE PROBLEMS MADE IT FOR YOU TO DO YOUR WORK, TAKE CARE OF THINGS AT HOME, OR GET ALONG WITH OTHER PEOPLE: SOMEWHAT DIFFICULT

## 2024-05-24 NOTE — PROGRESS NOTES
"Gem is a 79 year old who is being evaluated via a billable telephone visit.    What phone number would you like to be contacted at? [phone]  How would you like to obtain your AVS? Mail a copy  Originating Location (pt. Location): Home    Distant Location (provider location):  On-site      Assessment & Plan   Problem List Items Addressed This Visit       Paroxysmal atrial fibrillation (H)    Type 2 diabetes mellitus with diabetic nephropathy, without long-term current use of insulin (H) - Primary    Stage 3a chronic kidney disease (H)    Mild recurrent major depression (H24)    Relevant Medications    QUEtiapine (SEROQUEL) 100 MG tablet    Lewy body dementia, unspecified dementia severity, unspecified whether behavioral, psychotic, or mood disturbance or anxiety (H)    Relevant Medications    QUEtiapine (SEROQUEL) 100 MG tablet     Other Visit Diagnoses       Hallucinations        Relevant Medications    QUEtiapine (SEROQUEL) 100 MG tablet           Increase seroquel 50 -->100mg, double booked her on my schedule for Wednesday 5/29/24 at 1000.         BMI  Estimated body mass index is 38.8 kg/m  as calculated from the following:    Height as of 4/17/24: 1.499 m (4' 11\").    Weight as of 4/17/24: 87.1 kg (192 lb 2 oz).             Subjective   Gem is a 79 year old, presenting for the following health issues:  Mental Health Problem and Gastrointestinal Problem (Acid Reflux)      5/24/2024     4:29 PM   Additional Questions   Roomed by Yuli ROSSI CMA   Accompanied by Alicia Daughter     Mental Health Problem    History of Present Illness       Mental Health Follow-up:  Patient presents to follow-up on Depression & Anxiety.Patient's depression since last visit has been:  No change  The patient is having other symptoms associated with depression.  Patient's anxiety since last visit has been:  No change  The patient is having other symptoms associated with anxiety.  Any significant life events: other  Patient is " feeling anxious or having panic attacks.  Patient has no concerns about alcohol or drug use.    She eats 2-3 servings of fruits and vegetables daily.She consumes 1 sweetened beverage(s) daily.She exercises with enough effort to increase her heart rate 9 or less minutes per day.  She exercises with enough effort to increase her heart rate 3 or less days per week.   She is taking medications regularly.             Objective           Vitals:  No vitals were obtained today due to virtual visit.    Physical Exam   General: Gem normal breathing - no outbursts - spoke mostly with her daughter            Phone call duration: 10 minutes  Signed Electronically by: ARTURO VIERA DO

## 2024-05-29 ENCOUNTER — OFFICE VISIT (OUTPATIENT)
Dept: FAMILY MEDICINE | Facility: CLINIC | Age: 79
End: 2024-05-29
Payer: MEDICARE

## 2024-05-29 VITALS
TEMPERATURE: 97.8 F | BODY MASS INDEX: 36.29 KG/M2 | HEART RATE: 75 BPM | HEIGHT: 59 IN | DIASTOLIC BLOOD PRESSURE: 90 MMHG | SYSTOLIC BLOOD PRESSURE: 139 MMHG | RESPIRATION RATE: 16 BRPM | WEIGHT: 180 LBS | OXYGEN SATURATION: 100 %

## 2024-05-29 DIAGNOSIS — G31.83 LEWY BODY DEMENTIA, UNSPECIFIED DEMENTIA SEVERITY, UNSPECIFIED WHETHER BEHAVIORAL, PSYCHOTIC, OR MOOD DISTURBANCE OR ANXIETY (H): ICD-10-CM

## 2024-05-29 DIAGNOSIS — R44.3 HALLUCINATIONS: ICD-10-CM

## 2024-05-29 DIAGNOSIS — F02.80 LEWY BODY DEMENTIA, UNSPECIFIED DEMENTIA SEVERITY, UNSPECIFIED WHETHER BEHAVIORAL, PSYCHOTIC, OR MOOD DISTURBANCE OR ANXIETY (H): ICD-10-CM

## 2024-05-29 DIAGNOSIS — F33.0 MILD RECURRENT MAJOR DEPRESSION (H): Primary | ICD-10-CM

## 2024-05-29 PROCEDURE — 99214 OFFICE O/P EST MOD 30 MIN: CPT | Performed by: FAMILY MEDICINE

## 2024-05-29 RX ORDER — QUETIAPINE FUMARATE 100 MG/1
100 TABLET, FILM COATED ORAL AT BEDTIME
Qty: 90 TABLET | Refills: 0 | Status: SHIPPED | OUTPATIENT
Start: 2024-05-29 | End: 2024-06-21

## 2024-05-29 RX ORDER — SERTRALINE HYDROCHLORIDE 100 MG/1
100 TABLET, FILM COATED ORAL DAILY
Qty: 90 TABLET | Refills: 0 | Status: SHIPPED | OUTPATIENT
Start: 2024-05-29 | End: 2024-06-21

## 2024-05-29 NOTE — PROGRESS NOTES
"  Assessment & Plan   Problem List Items Addressed This Visit       Mild recurrent major depression (H24) - Primary    Relevant Medications    sertraline (ZOLOFT) 100 MG tablet    QUEtiapine (SEROQUEL) 100 MG tablet    Lewy body dementia, unspecified dementia severity, unspecified whether behavioral, psychotic, or mood disturbance or anxiety (H)    Relevant Medications    sertraline (ZOLOFT) 100 MG tablet    QUEtiapine (SEROQUEL) 100 MG tablet     Other Visit Diagnoses       Hallucinations        Relevant Medications    QUEtiapine (SEROQUEL) 100 MG tablet           Declined psychiatry eval  Increase zoloft to 100mg, continue Seroquel 100mg/night  Follow up 1 month       BMI  Estimated body mass index is 36.36 kg/m  as calculated from the following:    Height as of this encounter: 1.499 m (4' 11\").    Weight as of this encounter: 81.6 kg (180 lb).           Marcy Rabago is a 79 year old, presenting for the following health issues:  Recheck Medication and Memory Loss        5/29/2024    10:00 AM   Additional Questions   Roomed by Yuli ROSSI CMA   Accompanied by Alicia Daughter     HPI     Took 100mg seroquel Sunday night, Monday and Tuesday night. It helped to sleep. Living with her daughter.    Hallucinations for 1.5 years. Sleep issues longer.      Medication Followup of Seroquel   Taking Medication as prescribed: yes  Side Effects:  The other medications she is taking are causing side effects   Medication Helping Symptoms:  yes  Patient is also having Memory Issues          Objective    BP (!) 143/89 (BP Location: Right arm, Patient Position: Chair, Cuff Size: Adult Regular)   Pulse 75   Temp 97.8  F (36.6  C) (Temporal)   Resp 16   Ht 1.499 m (4' 11\")   Wt 81.6 kg (180 lb)   SpO2 100%   BMI 36.36 kg/m    Body mass index is 36.36 kg/m .  Physical Exam   Gen NAD  No hallucinations  Well groomed  Pleasant  Daughter translating            Signed Electronically by: ARTURO VIERA DO    "

## 2024-06-01 DIAGNOSIS — I10 HYPERTENSION GOAL BP (BLOOD PRESSURE) < 140/90: ICD-10-CM

## 2024-06-01 DIAGNOSIS — I48.0 PAROXYSMAL ATRIAL FIBRILLATION (H): ICD-10-CM

## 2024-06-03 RX ORDER — CARVEDILOL 6.25 MG/1
6.25 TABLET ORAL 2 TIMES DAILY
Qty: 180 TABLET | Refills: 1 | Status: SHIPPED | OUTPATIENT
Start: 2024-06-03

## 2024-06-18 ENCOUNTER — TELEPHONE (OUTPATIENT)
Dept: FAMILY MEDICINE | Facility: CLINIC | Age: 79
End: 2024-06-18
Payer: MEDICARE

## 2024-06-18 NOTE — TELEPHONE ENCOUNTER
Patient's daughter calling, consent on file, says Dr. Murray increased patient's seroquel and sertraline to 100 mg recently, last visit was 5/29/24.   Stopped paxil as it was not effective.    Alicia says they've noticed patient being more unsteady on her feet and also complaining more about acid reflux since the changes.    Dizziness, balance is off when walking worsened after the increase of seroquel and zoloft to 100 mg.  She takes both at bedtime, she did try to separate the dosing by 30-60 minutes to see if that would help.    Says patient is eating and drinking as usual.   Has not yet scheduled the requested one month follow up as she wanted to see how the changes were tolerated first.        Pharmacy selected, routed to Dr. Murray to address, any med changes and then follow up or needs to be seen now (in person or virtual?).    Georgina MARTÍNEZ RN  [facility] Triage

## 2024-06-19 ENCOUNTER — TELEPHONE (OUTPATIENT)
Dept: ANTICOAGULATION | Facility: CLINIC | Age: 79
End: 2024-06-19
Payer: MEDICARE

## 2024-06-19 NOTE — TELEPHONE ENCOUNTER
Patient's daughter Alicia (consent to communicate on file) calling clinic back, states that it is hard for them to get patient to the clinic and is asking if provider can just change the medication. Writer discussed that per provider some form of a visit twill be needed to further discuss, writer offered virtual appointment, Alicia states that she will discuss with patient and call back to schedule this.    FELIPE Castillo RN  [facility]

## 2024-06-19 NOTE — TELEPHONE ENCOUNTER
ANTICOAGULATION     Selvin Rockwell is overdue for an INR check.     Left message for patient to call and schedule lab appointment as soon as possible. If returning call, please schedule.     Portia Coronado RN

## 2024-06-20 ENCOUNTER — TELEPHONE (OUTPATIENT)
Dept: FAMILY MEDICINE | Facility: CLINIC | Age: 79
End: 2024-06-20
Payer: MEDICARE

## 2024-06-20 NOTE — TELEPHONE ENCOUNTER
Daughter, Alicia calling, says she scheduled her mom for a phone visit but can't get this done until next Tuesday 6/25.    See 6/18/24 phone call, in person or virtual visit was advised to discuss multiple issues.   Alicia notes her mom is scheduled for 6/25, wonders what they can do in the meantime for reflux, insomnia, hallucinations.       I advised since is ongoing issue and visit advised, will try to schedule sooner.   Unable to do 7 am tomorrow, too early.   Scheduled for 11 am on Monday, one day earlier.    Patient's daughter verbalized understanding of and agreement with plan.    Georgina MARTÍNEZ RN  [facility] Triage

## 2024-06-21 ENCOUNTER — VIRTUAL VISIT (OUTPATIENT)
Dept: INTERNAL MEDICINE | Facility: CLINIC | Age: 79
End: 2024-06-21
Payer: MEDICARE

## 2024-06-21 DIAGNOSIS — F03.B3 MODERATE DEMENTIA WITH MOOD DISTURBANCE, UNSPECIFIED DEMENTIA TYPE (H): ICD-10-CM

## 2024-06-21 DIAGNOSIS — R45.1 AGITATION: ICD-10-CM

## 2024-06-21 DIAGNOSIS — F32.A DEPRESSION, UNSPECIFIED DEPRESSION TYPE: ICD-10-CM

## 2024-06-21 DIAGNOSIS — R11.2 NAUSEA AND VOMITING, UNSPECIFIED VOMITING TYPE: Primary | ICD-10-CM

## 2024-06-21 DIAGNOSIS — K21.00 GASTROESOPHAGEAL REFLUX DISEASE WITH ESOPHAGITIS WITHOUT HEMORRHAGE: ICD-10-CM

## 2024-06-21 PROCEDURE — 99443 PR PHYSICIAN TELEPHONE EVALUATION 21-30 MIN: CPT | Mod: 93

## 2024-06-21 RX ORDER — QUETIAPINE FUMARATE 25 MG/1
TABLET, FILM COATED ORAL
Qty: 60 TABLET | Refills: 0 | Status: SHIPPED | OUTPATIENT
Start: 2024-06-21 | End: 2024-08-07

## 2024-06-21 RX ORDER — SERTRALINE HYDROCHLORIDE 25 MG/1
25 TABLET, FILM COATED ORAL DAILY
Qty: 14 TABLET | Refills: 0 | Status: SHIPPED | OUTPATIENT
Start: 2024-06-21 | End: 2024-08-07

## 2024-06-21 NOTE — PROGRESS NOTES
Gem is a 79 year old who is being evaluated via a billable telephone visit.    What phone number would you like to be contacted at? [phone]  How would you like to obtain your AVS? Mail a copy  Originating Location (pt. Location): Home    Distant Location (provider location):  On-site    Assessment & Plan     (R11.2) Nausea and vomiting, unspecified vomiting type  (primary encounter diagnosis)  Comment: Patient has been dealing with nausea nad vomiting for the last few days. In taking information it ws noted that patient stopped taking 100 mg of sertraline abruptly three days ago. Discussed that these symptoms can be the results of suddenly stopping the sertraline. Discussed doing a taper of medication.   Plan: Will taper off medication. Will follow up next Friday to assess patient symptoms    (F32.A) Depression, unspecified depression type  Comment: Patient suffers with depression. Discussed tapering medication and trialing  the 25 mg dose as patient suffers with dementia a smaller dose maybe more appropriate. Also discussed need for MTM referral for other suggestions.   Plan: sertraline (ZOLOFT) 50 MG tablet, sertraline         (ZOLOFT) 25 MG tablet,   Prescriptions sent to pharmacy  Med Therapy Management         Referral        Future     (R45.1) Agitation  Comment: Patient was prescribed quetiapine. Patient was on 100 mg. Discussed with dementia smaller dose maybe more appropriate.Discussed taking 2 times daily in am and at bedtime. Discussed non pharmacological options to help manage symptoms.  Plan: QUEtiapine (SEROQUEL) 25 MG tablet,  Prescriptions sent to pharmacy   Med Therapy        Management Referral        Future     (F03.B3) Moderate dementia with mood disturbance, unspecified dementia type (H)  Comment: Discussed need for MTM referral to help manage symptoms more effectively.   Plan: Med Therapy Management Referral        Future     (K21.00) Gastroesophageal reflux disease with esophagitis  "without hemorrhage  Comment: Chronic. Discussed that sertraline can increase symptoms. Discussed patient taking smaller dose.   Plan: Continue current medication.           BMI  Estimated body mass index is 36.36 kg/m  as calculated from the following:    Height as of 5/29/24: 1.499 m (4' 11\").    Weight as of 5/29/24: 81.6 kg (180 lb).             Marcy Rabago is a 79 year old, presenting for the following health issues:  No chief complaint on file.    Roomed by: Michelle PERKINS    Discuss acid reflux,Short of breath        Review of Systems  Telephone visit       Objective           Vitals:  No vitals were obtained today due to virtual visit.    Physical Exam   General: Alert and no distress //Respiratory: No audible wheeze, cough, or shortness of breath // Psychiatric:  Appropriate affect, tone, and pace of words            Phone call duration: 45 minutes  Signed Electronically by: KIKA Brantley CNP  "

## 2024-06-26 ENCOUNTER — TELEPHONE (OUTPATIENT)
Dept: ANTICOAGULATION | Facility: CLINIC | Age: 79
End: 2024-06-26
Payer: MEDICARE

## 2024-06-26 NOTE — TELEPHONE ENCOUNTER
ANTICOAGULATION     Selvin Rockwell is overdue for an INR check.     LVM for Alicia to check at local lab or call us back if Gem has checked her INR recently.     Alesia Olivares RN

## 2024-06-27 ENCOUNTER — TELEPHONE (OUTPATIENT)
Dept: FAMILY MEDICINE | Facility: CLINIC | Age: 79
End: 2024-06-27
Payer: MEDICARE

## 2024-06-27 NOTE — TELEPHONE ENCOUNTER
MTM referral from: [facility] visit (referral by provider)    MTM referral outreach attempt #2 on June 27, 2024 at 1:32 PM      Outcome: Patient not reachable after several attempts    Use VBC for the carrier/Plan on the flowsheet      MTM Practitioner please send patient letter    Trang Chowdary [facility]  -[facility]  [phone]

## 2024-06-27 NOTE — LETTER
June 28, 2024      Selvin Rockwell  [address] [place] [id number]        Dear Selvin,           Lydia ANAND CNP, has recommended you schedule a Medication Therapy Management (MTM) appointment. MTM is designed to help you get the most of out of your medicines.      During an MTM appointment a specially trained pharmacist will review all of your medicines, both prescription and over-the-counter. They will make sure your medicines are the best choice for you and are safe and convenient for you.  MTM pharmacists work together with you and your doctor to help you understand your medicines, solve any problems related to your medicines and help you get the best results from taking your medicines.      At [facility], we strongly believe in a team approach to health care. We want to help you understand your medicines and health conditions. To learn more about how you might benefit from MTM services, watch the patient video at [email or web address].      To make an appointment, please call the MTM scheduling line at [phone] (toll-free at [phone]).     We look forward to hearing from you!           [facility] MTM Team

## 2024-07-01 ENCOUNTER — ANTICOAGULATION THERAPY VISIT (OUTPATIENT)
Dept: ANTICOAGULATION | Facility: CLINIC | Age: 79
End: 2024-07-01
Payer: MEDICARE

## 2024-07-01 DIAGNOSIS — Z79.01 LONG TERM (CURRENT) USE OF ANTICOAGULANTS: ICD-10-CM

## 2024-07-01 DIAGNOSIS — I48.0 PAROXYSMAL ATRIAL FIBRILLATION (H): Primary | ICD-10-CM

## 2024-07-01 LAB — INR (EXTERNAL): 2.2 (ref 0.9–1.1)

## 2024-07-01 NOTE — PROGRESS NOTES
ANTICOAGULATION MANAGEMENT     Selvin Rockwell 79 year old female is on warfarin with therapeutic INR result. (Goal INR 2.0-3.0)    Recent labs: (last 7 days)     07/01/24  0935   INR 2.2*       ASSESSMENT     Source(s): Chart Review and Patient/Caregiver Call     Warfarin doses taken: Warfarin taken as instructed  Diet: No new diet changes identified  Medication/supplement changes:  dose adjustments to patient's seroquel and sertraline made recently; patient had some nausea/vomiting after stopping sertraline on her own so has now been weaning down to lower dosage.  New illness, injury, or hospitalization: No  Signs or symptoms of bleeding or clotting: No  Previous result: Therapeutic last visit; previously outside of goal range  Additional findings:  Per daughter, it is still difficult to get patient out of the house for her lab work but she will continue to try to bring her in as requested. Last INR was 6 weeks ago, medication changes within that time frame and INR remains in range today.         PLAN     Recommended plan for no diet, medication or health factor changes affecting INR     Dosing Instructions: Continue your current warfarin dose with next INR in 6 weeks       Summary  As of 7/1/2024      Full warfarin instructions:  3 mg every Thu; 2 mg all other days   Next INR check:  8/12/2024               Telephone call with Alicia who verbalizes understanding and agrees to plan and who agrees to plan and repeated back plan correctly    Patient using outside facility for labs    Education provided: Importance of notifying anticoagulation clinic for: changes in medications; a sooner lab recheck maybe needed and diarrhea, nausea/vomiting, reduced intake, cold/flu, and/or infections; a sooner lab recheck maybe needed  Contact [phone] with any changes, questions or concerns.     Plan made per [facility] anticoagulation protocol    Suma Sandoval, RN  Anticoagulation  Clinic  7/1/2024    _______________________________________________________________________     Anticoagulation Episode Summary       Current INR goal:  2.0-3.0   TTR:  68.9% (1 y)   Target end date:  Indefinite   Send INR reminders to:  JERRY PEÑA    Indications    Paroxysmal atrial fibrillation (H) [I48.0]  Long term (current) use of anticoagulants [Z79.01]             Comments:               Anticoagulation Care Providers       Provider Role Specialty Phone number    Gurinder Ho MD Referring Family Medicine [phone]

## 2024-07-03 DIAGNOSIS — K21.9 GASTROESOPHAGEAL REFLUX DISEASE WITHOUT ESOPHAGITIS: ICD-10-CM

## 2024-07-03 DIAGNOSIS — M48.062 SPINAL STENOSIS OF LUMBAR REGION WITH NEUROGENIC CLAUDICATION: ICD-10-CM

## 2024-07-03 RX ORDER — PANTOPRAZOLE SODIUM 40 MG/1
40 TABLET, DELAYED RELEASE ORAL DAILY
Qty: 90 TABLET | Refills: 0 | Status: SHIPPED | OUTPATIENT
Start: 2024-07-03

## 2024-07-03 RX ORDER — PREGABALIN 150 MG/1
150 CAPSULE ORAL 2 TIMES DAILY
Qty: 180 CAPSULE | Refills: 0 | Status: SHIPPED | OUTPATIENT
Start: 2024-07-03

## 2024-07-16 ENCOUNTER — VIRTUAL VISIT (OUTPATIENT)
Dept: PHARMACY | Facility: CLINIC | Age: 79
End: 2024-07-16
Payer: MEDICARE

## 2024-07-16 DIAGNOSIS — E11.21 TYPE 2 DIABETES MELLITUS WITH DIABETIC NEPHROPATHY, WITHOUT LONG-TERM CURRENT USE OF INSULIN (H): Primary | ICD-10-CM

## 2024-07-16 DIAGNOSIS — F33.0 MILD RECURRENT MAJOR DEPRESSION (H): ICD-10-CM

## 2024-07-16 DIAGNOSIS — M48.062 SPINAL STENOSIS OF LUMBAR REGION WITH NEUROGENIC CLAUDICATION: ICD-10-CM

## 2024-07-16 DIAGNOSIS — K21.9 GERD WITHOUT ESOPHAGITIS: ICD-10-CM

## 2024-07-16 DIAGNOSIS — G31.83 LEWY BODY DEMENTIA, UNSPECIFIED DEMENTIA SEVERITY, UNSPECIFIED WHETHER BEHAVIORAL, PSYCHOTIC, OR MOOD DISTURBANCE OR ANXIETY (H): ICD-10-CM

## 2024-07-16 DIAGNOSIS — D50.9 IRON DEFICIENCY ANEMIA, UNSPECIFIED IRON DEFICIENCY ANEMIA TYPE: ICD-10-CM

## 2024-07-16 DIAGNOSIS — F02.80 LEWY BODY DEMENTIA, UNSPECIFIED DEMENTIA SEVERITY, UNSPECIFIED WHETHER BEHAVIORAL, PSYCHOTIC, OR MOOD DISTURBANCE OR ANXIETY (H): ICD-10-CM

## 2024-07-16 DIAGNOSIS — G25.81 RESTLESS LEGS SYNDROME (RLS): ICD-10-CM

## 2024-07-16 DIAGNOSIS — E78.2 MIXED HYPERLIPIDEMIA: ICD-10-CM

## 2024-07-16 DIAGNOSIS — I10 HTN (HYPERTENSION), BENIGN: ICD-10-CM

## 2024-07-16 PROCEDURE — 99207 PR NO CHARGE LOS: CPT | Mod: 93 | Performed by: PHARMACIST

## 2024-07-16 RX ORDER — QUINIDINE GLUCONATE 324 MG
1 TABLET, EXTENDED RELEASE ORAL 2 TIMES DAILY WITH MEALS
Qty: 100 TABLET | Refills: 11 | Status: SHIPPED | OUTPATIENT
Start: 2024-07-16

## 2024-07-16 NOTE — PROGRESS NOTES
Medication Therapy Management (MTM) Encounter    ASSESSMENT:                            Medication Adherence/Access: See below for considerations    Diabetes   A1c at goal of less than 8.  Soft recommendation to have a yearly eye exam.    Hypertension   Atrial fibrillation/Hx Ablation  Blood pressure elevated at last visit, but generally stable in previous visits.  Current therapy appears appropriate.     Hyperlipidemia   Stable.     GERD    Concurred that Tums would be a fine option for managing breakthrough heartburn.  See notes under spinal stenosis/RLS below.     Mental Health   Depression with Dementia and Hx Hallucination  We discussed that sertraline is unlikely to cause the concerning effects that the patient is experiencing with her dementia.  We discussed the black box warning for quetiapine, but that patient's current dose is much better than the higher doses before, and with her history of hallucinations it may be appropriate to keep her on a little bit of quetiapine at bedtime.  We will continue to reassess though.     Spinal Stenosis/RLS  We discussed that a cornerstone of restless leg management is maintaining good iron stores, ideally keeping the ferritin level above 75. We do not have a level since 2022. Patient's daughter reports that patient was on an iron supplement and she is not sure why it was stopped.  We will restart ferrous gluconate in an attempt to replete/maintain iron stores while limiting stomach effects a little bit, ideally not taking at exact same time as TUMS.  We will recheck ferritin in a few months.    PLAN:                            Of her current meds, the quetiapine and pergabalin are the most likely causes of daytime drowsiness and any potential additive cognitive issues. At this time, I would suggest keeping the current doses- the quetiapine dose is very low and the pregabalin is likely helping significantly with her restless legs.  Gem has very low iron stores, which  can cause or worsen restless legs. Restart iron as Ferrous Gluconate 240mg twice daily, or whatever salt form you prefer that gets her to around 60mg of elemental iron total daily. We will recheck her ferritin level in a few months to make sure this is rebounding and at that point may be able to come down on pregabalin dose.  Ok to take TUMS for breakthrough heartburn up to 2-3 times daily, trying when possible to space away from iron doses.  Ideally have her get a yearly eye exam as recommended for patients with diabetes.    Follow-up: Return in 4 weeks (on 8/13/2024) for phone visit.    SUBJECTIVE/OBJECTIVE:                          Gem Rockwell is a 79 year old female seen for an initial visit. She was referred to me from Lydia Smart CNP. Patient was accompanied by her daughter, Alicia. 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    Reason for visit: Medication review. Alicia reports that a few meds were added and doses changed. She's having issues with GI reflux, daytime drowsiness, balance issues, longer-standing memory/dementia issues with hallucination. Wondering if any meds could contribute and what to do.    Allergies/ADRs: Reviewed in chart  Past Medical History: Reviewed in chart  Tobacco: She reports that she has never smoked. She has never been exposed to tobacco smoke. She has never used smokeless tobacco.  Alcohol: not currently using  No THC/CBD  Caffeine: rare  Medication Adherence/Access: Patient takes medications directly from bottles (generally manages this by herself but occasionally has family member help).  Per patient, misses medication 0-1 times per week. She does sometimes take her morning meds late morning instead of right away.    Diabetes     Metformin 1000 mg twice daily  Not taking aspirin due to warfarin  Patient is not experiencing side effects (aside from reflux below).  Current diabetes symptoms:  none  Blood sugar monitorin time(s) daily; Ranges: (patient reported) Fasting- 120-130s   Diet/Exercise: Not discussed in detail     Eye exam in the last 12 months? No  Foot exam: up to date    Hypertension   Atrial fibrillation/Hx Ablation    Carvedilol 6.25 mg twice daily  Isosorbide mononitrate 30 mg daily  Warfarin as directed per [facility]  Patient reports no current medication side effects  Patient  does montitor home BP sometimes- doesn't recall numbers on the call today .       Hyperlipidemia     Simvastatin 10 mg daily  Patient reports no significant myalgias or other side effects.     GERD      Pantoprazole 40 mg once daily   Has recently started TUMS for frequent breakthrough- wondering about whether there are other options or if this is safe.       Mental Health   Depression with Dementia and Hx Hallucination    Sertraline 25mg once daily (decreased recently from 100mg)  Quetiapine 25mg daily at bedtime (decreased recently from 100mg)  Has not seen much improvement in energy level, memory etc with the dose adjustments.     Spinal Stenosis/RLS    Pregabalin 150 mg at night (not twice daily)  Acetaminophen as needed.  Alicia reports Gem's back does hurt sometimes, but she complaining of restless leg symptoms every night with lots of movement.  Ferritin   Date Value Ref Range Status   10/12/2022 6 (L) 8 - 252 ng/mL Final     Iron   Date Value Ref Range Status   10/12/2022 19 (L) 35 - 180 ug/dL Final     Iron Binding Capacity   Date Value Ref Range Status   10/12/2022 404 240 - 430 ug/dL Final     I spent 38 with this patient today. All changes were made via collaborative practice agreement with Gurinder Ho MD. A copy of the visit note was provided to the patient's provider(s).    A summary of these recommendations was mailed to the patient.    Joanne Andersen, Basilio, BCACP  Medication Therapy Management Provider  Phone: [phone]  [email or web address]    Telemedicine Visit Details  Type of service:   Telephone visit  Start Time: 10:02 AM  End Time:  10:40 AM     Medication Therapy Recommendations  Restless legs syndrome (RLS)    Current Medication: pregabalin (LYRICA) 150 MG capsule   Rationale: Medication requires monitoring - Needs additional monitoring   Recommendation: Order Lab   Status: Accepted per CPA

## 2024-07-16 NOTE — PATIENT INSTRUCTIONS
"Recommendations from today's MTM visit:                                                       Of her current meds, the quetiapine and pergabalin are the most likely causes of daytime drowsiness and any potential additive cognitive issues. At this time, I would suggest keeping the current doses- the quetiapine dose is very low and the pregabalin is likely helping significantly with her restless legs.  Gem has very low iron stores, which can cause or worsen restless legs. Restart iron as Ferrous Gluconate 240mg twice daily, or whatever salt form you prefer that gets her to around 60mg of elemental iron total daily. We will recheck her ferritin level in a few months to make sure this is rebounding and at that point may be able to come down on pregabalin dose.  Ok to take TUMS for breakthrough heartburn up to 2-3 times daily, trying when possible to space away from iron doses.  Ideally have her get a yearly eye exam as recommended for patients with diabetes.    Follow-up: Return in 4 weeks (on 8/13/2024) for phone visit.    It was great speaking with you today.  I value your experience and would be very thankful for your time in providing feedback in our clinic survey. In the next few days, you may receive an email or text message from [employer] with a link to a survey related to your  clinical pharmacist.\"     To schedule another MTM appointment, please call the clinic directly or you may call the MTM scheduling line at [phone] or toll-free at [phone].     My Clinical Pharmacist's contact information:                                                      Please feel free to contact me with any questions or concerns you have.      Joanne Andersen, Basilio, BCACP  Medication Therapy Management Provider  Phone: [phone]  [email or web address]    "

## 2024-08-07 DIAGNOSIS — Z79.01 LONG TERM (CURRENT) USE OF ANTICOAGULANTS: ICD-10-CM

## 2024-08-07 DIAGNOSIS — R45.1 AGITATION: ICD-10-CM

## 2024-08-07 DIAGNOSIS — I48.0 PAROXYSMAL ATRIAL FIBRILLATION (H): ICD-10-CM

## 2024-08-07 DIAGNOSIS — F32.A DEPRESSION, UNSPECIFIED DEPRESSION TYPE: ICD-10-CM

## 2024-08-07 RX ORDER — WARFARIN SODIUM 2 MG/1
TABLET ORAL
Qty: 100 TABLET | Refills: 1 | Status: SHIPPED | OUTPATIENT
Start: 2024-08-07

## 2024-08-07 RX ORDER — QUETIAPINE FUMARATE 25 MG/1
TABLET, FILM COATED ORAL
Qty: 60 TABLET | Refills: 1 | Status: SHIPPED | OUTPATIENT
Start: 2024-08-07

## 2024-08-07 RX ORDER — SERTRALINE HYDROCHLORIDE 25 MG/1
25 TABLET, FILM COATED ORAL DAILY
Qty: 30 TABLET | Refills: 1 | Status: SHIPPED | OUTPATIENT
Start: 2024-08-07

## 2024-08-07 NOTE — TELEPHONE ENCOUNTER
ANTICOAGULATION MANAGEMENT:  Medication Refill    Anticoagulation Summary  As of 7/1/2024      Warfarin maintenance plan:  3 mg (2 mg x 1.5) every Thu; 2 mg (2 mg x 1) all other days   Next INR check:  8/12/2024   Target end date:  Indefinite    Indications    Paroxysmal atrial fibrillation (H) [I48.0]  Long term (current) use of anticoagulants [Z79.01]                 Anticoagulation Care Providers       Provider Role Specialty Phone number    Gurinder Ho MD Referring Family Medicine [phone]            Refill Criteria    Visit with referring provider/group: Meets criteria: visit within referring provider group in the last 15 months on 6/21/24    [facility] referral last signed: 05/20/2024; within last year: Yes    Lab monitoring not exceeding 2 weeks overdue: Yes    Corazoni meets all criteria for refill. Rx instructions and quantity supplied updated to match patient's current dosing plan. Warfarin 90 day supply with 1 refill granted per [facility] protocol     Chitra Morrison RN  Anticoagulation Clinic

## 2024-08-13 ENCOUNTER — VIRTUAL VISIT (OUTPATIENT)
Dept: PHARMACY | Facility: CLINIC | Age: 79
End: 2024-08-13

## 2024-08-13 DIAGNOSIS — G31.83 LEWY BODY DEMENTIA, UNSPECIFIED DEMENTIA SEVERITY, UNSPECIFIED WHETHER BEHAVIORAL, PSYCHOTIC, OR MOOD DISTURBANCE OR ANXIETY (H): ICD-10-CM

## 2024-08-13 DIAGNOSIS — F02.80 LEWY BODY DEMENTIA, UNSPECIFIED DEMENTIA SEVERITY, UNSPECIFIED WHETHER BEHAVIORAL, PSYCHOTIC, OR MOOD DISTURBANCE OR ANXIETY (H): ICD-10-CM

## 2024-08-13 DIAGNOSIS — F33.0 MILD RECURRENT MAJOR DEPRESSION (H): Primary | ICD-10-CM

## 2024-08-13 DIAGNOSIS — K21.9 GERD WITHOUT ESOPHAGITIS: ICD-10-CM

## 2024-08-13 DIAGNOSIS — G25.81 RESTLESS LEGS SYNDROME (RLS): ICD-10-CM

## 2024-08-13 DIAGNOSIS — D50.9 IRON DEFICIENCY ANEMIA, UNSPECIFIED IRON DEFICIENCY ANEMIA TYPE: ICD-10-CM

## 2024-08-13 DIAGNOSIS — M48.062 SPINAL STENOSIS OF LUMBAR REGION WITH NEUROGENIC CLAUDICATION: ICD-10-CM

## 2024-08-13 PROCEDURE — 99207 PR NO CHARGE LOS: CPT | Mod: 93 | Performed by: PHARMACIST

## 2024-08-13 NOTE — Clinical Note
Please see addendum and let me know if you are okay with this plan- I will follow up with the patient on this if deemed appropriate.

## 2024-08-13 NOTE — PROGRESS NOTES
Medication Therapy Management (MTM) Encounter    ASSESSMENT:                            Medication Adherence/Access: No issues identified    Mental Health   Depression with Dementia and Hx Hallucination  This author is not sure how to handle next steps to try to help this patient with her paranoia and other dementia symptoms- will consult with MTMs in psych and geriatrics to see if they have thoughts or whether patient may need referral to psych or geriatrics.     Spinal Stenosis/RLS  Encouraged patient's family to try her back on iron supplementation or get ferritin drawn- we may be able to peel back farther on pregabalin to see if this helps with cognition if her iron stores are low and we can bring them back up    GERD:   Encouraged increased TUMS usage. We also discussed that if she starts the iron supplement and this gets way worse, she can again stop iron and we will instead try to check ferritin.    PLAN:                            Restart ferrous gluconate 240mg twice daily as discussed. If this causes too much stomach upset or constipation, she can come in for Ferritin to see where her baseline is.  Feel free to use TUMS for breakthrough heartburn, trying to space 2 hours away from iron dose.  I will reach out to my coworkers in psychiatry and geriatrics to see if they have thoughts on how to manage Gem's dementia or whether she should be referred to a specialist.    Follow-up: I will try to get you information in the next week or so.    SUBJECTIVE/OBJECTIVE:                          Gem Rockwell is a 79 year old female seen for a follow-up visit.  Patient was accompanied by her daughter, Alicia. 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     Reason for visit: Med recheck.    Allergies/ADRs: Reviewed in chart  Past Medical History: Reviewed in chart  Tobacco: She reports that she has never smoked. She has never been exposed to  "tobacco smoke. She has never used smokeless tobacco.  Alcohol: not currently using    Medication Adherence/Access: Patient has assistance with medication administration: family members help when needed.    Mental Health   Depression with Dementia and Hx Hallucination    Sertraline 25mg once daily (decreased recently from 100mg)  Quetiapine 25mg daily at bedtime (decreased recently from 100mg)  Has not seen much improvement in energy level, memory etc with the dose adjustments. She is still having paranoia about \"people watching the house.\" We reviewed that this paranoia is new behavior since patient was diagnosed with dementia and that patient has no history of schizoaffective disorder or similar diagnosis.     Spinal Stenosis/RLS     Pregabalin 150 mg at night (not twice daily)  Acetaminophen as needed.  Ferrous gluconate has not been started yet- Alicia forgot to   Alicia reports Gem's back does hurt sometimes, her RLS has also improved since our last discussion and seems fairly stable at this point.    GERD:     Pantoprazole 40mg daily  Patient reports that her reflux has been better since starting this but she's still having issues. Has not been using TUMS much.    I spent 19 minutes with this patient today. All changes were made via collaborative practice agreement with Gurinder Ho MD. A copy of the visit note was provided to the patient's provider(s).    A summary of these recommendations was mailed to the patient.    Joanne Andersen PharmD, [facility]  Medication Therapy Management Provider  Phone: [phone]  [email or web address]    Telemedicine Visit Details  Type of service:  Telephone visit  Start Time: 11:04 AM  End Time: 11:23 AM     Medication Therapy Recommendations  No medication therapy recommendations to display   "

## 2024-08-13 NOTE — PATIENT INSTRUCTIONS
"Recommendations from today's MTM visit:                                                       Restart ferrous gluconate 240mg twice daily as discussed. If this causes too much stomach upset or constipation, she can come in for Ferritin to see where her baseline is.  Feel free to use TUMS for breakthrough heartburn, trying to space 2 hours away from iron dose.  I will reach out to my coworkers in psychiatry and geriatrics to see if they have thoughts on how to manage Gem's dementia or whether she should be referred to a specialist.    Follow-up: I will try to get you information in the next week or so.    It was great speaking with you today.  I value your experience and would be very thankful for your time in providing feedback in our clinic survey. In the next few days, you may receive an email or text message from [employer] with a link to a survey related to your  clinical pharmacist.\"     To schedule another MTM appointment, please call the clinic directly or you may call the MTM scheduling line at [phone] or toll-free at [phone].     My Clinical Pharmacist's contact information:                                                      Please feel free to contact me with any questions or concerns you have.      Joanne Andersen, Basilio, BCACP  Medication Therapy Management Provider  Phone: [phone]  [email or web address]    "

## 2024-08-13 NOTE — Clinical Note
Juanpablo Hugo and Donita, I'm at a loss on how to help this poor woman. Do either of you have suggestions on what to do with her psych meds? Do we try to stop the quetiapine completely? Should she be seeing either of you or a specialist at this point? Thank you for any direction.

## 2024-08-15 ENCOUNTER — TELEPHONE (OUTPATIENT)
Dept: ANTICOAGULATION | Facility: CLINIC | Age: 79
End: 2024-08-15
Payer: MEDICARE

## 2024-08-15 NOTE — TELEPHONE ENCOUNTER
ANTICOAGULATION     Selvin Rockwell is overdue for an INR check.     Spoke with jeffery and patient will have labs checked at next office visit on in the next week- uses an outside lab    Keely Velasquez RN

## 2024-08-15 NOTE — PROGRESS NOTES
ADDENDUM: Spoke with MTM PharmDs Donita Duggan in Psych/Neurology and Oanh Nava in Geriatrics and we've codeveloped the following plan.    1. Add donepezil (switch to rivastigmine patch if nausea is intolerable)  2. Try to come off quetiapine   3. If patient doesn't respond well to coming off quietiapine, add Nuplazid with help of psych MTM.    Forwarding plan to PCP and referring provider for consideration.    Joanne Andersen, JohnD, [facility]  Medication Therapy Management Provider  Phone: [phone]  [email or web address]

## 2024-08-22 ENCOUNTER — TELEPHONE (OUTPATIENT)
Dept: PHARMACY | Facility: CLINIC | Age: 79
End: 2024-08-22
Payer: MEDICARE

## 2024-08-22 NOTE — TELEPHONE ENCOUNTER
LVM with Alicia to schedule follow up MTM visit with me to discuss addendum plan from last MTM visit.    John LangfordD, [facility] Center[facility]  Medication Therapy Management Provider  Phone: [phone]  [email or web address]

## 2024-08-26 ENCOUNTER — TELEPHONE (OUTPATIENT)
Dept: ANTICOAGULATION | Facility: CLINIC | Age: 79
End: 2024-08-26
Payer: MEDICARE

## 2024-08-28 ENCOUNTER — ANTICOAGULATION THERAPY VISIT (OUTPATIENT)
Dept: ANTICOAGULATION | Facility: CLINIC | Age: 79
End: 2024-08-28
Payer: MEDICARE

## 2024-08-28 DIAGNOSIS — I48.0 PAROXYSMAL ATRIAL FIBRILLATION (H): Primary | ICD-10-CM

## 2024-08-28 DIAGNOSIS — Z79.01 LONG TERM (CURRENT) USE OF ANTICOAGULANTS: ICD-10-CM

## 2024-08-28 LAB — INR (EXTERNAL): 1.1 (ref 0.8–1.1)

## 2024-08-28 NOTE — PROGRESS NOTES
ANTICOAGULATION MANAGEMENT     Selvin Rockwell 79 year old female is on warfarin with subtherapeutic INR result. (Goal INR 2.0-3.0)    Recent labs: (last 7 days)     08/28/24  1533   INR 1.1       ASSESSMENT     Source(s): Chart Review and Patient/Caregiver Call     Warfarin doses taken: Warfarin taken as instructed patient denies missing any doses and read back correct dosing information  Diet: No new diet changes identified  Medication/supplement changes:  starting nuplazid and coming off quietiapine. Also added donepezil   New illness, injury, or hospitalization: No  Signs or symptoms of bleeding or clotting: No  Previous result: Therapeutic last 2(+) visits  Additional findings: None       PLAN     Recommended plan for no diet, medication or health factor changes affecting INR     Dosing Instructions: booster dose then Increase your warfarin dose (13.3% change) with next INR in 1 week       Summary  As of 8/28/2024      Full warfarin instructions:  8/28: 4 mg; Otherwise 3 mg every Tue, Thu, Sat; 2 mg all other days   Next INR check:  9/4/2024               Telephone call with Alicia who verbalizes understanding and agrees to plan    Patient using outside facility for labs    Education provided: Taking warfarin: Importance of taking warfarin as instructed  Goal range and lab monitoring: goal range and significance of current result    Plan made per [facility] anticoagulation protocol    Chitra Morrison RN  Anticoagulation Clinic  8/28/2024    _______________________________________________________________________     Anticoagulation Episode Summary       Current INR goal:  2.0-3.0   TTR:  55.9% (1 y)   Target end date:  Indefinite   Send INR reminders to:  CARLOSAG MARIANA    Indications    Paroxysmal atrial fibrillation (H) [I48.0]  Long term (current) use of anticoagulants [Z79.01]             Comments:               Anticoagulation Care Providers       Provider Role Specialty Phone number    Gurinder Ho MD  [facility] [phone]

## 2024-09-10 ENCOUNTER — TELEPHONE (OUTPATIENT)
Dept: ANTICOAGULATION | Facility: CLINIC | Age: 79
End: 2024-09-10
Payer: MEDICARE

## 2024-09-10 NOTE — TELEPHONE ENCOUNTER
ANTICOAGULATION     Selvin Rockwell is overdue for an INR check.     Spoke with daughter Alicia who reports she plans to bring patient to [place] for her labs this week.    Suma Sandoval RN  9/10/2024  Anticoagulation Clinic  [facility] for routing messages: renee PEÑA  [facility] patient phone line: [phone]

## 2024-09-17 ENCOUNTER — TELEPHONE (OUTPATIENT)
Dept: ANTICOAGULATION | Facility: CLINIC | Age: 79
End: 2024-09-17
Payer: MEDICARE

## 2024-09-17 NOTE — TELEPHONE ENCOUNTER
ANTICOAGULATION     Selvin Rockwell is overdue for an INR check.     Left message for daughter, patient has INR done at [place].  Advised to let us know if it was done recently, otherwise needs to have it done.     Gracia Souza RN  9/17/2024  Anticoagulation Clinic  [facility] for routing messages: renee PEÑA  [facility] patient phone line: [phone]

## 2024-09-23 ENCOUNTER — ANTICOAGULATION THERAPY VISIT (OUTPATIENT)
Dept: ANTICOAGULATION | Facility: CLINIC | Age: 79
End: 2024-09-23
Payer: MEDICARE

## 2024-09-23 DIAGNOSIS — I48.0 PAROXYSMAL ATRIAL FIBRILLATION (H): Primary | ICD-10-CM

## 2024-09-23 DIAGNOSIS — Z79.01 LONG TERM (CURRENT) USE OF ANTICOAGULANTS: ICD-10-CM

## 2024-09-23 LAB — INR (EXTERNAL): 2

## 2024-09-23 NOTE — PROGRESS NOTES
Incoming fax from  [facility] [place]     Date of result 9/21/24    INR result 2.0    Route result to: renee PEÑA

## 2024-09-23 NOTE — PROGRESS NOTES
Patient's daughter Alicia returned call and verified dosing instructions. No concerns reported. Patient has INR done at outside lab-[facility]. Alicia will call with any questions/concerns.    Suma Sandoval RN, BSN  [facility]  [phone]        ANTICOAGULATION MANAGEMENT     Selvin Rockwell 79 year old female is on warfarin with therapeutic INR result. (Goal INR 2.0-3.0)    Recent labs: (last 7 days)     09/21/24  0000   INR 2.0       ASSESSMENT     Source(s): Chart Review  Previous INR was Subtherapeutic  Medication, diet, health changes since last INR chart reviewed; none identified         PLAN     Recommended plan for no diet, medication or health factor changes affecting INR     Dosing Instructions: Continue your current warfarin dose with next INR in 4 weeks       Summary  As of 9/23/2024      Full warfarin instructions:  3 mg every Tue, Thu, Sat; 2 mg all other days   Next INR check:  10/18/2024               Detailed voice message left for Alicia with dosing instructions and follow up date.     Patient using outside facility for labs    Education provided: Please call back if any changes to your diet, medications or how you've been taking warfarin    Plan made per [facility] anticoagulation protocol    Suma Sandoval RN  9/23/2024  Anticoagulation Clinic  [facility] for routing messages: renee PEÑA  [facility] patient phone line: [phone]        _______________________________________________________________________     Anticoagulation Episode Summary       Current INR goal:  2.0-3.0   TTR:  49.1% (1 y)   Target end date:  Indefinite   Send INR reminders to:  JERRY PEÑA    Indications    Paroxysmal atrial fibrillation (H) [I48.0]  Long term (current) use of anticoagulants [Z79.01]             Comments:               Anticoagulation Care Providers       Provider Role Specialty Phone number    Gurinder Ho MD Referring Family Medicine [phone]

## 2024-09-24 ENCOUNTER — TELEPHONE (OUTPATIENT)
Dept: FAMILY MEDICINE | Facility: CLINIC | Age: 79
End: 2024-09-24
Payer: MEDICARE

## 2024-09-24 NOTE — TELEPHONE ENCOUNTER
Patient Quality Outreach    Patient is due for the following:   Diabetes -  A1C, Eye Exam, Microalbumin, and Foot Exam  Physical Annual Wellness Visit      Topic Date Due    Zoster (Shingles) Vaccine (1 of 2) Never done    Flu Vaccine (1) 09/01/2024    COVID-19 Vaccine (4 - 2024-25 season) 09/01/2024       Next Steps:   Schedule a Annual Wellness Visit    Type of outreach:    Sent letter.      Questions for provider review:    None           Yuli Schneider CMA  Chart routed to Care Team.

## 2024-09-24 NOTE — LETTER
September 24, 2024      Selvin Rockwell  [address] [facility] [address]    Your team at [facility] cares about your health. We have reviewed your chart and based on our findings; we are making the following recommendations to better manage your health.     You are in particular need of attention regarding the following:     Schedule a DIABETIC FOLLOW UP appointment for Office Visit. Patients with diabetes should see their provider regularly.  Schedule a DIABETIC EYE EXAM.  This exam is done with an optometrist, annually. You can schedule this appointment with your eye doctor.  If you need a referral, please let us know.  HYPERTENSION FOLLOW UP: Office Visit  Blood pressure check with nurse  Lab Only Visit   Visit your closest [place] pharmacy for BP check  Use your home Blood Pressure monitor and call us with your results or send them through [employer].  PREVENTATIVE VISIT: Annual Medicare Wellness:Schedule an Annual Medicare Wellness Exam. Please call your [facility] clinic to set up your appointment.    If you have already completed these items, please contact the clinic via phone or   [employer] so your care team can review and update your records. Thank you for   choosing [facility] Clinics for your healthcare needs. For any questions,   concerns, or to schedule an appointment please contact our clinic.    Healthy Regards,      Your [facility] Care Team

## 2024-10-04 ENCOUNTER — LAB (OUTPATIENT)
Dept: LAB | Facility: CLINIC | Age: 79
End: 2024-10-04
Payer: MEDICARE

## 2024-10-04 ENCOUNTER — OFFICE VISIT (OUTPATIENT)
Dept: OPTOMETRY | Facility: CLINIC | Age: 79
End: 2024-10-04
Payer: MEDICARE

## 2024-10-04 DIAGNOSIS — H25.813 COMBINED FORMS OF AGE-RELATED CATARACT OF BOTH EYES: ICD-10-CM

## 2024-10-04 DIAGNOSIS — Z01.01 ENCOUNTER FOR EXAMINATION OF EYES AND VISION WITH ABNORMAL FINDINGS: Primary | ICD-10-CM

## 2024-10-04 DIAGNOSIS — H02.403 ACQUIRED INVOLUTIONAL PTOSIS OF BOTH EYELIDS: ICD-10-CM

## 2024-10-04 DIAGNOSIS — E11.21 TYPE 2 DIABETES MELLITUS WITH DIABETIC NEPHROPATHY, WITHOUT LONG-TERM CURRENT USE OF INSULIN (H): ICD-10-CM

## 2024-10-04 DIAGNOSIS — H52.4 PRESBYOPIA: ICD-10-CM

## 2024-10-04 DIAGNOSIS — G25.81 RESTLESS LEGS SYNDROME (RLS): ICD-10-CM

## 2024-10-04 DIAGNOSIS — E11.9 TYPE 2 DIABETES MELLITUS WITHOUT RETINOPATHY (H): ICD-10-CM

## 2024-10-04 DIAGNOSIS — D50.9 IRON DEFICIENCY ANEMIA, UNSPECIFIED IRON DEFICIENCY ANEMIA TYPE: ICD-10-CM

## 2024-10-04 DIAGNOSIS — H52.03 HYPERMETROPIA, BILATERAL: ICD-10-CM

## 2024-10-04 LAB
EST. AVERAGE GLUCOSE BLD GHB EST-MCNC: 143 MG/DL
FERRITIN SERPL-MCNC: 32 NG/ML (ref 11–328)
HBA1C MFR BLD: 6.6 % (ref 0–5.6)

## 2024-10-04 PROCEDURE — 83036 HEMOGLOBIN GLYCOSYLATED A1C: CPT

## 2024-10-04 PROCEDURE — 82728 ASSAY OF FERRITIN: CPT

## 2024-10-04 PROCEDURE — 92015 DETERMINE REFRACTIVE STATE: CPT | Mod: GY | Performed by: OPTOMETRIST

## 2024-10-04 PROCEDURE — 92004 COMPRE OPH EXAM NEW PT 1/>: CPT | Performed by: OPTOMETRIST

## 2024-10-04 PROCEDURE — 36415 COLL VENOUS BLD VENIPUNCTURE: CPT

## 2024-10-04 ASSESSMENT — EXTERNAL EXAM - RIGHT EYE: OD_EXAM: NORMAL

## 2024-10-04 ASSESSMENT — CUP TO DISC RATIO
OS_RATIO: 0.2
OD_RATIO: 0.2

## 2024-10-04 ASSESSMENT — SLIT LAMP EXAM - LIDS: COMMENTS: HEAVY DERMATOCHALASIS RESTING ON LASHES, TRUE PTOSIS

## 2024-10-04 ASSESSMENT — REFRACTION_MANIFEST
OD_SPHERE: +1.00
METHOD_AUTOREFRACTION: 1
OD_SPHERE: +1.25
OS_AXIS: 176
OD_CYLINDER: SPHERE
OS_SPHERE: +1.50
OD_AXIS: 050
OS_ADD: +2.50
OD_CYLINDER: +0.75
OS_SPHERE: -0.25
OS_CYLINDER: +0.50
OS_CYLINDER: SPHERE
OD_ADD: +2.50

## 2024-10-04 ASSESSMENT — CONF VISUAL FIELD
OD_SUPERIOR_TEMPORAL_RESTRICTION: 0
OD_NORMAL: 1
OS_INFERIOR_TEMPORAL_RESTRICTION: 0
OD_SUPERIOR_NASAL_RESTRICTION: 0
OD_INFERIOR_TEMPORAL_RESTRICTION: 0
OS_SUPERIOR_NASAL_RESTRICTION: 0
OD_INFERIOR_NASAL_RESTRICTION: 0
OS_SUPERIOR_TEMPORAL_RESTRICTION: 0
OS_NORMAL: 1
METHOD: COUNTING FINGERS
OS_INFERIOR_NASAL_RESTRICTION: 0

## 2024-10-04 ASSESSMENT — TONOMETRY
OD_IOP_MMHG: 25
IOP_METHOD: APPLANATION
OS_IOP_MMHG: 23

## 2024-10-04 ASSESSMENT — REFRACTION_WEARINGRX
OD_AXIS: 090
OD_SPHERE: +0.75
OD_ADD: +2.50
OS_SPHERE: +1.25
OS_CYLINDER: +0.50
OD_CYLINDER: +0.25
SPECS_TYPE: BIFOCAL
OS_AXIS: 071
OS_ADD: +2.50

## 2024-10-04 ASSESSMENT — VISUAL ACUITY
CORRECTION_TYPE: GLASSES
METHOD: NUMBERS - LINEAR
OD_SC: 20/100
OS_SC: 20/80
OS_CC: 20/70
OD_CC: 20/60

## 2024-10-04 ASSESSMENT — KERATOMETRY
OD_AXISANGLE_DEGREES: 133
OS_K1POWER_DIOPTERS: 43.50
OD_K1POWER_DIOPTERS: 43.50
OS_AXISANGLE2_DEGREES: 125
OD_AXISANGLE2_DEGREES: 043
OD_K2POWER_DIOPTERS: 44.00
OS_K2POWER_DIOPTERS: 43.75
OS_AXISANGLE_DEGREES: 035

## 2024-10-04 NOTE — PATIENT INSTRUCTIONS
"Updated glasses prescription provided today. Optional to fill.     You have the start of mild cataracts.  You may notice some blurred vision or glare with night driving.  It is important that you wear good sunglasses to protect your eyes from the ultraviolet light from the sun.     You have a PVD- posterior vitreous detachment which is due to the gel of the eye shrinking and clumping together.  This can sometimes cause holes or tears in the retina.  The signs of a retinal detachment are flashes of light or a \"curtain veil\" coming over your vision. If you notice any of these changes return to clinic for re-evaluation.     Notify me if you would like a referral to oculoplastics for an eyelid evaluation.    Return in 1 year for a comprehensive eye exam, or sooner if needed.      The effects of the dilating drops last for 4- 6 hours.  You will be more sensitive to light and vision will be blurry up close.  Mydriatic sunglasses were given if needed.     Connor Gonzalez, OD  [address]  [place]  [address]    [phone]   "

## 2024-10-04 NOTE — PROGRESS NOTES
"Chief Complaint   Patient presents with    Diabetic Eye Exam     Accompanied by /daughter Alicia    Chief Complaint(s) and History of Present Illness(es)       Diabetic Eye Exam              Diabetes Type: Type 2 and taking oral medications    Duration: years    Blood Sugars: is controlled (Checks 1x/day)                   Lab Results   Component Value Date    A1C 7.7 04/17/2024    A1C 8.0 09/13/2022    A1C 8.2 06/07/2022    A1C 6.7 12/23/2021    A1C 7.0 11/18/2020    A1C 6.5 09/20/2019    A1C 6.7 04/24/2019    A1C 6.7 06/08/2018    A1C 6.9 02/16/2018       Last Eye Exam: ~1 yr - [facility]  Dilated Previously: Yes, side effects of dilation explained today    What are you currently using to see?  Glasses - FT BF - wears mainly for near work, does not use for distance     Distance Vision Acuity: Noticed gradual change in both eyes - vision seems intermittently foggy    Near Vision Acuity: Not satisfied     Eye Comfort: sometimes they \"sting\", occasional FB sensation in right eye (not currently)  Do you use eye drops? : Yes: Unknown OTC AT's PRN  Occupation or Hobbies: home     Katelyn Shukla  Optometry Assistant     Medical, surgical and family histories reviewed and updated 10/4/2024.       OBJECTIVE: See Ophthalmology exam    ASSESSMENT:    ICD-10-CM    1. Encounter for examination of eyes and vision with abnormal findings  Z01.01       2. Type 2 diabetes mellitus without retinopathy (H)  E11.9       3. Combined forms of age-related cataract of both eyes  H25.813       4. Acquired involutional ptosis of both eyelids  H02.403       5. Hypermetropia, bilateral  H52.03       6. Presbyopia  H52.4           PLAN:    Selvin Rockwell aware  eye exam results will be sent to Gurinder Ho  Patient Instructions   Updated glasses prescription provided today. Optional to fill.     You have the start of mild cataracts.  You may notice some blurred vision or glare with night driving.  It is " "important that you wear good sunglasses to protect your eyes from the ultraviolet light from the sun.     You have a PVD- posterior vitreous detachment which is due to the gel of the eye shrinking and clumping together.  This can sometimes cause holes or tears in the retina.  The signs of a retinal detachment are flashes of light or a \"curtain veil\" coming over your vision. If you notice any of these changes return to clinic for re-evaluation.     Notify me if you would like a referral to oculoplastics for an eyelid evaluation.    Return in 1 year for a comprehensive eye exam, or sooner if needed.      The effects of the dilating drops last for 4- 6 hours.  You will be more sensitive to light and vision will be blurry up close.  Mydriatic sunglasses were given if needed.     Connor Gonzalez, OD  [facility]  [address] [place] [address]. [place]  [address]    [phone]         "

## 2024-10-04 NOTE — LETTER
"10/4/2024      Selvin Rockwell  [address] [facility] [address]      Dear Colleague,    Thank you for referring your patient, Selvin Rockwell, to the [facility]. Please see a copy of my visit note below.    Chief Complaint   Patient presents with     Diabetic Eye Exam     Accompanied by /daughter Alicia    Chief Complaint(s) and History of Present Illness(es)       Diabetic Eye Exam              Diabetes Type: Type 2 and taking oral medications    Duration: years    Blood Sugars: is controlled (Checks 1x/day)                   Lab Results   Component Value Date    A1C 7.7 04/17/2024    A1C 8.0 09/13/2022    A1C 8.2 06/07/2022    A1C 6.7 12/23/2021    A1C 7.0 11/18/2020    A1C 6.5 09/20/2019    A1C 6.7 04/24/2019    A1C 6.7 06/08/2018    A1C 6.9 02/16/2018       Last Eye Exam: ~1 yr - [facility]  Dilated Previously: Yes, side effects of dilation explained today    What are you currently using to see?  Glasses - FT BF - wears mainly for near work, does not use for distance     Distance Vision Acuity: Noticed gradual change in both eyes - vision seems intermittently foggy    Near Vision Acuity: Not satisfied     Eye Comfort: sometimes they \"sting\", occasional FB sensation in right eye (not currently)  Do you use eye drops? : Yes: Unknown OTC AT's PRN  Occupation or Hobbies: home     Katelyn Shukla  Optometry Assistant     Medical, surgical and family histories reviewed and updated 10/4/2024.       OBJECTIVE: See Ophthalmology exam    ASSESSMENT:    ICD-10-CM    1. Encounter for examination of eyes and vision with abnormal findings  Z01.01       2. Type 2 diabetes mellitus without retinopathy (H)  E11.9       3. Combined forms of age-related cataract of both eyes  H25.813       4. Acquired involutional ptosis of both eyelids  H02.403       5. Hypermetropia, bilateral  H52.03       6. Presbyopia  H52.4           PLAN:    Selvin Rockwell aware  eye " "exam results will be sent to Gurinder Ho  Patient Instructions   Updated glasses prescription provided today. Optional to fill.     You have the start of mild cataracts.  You may notice some blurred vision or glare with night driving.  It is important that you wear good sunglasses to protect your eyes from the ultraviolet light from the sun.     You have a PVD- posterior vitreous detachment which is due to the gel of the eye shrinking and clumping together.  This can sometimes cause holes or tears in the retina.  The signs of a retinal detachment are flashes of light or a \"curtain veil\" coming over your vision. If you notice any of these changes return to clinic for re-evaluation.     Notify me if you would like a referral to oculoplastics for an eyelid evaluation.    Return in 1 year for a comprehensive eye exam, or sooner if needed.      The effects of the dilating drops last for 4- 6 hours.  You will be more sensitive to light and vision will be blurry up close.  Mydriatic sunglasses were given if needed.     Connor Gonzalez, KELY  [address]  [address]    [phone]             Again, thank you for allowing me to participate in the care of your patient.        Sincerely,        Connor Gonzalez, OD  "

## 2024-10-09 NOTE — RESULT ENCOUNTER NOTE
Gem,  Your ferritin level is now back up into the normal range and your hemoglobin A1c indicates that your diabetes is under good control.  Please continue your same medications for now.  I would advise a follow-up office visit sometime in the upcoming months for an annual wellness exam and a general checkup.    Gurinder Ho MD

## 2024-10-15 ENCOUNTER — TELEPHONE (OUTPATIENT)
Dept: FAMILY MEDICINE | Facility: CLINIC | Age: 79
End: 2024-10-15
Payer: MEDICARE

## 2024-10-15 NOTE — TELEPHONE ENCOUNTER
Patients daughter Alicia billings (CTC on file). She is calling about lab results for labs that were completed 10/4/24.     RN relayed that letter was mailed out and also relayed providers result note     Gurinder Ho MD  10/9/2024  8:53 AM CDT       Gem,  Your ferritin level is now back up into the normal range and your hemoglobin A1c indicates that your diabetes is under good control.  Please continue your same medications for now.  I would advise a follow-up office visit sometime in the upcoming months for an annual wellness exam and a general checkup.     Gurinder Ho MD       Patients daughter verbalized understanding. She stated they will call back at a later time to schedule annual wellness. She will also watch out for letter in the mail as she states they have not yet received this. She will call back if needing another copy sent.       Ladan Hou RN on 10/15/2024 at 4:42 PM

## 2024-10-16 DIAGNOSIS — K21.9 GASTROESOPHAGEAL REFLUX DISEASE WITHOUT ESOPHAGITIS: ICD-10-CM

## 2024-10-17 RX ORDER — PANTOPRAZOLE SODIUM 40 MG/1
40 TABLET, DELAYED RELEASE ORAL DAILY
Qty: 90 TABLET | Refills: 0 | Status: SHIPPED | OUTPATIENT
Start: 2024-10-17

## 2024-10-21 ENCOUNTER — TELEPHONE (OUTPATIENT)
Dept: ANTICOAGULATION | Facility: CLINIC | Age: 79
End: 2024-10-21
Payer: MEDICARE

## 2024-10-21 NOTE — TELEPHONE ENCOUNTER
ANTICOAGULATION     Selvin Rockwell is overdue for an INR check.     Left message for patient to call and schedule lab appointment as soon as possible. If returning call, please schedule.     Suma Sandoval RN  10/21/2024  Anticoagulation Clinic  [facility] for routing messages: renee PEÑA  [facility] patient phone line: [phone]

## 2024-10-29 ENCOUNTER — TELEPHONE (OUTPATIENT)
Dept: ANTICOAGULATION | Facility: CLINIC | Age: 79
End: 2024-10-29
Payer: MEDICARE

## 2024-10-29 NOTE — TELEPHONE ENCOUNTER
ANTICOAGULATION     Selvin Rockwell is overdue for an INR check.     Left message for patient to call and schedule lab appointment as soon as possible. If returning call, please schedule.  Patient has labs done at [place] (outside lab). Requested patient's daughter notify [facility] if labs were done already. No results found in care everywhere as of 10/29/24.    Suma Sandoval, RN  10/29/2024  Anticoagulation Clinic  [facility] for routing messages: renee PEÑA  [facility] patient phone line: [phone]

## 2024-11-05 ENCOUNTER — TELEPHONE (OUTPATIENT)
Dept: ANTICOAGULATION | Facility: CLINIC | Age: 79
End: 2024-11-05
Payer: MEDICARE

## 2024-11-05 NOTE — LETTER
[facility] ANTICOAGULATION [facility]INIC  [address] [facility] [address]  Phone: [phone]  Fax: [phone]   November 5, 2024        Selvin Rockwell  [address] [place] [address]            Dear Selvin,    You are currently under the care of [facility] for your warfarin (Coumadin , Jantoven ) therapy.  We are contacting you because our records show you were due for an INR on 10/18/24.    There are potentially serious risks when taking warfarin without careful monitoring and we want to make sure you are safely managed.  Routine lab monitoring is required for warfarin refills.     Please call [phone] as soon as possible to schedule a lab appointment. If it is difficult for you to get to lab, please call us to discuss options.  If there has been a change in your care or other concerns, please let us know so we can help and/or update our records.         Sincerely,       [facility]

## 2024-11-05 NOTE — TELEPHONE ENCOUNTER
ANTICOAGULATION     Selvin Rockwell is overdue for an INR check.     Left message for patient to call and schedule lab appointment as soon as possible. If returning call, please schedule.  and Reminder letter sent    Keely Velasquez RN  11/5/2024  Anticoagulation Clinic  [facility] for routing messages: renee PEÑA  [facility] patient phone line: [phone]

## 2024-11-06 DIAGNOSIS — I10 HYPERTENSION GOAL BP (BLOOD PRESSURE) < 140/90: ICD-10-CM

## 2024-11-06 DIAGNOSIS — F32.A DEPRESSION, UNSPECIFIED DEPRESSION TYPE: ICD-10-CM

## 2024-11-06 DIAGNOSIS — Z79.01 LONG TERM (CURRENT) USE OF ANTICOAGULANTS: ICD-10-CM

## 2024-11-06 DIAGNOSIS — M48.062 SPINAL STENOSIS OF LUMBAR REGION WITH NEUROGENIC CLAUDICATION: ICD-10-CM

## 2024-11-06 DIAGNOSIS — E11.21 TYPE 2 DIABETES MELLITUS WITH DIABETIC NEPHROPATHY, WITHOUT LONG-TERM CURRENT USE OF INSULIN (H): ICD-10-CM

## 2024-11-06 DIAGNOSIS — I48.0 PAROXYSMAL ATRIAL FIBRILLATION (H): ICD-10-CM

## 2024-11-06 DIAGNOSIS — R45.1 AGITATION: ICD-10-CM

## 2024-11-06 RX ORDER — PREGABALIN 150 MG/1
150 CAPSULE ORAL 2 TIMES DAILY
Qty: 180 CAPSULE | Refills: 0 | Status: SHIPPED | OUTPATIENT
Start: 2024-11-06

## 2024-11-07 RX ORDER — ISOSORBIDE MONONITRATE 30 MG/1
TABLET, EXTENDED RELEASE ORAL
Qty: 90 TABLET | Refills: 0 | Status: SHIPPED | OUTPATIENT
Start: 2024-11-07

## 2024-11-07 RX ORDER — QUETIAPINE FUMARATE 25 MG/1
TABLET, FILM COATED ORAL
Qty: 180 TABLET | Refills: 0 | Status: SHIPPED | OUTPATIENT
Start: 2024-11-07

## 2024-11-07 RX ORDER — WARFARIN SODIUM 2 MG/1
TABLET ORAL
Qty: 40 TABLET | Refills: 0 | Status: SHIPPED | OUTPATIENT
Start: 2024-11-07

## 2024-11-07 RX ORDER — SERTRALINE HYDROCHLORIDE 25 MG/1
25 TABLET, FILM COATED ORAL DAILY
Qty: 90 TABLET | Refills: 0 | Status: SHIPPED | OUTPATIENT
Start: 2024-11-07

## 2024-11-07 NOTE — TELEPHONE ENCOUNTER
ANTICOAGULATION MANAGEMENT:  Medication Refill    Anticoagulation Summary  As of 9/23/2024      Warfarin maintenance plan:  3 mg (2 mg x 1.5) every Tue, Thu, Sat; 2 mg (2 mg x 1) all other days   Next INR check:  10/18/2024   Target end date:  Indefinite    Indications    Paroxysmal atrial fibrillation (H) [I48.0]  Long term (current) use of anticoagulants [Z79.01]                 Anticoagulation Care Providers       Provider Role Specialty Phone number    Gurinder Ho MD Referring Family Medicine [phone]            Refill Criteria    Visit with referring provider/group: Meets criteria: visit within referring provider group in the last 15 months on 6/21/24    [facility] referral last signed: 05/20/2024; within last year: Yes    Lab monitoring not exceeding 2 weeks overdue: No    [facility] does NOT meet all criteria for refill: > 2 weeks overdue for lab monitoring . 30 day rachel fill approved; patient notified to schedule labs per [facility] protocol    Ju Meza RN  Anticoagulation Clinic

## 2024-11-09 LAB — INR (EXTERNAL): 3.2

## 2024-11-11 ENCOUNTER — ANTICOAGULATION THERAPY VISIT (OUTPATIENT)
Dept: ANTICOAGULATION | Facility: CLINIC | Age: 79
End: 2024-11-11
Payer: MEDICARE

## 2024-11-11 DIAGNOSIS — I48.0 PAROXYSMAL ATRIAL FIBRILLATION (H): Primary | ICD-10-CM

## 2024-11-11 DIAGNOSIS — Z79.01 LONG TERM (CURRENT) USE OF ANTICOAGULANTS: ICD-10-CM

## 2024-11-11 NOTE — PROGRESS NOTES
"ANTICOAGULATION MANAGEMENT     Selvin Rockwell 79 year old female is on warfarin with supratherapeutic INR result. (Goal INR 2.0-3.0)    No results for input(s): \"INR\" in the last 168 hours.    ASSESSMENT     Source(s): Chart Review and Patient/Caregiver Call     Warfarin doses taken: Warfarin taken as instructed  Diet: Decreased greens/vitamin K in diet; plans to resume previous intake  Medication/supplement changes: None noted  New illness, injury, or hospitalization: Yes: had a fall   Signs or symptoms of bleeding or clotting: No  Previous result: Therapeutic last visit; previously outside of goal range  Additional findings: None       PLAN     Recommended plan for temporary change(s) affecting INR     Dosing Instructions: Continue your current warfarin dose with next INR in 2 weeks       Summary  As of 11/11/2024      Full warfarin instructions:  3 mg every Tue, Thu, Sat; 2 mg all other days   Next INR check:  11/25/2024               Telephone call with Alicia who verbalizes understanding and agrees to plan and who agrees to plan and repeated back plan correctly    Patient offered & declined to schedule next visit    Education provided: Symptom monitoring: monitoring for bleeding signs and symptoms    Plan made per [facility] anticoagulation protocol    Danielle Vera RN  11/11/2024  Anticoagulation Clinic  [employer] for routing messages: renee PEÑA  [facility] patient phone line: [phone]        _______________________________________________________________________     Anticoagulation Episode Summary       Current INR goal:  2.0-3.0   TTR:  49.3% (1 y)   Target end date:  Indefinite   Send INR reminders to:  JERRY PEÑA    Indications    Paroxysmal atrial fibrillation (H) [I48.0]  Long term (current) use of anticoagulants [Z79.01]             Comments:  --             Anticoagulation Care Providers       Provider Role Specialty Phone number    Gurinder Ho MD Referring Family Medicine " [phone]

## 2024-11-16 DIAGNOSIS — E78.5 HYPERLIPIDEMIA WITH TARGET LDL LESS THAN 100: ICD-10-CM

## 2024-11-17 RX ORDER — SIMVASTATIN 10 MG
TABLET ORAL
Qty: 30 TABLET | Refills: 0 | Status: SHIPPED | OUTPATIENT
Start: 2024-11-17

## 2024-12-02 DIAGNOSIS — I48.0 PAROXYSMAL ATRIAL FIBRILLATION (H): ICD-10-CM

## 2024-12-02 DIAGNOSIS — I10 HYPERTENSION GOAL BP (BLOOD PRESSURE) < 140/90: ICD-10-CM

## 2024-12-02 RX ORDER — CARVEDILOL 6.25 MG/1
6.25 TABLET ORAL 2 TIMES DAILY
Qty: 180 TABLET | Refills: 0 | Status: SHIPPED | OUTPATIENT
Start: 2024-12-02

## 2024-12-09 ENCOUNTER — PATIENT OUTREACH (OUTPATIENT)
Dept: CARE COORDINATION | Facility: CLINIC | Age: 79
End: 2024-12-09
Payer: MEDICARE

## 2024-12-09 NOTE — PROGRESS NOTES
Community Health Worker Initial Outreach    CHW Initial Information Gathering:  Referral Source: PCP  [facility]  [phone]  Preferred Urgent Care: Other  Current living arrangement:: I live in a private home with spouse  Type of residence:: Private home - stairs  Community Resources: None  Supplies Currently Used at Home: None  Informal Support system:: Children, Spouse  No PCP office visit in Past Year: No  CHW Additional Questions  If ED/Hospital discharge, follow-up appointment scheduled as recommended?: N/A  Medication changes made following ED/Hospital discharge?: N/A  MyChart active?: No  Patient agreeable to assistance with activating MyChart?: No    Patient accepts CC: Yes. Patient scheduled for assessment with CC SW on 12 at 10 am. Patient noted desire to discuss with patient's daughter (CTC on file) back up transportation options.     SUZANNE Flores [place], [place], [place], [place] and [facility]  [phone]

## 2024-12-10 ENCOUNTER — NURSE TRIAGE (OUTPATIENT)
Dept: FAMILY MEDICINE | Facility: CLINIC | Age: 79
End: 2024-12-10
Payer: MEDICARE

## 2024-12-10 NOTE — TELEPHONE ENCOUNTER
"Patient's daughter, Alicia, calling, consent on file.    Says her mom reports lower abdominal pain when she is sitting, goes away when she gets up and walks around.    Has been going on \"for a while\", perhaps a few weeks.    I see patient had virtual visit with provider Lydia Smart in June for nausea and vomiting.    A lot of her med doses were reduced per a couple visits with MTM.    See triage below:    SEE IN OFFICE OR VIDEO VISIT TODAY OR TOMORROW:  * You need to be examined or have a video telemedicine visit.  * PCP OFFICE VISIT: Let me give you an appointment.  Daughter declines to schedule at this time, they have a virtual visit with social work tomorrow to discuss transportation issues; very hard to bring patient for in person visit currently.    Due to dementia, patient would be best served by being seen in person to look, listen, feel abdomen.    Daughter says she will monitor, if acutely worse or pain lasts more than 2 hours without relief, then will have patient evaluated in ER.    Says she will call to schedule after they have transportation.   The issue has been present for a few weeks and she does not feel is urgent at this time.    Georgina MARTÍNEZ RN  [facility] Triage      Reason for Disposition   MILD pain (e.g., does not interfere with normal activities) and pain comes and goes (cramps) lasts > 48 hours  (Exception: This same abdominal pain is a chronic symptom recurrent or ongoing AND present > 4 weeks.)    Additional Information   Negative: Passed out (e.g., fainted, lost consciousness, blacked out and was not responding)   Negative: Shock suspected (e.g., cold/pale/clammy skin, too weak to stand, low BP, rapid pulse)   Negative: Sounds like a life-threatening emergency to the triager   Negative: Followed an abdomen (stomach) injury   Negative: Chest pain   Negative: Abdominal pain and pregnant < 20 weeks   Negative: Abdominal pain and pregnant 20 or more weeks   Negative: Pain " "is mainly in upper abdomen (if needed ask: 'is it mainly above the belly button?')   Negative: Abdomen bloating or swelling are main symptoms   Negative: SEVERE abdominal pain (e.g., excruciating)   Negative: Vomiting red blood or black (coffee ground) material   Negative: Blood in bowel movements  (Exception: Blood on surface of BM with constipation.)   Negative: Black or tarry bowel movements  (Exception: Chronic-unchanged black-grey BMs AND is taking iron pills or Pepto-Bismol.)   Negative: MILD TO MODERATE constant pain lasting > 2 hours   Negative: MILD TO MODERATE constant pain lasting > 2 hours, and age > 60 years   Negative: Vomiting bile (green color)   Negative: Patient sounds very sick or weak to the triager   Negative: Vomiting and abdomen looks much more swollen than usual   Negative: White of the eyes have turned yellow (i.e., jaundice)   Negative: Blood in urine (red, pink, or tea-colored)   Negative: Fever > 103 F (39.4 C)   Negative: Fever > 101 F (38.3 C) and over 60 years of age   Negative: Fever > 100 F (37.8 C) and has diabetes mellitus or a weak immune system (e.g., HIV positive, cancer chemotherapy, organ transplant, splenectomy, chronic steroids)   Negative: Fever > 100 F (37.8 C) and bedridden (e.g., CVA, chronic illness, recovering from surgery)   Negative: MODERATE pain (e.g., interferes with normal activities that comes and goes (cramps) lasts > 24 hours  (Exception: Pain with Vomiting or Diarrhea - see that Protocol.)   Negative: Unusual vaginal discharge   Negative: Pregnancy suspected (e.g., missed last menstrual period)   Negative: Patient wants to be seen    Answer Assessment - Initial Assessment Questions  1. LOCATION: \"Where does it hurt?\"       Low abdomen, on low right side  2. RADIATION: \"Does the pain shoot anywhere else?\" (e.g., chest, back)      no  3. ONSET: \"When did the pain begin?\" (e.g., minutes, hours or days ago)       A few weeks ago  4. SUDDEN: \"Gradual or sudden " "onset?\"      Happens all at once at various times  5. PATTERN \"Does the pain come and go, or is it constant?\"      Comes and goes  6. SEVERITY: \"How bad is the pain?\"  (e.g., Scale 1-10; mild, moderate, or severe)      Mild-moderate, hard to tell due to dementia  7. RECURRENT SYMPTOM: \"Have you ever had this type of stomach pain before?\" If Yes, ask: \"When was the last time?\" and \"What happened that time?\"       no  8. CAUSE: \"What do you think is causing the stomach pain?\"      unknown  9. RELIEVING/AGGRAVATING FACTORS: \"What makes it better or worse?\" (e.g., antacids, bending or twisting motion, bowel movement)      Feels better when she stands up and moves around  10. OTHER SYMPTOMS: \"Do you have any other symptoms?\" (e.g., back pain, diarrhea, fever, urination pain, vomiting)        no  11. PREGNANCY: \"Is there any chance you are pregnant?\" \"When was your last menstrual period?\"        N/a    Protocols used: Abdominal Pain - Female-A-[place]    "

## 2024-12-11 ENCOUNTER — PATIENT OUTREACH (OUTPATIENT)
Dept: NURSING | Facility: CLINIC | Age: 79
End: 2024-12-11
Payer: MEDICARE

## 2024-12-11 NOTE — PROGRESS NOTES
Clinic Care Coordination Contact    RAZIA initial phone assessment with patient's dtr Alicia, Consent to Communicate on file.  Referral for alternate transportation options.  SW discussed options for transportation, [employer] and [employer] stating both are private pay.  Patient's dtr expressed frustration with having to pay for transportation.  She thanked SW and stated this is not going to be helpful.  She stated there were no further needs     SW will not intervene further     Josie Hillman, KIRANW, MSW   [facility]  Care Coordination  [facility]  [phone]  12/11/2024 10:52 AM

## 2024-12-18 ENCOUNTER — ANTICOAGULATION THERAPY VISIT (OUTPATIENT)
Dept: ANTICOAGULATION | Facility: CLINIC | Age: 79
End: 2024-12-18
Payer: MEDICARE

## 2024-12-18 DIAGNOSIS — I48.0 PAROXYSMAL ATRIAL FIBRILLATION (H): Primary | ICD-10-CM

## 2024-12-18 DIAGNOSIS — Z79.01 LONG TERM (CURRENT) USE OF ANTICOAGULANTS: ICD-10-CM

## 2024-12-18 LAB — INR (EXTERNAL): 1.9

## 2024-12-18 NOTE — PROGRESS NOTES
ANTICOAGULATION MANAGEMENT     Selvin Rockwell 79 year old female is on warfarin with subtherapeutic INR result. (Goal INR 2.0-3.0)    Recent labs: (last 7 days)     12/18/24  1520   INR 1.9       ASSESSMENT     Source(s): Chart Review and Patient/Caregiver Call     Warfarin doses taken: Warfarin taken as instructed  Diet: No new diet changes identified, pt is back to consuming her normal intake of greens (vitamin K)  Medication/supplement changes:  patient did miss some other medications but did not miss taking warfarin  New illness, injury, or hospitalization: No  Signs or symptoms of bleeding or clotting: No  Previous result: Supratherapeutic  Additional findings: None     PLAN     Recommended plan for temporary change(s) and ongoing change(s) affecting INR     Dosing Instructions: Continue your current warfarin dose with next INR in 2 weeks       Summary  As of 12/18/2024      Full warfarin instructions:  3 mg every Tue, Thu, Sat; 2 mg all other days   Next INR check:  1/2/2025               Telephone call with Alicia  who verbalizes understanding and agrees to plan    Patient using outside facility for labs    Education provided: Please call back if any changes to your diet, medications or how you've been taking warfarin  Symptom monitoring: monitoring for clotting signs and symptoms, monitoring for stroke signs and symptoms, and when to seek medical attention/emergency care    Plan made per [facility] anticoagulation protocol    Portia Croonado RN  12/18/2024  Anticoagulation Clinic  [facility] for routing messages: renee PEÑA  [facility] patient phone line: [phone]        _______________________________________________________________________     Anticoagulation Episode Summary       Current INR goal:  2.0-3.0   TTR:  51.8% (1 y)   Target end date:  Indefinite   Send INR reminders to:  JERRY PEÑA    Indications    Paroxysmal atrial fibrillation (H) [I48.0]  Long term (current) use of anticoagulants  [Z79.01]             Comments:  --             Anticoagulation Care Providers       Provider Role Specialty Phone number    Gurinder Ho MD [facility] Medicine [phone]

## 2024-12-18 NOTE — PROGRESS NOTES
Incoming fax from [facility] medical lab    Date of result 1.9    INR result 12/18/2024    Route result to: Leola Whitt Rn  [facility]

## 2025-01-05 DIAGNOSIS — E78.5 HYPERLIPIDEMIA WITH TARGET LDL LESS THAN 100: ICD-10-CM

## 2025-01-05 DIAGNOSIS — N18.31 STAGE 3A CHRONIC KIDNEY DISEASE (H): Primary | ICD-10-CM

## 2025-01-05 DIAGNOSIS — E11.21 TYPE 2 DIABETES MELLITUS WITH DIABETIC NEPHROPATHY, WITHOUT LONG-TERM CURRENT USE OF INSULIN (H): ICD-10-CM

## 2025-01-06 DIAGNOSIS — Z79.01 LONG TERM (CURRENT) USE OF ANTICOAGULANTS: ICD-10-CM

## 2025-01-06 DIAGNOSIS — I48.0 PAROXYSMAL ATRIAL FIBRILLATION (H): ICD-10-CM

## 2025-01-06 RX ORDER — WARFARIN SODIUM 2 MG/1
TABLET ORAL
Qty: 115 TABLET | Refills: 0 | Status: SHIPPED | OUTPATIENT
Start: 2025-01-06

## 2025-01-06 NOTE — TELEPHONE ENCOUNTER
ANTICOAGULATION MANAGEMENT:  Medication Refill    Anticoagulation Summary  As of 12/18/2024      Warfarin maintenance plan:  3 mg (2 mg x 1.5) every Tue, Thu, Sat; 2 mg (2 mg x 1) all other days   Next INR check:  1/2/2025   Target end date:  Indefinite    Indications    Paroxysmal atrial fibrillation (H) [I48.0]  Long term (current) use of anticoagulants [Z79.01]                 Anticoagulation Care Providers       Provider Role Specialty Phone number    Gurinder Ho MD Referring Family Medicine [phone]            Refill Criteria    Visit with referring provider/group: Meets criteria: visit within referring provider group in the last 15 months on 6/21/24    [facility] referral last signed: 05/20/2024; within last year:  Yes    Lab monitoring not exceeding 2 weeks overdue: No    Styliani meets all criteria for refill. Rx instructions and quantity supplied updated to match patient's current dosing plan. Warfarin 90 day supply per [facility] protocol     Portia Coronado RN  Anticoagulation Clinic

## 2025-01-06 NOTE — TELEPHONE ENCOUNTER
Appointment needed for refills. Once scheduled route to the provider for a rachel refill.     Gena Wilde RN

## 2025-01-07 RX ORDER — SIMVASTATIN 10 MG
TABLET ORAL
Qty: 30 TABLET | Refills: 0 | Status: SHIPPED | OUTPATIENT
Start: 2025-01-07

## 2025-01-07 NOTE — TELEPHONE ENCOUNTER
Patient scheduled phone appt 1/15 would like to come in for labs on 1/10.  Can orders be placed and rachel refill  sent.     Yuliana

## 2025-01-07 NOTE — TELEPHONE ENCOUNTER
Attempted to contact patient, no answer.   Left generic message informing patient that she needs an OV in order to have meds filled.    Ju House CMA on 1/7/2025 at 9:51 AM

## 2025-01-07 NOTE — TELEPHONE ENCOUNTER
Patient's daughter returning call, consent to communicate on file.  I advised her of provider's message, she will have her mom come fasting for the lab and ready to leave urine.     I advised her of Rx sent for simvastatin today, coumadin was sent by INR clinic yesterday.    Patient's daughter verbalized understanding of and agreement with plan.    Georgina MARTÍNEZ RN  [facility] Triage

## 2025-01-07 NOTE — TELEPHONE ENCOUNTER
Yes.  She should come in fasting and prepared to give a urine sample on her lab visit on January 10.    Gurinder Ho MD

## 2025-01-14 NOTE — PROGRESS NOTES
Incoming fax from [facility] 4/10/23 [phone]    INR result: 2.9   Pressure is at goal.  Continue medication.  Blood work ordered.  Orders:    Comprehensive metabolic panel; Future    Albumin / creatinine urine ratio; Future    diltiazem (CARDIZEM SR) 60 mg 12 hr capsule; Take 1 capsule (60 mg total) by mouth 2 (two) times a day    losartan (COZAAR) 50 mg tablet; Take 1 tablet (50 mg total) by mouth daily    metoprolol tartrate (LOPRESSOR) 25 mg tablet; Take 1 tablet (25 mg total) by mouth every 12 (twelve) hours

## 2025-01-15 ENCOUNTER — VIRTUAL VISIT (OUTPATIENT)
Dept: FAMILY MEDICINE | Facility: CLINIC | Age: 80
End: 2025-01-15
Payer: COMMERCIAL

## 2025-01-15 DIAGNOSIS — E78.5 HYPERLIPIDEMIA WITH TARGET LDL LESS THAN 100: ICD-10-CM

## 2025-01-15 DIAGNOSIS — Z79.01 LONG TERM (CURRENT) USE OF ANTICOAGULANTS: ICD-10-CM

## 2025-01-15 DIAGNOSIS — M48.062 SPINAL STENOSIS OF LUMBAR REGION WITH NEUROGENIC CLAUDICATION: ICD-10-CM

## 2025-01-15 DIAGNOSIS — R45.1 AGITATION: ICD-10-CM

## 2025-01-15 DIAGNOSIS — F33.1 MODERATE EPISODE OF RECURRENT MAJOR DEPRESSIVE DISORDER (H): ICD-10-CM

## 2025-01-15 DIAGNOSIS — F03.B3 MODERATE DEMENTIA WITH MOOD DISTURBANCE, UNSPECIFIED DEMENTIA TYPE (H): ICD-10-CM

## 2025-01-15 DIAGNOSIS — F32.A DEPRESSION, UNSPECIFIED DEPRESSION TYPE: ICD-10-CM

## 2025-01-15 DIAGNOSIS — E11.21 TYPE 2 DIABETES MELLITUS WITH DIABETIC NEPHROPATHY, WITHOUT LONG-TERM CURRENT USE OF INSULIN (H): Primary | ICD-10-CM

## 2025-01-15 DIAGNOSIS — I48.0 PAROXYSMAL ATRIAL FIBRILLATION (H): ICD-10-CM

## 2025-01-15 DIAGNOSIS — I10 HYPERTENSION GOAL BP (BLOOD PRESSURE) < 140/90: ICD-10-CM

## 2025-01-15 DIAGNOSIS — I50.9 HEART FAILURE, UNSPECIFIED HF CHRONICITY, UNSPECIFIED HEART FAILURE TYPE (H): ICD-10-CM

## 2025-01-15 DIAGNOSIS — N18.31 STAGE 3A CHRONIC KIDNEY DISEASE (H): ICD-10-CM

## 2025-01-15 DIAGNOSIS — K21.9 GASTROESOPHAGEAL REFLUX DISEASE WITHOUT ESOPHAGITIS: ICD-10-CM

## 2025-01-15 DIAGNOSIS — E66.01 MORBID OBESITY (H): ICD-10-CM

## 2025-01-15 PROCEDURE — 98014 SYNCH AUDIO-ONLY EST MOD 30: CPT | Performed by: FAMILY MEDICINE

## 2025-01-15 RX ORDER — SERTRALINE HYDROCHLORIDE 25 MG/1
25 TABLET, FILM COATED ORAL DAILY
Qty: 90 TABLET | Refills: 0 | Status: SHIPPED | OUTPATIENT
Start: 2025-01-15

## 2025-01-15 RX ORDER — WARFARIN SODIUM 2 MG/1
TABLET ORAL
Qty: 115 TABLET | Refills: 0 | Status: SHIPPED | OUTPATIENT
Start: 2025-01-15

## 2025-01-15 RX ORDER — QUETIAPINE FUMARATE 25 MG/1
TABLET, FILM COATED ORAL
Qty: 180 TABLET | Refills: 0 | Status: SHIPPED | OUTPATIENT
Start: 2025-01-15

## 2025-01-15 RX ORDER — PREGABALIN 150 MG/1
150 CAPSULE ORAL 2 TIMES DAILY
Qty: 180 CAPSULE | Refills: 0 | Status: SHIPPED | OUTPATIENT
Start: 2025-01-15

## 2025-01-15 RX ORDER — SIMVASTATIN 10 MG
TABLET ORAL
Qty: 30 TABLET | Refills: 0 | Status: SHIPPED | OUTPATIENT
Start: 2025-01-15

## 2025-01-15 RX ORDER — ISOSORBIDE MONONITRATE 30 MG/1
TABLET, EXTENDED RELEASE ORAL
Qty: 90 TABLET | Refills: 0 | Status: SHIPPED | OUTPATIENT
Start: 2025-01-15

## 2025-01-15 RX ORDER — PANTOPRAZOLE SODIUM 40 MG/1
40 TABLET, DELAYED RELEASE ORAL DAILY
Qty: 90 TABLET | Refills: 0 | Status: SHIPPED | OUTPATIENT
Start: 2025-01-15

## 2025-01-15 NOTE — PROGRESS NOTES
Gem is a 79 year old who is being evaluated via a billable telephone visit.    What phone number would you like to be contacted at? [phone]  How would you like to obtain your AVS? Mail a copy  Originating Location (pt. Location): Home    Distant Location (provider location):  On-site  Telephone visit completed due to the patient did not have access to video, while the distant provider did.    Assessment & Plan       ICD-10-CM    1. Type 2 diabetes mellitus with diabetic nephropathy, without long-term current use of insulin (H)  E11.21 metFORMIN (GLUCOPHAGE) 1000 MG tablet      2. Gastroesophageal reflux disease without esophagitis  K21.9 pantoprazole (PROTONIX) 40 MG EC tablet      3. Hypertension goal BP (blood pressure) < 140/90  I10 isosorbide mononitrate (IMDUR) 30 MG 24 hr tablet      4. Hyperlipidemia with target LDL less than 100  E78.5 simvastatin (ZOCOR) 10 MG tablet      5. Paroxysmal atrial fibrillation (H)  I48.0 warfarin ANTICOAGULANT (COUMADIN) 2 MG tablet      6. Long term (current) use of anticoagulants  Z79.01 warfarin ANTICOAGULANT (COUMADIN) 2 MG tablet      7. Heart failure, unspecified HF chronicity, unspecified heart failure type (H)  I50.9       8. Moderate dementia with mood disturbance, unspecified dementia type (H)  F03.B3       9. Moderate episode of recurrent major depressive disorder (H)  F33.1       10. Morbid obesity (H)  E66.01       11. Stage 3a chronic kidney disease (H)  N18.31       12. Spinal stenosis of lumbar region with neurogenic claudication  M48.062 pregabalin (LYRICA) 150 MG capsule      13. Agitation  R45.1 QUEtiapine (SEROQUEL) 25 MG tablet      14. Depression, unspecified depression type  F32.A sertraline (ZOLOFT) 25 MG tablet        The patient's lab results generally look good and/or stable from the past  Diabetes remains well-controlled  We will continue her same meds for now  I suggested trying some Tums or other antacid when the patient gets her chest  discomfort to try to sort out whether this is related to GERD or perhaps CAD  She is on medications for both at this time  I suggested an in person visit sometime in the next month or 2 for an annual wellness exam and the daughter will go ahead and schedule that for the patient    The longitudinal plan of care for the diagnosis(es)/condition(s) as documented were addressed during this visit. Due to the added complexity in care, I will continue to support Gem in the subsequent management and with ongoing continuity of care.      Marcy Rabago is a 79 year old, presenting for the following health issues:  Refill Request      1/15/2025     1:59 PM   Additional Questions   Roomed by Yuli ROSSI CMA   Accompanied by Alicia     History of Present Illness       Reason for visit:  Medication Refill   She is taking medications regularly.     The phone visit was conducted with the patient's daughter, Alicia, who speaks English well and communicates for the patient.  The patient has underlying dementia and mainly speaks [place] Islander.  It has been over 2 years since I have last seen the patient.  I am still listed as her PCP.  The patient is on numerous medications as listed below.  She came in recently for lab work.  She has various baseline health conditions as listed.  Alicia feels like the medicines are generally working well, though the patient continues to have cognitive challenges.  She complains of occasional right lower abdominal pain, mainly just when sitting.  If she stands up and moves around, then it goes away.  She also still gets occasional chest discomfort.  Alicia is not sure if the pantoprazole is working very well.    Patient Active Problem List   Diagnosis    Status Post Total Knee Replacement - bilateral    TACHO (obstructive sleep apnea)    Hyperlipidemia with target LDL less than 70    Hypertension goal BP (blood pressure) < 140/90    Morbid obesity (H)    Achalasia    Chronic low back pain    Gout     Paroxysmal atrial fibrillation (H)    Long term (current) use of anticoagulants    Anxiety    Spinal stenosis of lumbar region with neurogenic claudication    Glenohumeral arthritis, left    Type 2 diabetes mellitus with diabetic nephropathy, without long-term current use of insulin (H)    Stage 3a chronic kidney disease (H)    Anemia, iron deficiency    Mild recurrent major depression    Memory problem    Falls frequently    Lewy body dementia, unspecified dementia severity, unspecified whether behavioral, psychotic, or mood disturbance or anxiety (H)    Heart failure, unspecified HF chronicity, unspecified heart failure type (H)    Moderate dementia with mood disturbance, unspecified dementia type (H)    Moderate episode of recurrent major depressive disorder (H)     Current Outpatient Medications   Medication Sig Dispense Refill    acetaminophen (TYLENOL) 500 MG tablet Take 500-1,000 mg by mouth every 6 hours as needed for mild pain      blood glucose (NO BRAND SPECIFIED) test strip Use to test blood sugar one time daily or as directed. To accompany: Blood Glucose Monitor Brands: per insurance. 100 strip 6    carvedilol (COREG) 6.25 MG tablet Take 1 tablet by mouth twice daily 180 tablet 0    Ferrous Gluconate 240 (27 Fe) MG TABS Take 1 tablet by mouth 2 times daily (with meals) For restless legs 100 tablet 11    isosorbide mononitrate (IMDUR) 30 MG 24 hr tablet TAKE 1 TABLET BY MOUTH ONCE DAILY 90 tablet 0    metFORMIN (GLUCOPHAGE) 1000 MG tablet TAKE 1 TABLET BY MOUTH TWICE DAILY WITH MEALS 180 tablet 0    pantoprazole (PROTONIX) 40 MG EC tablet Take 1 tablet (40 mg) by mouth daily. 90 tablet 0    pregabalin (LYRICA) 150 MG capsule Take 1 capsule by mouth twice daily 180 capsule 0    QUEtiapine (SEROQUEL) 25 MG tablet Tale 1 tablet in am and 1 tablet at bedtime 180 tablet 0    sertraline (ZOLOFT) 25 MG tablet Take 1 tablet by mouth once daily 90 tablet 0    simvastatin (ZOCOR) 10 MG tablet TAKE 1 TABLET BY  MOUTH ONCE DAILY AT BEDTIME 30 tablet 0    warfarin ANTICOAGULANT (COUMADIN) 2 MG tablet Take 3 mg every Tue, Thu, Sat; 2 mg all other days or As directed by Anticoagulation clinic 115 tablet 0     No current facility-administered medications for this visit.           Review of Systems  Mainly any significant for the above.      Objective           Vitals:  No vitals were obtained today due to virtual visit.    Physical Exam     Visit was conducted with the patient's daughter.      Lab on 01/10/2025   Component Date Value Ref Range Status    Sodium 01/10/2025 143  135 - 145 mmol/L Final    Potassium 01/10/2025 4.3  3.4 - 5.3 mmol/L Final    Chloride 01/10/2025 109 (H)  98 - 107 mmol/L Final    Carbon Dioxide (CO2) 01/10/2025 23  22 - 29 mmol/L Final    Anion Gap 01/10/2025 11  7 - 15 mmol/L Final    Urea Nitrogen 01/10/2025 20.5  8.0 - 23.0 mg/dL Final    Creatinine 01/10/2025 0.99 (H)  0.51 - 0.95 mg/dL Final    GFR Estimate 01/10/2025 58 (L)  >60 mL/min/1.73m2 Final    eGFR calculated using 2021 CKD-EPI equation.    Calcium 01/10/2025 9.1  8.8 - 10.4 mg/dL Final    Reference intervals for this test were updated on 7/16/2024 to reflect our healthy population more accurately. There may be differences in the flagging of prior results with similar values performed with this method. Those prior results can be interpreted in the context of the updated reference intervals.    Glucose 01/10/2025 125 (H)  70 - 99 mg/dL Final    Patient Fasting > 8hrs? 01/10/2025 Yes   Final    Creatinine Urine mg/dL 01/10/2025 58.6  mg/dL Final    The reference ranges have not been established in urine creatinine. The results should be integrated into the clinical context for interpretation.    Albumin Urine mg/L 01/10/2025 122.0  mg/L Final    The reference ranges have not been established in urine albumin. The results should be integrated into the clinical context for interpretation.    Albumin Urine mg/g Cr 01/10/2025 208.19 (H)  0.00 -  25.00 mg/g Cr Final    Microalbuminuria is defined as an albumin:creatinine ratio of 17 to 299 for males and 25 to 299 for females. A ratio of albumin:creatinine of 300 or higher is indicative of overt proteinuria.  Due to biologic variability, positive results should be confirmed by a second, first-morning random or 24-hour timed urine specimen. If there is discrepancy, a third specimen is recommended. When 2 out of 3 results are in the microalbuminuria range, this is evidence for incipient nephropathy and warrants increased efforts at glucose control, blood pressure control, and institution of therapy with an angiotensin-converting-enzyme (ACE) inhibitor (if the patient can tolerate it).      Cholesterol 01/10/2025 148  <200 mg/dL Final    Triglycerides 01/10/2025 140  <150 mg/dL Final    Direct Measure HDL 01/10/2025 41 (L)  >=50 mg/dL Final    LDL Cholesterol Calculated 01/10/2025 79  <100 mg/dL Final    Non HDL Cholesterol 01/10/2025 107  <130 mg/dL Final    Patient Fasting > 8hrs? 01/10/2025 Yes   Final    WBC Count 01/10/2025 5.7  4.0 - 11.0 10e3/uL Final    RBC Count 01/10/2025 5.34 (H)  3.80 - 5.20 10e6/uL Final    Hemoglobin 01/10/2025 14.9  11.7 - 15.7 g/dL Final    Hematocrit 01/10/2025 46.2  35.0 - 47.0 % Final    MCV 01/10/2025 87  78 - 100 fL Final    MCH 01/10/2025 27.9  26.5 - 33.0 pg Final    MCHC 01/10/2025 32.3  31.5 - 36.5 g/dL Final    RDW 01/10/2025 14.3  10.0 - 15.0 % Final    Platelet Count 01/10/2025 194  150 - 450 10e3/uL Final    Estimated Average Glucose 01/10/2025 146 (H)  <117 mg/dL Final    Hemoglobin A1C 01/10/2025 6.7 (H)  0.0 - 5.6 % Final    Normal <5.7%   Prediabetes 5.7-6.4%    Diabetes 6.5% or higher     Note: Adopted from ADA consensus guidelines.           Phone call duration: 12 minutes  Signed Electronically by: Gurinder Ho MD

## 2025-01-22 ENCOUNTER — OFFICE VISIT (OUTPATIENT)
Dept: FAMILY MEDICINE | Facility: CLINIC | Age: 80
End: 2025-01-22
Payer: COMMERCIAL

## 2025-01-22 VITALS
RESPIRATION RATE: 16 BRPM | HEIGHT: 58 IN | HEART RATE: 71 BPM | TEMPERATURE: 98.7 F | OXYGEN SATURATION: 95 % | SYSTOLIC BLOOD PRESSURE: 138 MMHG | WEIGHT: 185 LBS | DIASTOLIC BLOOD PRESSURE: 88 MMHG | BODY MASS INDEX: 38.83 KG/M2

## 2025-01-22 DIAGNOSIS — N18.31 STAGE 3A CHRONIC KIDNEY DISEASE (H): ICD-10-CM

## 2025-01-22 DIAGNOSIS — I10 HYPERTENSION GOAL BP (BLOOD PRESSURE) < 140/90: ICD-10-CM

## 2025-01-22 DIAGNOSIS — G47.33 OSA (OBSTRUCTIVE SLEEP APNEA): ICD-10-CM

## 2025-01-22 DIAGNOSIS — Z00.00 ENCOUNTER FOR MEDICARE ANNUAL WELLNESS EXAM: Primary | ICD-10-CM

## 2025-01-22 DIAGNOSIS — L97.511 DIABETIC ULCER OF TOE OF RIGHT FOOT ASSOCIATED WITH TYPE 2 DIABETES MELLITUS, LIMITED TO BREAKDOWN OF SKIN (H): ICD-10-CM

## 2025-01-22 DIAGNOSIS — F33.1 MODERATE EPISODE OF RECURRENT MAJOR DEPRESSIVE DISORDER (H): ICD-10-CM

## 2025-01-22 DIAGNOSIS — I48.0 PAROXYSMAL ATRIAL FIBRILLATION (H): ICD-10-CM

## 2025-01-22 DIAGNOSIS — R45.1 AGITATION: ICD-10-CM

## 2025-01-22 DIAGNOSIS — L97.411 DIABETIC ULCER OF RIGHT HEEL ASSOCIATED WITH TYPE 2 DIABETES MELLITUS, LIMITED TO BREAKDOWN OF SKIN (H): ICD-10-CM

## 2025-01-22 DIAGNOSIS — E66.01 MORBID OBESITY (H): ICD-10-CM

## 2025-01-22 DIAGNOSIS — Z23 NEED FOR INFLUENZA VACCINATION: ICD-10-CM

## 2025-01-22 DIAGNOSIS — Z23 NEED FOR COVID-19 VACCINE: ICD-10-CM

## 2025-01-22 DIAGNOSIS — G31.83 LEWY BODY DEMENTIA, UNSPECIFIED DEMENTIA SEVERITY, UNSPECIFIED WHETHER BEHAVIORAL, PSYCHOTIC, OR MOOD DISTURBANCE OR ANXIETY (H): ICD-10-CM

## 2025-01-22 DIAGNOSIS — E11.621 DIABETIC ULCER OF TOE OF RIGHT FOOT ASSOCIATED WITH TYPE 2 DIABETES MELLITUS, LIMITED TO BREAKDOWN OF SKIN (H): ICD-10-CM

## 2025-01-22 DIAGNOSIS — M48.062 SPINAL STENOSIS OF LUMBAR REGION WITH NEUROGENIC CLAUDICATION: ICD-10-CM

## 2025-01-22 DIAGNOSIS — F02.80 LEWY BODY DEMENTIA, UNSPECIFIED DEMENTIA SEVERITY, UNSPECIFIED WHETHER BEHAVIORAL, PSYCHOTIC, OR MOOD DISTURBANCE OR ANXIETY (H): ICD-10-CM

## 2025-01-22 DIAGNOSIS — E78.5 HYPERLIPIDEMIA WITH TARGET LDL LESS THAN 100: ICD-10-CM

## 2025-01-22 DIAGNOSIS — F32.A DEPRESSION, UNSPECIFIED DEPRESSION TYPE: ICD-10-CM

## 2025-01-22 DIAGNOSIS — Z29.11 NEED FOR VACCINATION AGAINST RESPIRATORY SYNCYTIAL VIRUS: ICD-10-CM

## 2025-01-22 DIAGNOSIS — Z79.01 LONG TERM (CURRENT) USE OF ANTICOAGULANTS: ICD-10-CM

## 2025-01-22 DIAGNOSIS — I50.9 HEART FAILURE, UNSPECIFIED HF CHRONICITY, UNSPECIFIED HEART FAILURE TYPE (H): ICD-10-CM

## 2025-01-22 DIAGNOSIS — E11.21 TYPE 2 DIABETES MELLITUS WITH DIABETIC NEPHROPATHY, WITHOUT LONG-TERM CURRENT USE OF INSULIN (H): ICD-10-CM

## 2025-01-22 DIAGNOSIS — E11.621 DIABETIC ULCER OF RIGHT HEEL ASSOCIATED WITH TYPE 2 DIABETES MELLITUS, LIMITED TO BREAKDOWN OF SKIN (H): ICD-10-CM

## 2025-01-22 DIAGNOSIS — K21.9 GASTROESOPHAGEAL REFLUX DISEASE WITHOUT ESOPHAGITIS: ICD-10-CM

## 2025-01-22 PROBLEM — F33.0 MILD RECURRENT MAJOR DEPRESSION: Status: RESOLVED | Noted: 2022-06-07 | Resolved: 2025-01-22

## 2025-01-22 PROBLEM — R41.3 MEMORY PROBLEM: Status: RESOLVED | Noted: 2022-10-12 | Resolved: 2025-01-22

## 2025-01-22 PROCEDURE — G0439 PPPS, SUBSEQ VISIT: HCPCS | Mod: 25 | Performed by: FAMILY MEDICINE

## 2025-01-22 PROCEDURE — 91320 SARSCV2 VAC 30MCG TRS-SUC IM: CPT | Performed by: FAMILY MEDICINE

## 2025-01-22 PROCEDURE — G0008 ADMIN INFLUENZA VIRUS VAC: HCPCS | Performed by: FAMILY MEDICINE

## 2025-01-22 PROCEDURE — 90662 IIV NO PRSV INCREASED AG IM: CPT | Performed by: FAMILY MEDICINE

## 2025-01-22 PROCEDURE — 90480 ADMN SARSCOV2 VAC 1/ONLY CMP: CPT | Performed by: FAMILY MEDICINE

## 2025-01-22 PROCEDURE — G2211 COMPLEX E/M VISIT ADD ON: HCPCS | Performed by: FAMILY MEDICINE

## 2025-01-22 PROCEDURE — 99213 OFFICE O/P EST LOW 20 MIN: CPT | Mod: 25 | Performed by: FAMILY MEDICINE

## 2025-01-22 RX ORDER — QUETIAPINE FUMARATE 25 MG/1
TABLET, FILM COATED ORAL
Qty: 180 TABLET | Refills: 3 | Status: SHIPPED | OUTPATIENT
Start: 2025-01-22

## 2025-01-22 RX ORDER — SERTRALINE HYDROCHLORIDE 25 MG/1
25 TABLET, FILM COATED ORAL DAILY
Qty: 90 TABLET | Refills: 3 | Status: SHIPPED | OUTPATIENT
Start: 2025-01-22

## 2025-01-22 RX ORDER — ISOSORBIDE MONONITRATE 30 MG/1
TABLET, EXTENDED RELEASE ORAL
Qty: 90 TABLET | Refills: 3 | Status: SHIPPED | OUTPATIENT
Start: 2025-01-22

## 2025-01-22 RX ORDER — CARVEDILOL 6.25 MG/1
6.25 TABLET ORAL 2 TIMES DAILY
Qty: 180 TABLET | Refills: 3 | Status: SHIPPED | OUTPATIENT
Start: 2025-01-22

## 2025-01-22 RX ORDER — PANTOPRAZOLE SODIUM 40 MG/1
40 TABLET, DELAYED RELEASE ORAL DAILY
Qty: 90 TABLET | Refills: 3 | Status: SHIPPED | OUTPATIENT
Start: 2025-01-22

## 2025-01-22 RX ORDER — SIMVASTATIN 10 MG
TABLET ORAL
Qty: 90 TABLET | Refills: 3 | Status: SHIPPED | OUTPATIENT
Start: 2025-01-22

## 2025-01-22 RX ORDER — PREGABALIN 150 MG/1
150 CAPSULE ORAL 2 TIMES DAILY
Qty: 180 CAPSULE | Refills: 1 | Status: SHIPPED | OUTPATIENT
Start: 2025-01-22

## 2025-01-22 RX ORDER — WARFARIN SODIUM 2 MG/1
TABLET ORAL
Qty: 115 TABLET | Refills: 0 | Status: SHIPPED | OUTPATIENT
Start: 2025-01-22

## 2025-01-22 ASSESSMENT — ACTIVITIES OF DAILY LIVING (ADL): CURRENT_FUNCTION: NEEDS ASSISTANCE

## 2025-01-22 NOTE — PROGRESS NOTES
Assessment & Plan       ICD-10-CM    1. Encounter for Medicare annual wellness exam  Z00.00       2. Type 2 diabetes mellitus with diabetic nephropathy, without long-term current use of insulin (H)  E11.21 metFORMIN (GLUCOPHAGE) 1000 MG tablet      3. Hypertension goal BP (blood pressure) < 140/90  I10 carvedilol (COREG) 6.25 MG tablet     isosorbide mononitrate (IMDUR) 30 MG 24 hr tablet      4. Paroxysmal atrial fibrillation (H)  I48.0 carvedilol (COREG) 6.25 MG tablet     warfarin ANTICOAGULANT (COUMADIN) 2 MG tablet      5. Gastroesophageal reflux disease without esophagitis  K21.9 pantoprazole (PROTONIX) 40 MG EC tablet      6. Spinal stenosis of lumbar region with neurogenic claudication  M48.062 pregabalin (LYRICA) 150 MG capsule      7. Agitation  R45.1 QUEtiapine (SEROQUEL) 25 MG tablet      8. Depression, unspecified depression type  F32.A sertraline (ZOLOFT) 25 MG tablet      9. Hyperlipidemia with target LDL less than 100  E78.5 simvastatin (ZOCOR) 10 MG tablet      10. Long term (current) use of anticoagulants  Z79.01 warfarin ANTICOAGULANT (COUMADIN) 2 MG tablet      11. Need for vaccination against respiratory syncytial virus  Z29.11 RSV vaccine, bivalent, ABRYSVO, injection      12. Need for COVID-19 vaccine  Z23 COVID-19 12+ (PFIZER)      13. Need for influenza vaccination  Z23 INFLUENZA HIGH DOSE, TRIVALENT, PF (FLUZONE)      14. Lewy body dementia, unspecified dementia severity, unspecified whether behavioral, psychotic, or mood disturbance or anxiety (H)  G31.83     F02.80       15. Moderate episode of recurrent major depressive disorder (H)  F33.1       16. Heart failure, unspecified HF chronicity, unspecified heart failure type (H)  I50.9       17. Stage 3a chronic kidney disease (H)  N18.31       18. Morbid obesity (H)  E66.01       19. TACHO (obstructive sleep apnea)  G47.33       20. Diabetic ulcer of right heel associated with type 2 diabetes mellitus, limited to breakdown of skin (H)   "E11.621 Orthopedic  Referral    L97.411       21. Diabetic ulcer of toe of right foot associated with type 2 diabetes mellitus, limited to breakdown of skin (H)  E11.621 Orthopedic  Referral    L97.511         Her laboratory tests look reasonable  She has numerous underlying health conditions as noted above  I suggested we have her see podiatry for further diabetic footcare and the patient and her mother were open to that, so referral was made for that  We will continue her same medications for now  We will give her a flu shot and COVID-vaccine  I advised her to get an RSV vaccine from her local pharmacy for insurance reasons and I sent a prescription to her pharmacy for that  We will continue to monitor and treat the other conditions listed above and I advised a recheck in about 6 months, or sooner prn    The longitudinal plan of care for the diagnosis(es)/condition(s) as documented were addressed during this visit. Due to the added complexity in care, I will continue to support Gem in the subsequent management and with ongoing continuity of care.      BMI  Estimated body mass index is 38.67 kg/m  as calculated from the following:    Height as of this encounter: 1.473 m (4' 10\").    Weight as of this encounter: 83.9 kg (185 lb).   Weight management plan: Discussed healthy diet and exercise guidelines        Marcy Rabago is a 79 year old, presenting for the following health issues:  Foot Pain (Right foot), Physical, and Abdominal Pain        1/22/2025     2:38 PM   Additional Questions   Roomed by Beryl TODD CMA   Accompanied by Daughter         1/22/2025     2:38 PM   Patient Reported Additional Medications   Patient reports taking the following new medications None     HPI     She has multiple underlying health problems as per her chart and listed below.  I had a telephone visit with her, actually her daughter, last week.  The patient has a sore on her right second toe.  Her daughter " states that it looked bruised yesterday, but today it looks better.  She also has a sore on her outer right heel which has been present for a number of months.  She does have underlying diabetes.  Her most recent A1c from a couple of weeks ago looked good at 6.7.  She is no longer anemic.  She does have CKD stage IIIa.  She has dementia and a history of depression.  She is on medications for that.  She is on other medications as below.    She has had some intermittent abdominal pain, but mainly just when sitting or leaning forward.  If she stands up, it goes away.      Annual Wellness Visit     Patient has been advised of split billing requirements and indicates understanding: Yes          Health Care Directive  Patient does not have a Health Care Directive: Discussed advance care planning with patient; however, patient declined at this time.  In general, how would you rate your overall physical health? (!) FAIR   Discussed with patient their rating of physical health; information has been provided.   Do you have a special diet?  Regular (no restrictions)        1/22/2025   Exercise, Social Connection, Stress   Days per week of moderate/strenous exercise 0 days   Average minutes spent exercising at this level 0 min   Frequency of gathering with friends or relatives Never   Feel stress (tense, anxious, or unable to sleep) Very much     Do you see a dentist two times every year?  (!) NO  The patient was instructed to see the dentist every 6 months.   Have you been more tired than usual lately?  No  If you drink alcohol do you typically have >3 drinks per day or >7 drinks per week? No  Do you have a current opioid prescription? No  Do you use any other controlled substances or medications that are not prescribed by a provider? None  Social History     Tobacco Use    Smoking status: Never     Passive exposure: Never    Smokeless tobacco: Never   Vaping Use    Vaping status: Never Used   Substance Use Topics    Alcohol  use: No    Drug use: No       Needs assistance for the following daily activities: (!) TELEPHONE USE, (!) TRANSPORTATION, and (!) SHOPPING  Which of the following safety concerns are present in your home?  none identified   Do you (or your family members) have any concerns about your safety while driving?  No  Do you have any of the following hearing concerns?: (!) I FEEL THAT PEOPLE ARE MUMBLING OR NOT SPEAKING CLEARLY, (!) I NEED TO ASK PEOPLE TO SPEAK UP OR REPEAT THEMSELVES, (!) IT'S HARD TO FOLLOW A CONVERSATION IN A NOISY RESTAURANT OR CROWDED ROOM, and (!) TROUBLE UNDERSTANDING SOFT OR WHISPERED SPEECH  In the past 6 months, have you been bothered by leaking of urine? No        9/20/2023   Social Factors   Worry food won't last until get money to buy more Patient refused   Food not last or not have enough money for food? Patient refused   Do you have housing? (Housing is defined as stable permanent housing and does not include staying ouside in a car, in a tent, in an abandoned building, in an overnight shelter, or couch-surfing.) Patient refused   Are you worried about losing your housing? Patient refused   Lack of transportation? Patient refused   Unable to get utilities (heat,electricity)? Patient refused          1/22/2025   Fall Risk   Fallen 2 or more times in the past year? No   Trouble with walking or balance? Yes   Gait Speed Test (Document in seconds) 8.13   Gait Speed Test Interpretation Greater than 5.01 seconds - ABNORMAL        Today's PHQ-9 Score:       1/22/2025     2:29 PM   PHQ-9 SCORE   PHQ-9 Total Score MyChart 15 (Moderately severe depression)   PHQ-9 Total Score 15        Patient-reported        Mammogram Screening - After age 74- determine frequency with patient based on health status, life expectancy and patient goals    ASCVD Risk   The 10-year ASCVD risk score (Charley CASTRO, et al., 2019) is: 57.1%    Values used to calculate the score:      Age: 79 years      Sex: Female       Is Non- : No      Diabetic: Yes      Tobacco smoker: No      Systolic Blood Pressure: 149 mmHg      Is BP treated: Yes      HDL Cholesterol: 41 mg/dL      Total Cholesterol: 148 mg/dL          Reviewed and updated as needed this visit by Provider                    Patient Active Problem List   Diagnosis    Status Post Total Knee Replacement - bilateral    TACHO (obstructive sleep apnea)    Hyperlipidemia with target LDL less than 70    Hypertension goal BP (blood pressure) < 140/90    Morbid obesity (H)    Achalasia    Chronic low back pain    Gout    Paroxysmal atrial fibrillation (H)    Long term (current) use of anticoagulants    Anxiety    Spinal stenosis of lumbar region with neurogenic claudication    Glenohumeral arthritis, left    Type 2 diabetes mellitus with diabetic nephropathy, without long-term current use of insulin (H)    Stage 3a chronic kidney disease (H)    Anemia, iron deficiency    Falls frequently    Lewy body dementia, unspecified dementia severity, unspecified whether behavioral, psychotic, or mood disturbance or anxiety (H)    Heart failure, unspecified HF chronicity, unspecified heart failure type (H)    Moderate dementia with mood disturbance, unspecified dementia type (H)    Moderate episode of recurrent major depressive disorder (H)     Past Surgical History:   Procedure Laterality Date    AS ESOPHAGOSCOPY, DIAGNOSTIC  07/2019    CARPAL TUNNEL RELEASE RT/LT      CATHETER, ABLATION  2017    for PAF    COLONOSCOPY  07/2019    HC LIG/DIV/EXCIS VARICOSE VEIN      TONSILLECTOMY      Z HAND/FINGER SURGERY UNLISTED      Z REMOVAL OF KIDNEY STONE      Z TOTAL KNEE ARTHROPLASTY  07/21/2010    left    ZC TOTAL KNEE ARTHROPLASTY  12/12/2012    Right       Social History     Tobacco Use    Smoking status: Never     Passive exposure: Never    Smokeless tobacco: Never   Substance Use Topics    Alcohol use: No     Family History   Problem Relation Age of Onset    Glaucoma  No family hx of     Macular Degeneration No family hx of          Current Outpatient Medications   Medication Sig Dispense Refill    acetaminophen (TYLENOL) 500 MG tablet Take 500-1,000 mg by mouth every 6 hours as needed for mild pain      blood glucose (NO BRAND SPECIFIED) test strip Use to test blood sugar one time daily or as directed. To accompany: Blood Glucose Monitor Brands: per insurance. 100 strip 6    carvedilol (COREG) 6.25 MG tablet Take 1 tablet (6.25 mg) by mouth 2 times daily. 180 tablet 3    isosorbide mononitrate (IMDUR) 30 MG 24 hr tablet TAKE 1 TABLET BY MOUTH ONCE DAILY 90 tablet 3    metFORMIN (GLUCOPHAGE) 1000 MG tablet Take 1 tablet (1,000 mg) by mouth 2 times daily (with meals). 180 tablet 3    pantoprazole (PROTONIX) 40 MG EC tablet Take 1 tablet (40 mg) by mouth daily. 90 tablet 3    pregabalin (LYRICA) 150 MG capsule Take 1 capsule (150 mg) by mouth 2 times daily. 180 capsule 1    QUEtiapine (SEROQUEL) 25 MG tablet Tale 1 tablet in am and 1 tablet at bedtime 180 tablet 3    RSV vaccine, bivalent, ABRYSVO, injection Inject 0.5 mLs into the muscle once for 1 dose. Pharmacist administered 0.5 mL 0    sertraline (ZOLOFT) 25 MG tablet Take 1 tablet (25 mg) by mouth daily. 90 tablet 3    simvastatin (ZOCOR) 10 MG tablet TAKE 1 TABLET BY MOUTH ONCE DAILY AT BEDTIME 90 tablet 3    warfarin ANTICOAGULANT (COUMADIN) 2 MG tablet Take 3 mg every Tue, Thu, Sat; 2 mg all other days or As directed by Anticoagulation clinic 115 tablet 0    Ferrous Gluconate 240 (27 Fe) MG TABS Take 1 tablet by mouth 2 times daily (with meals) For restless legs (Patient not taking: Reported on 1/22/2025) 100 tablet 11     Allergies   Allergen Reactions    Gabapentin      Rash and itching    Nsaids      Other reaction(s): Gastrointestinal  Former PPI user, EGD 2/2018 showed LA Grade D esophagitis, plus duodenitis and gastritis.  Famotidine on med list but may not have been taking - rx was 3 years old.     Allopurinol Rash      Itchy red rash on feet (stopped immediately - failed to come to clinic for exam).   Itchy red rash on feet (stopped immediately - failed to come to clinic for exam).       Other Environmental Allergy Rash     RASH ALL OVER FOUND IN DR. RAMIREZ DICTATION OF 10/11/11    Sulfa Antibiotics Other (See Comments) and Rash     Per 11-4-13 H&P by Dr. Fei Arias.  RASH ALL OVER FOUND IN DR. RAMIREZ DICTATION OF 10/11/11  BACTRIM-  RASH ALL OVER  Per 11-4-13 H&P by Dr. Fei Arias.  RASH ALL OVER FOUND IN DR. RAMIREZ DICTATION OF 10/11/11  Per 11-4-13 H&P by Dr. Fei Arias.  RASH ALL OVER FOUND IN DR. RAMIREZ DICTATION OF 10/11/11  BACTRIM-  RASH ALL OVER      Trimethoprim Rash       Current providers sharing in care for this patient include:  Patient Care Team:  Gurinder Ho MD as PCP - General (Family Practice)  Murphy Murray DO as Assigned PCP  Joanne Andersen RPH as Pharmacist (Pharmacist)  Joanne Andersen RPH as Assigned [place] Pharmacist  Connor Gonzalez OD as MD (Optometrist)  Connor Gonzalez OD as Assigned Surgical Provider    The following health maintenance items are reviewed in Epic and correct as of today:  Health Maintenance   Topic Date Due    DEXA  Never done    HF ACTION PLAN  Never done    ZOSTER IMMUNIZATION (1 of 2) Never done    RSV VACCINE (1 - 1-dose 75+ series) Never done    MEDICARE ANNUAL WELLNESS VISIT  11/14/2023    DIABETIC FOOT EXAM  11/14/2023    INFLUENZA VACCINE (1) 09/01/2024    COVID-19 Vaccine (4 - 2024-25 season) 09/01/2024    A1C  04/10/2025    ALT  04/17/2025    BMP  07/10/2025    EYE EXAM  10/04/2025    LIPID  01/10/2026    MICROALBUMIN  01/10/2026    CBC  01/10/2026    HEMOGLOBIN  01/10/2026    ANNUAL REVIEW OF HM ORDERS  01/15/2026    FALL RISK ASSESSMENT  01/22/2026    PHQ-9  01/22/2026    ADVANCE CARE PLANNING  11/14/2027    DTAP/TDAP/TD IMMUNIZATION (3 - Td or Tdap) 11/16/2030    TSH W/FREE T4 REFLEX  Completed    HEPATITIS C SCREENING   "Completed    DEPRESSION ACTION PLAN  Completed    Pneumococcal Vaccine: 50+ Years  Completed    URINALYSIS  Completed    HPV IMMUNIZATION  Aged Out    MENINGITIS IMMUNIZATION  Aged Out    RSV MONOCLONAL ANTIBODY  Aged Out    MAMMO SCREENING  Discontinued    COLORECTAL CANCER SCREENING  Discontinued       Appropriate preventive services were discussed with this patient, including applicable screening as appropriate for fall prevention, nutrition, physical activity, Tobacco-use cessation, weight loss and cognition.  Checklist reviewing preventive services available has been given to the patient.           1/22/2025   Mini Cog   Clock Draw Score 2 Normal   3 Item Recall 0 objects recalled   Mini Cog Total Score 2            Pain on the bottom of right foot.  2 toe on left foot red.  Abdominal discomfort on left side. When she is sitting has a lot of pain and when she gets up the pain is better.  CT results from head.      Review of Systems  Mainly significant for the above items.      Objective    /88   Pulse 71   Temp 98.7  F (37.1  C) (Oral)   Resp 16   Ht 1.473 m (4' 10\")   Wt 83.9 kg (185 lb)   SpO2 95%   BMI 38.67 kg/m    Body mass index is 38.67 kg/m .  Physical Exam   GENERAL: alert and no distress  EYES: Eyes grossly normal to inspection, PERRL and conjunctivae and sclerae normal  HENT: Grossly normal  NECK: no adenopathy, no asymmetry, masses, or scars  RESP: lungs clear to auscultation - no rales, rhonchi or wheezes  CV: regular rate and rhythm, normal S1 S2, no S3 or S4, no murmur, click or rub, trace right lower extremity peripheral edema, no left leg edema.  She does have some varicose veins bilaterally.  ABDOMEN: soft, nontender, no hepatosplenomegaly, no masses   EXT: She has a superficial ulceration over the lateral aspect of the right second toe and also over the lateral right heel.  No obvious infection present.  There is some surrounding callus over the right heel ulcer.  SKIN: no " suspicious lesions or rashes, but she has the 2 foot ulcerations as noted above  NEURO: Mildly unsteady on feet.  Some mild cognitive issues with memory.  Daughter helps with her history.     PSYCH: affect normal to somewhat flat    Lab on 01/10/2025   Component Date Value Ref Range Status    Sodium 01/10/2025 143  135 - 145 mmol/L Final    Potassium 01/10/2025 4.3  3.4 - 5.3 mmol/L Final    Chloride 01/10/2025 109 (H)  98 - 107 mmol/L Final    Carbon Dioxide (CO2) 01/10/2025 23  22 - 29 mmol/L Final    Anion Gap 01/10/2025 11  7 - 15 mmol/L Final    Urea Nitrogen 01/10/2025 20.5  8.0 - 23.0 mg/dL Final    Creatinine 01/10/2025 0.99 (H)  0.51 - 0.95 mg/dL Final    GFR Estimate 01/10/2025 58 (L)  >60 mL/min/1.73m2 Final    eGFR calculated using 2021 CKD-EPI equation.    Calcium 01/10/2025 9.1  8.8 - 10.4 mg/dL Final    Reference intervals for this test were updated on 7/16/2024 to reflect our healthy population more accurately. There may be differences in the flagging of prior results with similar values performed with this method. Those prior results can be interpreted in the context of the updated reference intervals.    Glucose 01/10/2025 125 (H)  70 - 99 mg/dL Final    Patient Fasting > 8hrs? 01/10/2025 Yes   Final    Creatinine Urine mg/dL 01/10/2025 58.6  mg/dL Final    The reference ranges have not been established in urine creatinine. The results should be integrated into the clinical context for interpretation.    Albumin Urine mg/L 01/10/2025 122.0  mg/L Final    The reference ranges have not been established in urine albumin. The results should be integrated into the clinical context for interpretation.    Albumin Urine mg/g Cr 01/10/2025 208.19 (H)  0.00 - 25.00 mg/g Cr Final    Microalbuminuria is defined as an albumin:creatinine ratio of 17 to 299 for males and 25 to 299 for females. A ratio of albumin:creatinine of 300 or higher is indicative of overt proteinuria.  Due to biologic variability, positive  results should be confirmed by a second, first-morning random or 24-hour timed urine specimen. If there is discrepancy, a third specimen is recommended. When 2 out of 3 results are in the microalbuminuria range, this is evidence for incipient nephropathy and warrants increased efforts at glucose control, blood pressure control, and institution of therapy with an angiotensin-converting-enzyme (ACE) inhibitor (if the patient can tolerate it).      Cholesterol 01/10/2025 148  <200 mg/dL Final    Triglycerides 01/10/2025 140  <150 mg/dL Final    Direct Measure HDL 01/10/2025 41 (L)  >=50 mg/dL Final    LDL Cholesterol Calculated 01/10/2025 79  <100 mg/dL Final    Non HDL Cholesterol 01/10/2025 107  <130 mg/dL Final    Patient Fasting > 8hrs? 01/10/2025 Yes   Final    WBC Count 01/10/2025 5.7  4.0 - 11.0 10e3/uL Final    RBC Count 01/10/2025 5.34 (H)  3.80 - 5.20 10e6/uL Final    Hemoglobin 01/10/2025 14.9  11.7 - 15.7 g/dL Final    Hematocrit 01/10/2025 46.2  35.0 - 47.0 % Final    MCV 01/10/2025 87  78 - 100 fL Final    MCH 01/10/2025 27.9  26.5 - 33.0 pg Final    MCHC 01/10/2025 32.3  31.5 - 36.5 g/dL Final    RDW 01/10/2025 14.3  10.0 - 15.0 % Final    Platelet Count 01/10/2025 194  150 - 450 10e3/uL Final    Estimated Average Glucose 01/10/2025 146 (H)  <117 mg/dL Final    Hemoglobin A1C 01/10/2025 6.7 (H)  0.0 - 5.6 % Final    Normal <5.7%   Prediabetes 5.7-6.4%    Diabetes 6.5% or higher     Note: Adopted from ADA consensus guidelines.             Signed Electronically by: Gurinder Ho MD    Answers submitted by the patient for this visit:  Patient Health Questionnaire (Submitted on 1/22/2025)  If you checked off any problems, how difficult have these problems made it for you to do your work, take care of things at home, or get along with other people?: Somewhat difficult  PHQ9 TOTAL SCORE: 15

## 2025-01-23 ENCOUNTER — TELEPHONE (OUTPATIENT)
Dept: ANTICOAGULATION | Facility: CLINIC | Age: 80
End: 2025-01-23
Payer: COMMERCIAL

## 2025-01-23 NOTE — TELEPHONE ENCOUNTER
ANTICOAGULATION     Selvin Rockwell is overdue for an INR check.     Left message for patient to call and schedule lab appointment as soon as possible. If returning call, please schedule.     Portia Coronado RN  1/23/2025  Anticoagulation Clinic  [facility] for routing messages: renee PEÑA  [facility] patient phone line: [phone]

## 2025-01-24 ENCOUNTER — ANTICOAGULATION THERAPY VISIT (OUTPATIENT)
Dept: ANTICOAGULATION | Facility: CLINIC | Age: 80
End: 2025-01-24

## 2025-01-24 DIAGNOSIS — I48.0 PAROXYSMAL ATRIAL FIBRILLATION (H): Primary | ICD-10-CM

## 2025-01-24 DIAGNOSIS — Z79.01 LONG TERM (CURRENT) USE OF ANTICOAGULANTS: ICD-10-CM

## 2025-01-27 NOTE — PROGRESS NOTES
ANTICOAGULATION MANAGEMENT     Selvin Rockwell 79 year old female is on warfarin with subtherapeutic INR result. (Goal INR 2.0-3.0)    Recent labs: (last 7 days)     01/24/25  1415   INR 1.9*       ASSESSMENT     Source(s): Chart Review and Patient/Caregiver Call     Warfarin doses taken: Warfarin taken as instructed  Diet: No new diet changes identified  Medication/supplement changes: None noted  New illness, injury, or hospitalization: No  Signs or symptoms of bleeding or clotting: No  Previous result: Subtherapeutic  Additional findings: None     PLAN     Recommended plan for no diet, medication or health factor changes affecting INR     Dosing Instructions: Increase your warfarin dose (5.9% change) with next INR in 2 weeks       Summary  As of 1/24/2025      Full warfarin instructions:  2 mg every Mon, Wed, Fri; 3 mg all other days   Next INR check:  2/7/2025               Telephone call with Alicia who verbalizes understanding and agrees to plan    Lab visit scheduled    Education provided: Please call back if any changes to your diet, medications or how you've been taking warfarin  Symptom monitoring: monitoring for clotting signs and symptoms, monitoring for stroke signs and symptoms, and when to seek medical attention/emergency care    Plan made per [facility] anticoagulation protocol    Portia Coronado RN  1/27/2025  Anticoagulation Clinic  Digital Message [employer] for routing messages: renee PEÑA  [facility] patient phone line: [phone]        _______________________________________________________________________     Anticoagulation Episode Summary       Current INR goal:  2.0-3.0   TTR:  51.1% (1 y)   Target end date:  Indefinite   Send INR reminders to:  JERRY PEÑA    Indications    Paroxysmal atrial fibrillation (H) [I48.0]  Long term (current) use of anticoagulants [Z79.01]             Comments:  --             Anticoagulation Care Providers       Provider Role Specialty Phone number    Gurinder Ho MD  [facility] [phone]

## 2025-01-28 ENCOUNTER — OFFICE VISIT (OUTPATIENT)
Dept: PODIATRY | Facility: CLINIC | Age: 80
End: 2025-01-28
Payer: COMMERCIAL

## 2025-01-28 DIAGNOSIS — L97.511 DIABETIC ULCER OF TOE OF RIGHT FOOT ASSOCIATED WITH TYPE 2 DIABETES MELLITUS, LIMITED TO BREAKDOWN OF SKIN (H): ICD-10-CM

## 2025-01-28 DIAGNOSIS — L03.115 CELLULITIS OF FOOT, RIGHT: ICD-10-CM

## 2025-01-28 DIAGNOSIS — E11.621 DIABETIC ULCER OF RIGHT HEEL ASSOCIATED WITH TYPE 2 DIABETES MELLITUS, LIMITED TO BREAKDOWN OF SKIN (H): Primary | ICD-10-CM

## 2025-01-28 DIAGNOSIS — L97.411 DIABETIC ULCER OF RIGHT HEEL ASSOCIATED WITH TYPE 2 DIABETES MELLITUS, LIMITED TO BREAKDOWN OF SKIN (H): Primary | ICD-10-CM

## 2025-01-28 DIAGNOSIS — E11.621 DIABETIC ULCER OF TOE OF RIGHT FOOT ASSOCIATED WITH TYPE 2 DIABETES MELLITUS, LIMITED TO BREAKDOWN OF SKIN (H): ICD-10-CM

## 2025-01-28 PROCEDURE — 99214 OFFICE O/P EST MOD 30 MIN: CPT | Performed by: PODIATRIST

## 2025-01-28 NOTE — LETTER
1/28/2025      Selvin Rockwell  [address] [facility] [address]      Dear Colleague,    Thank you for referring your patient, Selvin Rockwell, to the [facility]. Please see a copy of my visit note below.    Subjective:    1/24/25   Pt is seen today in consult from Dr. Ho with the c/c of right foot ulceration.  Has noticed a wound on right second toe.  Has had this for several days.  She did wrap a Band-Aid all around this toe.  There is now blistering around it.  They have noticed erythema.  They deny purulence or odor.  She denies fever or chills.  Also has wound on plantar lateral right heel.  Also noticed recently.  She cannot remember any history of trauma.  Cannot remember thermal injury.  She has not gotten this cold.  She denies putting a heating pad on this.  They cannot remember any constant pressure on this.  She is wearing a slip on shoe.  She also has a scab on the lateral portion of her right heel.  This is dry.  They believe it is healing.  These wounds are painful.  She does have diabetes.  States she has some numbness in her feet.  Patient is anticoagulated.    1/28/25 patient returns for right foot ulcers.  States generally foot feeling better.  Has been taking Keflex.  She is wondering about a smaller shoe.  Denies any new erythema edema or drainage.       ROS:  see above         Allergies   Allergen Reactions     Gabapentin      Rash and itching     Nsaids      Other reaction(s): Gastrointestinal  Former PPI user, EGD 2/2018 showed LA Grade D esophagitis, plus duodenitis and gastritis.  Famotidine on med list but may not have been taking - rx was 3 years old.      Allopurinol Rash     Itchy red rash on feet (stopped immediately - failed to come to clinic for exam).   Itchy red rash on feet (stopped immediately - failed to come to clinic for exam).        Other Environmental Allergy Rash     RASH ALL OVER FOUND IN DR. RAMIREZ DICTATION OF 10/11/11      Sulfa Antibiotics Other (See Comments) and Rash     Per 11-4-13 H&P by Dr. Fei Arias.  RASH ALL OVER FOUND IN DR. RAMIREZ DICTATION OF 10/11/11  BACTRIM-  RASH ALL OVER  Per 11-4-13 H&P by Dr. Fei Arias.  RASH ALL OVER FOUND IN DR. RAMIREZ DICTATION OF 10/11/11  Per 11-4-13 H&P by Dr. Fei Arias.  RASH ALL OVER FOUND IN DR. RAMIREZ DICTATION OF 10/11/11  BACTRIM-  RASH ALL OVER       Trimethoprim Rash       Current Outpatient Medications   Medication Sig Dispense Refill     acetaminophen (TYLENOL) 500 MG tablet Take 500-1,000 mg by mouth every 6 hours as needed for mild pain       blood glucose (NO BRAND SPECIFIED) test strip Use to test blood sugar one time daily or as directed. To accompany: Blood Glucose Monitor Brands: per insurance. 100 strip 6     carvedilol (COREG) 6.25 MG tablet Take 1 tablet (6.25 mg) by mouth 2 times daily. 180 tablet 3     cephALEXin (KEFLEX) 500 MG capsule Take 1 capsule (500 mg) by mouth 3 times daily. 30 capsule 0     Ferrous Gluconate 240 (27 Fe) MG TABS Take 1 tablet by mouth 2 times daily (with meals) For restless legs (Patient not taking: Reported on 1/22/2025) 100 tablet 11     isosorbide mononitrate (IMDUR) 30 MG 24 hr tablet TAKE 1 TABLET BY MOUTH ONCE DAILY 90 tablet 3     metFORMIN (GLUCOPHAGE) 1000 MG tablet Take 1 tablet (1,000 mg) by mouth 2 times daily (with meals). 180 tablet 3     pantoprazole (PROTONIX) 40 MG EC tablet Take 1 tablet (40 mg) by mouth daily. 90 tablet 3     pregabalin (LYRICA) 150 MG capsule Take 1 capsule (150 mg) by mouth 2 times daily. 180 capsule 1     QUEtiapine (SEROQUEL) 25 MG tablet Tale 1 tablet in am and 1 tablet at bedtime 180 tablet 3     sertraline (ZOLOFT) 25 MG tablet Take 1 tablet (25 mg) by mouth daily. 90 tablet 3     simvastatin (ZOCOR) 10 MG tablet TAKE 1 TABLET BY MOUTH ONCE DAILY AT BEDTIME 90 tablet 3     warfarin ANTICOAGULANT (COUMADIN) 2 MG tablet Take 3 mg every Tue, Thu, Sat; 2 mg all other days or As directed by  Anticoagulation clinic 115 tablet 0       Patient Active Problem List   Diagnosis     Status Post Total Knee Replacement - bilateral     TACHO (obstructive sleep apnea)     Hyperlipidemia with target LDL less than 70     Hypertension goal BP (blood pressure) < 140/90     Morbid obesity (H)     Achalasia     Chronic low back pain     Gout     Paroxysmal atrial fibrillation (H)     Long term (current) use of anticoagulants     Anxiety     Spinal stenosis of lumbar region with neurogenic claudication     Glenohumeral arthritis, left     Type 2 diabetes mellitus with diabetic nephropathy, without long-term current use of insulin (H)     Stage 3a chronic kidney disease (H)     Anemia, iron deficiency     Falls frequently     Lewy body dementia, unspecified dementia severity, unspecified whether behavioral, psychotic, or mood disturbance or anxiety (H)     Heart failure, unspecified HF chronicity, unspecified heart failure type (H)     Moderate dementia with mood disturbance, unspecified dementia type (H)     Moderate episode of recurrent major depressive disorder (H)       Past Medical History:   Diagnosis Date     Achalasia 05/02/2018     Adrenal adenoma 06/16/2009    Overview:  Stable on FU MR [place] 3/31/09.  Stable on MRI [place] 3/2015, no further FU needed.      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  Xray foot shows overhanging edges pathognomic for gout.  Sx occurred on thiazides     HTN (hypertension) 12/05/2012     Hyperlipidemia with target LDL less than 70      Hypertension goal BP (blood pressure) < 140/90 01/15/2013     Iron deficiency anemia, unspecified iron deficiency anemia type 11/18/2020     Mild recurrent major depression 06/07/2022     Morbid obesity (H) 01/15/2013     OA (OSTEOARTHRITIS) OF KNEE - right  07/13/2010     TACHO (obstructive sleep apnea) 12/07/2012     Paroxysmal atrial fibrillation (H) 07/11/2018    had ablation procedure 2017; nuclear stress test neg for ischemia in 2017     Spinal stenosis of lumbar region with neurogenic claudication 02/17/2020     Stage 3a chronic kidney disease (H) 11/16/2020     Type 2 diabetes mellitus with diabetic nephropathy, without long-term current use of insulin (H) 11/16/2020       Past Surgical History:   Procedure Laterality Date     AS ESOPHAGOSCOPY, DIAGNOSTIC  07/2019     CARPAL TUNNEL RELEASE RT/LT       CATHETER, ABLATION  2017    for PAF     COLONOSCOPY  07/2019     HC LIG/DIV/EXCIS VARICOSE VEIN       TONSILLECTOMY       ZZC HAND/FINGER SURGERY UNLISTED       ZZC REMOVAL OF KIDNEY STONE       Z TOTAL KNEE ARTHROPLASTY  07/21/2010    left     ZZC TOTAL KNEE ARTHROPLASTY  12/12/2012    Right       Family History   Problem Relation Age of Onset     Glaucoma No family hx of      Macular Degeneration No family hx of        Social History     Tobacco Use     Smoking status: Never     Passive exposure: Never     Smokeless tobacco: Never   Substance Use Topics     Alcohol use: No         Exam:    Vitals: There were no vitals taken for this visit.  BMI: There is no height or weight on file to calculate BMI.  Height: Data Unavailable    Constitutional/ general:  Pt is in no apparent distress, appears well-nourished.  Cooperative with history and physical exam.  Seen with daughter today who acts as .    Psych:  The patient answered questions appropriately.  Normal affect.  Seems to have reasonable expectations, in terms of treatment.     Lungs:  Non labored breathing, non labored speech. No cough.  No audible wheezing. Even, quiet breathing.       Vascular:  positive pedal pulses bilaterally for both the DP.  CFT < 3 sec.  positive ankle edema making it difficult to palpate posterior tibial arteries..  negative pedal hair growth.    Neuro:  Alert and oriented x  3. Coordinated gait.  Light touch sensation is intact to the L4, L5, S1 distributions. No obvious deficits.  No evidence of neurological-based weakness, spasticity, or contracture in the lower extremities.  Monofilament intact in all toes.    Derm: thin and shiny    Musculoskeletal:    Lower extremity muscle strength is normal.  Obvious forefoot or rear foot deformities noted.  Normal range of motion of all forefoot joints.    The right second toe dorsal lateral now has a partial-thickness wound that measures 6 x 3 mm.  Plantar distal right second toe there is now a dry leathery eschar that measures 12 x 9 mm.  Edema slightly decreased on the right second toe and erythema persisting.    Right heel lateral has dry eschar.  It is healing around the edges.  No erythema or edema.  It measures 10 x 7 mm.    Right heel plantar lateral there is an ulcer that in the past measured 19 x 15 mm and today measures 21 x 22 mm.  The base is full-thickness and mostly granulation tissue now..  There is no purulence or odor.  There is no erythema edema surrounding this suggestive of infection.                 Component  Ref Range & Units 3 mo ago  (10/4/24) 9 mo ago  (4/17/24) 2 yr ago  (9/13/22) 2 yr ago  (6/7/22) 3 yr ago  (12/23/21) 4 yr ago  (11/18/20) 5 yr ago  (9/20/19)    Estimated Average Glucose  <117 mg/dL 143 High           Hemoglobin A1C  0.0 - 5.6 % 6.6 High  7.7 High  CM 8.0 High  CM 8.2 High  CM 6.7 High  CM 7.0 High  R, CM 6.5 High  R,                Collected 1/24/2025  1:33 PM       Status: Final result       Visible to patient: No (inaccessible in MyChart)       Dx: Cellulitis of foot, right; Diabetic u...    Specimen Information: Foot, Right; Wound   0 Result Notes  Culture 3+ Staphylococcus aureus Abnormal             Gram Stain Result  Abnormal   3+ Gram positive cocci   1+ Gram positive bacilli   1+ WBC seen                Susceptibility     Staphylococcus aureus     VINCENT     Clindamycin 0.25 ug/mL Susceptible      Daptomycin 0.25 ug/mL Susceptible     Doxycycline <=0.5 ug/mL Susceptible     Erythromycin <=0.25 ug/mL Susceptible     Gentamicin <=0.5 ug/mL Susceptible     Oxacillin <=0.25 ug/mL Susceptible 1     Tetracycline <=1 ug/mL Susceptible     Trimethoprim/Sulfamethoxazole <=0.5/9.5 u... Susceptible     Vancomycin 1 ug/mL Susceptible              1 Oxacillin susceptible isolates are susceptible to cephalosporins (example: cefazolin and cephalexin) and beta lactam combination agents. Oxacillin resistant isolates are resistant to these agents.        Evaluated the patient shoe.  The tongue was folded in.  Each side of the heel were folded down.    A:  Diabetes mellitus with peripheral neuropathy  Right second toe ulcers  Right lateral heel eschar  Right plantar lateral heel decubitus ulcer    P: Discussed culture results.  Will continue Keflex and then stop.  Discussed importance of not having the tongue on the shoe be folded and as it is causing pressure on second toe.  Will also make sure that he will of the shoes not folded down as it could rub.  Continue dressing second toe with gauze.  Continue dressing plantar lateral heel with wound Vashe, Medihoney, Mepilex.  Dispensed him supplies today.  If need be different DH shoe if not fitting good.  Return to clinic in 2 weeks to reevaluate.      Morro Lucero DPM, FACFAS              Again, thank you for allowing me to participate in the care of your patient.        Sincerely,        Morro Lucero DPM    Electronically signed

## 2025-01-28 NOTE — PROGRESS NOTES
Subjective:    1/24/25   Pt is seen today in consult from Dr. Ho with the c/c of right foot ulceration.  Has noticed a wound on right second toe.  Has had this for several days.  She did wrap a Band-Aid all around this toe.  There is now blistering around it.  They have noticed erythema.  They deny purulence or odor.  She denies fever or chills.  Also has wound on plantar lateral right heel.  Also noticed recently.  She cannot remember any history of trauma.  Cannot remember thermal injury.  She has not gotten this cold.  She denies putting a heating pad on this.  They cannot remember any constant pressure on this.  She is wearing a slip on shoe.  She also has a scab on the lateral portion of her right heel.  This is dry.  They believe it is healing.  These wounds are painful.  She does have diabetes.  States she has some numbness in her feet.  Patient is anticoagulated.    1/28/25 patient returns for right foot ulcers.  States generally foot feeling better.  Has been taking Keflex.  She is wondering about a smaller shoe.  Denies any new erythema edema or drainage.       ROS:  see above         Allergies   Allergen Reactions    Gabapentin      Rash and itching    Nsaids      Other reaction(s): Gastrointestinal  Former PPI user, EGD 2/2018 showed LA Grade D esophagitis, plus duodenitis and gastritis.  Famotidine on med list but may not have been taking - rx was 3 years old.     Allopurinol Rash     Itchy red rash on feet (stopped immediately - failed to come to clinic for exam).   Itchy red rash on feet (stopped immediately - failed to come to clinic for exam).       Other Environmental Allergy Rash     RASH ALL OVER FOUND IN DR. RAMIREZ DICTATION OF 10/11/11    Sulfa Antibiotics Other (See Comments) and Rash     Per 11-4-13 H&P by Dr. Fei Arias.  RASH ALL OVER FOUND IN DR. RAMIREZ DICTATION OF 10/11/11  BACTRIM-  RASH ALL OVER  Per 11-4-13 H&P by Dr. Fei Arias.  RASH ALL OVER FOUND IN DR. RAMIREZ  DICTATION OF 10/11/11  Per 11-4-13 H&P by Dr. Fei Arias.  RASH ALL OVER FOUND IN DR. RAMIREZ DICTATION OF 10/11/11  BACTRIM-  RASH ALL OVER      Trimethoprim Rash       Current Outpatient Medications   Medication Sig Dispense Refill    acetaminophen (TYLENOL) 500 MG tablet Take 500-1,000 mg by mouth every 6 hours as needed for mild pain      blood glucose (NO BRAND SPECIFIED) test strip Use to test blood sugar one time daily or as directed. To accompany: Blood Glucose Monitor Brands: per insurance. 100 strip 6    carvedilol (COREG) 6.25 MG tablet Take 1 tablet (6.25 mg) by mouth 2 times daily. 180 tablet 3    cephALEXin (KEFLEX) 500 MG capsule Take 1 capsule (500 mg) by mouth 3 times daily. 30 capsule 0    Ferrous Gluconate 240 (27 Fe) MG TABS Take 1 tablet by mouth 2 times daily (with meals) For restless legs (Patient not taking: Reported on 1/22/2025) 100 tablet 11    isosorbide mononitrate (IMDUR) 30 MG 24 hr tablet TAKE 1 TABLET BY MOUTH ONCE DAILY 90 tablet 3    metFORMIN (GLUCOPHAGE) 1000 MG tablet Take 1 tablet (1,000 mg) by mouth 2 times daily (with meals). 180 tablet 3    pantoprazole (PROTONIX) 40 MG EC tablet Take 1 tablet (40 mg) by mouth daily. 90 tablet 3    pregabalin (LYRICA) 150 MG capsule Take 1 capsule (150 mg) by mouth 2 times daily. 180 capsule 1    QUEtiapine (SEROQUEL) 25 MG tablet Tale 1 tablet in am and 1 tablet at bedtime 180 tablet 3    sertraline (ZOLOFT) 25 MG tablet Take 1 tablet (25 mg) by mouth daily. 90 tablet 3    simvastatin (ZOCOR) 10 MG tablet TAKE 1 TABLET BY MOUTH ONCE DAILY AT BEDTIME 90 tablet 3    warfarin ANTICOAGULANT (COUMADIN) 2 MG tablet Take 3 mg every Tue, Thu, Sat; 2 mg all other days or As directed by Anticoagulation clinic 115 tablet 0       Patient Active Problem List   Diagnosis    Status Post Total Knee Replacement - bilateral    TACHO (obstructive sleep apnea)    Hyperlipidemia with target LDL less than 70    Hypertension goal BP (blood pressure) < 140/90     Morbid obesity (H)    Achalasia    Chronic low back pain    Gout    Paroxysmal atrial fibrillation (H)    Long term (current) use of anticoagulants    Anxiety    Spinal stenosis of lumbar region with neurogenic claudication    Glenohumeral arthritis, left    Type 2 diabetes mellitus with diabetic nephropathy, without long-term current use of insulin (H)    Stage 3a chronic kidney disease (H)    Anemia, iron deficiency    Falls frequently    Lewy body dementia, unspecified dementia severity, unspecified whether behavioral, psychotic, or mood disturbance or anxiety (H)    Heart failure, unspecified HF chronicity, unspecified heart failure type (H)    Moderate dementia with mood disturbance, unspecified dementia type (H)    Moderate episode of recurrent major depressive disorder (H)       Past Medical History:   Diagnosis Date    Achalasia 05/02/2018    Adrenal adenoma 06/16/2009    Overview:  Stable on FU MR [place] 3/31/09.  Stable on MRI [place] 3/2015, no further FU needed.     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  Xray foot shows overhanging edges pathognomic for gout.  Sx occurred on thiazides    HTN (hypertension) 12/05/2012    Hyperlipidemia with target LDL less than 70     Hypertension goal BP (blood pressure) < 140/90 01/15/2013    Iron deficiency anemia, unspecified iron deficiency anemia type 11/18/2020    Mild recurrent major depression 06/07/2022    Morbid obesity (H) 01/15/2013    OA (OSTEOARTHRITIS) OF KNEE - right 07/13/2010    TACHO (obstructive sleep apnea) 12/07/2012    Paroxysmal atrial fibrillation (H) 07/11/2018    had ablation procedure 2017; nuclear stress test neg for ischemia in 2017    Spinal stenosis of lumbar region with neurogenic claudication 02/17/2020    Stage 3a chronic kidney disease (H)  11/16/2020    Type 2 diabetes mellitus with diabetic nephropathy, without long-term current use of insulin (H) 11/16/2020       Past Surgical History:   Procedure Laterality Date    AS ESOPHAGOSCOPY, DIAGNOSTIC  07/2019    CARPAL TUNNEL RELEASE RT/LT      CATHETER, ABLATION  2017    for PAF    COLONOSCOPY  07/2019    HC LIG/DIV/EXCIS VARICOSE VEIN      TONSILLECTOMY      ZZC HAND/FINGER SURGERY UNLISTED      ZZC REMOVAL OF KIDNEY STONE      ZZC TOTAL KNEE ARTHROPLASTY  07/21/2010    left    ZZC TOTAL KNEE ARTHROPLASTY  12/12/2012    Right       Family History   Problem Relation Age of Onset    Glaucoma No family hx of     Macular Degeneration No family hx of        Social History     Tobacco Use    Smoking status: Never     Passive exposure: Never    Smokeless tobacco: Never   Substance Use Topics    Alcohol use: No         Exam:    Vitals: There were no vitals taken for this visit.  BMI: There is no height or weight on file to calculate BMI.  Height: Data Unavailable    Constitutional/ general:  Pt is in no apparent distress, appears well-nourished.  Cooperative with history and physical exam.  Seen with daughter today who acts as .    Psych:  The patient answered questions appropriately.  Normal affect.  Seems to have reasonable expectations, in terms of treatment.     Lungs:  Non labored breathing, non labored speech. No cough.  No audible wheezing. Even, quiet breathing.       Vascular:  positive pedal pulses bilaterally for both the DP.  CFT < 3 sec.  positive ankle edema making it difficult to palpate posterior tibial arteries..  negative pedal hair growth.    Neuro:  Alert and oriented x 3. Coordinated gait.  Light touch sensation is intact to the L4, L5, S1 distributions. No obvious deficits.  No evidence of neurological-based weakness, spasticity, or contracture in the lower extremities.  Monofilament intact in all toes.    Derm: thin and shiny    Musculoskeletal:    Lower extremity muscle  strength is normal.  Obvious forefoot or rear foot deformities noted.  Normal range of motion of all forefoot joints.    The right second toe dorsal lateral now has a partial-thickness wound that measures 6 x 3 mm.  Plantar distal right second toe there is now a dry leathery eschar that measures 12 x 9 mm.  Edema slightly decreased on the right second toe and erythema persisting.    Right heel lateral has dry eschar.  It is healing around the edges.  No erythema or edema.  It measures 10 x 7 mm.    Right heel plantar lateral there is an ulcer that in the past measured 19 x 15 mm and today measures 21 x 22 mm.  The base is full-thickness and mostly granulation tissue now..  There is no purulence or odor.  There is no erythema edema surrounding this suggestive of infection.                 Component  Ref Range & Units 3 mo ago  (10/4/24) 9 mo ago  (4/17/24) 2 yr ago  (9/13/22) 2 yr ago  (6/7/22) 3 yr ago  (12/23/21) 4 yr ago  (11/18/20) 5 yr ago  (9/20/19)    Estimated Average Glucose  <117 mg/dL 143 High           Hemoglobin A1C  0.0 - 5.6 % 6.6 High  7.7 High  CM 8.0 High  CM 8.2 High  CM 6.7 High  CM 7.0 High  R, CM 6.5 High  R,                Collected 1/24/2025  1:33 PM       Status: Final result       Visible to patient: No (inaccessible in MyChart)       Dx: Cellulitis of foot, right; Diabetic u...    Specimen Information: Foot, Right; Wound   0 Result Notes  Culture 3+ Staphylococcus aureus Abnormal             Gram Stain Result  Abnormal   3+ Gram positive cocci   1+ Gram positive bacilli   1+ WBC seen                Susceptibility     Staphylococcus aureus     VINCENT     Clindamycin 0.25 ug/mL Susceptible     Daptomycin 0.25 ug/mL Susceptible     Doxycycline <=0.5 ug/mL Susceptible     Erythromycin <=0.25 ug/mL Susceptible     Gentamicin <=0.5 ug/mL Susceptible     Oxacillin <=0.25 ug/mL Susceptible 1     Tetracycline <=1 ug/mL Susceptible     Trimethoprim/Sulfamethoxazole <=0.5/9.5 u... Susceptible      Vancomycin 1 ug/mL Susceptible              1 Oxacillin susceptible isolates are susceptible to cephalosporins (example: cefazolin and cephalexin) and beta lactam combination agents. Oxacillin resistant isolates are resistant to these agents.        Evaluated the patient shoe.  The tongue was folded in.  Each side of the heel were folded down.    A:  Diabetes mellitus with peripheral neuropathy  Right second toe ulcers  Right lateral heel eschar  Right plantar lateral heel decubitus ulcer    P: Discussed culture results.  Will continue Keflex and then stop.  Discussed importance of not having the tongue on the shoe be folded and as it is causing pressure on second toe.  Will also make sure that he will of the shoes not folded down as it could rub.  Continue dressing second toe with gauze.  Continue dressing plantar lateral heel with wound Vashe, Medihoney, Mepilex.  Dispensed him supplies today.  If need be different DH shoe if not fitting good.  Return to clinic in 2 weeks to reevaluate.      Morro Lucero DPM, FACFAS

## 2025-01-28 NOTE — PATIENT INSTRUCTIONS
We wish you continued good healing. If you have any questions or concerns, please do not hesitate to contact us at  [phone]    [employer] (secure e-mail communication and access to your chart) to send a message or to make an appointment.    Please remember to call and schedule a follow up appointment if one was recommended at your earliest convenience.     PODIATRY CLINIC HOURS  TELEPHONE NUMBER    Dr. Morro ARTPSARAH FACFAS        Clinics:  [facility]   [place]  Tuesday 1PM-6PM  [place]  Wednesday 745AM-330PM  [place]  Monday 2nd,4th  830AM-4PM  Thursday/Friday 745AM-230PM     [place] APPOINTMENTS  [phone]    [place] APPOINTMENTS  [phone]          If you need a medication refill, please contact us you may need lab work and/or a follow up visit prior to your refill (i.e. Antifungal medications).  If MRI needed please call Imaging at [phone]   HOW DO I GET MY KNEE SCOOTER? Knee scooters can be picked up at ANY Medical Supply stores with your knee scooter Prescription.  OR  Bring your signed prescription to an [facility] showroom.   Set up an appointment for your custom Orthotics. Call any Orthotics locations call [phone]

## 2025-02-13 ENCOUNTER — LAB (OUTPATIENT)
Dept: LAB | Facility: CLINIC | Age: 80
End: 2025-02-13
Payer: COMMERCIAL

## 2025-02-13 ENCOUNTER — ANTICOAGULATION THERAPY VISIT (OUTPATIENT)
Dept: ANTICOAGULATION | Facility: CLINIC | Age: 80
End: 2025-02-13

## 2025-02-13 ENCOUNTER — OFFICE VISIT (OUTPATIENT)
Dept: PODIATRY | Facility: CLINIC | Age: 80
End: 2025-02-13
Payer: COMMERCIAL

## 2025-02-13 VITALS — HEART RATE: 74 BPM | OXYGEN SATURATION: 98 %

## 2025-02-13 DIAGNOSIS — L97.411 DIABETIC ULCER OF RIGHT HEEL ASSOCIATED WITH TYPE 2 DIABETES MELLITUS, LIMITED TO BREAKDOWN OF SKIN (H): Primary | ICD-10-CM

## 2025-02-13 DIAGNOSIS — L97.511 DIABETIC ULCER OF TOE OF RIGHT FOOT ASSOCIATED WITH TYPE 2 DIABETES MELLITUS, LIMITED TO BREAKDOWN OF SKIN (H): ICD-10-CM

## 2025-02-13 DIAGNOSIS — E11.621 DIABETIC ULCER OF RIGHT HEEL ASSOCIATED WITH TYPE 2 DIABETES MELLITUS, LIMITED TO BREAKDOWN OF SKIN (H): Primary | ICD-10-CM

## 2025-02-13 DIAGNOSIS — I48.0 PAROXYSMAL ATRIAL FIBRILLATION (H): ICD-10-CM

## 2025-02-13 DIAGNOSIS — E11.21 TYPE 2 DIABETES MELLITUS WITH DIABETIC NEPHROPATHY, WITHOUT LONG-TERM CURRENT USE OF INSULIN (H): ICD-10-CM

## 2025-02-13 DIAGNOSIS — Z79.01 LONG TERM (CURRENT) USE OF ANTICOAGULANTS: ICD-10-CM

## 2025-02-13 DIAGNOSIS — I48.0 PAROXYSMAL ATRIAL FIBRILLATION (H): Primary | ICD-10-CM

## 2025-02-13 DIAGNOSIS — E11.621 DIABETIC ULCER OF TOE OF RIGHT FOOT ASSOCIATED WITH TYPE 2 DIABETES MELLITUS, LIMITED TO BREAKDOWN OF SKIN (H): ICD-10-CM

## 2025-02-13 LAB — INR BLD: 2.1 (ref 0.9–1.1)

## 2025-02-13 NOTE — PROGRESS NOTES
ANTICOAGULATION MANAGEMENT     Selvin Rockwell 79 year old female is on warfarin with therapeutic INR result. (Goal INR 2.0-3.0)    Recent labs: (last 7 days)     02/13/25  1041   INR 2.1*       ASSESSMENT     Source(s): Chart Review  Previous INR was Subtherapeutic  Medication, diet, health changes since last INR chart reviewed; none identified         PLAN     Recommended plan for no diet, medication or health factor changes affecting INR     Dosing Instructions: Continue your current warfarin dose with next INR in 3 weeks       Summary  As of 2/13/2025      Full warfarin instructions:  2 mg every Mon, Wed, Fri; 3 mg all other days   Next INR check:  3/6/2025               Detailed voice message left for Gem with dosing instructions and follow up date.     Patient using outside facility for labs    Education provided: Please call back if any changes to your diet, medications or how you've been taking warfarin  Goal range and lab monitoring: goal range and significance of current result    Plan made per [facility] anticoagulation protocol    Chitra Morrison RN  2/13/2025  Anticoagulation Clinic  [email or web address] for routing messages: renee PEÑA  [facility] patient phone line: [phone]        _______________________________________________________________________     Anticoagulation Episode Summary       Current INR goal:  2.0-3.0   TTR:  48.8% (1 y)   Target end date:  Indefinite   Send INR reminders to:  JERRY PEÑA    Indications    Paroxysmal atrial fibrillation (H) [I48.0]  Long term (current) use of anticoagulants [Z79.01]             Comments:  --             Anticoagulation Care Providers       Provider Role Specialty Phone number    Gurinder Ho MD Referring Family Medicine [phone]

## 2025-02-13 NOTE — PROGRESS NOTES
Subjective:    1/24/25   Pt is seen today in consult from Dr. Ho with the c/c of right foot ulceration.  Has noticed a wound on right second toe.  Has had this for several days.  She did wrap a Band-Aid all around this toe.  There is now blistering around it.  They have noticed erythema.  They deny purulence or odor.  She denies fever or chills.  Also has wound on plantar lateral right heel.  Also noticed recently.  She cannot remember any history of trauma.  Cannot remember thermal injury.  She has not gotten this cold.  She denies putting a heating pad on this.  They cannot remember any constant pressure on this.  She is wearing a slip on shoe.  She also has a scab on the lateral portion of her right heel.  This is dry.  They believe it is healing.  These wounds are painful.  She does have diabetes.  States she has some numbness in her feet.  Patient is anticoagulated.    1/28/25 patient returns for right foot ulcers.  States generally foot feeling better.  Has been taking Keflex.  She is wondering about a smaller shoe.  Denies any new erythema edema or drainage.     2/13/25 patient returns for right lower extremity ulcers.  There is still continuing to improve.  They are finished taking the Keflex.  Minimal drainage.  They have no new complaints today.  She is wondering if she can wear her compression stockings again.         ROS:  see above         Allergies   Allergen Reactions    Gabapentin      Rash and itching    Nsaids      Other reaction(s): Gastrointestinal  Former PPI user, EGD 2/2018 showed LA Grade D esophagitis, plus duodenitis and gastritis.  Famotidine on med list but may not have been taking - rx was 3 years old.     Allopurinol Rash     Itchy red rash on feet (stopped immediately - failed to come to clinic for exam).   Itchy red rash on feet (stopped immediately - failed to come to clinic for exam).       Other Environmental Allergy Rash     RASH ALL OVER FOUND IN DR. RAMIREZ DICTATION OF  10/11/11    Sulfa Antibiotics Other (See Comments) and Rash     Per 11-4-13 H&P by Dr. Fei Arias.  RASH ALL OVER FOUND IN DR. RAMIREZ DICTATION OF 10/11/11  BACTRIM-  RASH ALL OVER  Per 11-4-13 H&P by Dr. Fei Arias.  RASH ALL OVER FOUND IN DR. RAMIREZ DICTATION OF 10/11/11  Per 11-4-13 H&P by Dr. Fei Arias.  RASH ALL OVER FOUND IN DR. RAMIREZ DICTATION OF 10/11/11  BACTRIM-  RASH ALL OVER      Trimethoprim Rash       Current Outpatient Medications   Medication Sig Dispense Refill    acetaminophen (TYLENOL) 500 MG tablet Take 500-1,000 mg by mouth every 6 hours as needed for mild pain      blood glucose (NO BRAND SPECIFIED) test strip Use to test blood sugar one time daily or as directed. To accompany: Blood Glucose Monitor Brands: per insurance. 100 strip 6    carvedilol (COREG) 6.25 MG tablet Take 1 tablet (6.25 mg) by mouth 2 times daily. 180 tablet 3    cephALEXin (KEFLEX) 500 MG capsule Take 1 capsule (500 mg) by mouth 3 times daily. 30 capsule 0    Ferrous Gluconate 240 (27 Fe) MG TABS Take 1 tablet by mouth 2 times daily (with meals) For restless legs (Patient not taking: Reported on 1/22/2025) 100 tablet 11    isosorbide mononitrate (IMDUR) 30 MG 24 hr tablet TAKE 1 TABLET BY MOUTH ONCE DAILY 90 tablet 3    metFORMIN (GLUCOPHAGE) 1000 MG tablet Take 1 tablet (1,000 mg) by mouth 2 times daily (with meals). 180 tablet 3    pantoprazole (PROTONIX) 40 MG EC tablet Take 1 tablet (40 mg) by mouth daily. 90 tablet 3    pregabalin (LYRICA) 150 MG capsule Take 1 capsule (150 mg) by mouth 2 times daily. 180 capsule 1    QUEtiapine (SEROQUEL) 25 MG tablet Tale 1 tablet in am and 1 tablet at bedtime 180 tablet 3    sertraline (ZOLOFT) 25 MG tablet Take 1 tablet (25 mg) by mouth daily. 90 tablet 3    simvastatin (ZOCOR) 10 MG tablet TAKE 1 TABLET BY MOUTH ONCE DAILY AT BEDTIME 90 tablet 3    warfarin ANTICOAGULANT (COUMADIN) 2 MG tablet Take 3 mg every Tue, Thu, Sat; 2 mg all other days or As directed by  Anticoagulation clinic 115 tablet 0       Patient Active Problem List   Diagnosis    Status Post Total Knee Replacement - bilateral    TACHO (obstructive sleep apnea)    Hyperlipidemia with target LDL less than 70    Hypertension goal BP (blood pressure) < 140/90    Morbid obesity (H)    Achalasia    Chronic low back pain    Gout    Paroxysmal atrial fibrillation (H)    Long term (current) use of anticoagulants    Anxiety    Spinal stenosis of lumbar region with neurogenic claudication    Glenohumeral arthritis, left    Type 2 diabetes mellitus with diabetic nephropathy, without long-term current use of insulin (H)    Stage 3a chronic kidney disease (H)    Anemia, iron deficiency    Falls frequently    Lewy body dementia, unspecified dementia severity, unspecified whether behavioral, psychotic, or mood disturbance or anxiety (H)    Heart failure, unspecified HF chronicity, unspecified heart failure type (H)    Moderate dementia with mood disturbance, unspecified dementia type (H)    Moderate episode of recurrent major depressive disorder (H)       Past Medical History:   Diagnosis Date    Achalasia 05/02/2018    Adrenal adenoma 06/16/2009    Overview:  Stable on FU MR [facility] 3/31/09.  Stable on MRI [facility] 3/2015, no further FU needed.     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  Xray foot shows overhanging edges pathognomic for gout.  Sx occurred on thiazides    HTN (hypertension) 12/05/2012    Hyperlipidemia with target LDL less than 70     Hypertension goal BP (blood pressure) < 140/90 01/15/2013    Iron deficiency anemia, unspecified iron deficiency anemia type 11/18/2020    Mild recurrent major depression 06/07/2022    Morbid obesity (H) 01/15/2013    OA (OSTEOARTHRITIS) OF KNEE - right 07/13/2010    TACHO (obstructive  sleep apnea) 12/07/2012    Paroxysmal atrial fibrillation (H) 07/11/2018    had ablation procedure 2017; nuclear stress test neg for ischemia in 2017    Spinal stenosis of lumbar region with neurogenic claudication 02/17/2020    Stage 3a chronic kidney disease (H) 11/16/2020    Type 2 diabetes mellitus with diabetic nephropathy, without long-term current use of insulin (H) 11/16/2020       Past Surgical History:   Procedure Laterality Date    AS ESOPHAGOSCOPY, DIAGNOSTIC  07/2019    CARPAL TUNNEL RELEASE RT/LT      CATHETER, ABLATION  2017    for PAF    COLONOSCOPY  07/2019    HC LIG/DIV/EXCIS VARICOSE VEIN      TONSILLECTOMY      ZZC HAND/FINGER SURGERY UNLISTED      ZZC REMOVAL OF KIDNEY STONE      Z TOTAL KNEE ARTHROPLASTY  07/21/2010    left    ZZC TOTAL KNEE ARTHROPLASTY  12/12/2012    Right       Family History   Problem Relation Age of Onset    Glaucoma No family hx of     Macular Degeneration No family hx of        Social History     Tobacco Use    Smoking status: Never     Passive exposure: Never    Smokeless tobacco: Never   Substance Use Topics    Alcohol use: No         Exam:    Vitals: There were no vitals taken for this visit.  BMI: There is no height or weight on file to calculate BMI.  Height: Data Unavailable    Constitutional/ general:  Pt is in no apparent distress, appears well-nourished.  Cooperative with history and physical exam.  Seen with daughter today who acts as .    Psych:  The patient answered questions appropriately.  Normal affect.  Seems to have reasonable expectations, in terms of treatment.     Lungs:  Non labored breathing, non labored speech. No cough.  No audible wheezing. Even, quiet breathing.       Vascular:  positive pedal pulses bilaterally for both the DP.  CFT < 3 sec.  positive ankle edema making it difficult to palpate posterior tibial arteries..  negative pedal hair growth.    Neuro:  Alert and oriented x 3. Coordinated gait.  Light touch sensation is  intact to the L4, L5, S1 distributions. No obvious deficits.  No evidence of neurological-based weakness, spasticity, or contracture in the lower extremities.  Monofilament intact in all toes.    Derm: thin and shiny    Musculoskeletal:    Lower extremity muscle strength is normal.  Obvious forefoot or rear foot deformities noted.  Normal range of motion of all forefoot joints.    The right second toe dorsal lateral wound is now healed.      plantar distal right second toe dry leathery eschar that in the past measured 12 x 9 mm and today measures 7 x 5 mm.  No edema on right second toe now.  Slight erythema on this toe.     Right heel lateral has dry eschar.  It is healing around the edges.  No erythema or edema.  In the past this measured 10 x 7 mm and today measures 8 x 6 mm and is healing around the edges..    Right heel plantar lateral there is an ulcer that in the past measured 19 x 15 mm, 21 x 22 mm, 21 x 22 mm, and today measures 15 x 15 mm.  The base is dry black leathery eschar now.  There is no purulence or odor.  There is no erythema edema surrounding this suggestive of infection.                 Component  Ref Range & Units 3 mo ago  (10/4/24) 9 mo ago  (4/17/24) 2 yr ago  (9/13/22) 2 yr ago  (6/7/22) 3 yr ago  (12/23/21) 4 yr ago  (11/18/20) 5 yr ago  (9/20/19)    Estimated Average Glucose  <117 mg/dL 143 High           Hemoglobin A1C  0.0 - 5.6 % 6.6 High  7.7 High  CM 8.0 High  CM 8.2 High  CM 6.7 High  CM 7.0 High  R, CM 6.5 High  R,                Collected 1/24/2025  1:33 PM       Status: Final result       Visible to patient: No (inaccessible in MyChart)       Dx: Cellulitis of foot, right; Diabetic u...    Specimen Information: Foot, Right; Wound   0 Result Notes  Culture 3+ Staphylococcus aureus Abnormal             Gram Stain Result  Abnormal   3+ Gram positive cocci   1+ Gram positive bacilli   1+ WBC seen                Susceptibility     Staphylococcus aureus     VINCENT     Clindamycin 0.25  ug/mL Susceptible     Daptomycin 0.25 ug/mL Susceptible     Doxycycline <=0.5 ug/mL Susceptible     Erythromycin <=0.25 ug/mL Susceptible     Gentamicin <=0.5 ug/mL Susceptible     Oxacillin <=0.25 ug/mL Susceptible 1     Tetracycline <=1 ug/mL Susceptible     Trimethoprim/Sulfamethoxazole <=0.5/9.5 u... Susceptible     Vancomycin 1 ug/mL Susceptible              1 Oxacillin susceptible isolates are susceptible to cephalosporins (example: cefazolin and cephalexin) and beta lactam combination agents. Oxacillin resistant isolates are resistant to these agents.        Evaluated the patient shoe.  The tongue was folded in.  Each side of the heel were folded down.    A:  Diabetes mellitus with peripheral neuropathy  Right second toe ulcer  Right lateral heel eschar  Right plantar lateral heel decubitus ulcer    P: Discussed all wounds improving.  No need to dress right second toe now.  May use wound wash on this every other day.  Wound wash every other day on other wounds as well.  Can use Mepilex to protect.  We dressed and dispensed some today.  Patient may go back to wearing compression stockings.  Discussed no signs of infection and all improving.  Return to clinic in 2 weeks to reevaluate.      Morro Lucero DPM, FACFAS

## 2025-02-13 NOTE — LETTER
2/13/2025      Selvin Rockwell  [address] [facility] [address]      Dear Colleague,    Thank you for referring your patient, Selvin Rockwell, to the [facility]. Please see a copy of my visit note below.    Subjective:    1/24/25   Pt is seen today in consult from Dr. Ho with the c/c of right foot ulceration.  Has noticed a wound on right second toe.  Has had this for several days.  She did wrap a Band-Aid all around this toe.  There is now blistering around it.  They have noticed erythema.  They deny purulence or odor.  She denies fever or chills.  Also has wound on plantar lateral right heel.  Also noticed recently.  She cannot remember any history of trauma.  Cannot remember thermal injury.  She has not gotten this cold.  She denies putting a heating pad on this.  They cannot remember any constant pressure on this.  She is wearing a slip on shoe.  She also has a scab on the lateral portion of her right heel.  This is dry.  They believe it is healing.  These wounds are painful.  She does have diabetes.  States she has some numbness in her feet.  Patient is anticoagulated.    1/28/25 patient returns for right foot ulcers.  States generally foot feeling better.  Has been taking Keflex.  She is wondering about a smaller shoe.  Denies any new erythema edema or drainage.     2/13/25 patient returns for right lower extremity ulcers.  There is still continuing to improve.  They are finished taking the Keflex.  Minimal drainage.  They have no new complaints today.  She is wondering if she can wear her compression stockings again.         ROS:  see above         Allergies   Allergen Reactions     Gabapentin      Rash and itching     Nsaids      Other reaction(s): Gastrointestinal  Former PPI user, EGD 2/2018 showed LA Grade D esophagitis, plus duodenitis and gastritis.  Famotidine on med list but may not have been taking - rx was 3 years old.      Allopurinol Rash     Itchy red  rash on feet (stopped immediately - failed to come to clinic for exam).   Itchy red rash on feet (stopped immediately - failed to come to clinic for exam).        Other Environmental Allergy Rash     RASH ALL OVER FOUND IN DR. RAMIREZ DICTATION OF 10/11/11     Sulfa Antibiotics Other (See Comments) and Rash     Per 11-4-13 H&P by Dr. Fei Arias.  RASH ALL OVER FOUND IN DR. RAMIREZ DICTATION OF 10/11/11  BACTRIM-  RASH ALL OVER  Per 11-4-13 H&P by Dr. Fei Arias.  RASH ALL OVER FOUND IN DR. RAMIREZ DICTATION OF 10/11/11  Per 11-4-13 H&P by Dr. Fei Arias.  RASH ALL OVER FOUND IN DR. RAMIREZ DICTATION OF 10/11/11  BACTRIM-  RASH ALL OVER       Trimethoprim Rash       Current Outpatient Medications   Medication Sig Dispense Refill     acetaminophen (TYLENOL) 500 MG tablet Take 500-1,000 mg by mouth every 6 hours as needed for mild pain       blood glucose (NO BRAND SPECIFIED) test strip Use to test blood sugar one time daily or as directed. To accompany: Blood Glucose Monitor Brands: per insurance. 100 strip 6     carvedilol (COREG) 6.25 MG tablet Take 1 tablet (6.25 mg) by mouth 2 times daily. 180 tablet 3     cephALEXin (KEFLEX) 500 MG capsule Take 1 capsule (500 mg) by mouth 3 times daily. 30 capsule 0     Ferrous Gluconate 240 (27 Fe) MG TABS Take 1 tablet by mouth 2 times daily (with meals) For restless legs (Patient not taking: Reported on 1/22/2025) 100 tablet 11     isosorbide mononitrate (IMDUR) 30 MG 24 hr tablet TAKE 1 TABLET BY MOUTH ONCE DAILY 90 tablet 3     metFORMIN (GLUCOPHAGE) 1000 MG tablet Take 1 tablet (1,000 mg) by mouth 2 times daily (with meals). 180 tablet 3     pantoprazole (PROTONIX) 40 MG EC tablet Take 1 tablet (40 mg) by mouth daily. 90 tablet 3     pregabalin (LYRICA) 150 MG capsule Take 1 capsule (150 mg) by mouth 2 times daily. 180 capsule 1     QUEtiapine (SEROQUEL) 25 MG tablet Tale 1 tablet in am and 1 tablet at bedtime 180 tablet 3     sertraline (ZOLOFT) 25 MG tablet Take  1 tablet (25 mg) by mouth daily. 90 tablet 3     simvastatin (ZOCOR) 10 MG tablet TAKE 1 TABLET BY MOUTH ONCE DAILY AT BEDTIME 90 tablet 3     warfarin ANTICOAGULANT (COUMADIN) 2 MG tablet Take 3 mg every Tue, Thu, Sat; 2 mg all other days or As directed by Anticoagulation clinic 115 tablet 0       Patient Active Problem List   Diagnosis     Status Post Total Knee Replacement - bilateral     TACHO (obstructive sleep apnea)     Hyperlipidemia with target LDL less than 70     Hypertension goal BP (blood pressure) < 140/90     Morbid obesity (H)     Achalasia     Chronic low back pain     Gout     Paroxysmal atrial fibrillation (H)     Long term (current) use of anticoagulants     Anxiety     Spinal stenosis of lumbar region with neurogenic claudication     Glenohumeral arthritis, left     Type 2 diabetes mellitus with diabetic nephropathy, without long-term current use of insulin (H)     Stage 3a chronic kidney disease (H)     Anemia, iron deficiency     Falls frequently     Lewy body dementia, unspecified dementia severity, unspecified whether behavioral, psychotic, or mood disturbance or anxiety (H)     Heart failure, unspecified HF chronicity, unspecified heart failure type (H)     Moderate dementia with mood disturbance, unspecified dementia type (H)     Moderate episode of recurrent major depressive disorder (H)       Past Medical History:   Diagnosis Date     Achalasia 05/02/2018     Adrenal adenoma 06/16/2009    Overview:  Stable on FU MR [place] 3/31/09.  Stable on MRI [place] 3/2015, no further FU needed.      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  Xray foot shows overhanging edges pathognomic for gout.  Sx occurred on thiazides     HTN (hypertension) 12/05/2012     Hyperlipidemia with target LDL less than 70       Hypertension goal BP (blood pressure) < 140/90 01/15/2013     Iron deficiency anemia, unspecified iron deficiency anemia type 11/18/2020     Mild recurrent major depression 06/07/2022     Morbid obesity (H) 01/15/2013     OA (OSTEOARTHRITIS) OF KNEE - right 07/13/2010     TACHO (obstructive sleep apnea) 12/07/2012     Paroxysmal atrial fibrillation (H) 07/11/2018    had ablation procedure 2017; nuclear stress test neg for ischemia in 2017     Spinal stenosis of lumbar region with neurogenic claudication 02/17/2020     Stage 3a chronic kidney disease (H) 11/16/2020     Type 2 diabetes mellitus with diabetic nephropathy, without long-term current use of insulin (H) 11/16/2020       Past Surgical History:   Procedure Laterality Date     AS ESOPHAGOSCOPY, DIAGNOSTIC  07/2019     CARPAL TUNNEL RELEASE RT/LT       CATHETER, ABLATION  2017    for PAF     COLONOSCOPY  07/2019     HC LIG/DIV/EXCIS VARICOSE VEIN       TONSILLECTOMY       ZZC HAND/FINGER SURGERY UNLISTED       ZZC REMOVAL OF KIDNEY STONE       ZZC TOTAL KNEE ARTHROPLASTY  07/21/2010    left     ZZC TOTAL KNEE ARTHROPLASTY  12/12/2012    Right       Family History   Problem Relation Age of Onset     Glaucoma No family hx of      Macular Degeneration No family hx of        Social History     Tobacco Use     Smoking status: Never     Passive exposure: Never     Smokeless tobacco: Never   Substance Use Topics     Alcohol use: No         Exam:    Vitals: There were no vitals taken for this visit.  BMI: There is no height or weight on file to calculate BMI.  Height: Data Unavailable    Constitutional/ general:  Pt is in no apparent distress, appears well-nourished.  Cooperative with history and physical exam.  Seen with daughter today who acts as .    Psych:  The patient answered questions appropriately.  Normal affect.  Seems to have reasonable expectations, in terms of treatment.     Lungs:  Non labored breathing, non labored speech. No cough.  No audible  wheezing. Even, quiet breathing.       Vascular:  positive pedal pulses bilaterally for both the DP.  CFT < 3 sec.  positive ankle edema making it difficult to palpate posterior tibial arteries..  negative pedal hair growth.    Neuro:  Alert and oriented x 3. Coordinated gait.  Light touch sensation is intact to the L4, L5, S1 distributions. No obvious deficits.  No evidence of neurological-based weakness, spasticity, or contracture in the lower extremities.  Monofilament intact in all toes.    Derm: thin and shiny    Musculoskeletal:    Lower extremity muscle strength is normal.  Obvious forefoot or rear foot deformities noted.  Normal range of motion of all forefoot joints.    The right second toe dorsal lateral wound is now healed.      plantar distal right second toe dry leathery eschar that in the past measured 12 x 9 mm and today measures 7 x 5 mm.  No edema on right second toe now.  Slight erythema on this toe.     Right heel lateral has dry eschar.  It is healing around the edges.  No erythema or edema.  In the past this measured 10 x 7 mm and today measures 8 x 6 mm and is healing around the edges..    Right heel plantar lateral there is an ulcer that in the past measured 19 x 15 mm, 21 x 22 mm, 21 x 22 mm, and today measures 15 x 15 mm.  The base is dry black leathery eschar now.  There is no purulence or odor.  There is no erythema edema surrounding this suggestive of infection.                 Component  Ref Range & Units 3 mo ago  (10/4/24) 9 mo ago  (4/17/24) 2 yr ago  (9/13/22) 2 yr ago  (6/7/22) 3 yr ago  (12/23/21) 4 yr ago  (11/18/20) 5 yr ago  (9/20/19)    Estimated Average Glucose  <117 mg/dL 143 High           Hemoglobin A1C  0.0 - 5.6 % 6.6 High  7.7 High  CM 8.0 High  CM 8.2 High  CM 6.7 High  CM 7.0 High  R, CM 6.5 High  R,                Collected 1/24/2025  1:33 PM       Status: Final result       Visible to patient: No (inaccessible in MyChart)       Dx: Cellulitis of foot, right; Diabetic  u...    Specimen Information: Foot, Right; Wound   0 Result Notes  Culture 3+ Staphylococcus aureus Abnormal             Gram Stain Result  Abnormal   3+ Gram positive cocci   1+ Gram positive bacilli   1+ WBC seen                Susceptibility     Staphylococcus aureus     VINCENT     Clindamycin 0.25 ug/mL Susceptible     Daptomycin 0.25 ug/mL Susceptible     Doxycycline <=0.5 ug/mL Susceptible     Erythromycin <=0.25 ug/mL Susceptible     Gentamicin <=0.5 ug/mL Susceptible     Oxacillin <=0.25 ug/mL Susceptible 1     Tetracycline <=1 ug/mL Susceptible     Trimethoprim/Sulfamethoxazole <=0.5/9.5 u... Susceptible     Vancomycin 1 ug/mL Susceptible              1 Oxacillin susceptible isolates are susceptible to cephalosporins (example: cefazolin and cephalexin) and beta lactam combination agents. Oxacillin resistant isolates are resistant to these agents.        Evaluated the patient shoe.  The tongue was folded in.  Each side of the heel were folded down.    A:  Diabetes mellitus with peripheral neuropathy  Right second toe ulcer  Right lateral heel eschar  Right plantar lateral heel decubitus ulcer    P: Discussed all wounds improving.  No need to dress right second toe now.  May use wound wash on this every other day.  Wound wash every other day on other wounds as well.  Can use Mepilex to protect.  We dressed and dispensed some today.  Patient may go back to wearing compression stockings.  Discussed no signs of infection and all improving.  Return to clinic in 2 weeks to reevaluate.      Morro Lucero DPM, FACFAS              Again, thank you for allowing me to participate in the care of your patient.        Sincerely,        Morro Lucero DPM    Electronically signed

## 2025-02-19 DIAGNOSIS — L97.511 DIABETIC ULCER OF TOE OF RIGHT FOOT ASSOCIATED WITH TYPE 2 DIABETES MELLITUS, LIMITED TO BREAKDOWN OF SKIN (H): ICD-10-CM

## 2025-02-19 DIAGNOSIS — L03.115 CELLULITIS OF FOOT, RIGHT: ICD-10-CM

## 2025-02-19 DIAGNOSIS — E11.621 DIABETIC ULCER OF TOE OF RIGHT FOOT ASSOCIATED WITH TYPE 2 DIABETES MELLITUS, LIMITED TO BREAKDOWN OF SKIN (H): ICD-10-CM

## 2025-02-19 RX ORDER — CEPHALEXIN 500 MG/1
500 CAPSULE ORAL 3 TIMES DAILY
Qty: 30 CAPSULE | Refills: 0 | OUTPATIENT
Start: 2025-02-19

## 2025-03-06 ENCOUNTER — OFFICE VISIT (OUTPATIENT)
Dept: PODIATRY | Facility: CLINIC | Age: 80
End: 2025-03-06
Payer: COMMERCIAL

## 2025-03-06 VITALS — HEART RATE: 74 BPM | WEIGHT: 190 LBS | OXYGEN SATURATION: 96 % | BODY MASS INDEX: 39.71 KG/M2

## 2025-03-06 DIAGNOSIS — L97.411 DIABETIC ULCER OF RIGHT HEEL ASSOCIATED WITH TYPE 2 DIABETES MELLITUS, LIMITED TO BREAKDOWN OF SKIN (H): ICD-10-CM

## 2025-03-06 DIAGNOSIS — E11.21 TYPE 2 DIABETES MELLITUS WITH DIABETIC NEPHROPATHY, WITHOUT LONG-TERM CURRENT USE OF INSULIN (H): ICD-10-CM

## 2025-03-06 DIAGNOSIS — E11.621 DIABETIC ULCER OF RIGHT HEEL ASSOCIATED WITH TYPE 2 DIABETES MELLITUS, LIMITED TO BREAKDOWN OF SKIN (H): ICD-10-CM

## 2025-03-06 DIAGNOSIS — R23.4 ESCHAR: ICD-10-CM

## 2025-03-06 DIAGNOSIS — E11.621 DIABETIC ULCER OF TOE OF RIGHT FOOT ASSOCIATED WITH TYPE 2 DIABETES MELLITUS, LIMITED TO BREAKDOWN OF SKIN (H): Primary | ICD-10-CM

## 2025-03-06 DIAGNOSIS — L97.511 DIABETIC ULCER OF TOE OF RIGHT FOOT ASSOCIATED WITH TYPE 2 DIABETES MELLITUS, LIMITED TO BREAKDOWN OF SKIN (H): Primary | ICD-10-CM

## 2025-03-06 NOTE — LETTER
3/6/2025      Selvin Rockwell  [address] [facility] [address]      Dear Colleague,    Thank you for referring your patient, Selvin Rockwell, to the [facility]. Please see a copy of my visit note below.    Subjective:    1/24/25   Pt is seen today in consult from Dr. Ho with the c/c of right foot ulceration.  Has noticed a wound on right second toe.  Has had this for several days.  She did wrap a Band-Aid all around this toe.  There is now blistering around it.  They have noticed erythema.  They deny purulence or odor.  She denies fever or chills.  Also has wound on plantar lateral right heel.  Also noticed recently.  She cannot remember any history of trauma.  Cannot remember thermal injury.  She has not gotten this cold.  She denies putting a heating pad on this.  They cannot remember any constant pressure on this.  She is wearing a slip on shoe.  She also has a scab on the lateral portion of her right heel.  This is dry.  They believe it is healing.  These wounds are painful.  She does have diabetes.  States she has some numbness in her feet.  Patient is anticoagulated.    1/28/25 patient returns for right foot ulcers.  States generally foot feeling better.  Has been taking Keflex.  She is wondering about a smaller shoe.  Denies any new erythema edema or drainage.     2/13/25 patient returns for right lower extremity ulcers.  There is still continuing to improve.  They are finished taking the Keflex.  Minimal drainage.  They have no new complaints today.  She is wondering if she can wear her compression stockings again.     3/6/25 patient returns for right lower extremity ulcers.  She is no longer on antibiotic.  Since of last visit the plantar heel eschar has sloughed.  They are concerned about how it looks.  It is more moist now.  She denies any increasing pain.  She denies any purulence or odor.  No new swelling.        ROS:  see above         Allergies   Allergen  Reactions     Gabapentin      Rash and itching     Nsaids      Other reaction(s): Gastrointestinal  Former PPI user, EGD 2/2018 showed LA Grade D esophagitis, plus duodenitis and gastritis.  Famotidine on med list but may not have been taking - rx was 3 years old.      Allopurinol Rash     Itchy red rash on feet (stopped immediately - failed to come to clinic for exam).   Itchy red rash on feet (stopped immediately - failed to come to clinic for exam).        Other Environmental Allergy Rash     RASH ALL OVER FOUND IN DR. RAMIREZ DICTATION OF 10/11/11     Sulfa Antibiotics Other (See Comments) and Rash     Per 11-4-13 H&P by Dr. Fei Arias.  RASH ALL OVER FOUND IN DR. RAMIREZ DICTATION OF 10/11/11  BACTRIM-  RASH ALL OVER  Per 11-4-13 H&P by Dr. Fei Arias.  RASH ALL OVER FOUND IN DR. RAMIREZ DICTATION OF 10/11/11  Per 11-4-13 H&P by Dr. Fei Arias.  RASH ALL OVER FOUND IN DR. RAMIREZ DICTATION OF 10/11/11  BACTRIM-  RASH ALL OVER       Trimethoprim Rash       Current Outpatient Medications   Medication Sig Dispense Refill     acetaminophen (TYLENOL) 500 MG tablet Take 500-1,000 mg by mouth every 6 hours as needed for mild pain       blood glucose (NO BRAND SPECIFIED) test strip Use to test blood sugar one time daily or as directed. To accompany: Blood Glucose Monitor Brands: per insurance. 100 strip 6     carvedilol (COREG) 6.25 MG tablet Take 1 tablet (6.25 mg) by mouth 2 times daily. 180 tablet 3     cephALEXin (KEFLEX) 500 MG capsule Take 1 capsule (500 mg) by mouth 3 times daily. 30 capsule 0     Ferrous Gluconate 240 (27 Fe) MG TABS Take 1 tablet by mouth 2 times daily (with meals) For restless legs (Patient not taking: Reported on 1/22/2025) 100 tablet 11     isosorbide mononitrate (IMDUR) 30 MG 24 hr tablet TAKE 1 TABLET BY MOUTH ONCE DAILY 90 tablet 3     metFORMIN (GLUCOPHAGE) 1000 MG tablet Take 1 tablet (1,000 mg) by mouth 2 times daily (with meals). 180 tablet 3     pantoprazole (PROTONIX) 40  MG EC tablet Take 1 tablet (40 mg) by mouth daily. 90 tablet 3     pregabalin (LYRICA) 150 MG capsule Take 1 capsule (150 mg) by mouth 2 times daily. 180 capsule 1     QUEtiapine (SEROQUEL) 25 MG tablet Tale 1 tablet in am and 1 tablet at bedtime 180 tablet 3     sertraline (ZOLOFT) 25 MG tablet Take 1 tablet (25 mg) by mouth daily. 90 tablet 3     simvastatin (ZOCOR) 10 MG tablet TAKE 1 TABLET BY MOUTH ONCE DAILY AT BEDTIME 90 tablet 3     warfarin ANTICOAGULANT (COUMADIN) 2 MG tablet Take 3 mg every Tue, Thu, Sat; 2 mg all other days or As directed by Anticoagulation clinic 115 tablet 0       Patient Active Problem List   Diagnosis     Status Post Total Knee Replacement - bilateral     TACHO (obstructive sleep apnea)     Hyperlipidemia with target LDL less than 70     Hypertension goal BP (blood pressure) < 140/90     Morbid obesity (H)     Achalasia     Chronic low back pain     Gout     Paroxysmal atrial fibrillation (H)     Long term (current) use of anticoagulants     Anxiety     Spinal stenosis of lumbar region with neurogenic claudication     Glenohumeral arthritis, left     Type 2 diabetes mellitus with diabetic nephropathy, without long-term current use of insulin (H)     Stage 3a chronic kidney disease (H)     Anemia, iron deficiency     Falls frequently     Lewy body dementia, unspecified dementia severity, unspecified whether behavioral, psychotic, or mood disturbance or anxiety (H)     Heart failure, unspecified HF chronicity, unspecified heart failure type (H)     Moderate dementia with mood disturbance, unspecified dementia type (H)     Moderate episode of recurrent major depressive disorder (H)       Past Medical History:   Diagnosis Date     Achalasia 05/02/2018     Adrenal adenoma 06/16/2009    Overview:  Stable on FU MR [place] 3/31/09.  Stable on MRI [place] 3/2015, no further FU needed.      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  Xray foot shows overhanging edges pathognomic for gout.  Sx occurred on thiazides     HTN (hypertension) 12/05/2012     Hyperlipidemia with target LDL less than 70      Hypertension goal BP (blood pressure) < 140/90 01/15/2013     Iron deficiency anemia, unspecified iron deficiency anemia type 11/18/2020     Mild recurrent major depression 06/07/2022     Morbid obesity (H) 01/15/2013     OA (OSTEOARTHRITIS) OF KNEE - right 07/13/2010     TACHO (obstructive sleep apnea) 12/07/2012     Paroxysmal atrial fibrillation (H) 07/11/2018    had ablation procedure 2017; nuclear stress test neg for ischemia in 2017     Spinal stenosis of lumbar region with neurogenic claudication 02/17/2020     Stage 3a chronic kidney disease (H) 11/16/2020     Type 2 diabetes mellitus with diabetic nephropathy, without long-term current use of insulin (H) 11/16/2020       Past Surgical History:   Procedure Laterality Date     AS ESOPHAGOSCOPY, DIAGNOSTIC  07/2019     CARPAL TUNNEL RELEASE RT/LT       CATHETER, ABLATION  2017    for PAF     COLONOSCOPY  07/2019     HC LIG/DIV/EXCIS VARICOSE VEIN       TONSILLECTOMY       ZC HAND/FINGER SURGERY UNLISTED       Z REMOVAL OF KIDNEY STONE       Z TOTAL KNEE ARTHROPLASTY  07/21/2010    left     Z TOTAL KNEE ARTHROPLASTY  12/12/2012    Right       Family History   Problem Relation Age of Onset     Glaucoma No family hx of      Macular Degeneration No family hx of        Social History     Tobacco Use     Smoking status: Never     Passive exposure: Never     Smokeless tobacco: Never   Substance Use Topics     Alcohol use: No         Exam:    Vitals: There were no vitals taken for this visit.  BMI: There is no height or weight on file to calculate BMI.  Height: Data Unavailable    Constitutional/ general:  Pt is in no apparent distress, appears well-nourished.   Cooperative with history and physical exam.  Seen with daughter today who acts as .    Psych:  The patient answered questions appropriately.  Normal affect.  Seems to have reasonable expectations, in terms of treatment.     Lungs:  Non labored breathing, non labored speech. No cough.  No audible wheezing. Even, quiet breathing.       Vascular:  positive pedal pulses bilaterally for both the DP.  CFT < 3 sec.  positive ankle edema making it difficult to palpate posterior tibial arteries..  negative pedal hair growth.    Neuro:  Alert and oriented x 3. Coordinated gait.  Light touch sensation is intact to the L4, L5, S1 distributions. No obvious deficits.  No evidence of neurological-based weakness, spasticity, or contracture in the lower extremities.  Monofilament intact in all toes.    Derm: thin and shiny    Musculoskeletal:    Lower extremity muscle strength is normal.  Obvious forefoot or rear foot deformities noted.  Normal range of motion of all forefoot joints.    The right second toe dorsal lateral wound is now healed.  plantar distal right second toe dry leathery eschar that in the past measured 12 x 9 mm, 7 x 5 mm and today healed.  No edema or erythema on right second toe now.      Right heel lateral has dry eschar.  It is healing around the edges.  No erythema or edema.  In the past this measured 10 x 7 mm, 8 x 6 mm and today measures 5 x 4 mm and is healing around the edges..    Right heel plantar lateral there is an ulcer that in the past measured 19 x 15 mm, 21 x 22 mm, 21 x 22 mm, 15 x 15 mm and today measures 16 x 15 mm.  The base is now down to the subcutaneous tissue.  It is 70% granulation tissue and 30% fibrotic tissue.  There is no purulence or odor.  There is no erythema edema surrounding this suggestive of infection.                 Component  Ref Range & Units 3 mo ago  (10/4/24) 9 mo ago  (4/17/24) 2 yr ago  (9/13/22) 2 yr ago  (6/7/22) 3 yr ago  (12/23/21) 4 yr ago  (11/18/20) 5  yr ago  (9/20/19)    Estimated Average Glucose  <117 mg/dL 143 High           Hemoglobin A1C  0.0 - 5.6 % 6.6 High  7.7 High  CM 8.0 High  CM 8.2 High  CM 6.7 High  CM 7.0 High  R, CM 6.5 High  R,                Collected 1/24/2025  1:33 PM       Status: Final result       Visible to patient: No (inaccessible in MyChart)       Dx: Cellulitis of foot, right; Diabetic u...    Specimen Information: Foot, Right; Wound   0 Result Notes  Culture 3+ Staphylococcus aureus Abnormal             Gram Stain Result  Abnormal   3+ Gram positive cocci   1+ Gram positive bacilli   1+ WBC seen                Susceptibility     Staphylococcus aureus     VINCENT     Clindamycin 0.25 ug/mL Susceptible     Daptomycin 0.25 ug/mL Susceptible     Doxycycline <=0.5 ug/mL Susceptible     Erythromycin <=0.25 ug/mL Susceptible     Gentamicin <=0.5 ug/mL Susceptible     Oxacillin <=0.25 ug/mL Susceptible 1     Tetracycline <=1 ug/mL Susceptible     Trimethoprim/Sulfamethoxazole <=0.5/9.5 u... Susceptible     Vancomycin 1 ug/mL Susceptible              1 Oxacillin susceptible isolates are susceptible to cephalosporins (example: cefazolin and cephalexin) and beta lactam combination agents. Oxacillin resistant isolates are resistant to these agents.            A:  Diabetes mellitus with peripheral neuropathy  Right second toe ulcer  Right lateral heel eschar  Right plantar lateral heel decubitus ulcer    P: Discussed right second toe all healed now with no signs of infection.  They will just watch this.  Discussed right eschar is getting smaller.  There is no signs of infection around this.  They will just continue to watch this.  Explained that it is a normal process for plantar heel wounds with dry leathery eschar to eventually slough this leathery eschar and then start healing around the periphery and the bottom.  We discussed with the daughter and the patient that red granulation tissue is what we want to see and that this is not a sign of  infection.  There is just very slight erythema around the wound however this is normal.  She has no pain with palpation of these areas.  There is no purulence or odor.  We discussed the signs of infection.  If they notice any of these they will call me immediately.  They will make sure they continue to dress and clean daily.  We gave supplies.  Discussed wound debridement right plantar lateral heel ulcer with patient and they are in agreement.  After verbal consent and sterile prep the wound was debrided the wound all the way down to and including the subcutaneous tissue with a tissue nipper and #15 blade.  This was incisional debridement.  Measurements of wound as above.  Explored base of wound and dressed with gauze and coban.  Patient tolerated procedure well.  Continue dressing and offloading strategies.  Return to clinic in 2 weeks to ensure improving.        Morro Lucero DPM, FACFAS              Again, thank you for allowing me to participate in the care of your patient.        Sincerely,        Morro Lucero DPM    Electronically signed

## 2025-03-06 NOTE — PATIENT INSTRUCTIONS
We wish you continued good healing. If you have any questions or concerns, please do not hesitate to contact us at  [phone]    [employer] (secure e-mail communication and access to your chart) to send a message or to make an appointment.    Please remember to call and schedule a follow up appointment if one was recommended at your earliest convenience.     PODIATRY CLINIC HOURS  TELEPHONE NUMBER    Dr. Morro ARTPSARAH FACFAS        Clinics:  [facility]   [place]  Tuesday 1PM-6PM  [place]  Wednesday 745AM-330PM  [place]  Monday 2nd,4th  830AM-4PM  Thursday/Friday 745AM-230PM     [place] APPOINTMENTS  [phone]    [place] APPOINTMENTS  [phone]          If you need a medication refill, please contact us you may need lab work and/or a follow up visit prior to your refill (i.e. Antifungal medications).  If MRI needed please call Imaging at [phone]   HOW DO I GET MY KNEE SCOOTER? Knee scooters can be picked up at ANY Medical Supply stores with your knee scooter Prescription.  OR  Bring your signed prescription to an [facility] showroom.   Set up an appointment for your custom Orthotics. Call any Orthotics locations call [phone]

## 2025-03-06 NOTE — PROGRESS NOTES
Subjective:    1/24/25   Pt is seen today in consult from Dr. Ho with the c/c of right foot ulceration.  Has noticed a wound on right second toe.  Has had this for several days.  She did wrap a Band-Aid all around this toe.  There is now blistering around it.  They have noticed erythema.  They deny purulence or odor.  She denies fever or chills.  Also has wound on plantar lateral right heel.  Also noticed recently.  She cannot remember any history of trauma.  Cannot remember thermal injury.  She has not gotten this cold.  She denies putting a heating pad on this.  They cannot remember any constant pressure on this.  She is wearing a slip on shoe.  She also has a scab on the lateral portion of her right heel.  This is dry.  They believe it is healing.  These wounds are painful.  She does have diabetes.  States she has some numbness in her feet.  Patient is anticoagulated.    1/28/25 patient returns for right foot ulcers.  States generally foot feeling better.  Has been taking Keflex.  She is wondering about a smaller shoe.  Denies any new erythema edema or drainage.     2/13/25 patient returns for right lower extremity ulcers.  There is still continuing to improve.  They are finished taking the Keflex.  Minimal drainage.  They have no new complaints today.  She is wondering if she can wear her compression stockings again.     3/6/25 patient returns for right lower extremity ulcers.  She is no longer on antibiotic.  Since of last visit the plantar heel eschar has sloughed.  They are concerned about how it looks.  It is more moist now.  She denies any increasing pain.  She denies any purulence or odor.  No new swelling.        ROS:  see above         Allergies   Allergen Reactions    Gabapentin      Rash and itching    Nsaids      Other reaction(s): Gastrointestinal  Former PPI user, EGD 2/2018 showed LA Grade D esophagitis, plus duodenitis and gastritis.  Famotidine on med list but may not have been taking - rx  was 3 years old.     Allopurinol Rash     Itchy red rash on feet (stopped immediately - failed to come to clinic for exam).   Itchy red rash on feet (stopped immediately - failed to come to clinic for exam).       Other Environmental Allergy Rash     RASH ALL OVER FOUND IN DR. RAMIREZ DICTATION OF 10/11/11    Sulfa Antibiotics Other (See Comments) and Rash     Per 11-4-13 H&P by Dr. Fei Arias.  RASH ALL OVER FOUND IN DR. RAMIREZ DICTATION OF 10/11/11  BACTRIM-  RASH ALL OVER  Per 11-4-13 H&P by Dr. Fei Arias.  RASH ALL OVER FOUND IN DR. RAMIREZ DICTATION OF 10/11/11  Per 11-4-13 H&P by Dr. Fei Arias.  RASH ALL OVER FOUND IN DR. RAMIREZ DICTATION OF 10/11/11  BACTRIM-  RASH ALL OVER      Trimethoprim Rash       Current Outpatient Medications   Medication Sig Dispense Refill    acetaminophen (TYLENOL) 500 MG tablet Take 500-1,000 mg by mouth every 6 hours as needed for mild pain      blood glucose (NO BRAND SPECIFIED) test strip Use to test blood sugar one time daily or as directed. To accompany: Blood Glucose Monitor Brands: per insurance. 100 strip 6    carvedilol (COREG) 6.25 MG tablet Take 1 tablet (6.25 mg) by mouth 2 times daily. 180 tablet 3    cephALEXin (KEFLEX) 500 MG capsule Take 1 capsule (500 mg) by mouth 3 times daily. 30 capsule 0    Ferrous Gluconate 240 (27 Fe) MG TABS Take 1 tablet by mouth 2 times daily (with meals) For restless legs (Patient not taking: Reported on 1/22/2025) 100 tablet 11    isosorbide mononitrate (IMDUR) 30 MG 24 hr tablet TAKE 1 TABLET BY MOUTH ONCE DAILY 90 tablet 3    metFORMIN (GLUCOPHAGE) 1000 MG tablet Take 1 tablet (1,000 mg) by mouth 2 times daily (with meals). 180 tablet 3    pantoprazole (PROTONIX) 40 MG EC tablet Take 1 tablet (40 mg) by mouth daily. 90 tablet 3    pregabalin (LYRICA) 150 MG capsule Take 1 capsule (150 mg) by mouth 2 times daily. 180 capsule 1    QUEtiapine (SEROQUEL) 25 MG tablet Tale 1 tablet in am and 1 tablet at bedtime 180 tablet 3     sertraline (ZOLOFT) 25 MG tablet Take 1 tablet (25 mg) by mouth daily. 90 tablet 3    simvastatin (ZOCOR) 10 MG tablet TAKE 1 TABLET BY MOUTH ONCE DAILY AT BEDTIME 90 tablet 3    warfarin ANTICOAGULANT (COUMADIN) 2 MG tablet Take 3 mg every Tue, Thu, Sat; 2 mg all other days or As directed by Anticoagulation clinic 115 tablet 0       Patient Active Problem List   Diagnosis    Status Post Total Knee Replacement - bilateral    TACHO (obstructive sleep apnea)    Hyperlipidemia with target LDL less than 70    Hypertension goal BP (blood pressure) < 140/90    Morbid obesity (H)    Achalasia    Chronic low back pain    Gout    Paroxysmal atrial fibrillation (H)    Long term (current) use of anticoagulants    Anxiety    Spinal stenosis of lumbar region with neurogenic claudication    Glenohumeral arthritis, left    Type 2 diabetes mellitus with diabetic nephropathy, without long-term current use of insulin (H)    Stage 3a chronic kidney disease (H)    Anemia, iron deficiency    Falls frequently    Lewy body dementia, unspecified dementia severity, unspecified whether behavioral, psychotic, or mood disturbance or anxiety (H)    Heart failure, unspecified HF chronicity, unspecified heart failure type (H)    Moderate dementia with mood disturbance, unspecified dementia type (H)    Moderate episode of recurrent major depressive disorder (H)       Past Medical History:   Diagnosis Date    Achalasia 05/02/2018    Adrenal adenoma 06/16/2009    Overview:  Stable on FU MR [facility] 3/31/09.  Stable on MRI [facility] 3/2015, no further FU needed.     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  Xray foot shows overhanging edges pathognomic for gout.  Sx occurred on thiazides    HTN (hypertension) 12/05/2012    Hyperlipidemia with target LDL less than 70      Hypertension goal BP (blood pressure) < 140/90 01/15/2013    Iron deficiency anemia, unspecified iron deficiency anemia type 11/18/2020    Mild recurrent major depression 06/07/2022    Morbid obesity (H) 01/15/2013    OA (OSTEOARTHRITIS) OF KNEE - right 07/13/2010    TACHO (obstructive sleep apnea) 12/07/2012    Paroxysmal atrial fibrillation (H) 07/11/2018    had ablation procedure 2017; nuclear stress test neg for ischemia in 2017    Spinal stenosis of lumbar region with neurogenic claudication 02/17/2020    Stage 3a chronic kidney disease (H) 11/16/2020    Type 2 diabetes mellitus with diabetic nephropathy, without long-term current use of insulin (H) 11/16/2020       Past Surgical History:   Procedure Laterality Date    AS ESOPHAGOSCOPY, DIAGNOSTIC  07/2019    CARPAL TUNNEL RELEASE RT/LT      CATHETER, ABLATION  2017    for PAF    COLONOSCOPY  07/2019    HC LIG/DIV/EXCIS VARICOSE VEIN      TONSILLECTOMY      ZZC HAND/FINGER SURGERY UNLISTED      ZZC REMOVAL OF KIDNEY STONE      ZZC TOTAL KNEE ARTHROPLASTY  07/21/2010    left    ZZC TOTAL KNEE ARTHROPLASTY  12/12/2012    Right       Family History   Problem Relation Age of Onset    Glaucoma No family hx of     Macular Degeneration No family hx of        Social History     Tobacco Use    Smoking status: Never     Passive exposure: Never    Smokeless tobacco: Never   Substance Use Topics    Alcohol use: No         Exam:    Vitals: There were no vitals taken for this visit.  BMI: There is no height or weight on file to calculate BMI.  Height: Data Unavailable    Constitutional/ general:  Pt is in no apparent distress, appears well-nourished.  Cooperative with history and physical exam.  Seen with daughter today who acts as .    Psych:  The patient answered questions appropriately.  Normal affect.  Seems to have reasonable expectations, in terms of treatment.     Lungs:  Non labored breathing, non labored speech. No cough.  No audible wheezing. Even,  quiet breathing.       Vascular:  positive pedal pulses bilaterally for both the DP.  CFT < 3 sec.  positive ankle edema making it difficult to palpate posterior tibial arteries..  negative pedal hair growth.    Neuro:  Alert and oriented x 3. Coordinated gait.  Light touch sensation is intact to the L4, L5, S1 distributions. No obvious deficits.  No evidence of neurological-based weakness, spasticity, or contracture in the lower extremities.  Monofilament intact in all toes.    Derm: thin and shiny    Musculoskeletal:    Lower extremity muscle strength is normal.  Obvious forefoot or rear foot deformities noted.  Normal range of motion of all forefoot joints.    The right second toe dorsal lateral wound is now healed.  plantar distal right second toe dry leathery eschar that in the past measured 12 x 9 mm, 7 x 5 mm and today healed.  No edema or erythema on right second toe now.      Right heel lateral has dry eschar.  It is healing around the edges.  No erythema or edema.  In the past this measured 10 x 7 mm, 8 x 6 mm and today measures 5 x 4 mm and is healing around the edges..    Right heel plantar lateral there is an ulcer that in the past measured 19 x 15 mm, 21 x 22 mm, 21 x 22 mm, 15 x 15 mm and today measures 16 x 15 mm.  The base is now down to the subcutaneous tissue.  It is 70% granulation tissue and 30% fibrotic tissue.  There is no purulence or odor.  There is no erythema edema surrounding this suggestive of infection.                 Component  Ref Range & Units 3 mo ago  (10/4/24) 9 mo ago  (4/17/24) 2 yr ago  (9/13/22) 2 yr ago  (6/7/22) 3 yr ago  (12/23/21) 4 yr ago  (11/18/20) 5 yr ago  (9/20/19)    Estimated Average Glucose  <117 mg/dL 143 High           Hemoglobin A1C  0.0 - 5.6 % 6.6 High  7.7 High  CM 8.0 High  CM 8.2 High  CM 6.7 High  CM 7.0 High  R, CM 6.5 High  R,                Collected 1/24/2025  1:33 PM       Status: Final result       Visible to patient: No (inaccessible in MyChart)        Dx: Cellulitis of foot, right; Diabetic u...    Specimen Information: Foot, Right; Wound   0 Result Notes  Culture 3+ Staphylococcus aureus Abnormal             Gram Stain Result  Abnormal   3+ Gram positive cocci   1+ Gram positive bacilli   1+ WBC seen                Susceptibility     Staphylococcus aureus     VINCENT     Clindamycin 0.25 ug/mL Susceptible     Daptomycin 0.25 ug/mL Susceptible     Doxycycline <=0.5 ug/mL Susceptible     Erythromycin <=0.25 ug/mL Susceptible     Gentamicin <=0.5 ug/mL Susceptible     Oxacillin <=0.25 ug/mL Susceptible 1     Tetracycline <=1 ug/mL Susceptible     Trimethoprim/Sulfamethoxazole <=0.5/9.5 u... Susceptible     Vancomycin 1 ug/mL Susceptible              1 Oxacillin susceptible isolates are susceptible to cephalosporins (example: cefazolin and cephalexin) and beta lactam combination agents. Oxacillin resistant isolates are resistant to these agents.            A:  Diabetes mellitus with peripheral neuropathy  Right second toe ulcer  Right lateral heel eschar  Right plantar lateral heel decubitus ulcer    P: Discussed right second toe all healed now with no signs of infection.  They will just watch this.  Discussed right eschar is getting smaller.  There is no signs of infection around this.  They will just continue to watch this.  Explained that it is a normal process for plantar heel wounds with dry leathery eschar to eventually slough this leathery eschar and then start healing around the periphery and the bottom.  We discussed with the daughter and the patient that red granulation tissue is what we want to see and that this is not a sign of infection.  There is just very slight erythema around the wound however this is normal.  She has no pain with palpation of these areas.  There is no purulence or odor.  We discussed the signs of infection.  If they notice any of these they will call me immediately.  They will make sure they continue to dress and clean daily.  We  gave supplies.  Discussed wound debridement right plantar lateral heel ulcer with patient and they are in agreement.  After verbal consent and sterile prep the wound was debrided the wound all the way down to and including the subcutaneous tissue with a tissue nipper and #15 blade.  This was incisional debridement.  Measurements of wound as above.  Explored base of wound and dressed with gauze and coban.  Patient tolerated procedure well.  Continue dressing and offloading strategies.  Return to clinic in 2 weeks to ensure improving.        Morro Lucero, SUSAN, FACFAS

## 2025-03-20 ENCOUNTER — LAB (OUTPATIENT)
Dept: LAB | Facility: CLINIC | Age: 80
End: 2025-03-20
Payer: COMMERCIAL

## 2025-03-20 ENCOUNTER — OFFICE VISIT (OUTPATIENT)
Dept: PODIATRY | Facility: CLINIC | Age: 80
End: 2025-03-20
Payer: COMMERCIAL

## 2025-03-20 ENCOUNTER — ANTICOAGULATION THERAPY VISIT (OUTPATIENT)
Dept: ANTICOAGULATION | Facility: CLINIC | Age: 80
End: 2025-03-20

## 2025-03-20 VITALS
RESPIRATION RATE: 16 BRPM | HEART RATE: 54 BPM | SYSTOLIC BLOOD PRESSURE: 144 MMHG | BODY MASS INDEX: 39.08 KG/M2 | OXYGEN SATURATION: 94 % | DIASTOLIC BLOOD PRESSURE: 91 MMHG | WEIGHT: 187 LBS

## 2025-03-20 DIAGNOSIS — E78.5 HYPERLIPIDEMIA WITH TARGET LDL LESS THAN 100: Primary | ICD-10-CM

## 2025-03-20 DIAGNOSIS — Z79.01 LONG TERM (CURRENT) USE OF ANTICOAGULANTS: ICD-10-CM

## 2025-03-20 DIAGNOSIS — E11.621 DIABETIC ULCER OF RIGHT HEEL ASSOCIATED WITH TYPE 2 DIABETES MELLITUS, LIMITED TO BREAKDOWN OF SKIN (H): Primary | ICD-10-CM

## 2025-03-20 DIAGNOSIS — E11.21 TYPE 2 DIABETES MELLITUS WITH DIABETIC NEPHROPATHY, WITHOUT LONG-TERM CURRENT USE OF INSULIN (H): ICD-10-CM

## 2025-03-20 DIAGNOSIS — I48.0 PAROXYSMAL ATRIAL FIBRILLATION (H): Primary | ICD-10-CM

## 2025-03-20 DIAGNOSIS — I48.0 PAROXYSMAL ATRIAL FIBRILLATION (H): ICD-10-CM

## 2025-03-20 DIAGNOSIS — L97.411 DIABETIC ULCER OF RIGHT HEEL ASSOCIATED WITH TYPE 2 DIABETES MELLITUS, LIMITED TO BREAKDOWN OF SKIN (H): Primary | ICD-10-CM

## 2025-03-20 LAB — INR BLD: 2.3 (ref 0.9–1.1)

## 2025-03-20 ASSESSMENT — PAIN SCALES - GENERAL: PAINLEVEL_OUTOF10: NO PAIN (0)

## 2025-03-20 NOTE — PROGRESS NOTES
ANTICOAGULATION MANAGEMENT     Selvin Rockwell 79 year old female is on warfarin with therapeutic INR result. (Goal INR 2.0-3.0)    Recent labs: (last 7 days)     03/20/25  1214   INR 2.3*       ASSESSMENT     Source(s): Chart Review   Previous result: Therapeutic last visit; previously outside of goal range  Additional findings: None     PLAN     Recommended plan for no diet, medication or health factor changes affecting INR     Dosing Instructions: Continue your current warfarin dose with next INR in 4 weeks       Summary  As of 3/20/2025      Full warfarin instructions:  2 mg every Mon, Wed, Fri; 3 mg all other days   Next INR check:  4/17/2025               Detailed voice message left for Alicia with dosing instructions and follow up date.     Contact [phone] to schedule and with any changes, questions or concerns.     Education provided: Please call back if any changes to your diet, medications or how you've been taking warfarin    Plan made per [facility] anticoagulation protocol    Portia Coronado RN  3/20/2025  Anticoagulation Clinic  [facility] for routing messages: renee PEÑA  [facility] patient phone line: [phone]        _______________________________________________________________________     Anticoagulation Episode Summary       Current INR goal:  2.0-3.0   TTR:  54.3% (1 y)   Target end date:  Indefinite   Send INR reminders to:  JERRY PEÑA    Indications    Paroxysmal atrial fibrillation (H) [I48.0]  Long term (current) use of anticoagulants [Z79.01]             Comments:  --             Anticoagulation Care Providers       Provider Role Specialty Phone number    Gurinder Ho MD Referring Family Medicine [phone]

## 2025-03-20 NOTE — PROGRESS NOTES
Subjective:    1/24/25   Pt is seen today in consult from Dr. Ho with the c/c of right foot ulceration.  Has noticed a wound on right second toe.  Has had this for several days.  She did wrap a Band-Aid all around this toe.  There is now blistering around it.  They have noticed erythema.  They deny purulence or odor.  She denies fever or chills.  Also has wound on plantar lateral right heel.  Also noticed recently.  She cannot remember any history of trauma.  Cannot remember thermal injury.  She has not gotten this cold.  She denies putting a heating pad on this.  They cannot remember any constant pressure on this.  She is wearing a slip on shoe.  She also has a scab on the lateral portion of her right heel.  This is dry.  They believe it is healing.  These wounds are painful.  She does have diabetes.  States she has some numbness in her feet.  Patient is anticoagulated.    1/28/25 patient returns for right foot ulcers.  States generally foot feeling better.  Has been taking Keflex.  She is wondering about a smaller shoe.  Denies any new erythema edema or drainage.     2/13/25 patient returns for right lower extremity ulcers.  There is still continuing to improve.  They are finished taking the Keflex.  Minimal drainage.  They have no new complaints today.  She is wondering if she can wear her compression stockings again.     3/6/25 patient returns for right lower extremity ulcers.  She is no longer on antibiotic.  Since of last visit the plantar heel eschar has sloughed.  They are concerned about how it looks.  It is more moist now.  She denies any increasing pain.  She denies any purulence or odor.  No new swelling.        3/20/25   patient returns for right lower extremity ulcers.  She has less pain now.  They state the wound on the lateral calcaneus is healed.  The toe continues to stay healed.  Less pain on plantar heel wound.  Denies no drainage or pain.    ROS:  see above         Allergies   Allergen  Reactions    Gabapentin      Rash and itching    Nsaids      Other reaction(s): Gastrointestinal  Former PPI user, EGD 2/2018 showed LA Grade D esophagitis, plus duodenitis and gastritis.  Famotidine on med list but may not have been taking - rx was 3 years old.     Allopurinol Rash     Itchy red rash on feet (stopped immediately - failed to come to clinic for exam).   Itchy red rash on feet (stopped immediately - failed to come to clinic for exam).       Other Environmental Allergy Rash     RASH ALL OVER FOUND IN DR. RAMIREZ DICTATION OF 10/11/11    Sulfa Antibiotics Other (See Comments) and Rash     Per 11-4-13 H&P by Dr. Fei Arias.  RASH ALL OVER FOUND IN DR. RAMIREZ DICTATION OF 10/11/11  BACTRIM-  RASH ALL OVER  Per 11-4-13 H&P by Dr. Fei Arias.  RASH ALL OVER FOUND IN DR. RAMIREZ DICTATION OF 10/11/11  Per 11-4-13 H&P by Dr. Fei Arias.  RASH ALL OVER FOUND IN DR. RAMIREZ DICTATION OF 10/11/11  BACTRIM-  RASH ALL OVER      Trimethoprim Rash       Current Outpatient Medications   Medication Sig Dispense Refill    acetaminophen (TYLENOL) 500 MG tablet Take 500-1,000 mg by mouth every 6 hours as needed for mild pain      blood glucose (NO BRAND SPECIFIED) test strip Use to test blood sugar one time daily or as directed. To accompany: Blood Glucose Monitor Brands: per insurance. 100 strip 6    carvedilol (COREG) 6.25 MG tablet Take 1 tablet (6.25 mg) by mouth 2 times daily. 180 tablet 3    cephALEXin (KEFLEX) 500 MG capsule Take 1 capsule (500 mg) by mouth 3 times daily. 30 capsule 0    isosorbide mononitrate (IMDUR) 30 MG 24 hr tablet TAKE 1 TABLET BY MOUTH ONCE DAILY 90 tablet 3    metFORMIN (GLUCOPHAGE) 1000 MG tablet Take 1 tablet (1,000 mg) by mouth 2 times daily (with meals). 180 tablet 3    pantoprazole (PROTONIX) 40 MG EC tablet Take 1 tablet (40 mg) by mouth daily. 90 tablet 3    pregabalin (LYRICA) 150 MG capsule Take 1 capsule (150 mg) by mouth 2 times daily. 180 capsule 1    QUEtiapine  (SEROQUEL) 25 MG tablet Tale 1 tablet in am and 1 tablet at bedtime 180 tablet 3    sertraline (ZOLOFT) 25 MG tablet Take 1 tablet (25 mg) by mouth daily. 90 tablet 3    simvastatin (ZOCOR) 10 MG tablet TAKE 1 TABLET BY MOUTH ONCE DAILY AT BEDTIME 90 tablet 3    warfarin ANTICOAGULANT (COUMADIN) 2 MG tablet Take 3 mg every Tue, Thu, Sat; 2 mg all other days or As directed by Anticoagulation clinic 115 tablet 0    Ferrous Gluconate 240 (27 Fe) MG TABS Take 1 tablet by mouth 2 times daily (with meals) For restless legs (Patient not taking: Reported on 3/20/2025) 100 tablet 11       Patient Active Problem List   Diagnosis    Status Post Total Knee Replacement - bilateral    TACHO (obstructive sleep apnea)    Hyperlipidemia with target LDL less than 70    Hypertension goal BP (blood pressure) < 140/90    Morbid obesity (H)    Achalasia    Chronic low back pain    Gout    Paroxysmal atrial fibrillation (H)    Long term (current) use of anticoagulants    Anxiety    Spinal stenosis of lumbar region with neurogenic claudication    Glenohumeral arthritis, left    Type 2 diabetes mellitus with diabetic nephropathy, without long-term current use of insulin (H)    Stage 3a chronic kidney disease (H)    Anemia, iron deficiency    Falls frequently    Lewy body dementia, unspecified dementia severity, unspecified whether behavioral, psychotic, or mood disturbance or anxiety (H)    Heart failure, unspecified HF chronicity, unspecified heart failure type (H)    Moderate dementia with mood disturbance, unspecified dementia type (H)    Moderate episode of recurrent major depressive disorder (H)       Past Medical History:   Diagnosis Date    Achalasia 05/02/2018    Adrenal adenoma 06/16/2009    Overview:  Stable on FU MR [place] 3/31/09.  Stable on MRI [place] 3/2015, no further FU needed.     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  Xray foot shows overhanging edges pathognomic for gout.  Sx occurred on thiazides    HTN (hypertension) 12/05/2012    Hyperlipidemia with target LDL less than 70     Hypertension goal BP (blood pressure) < 140/90 01/15/2013    Iron deficiency anemia, unspecified iron deficiency anemia type 11/18/2020    Mild recurrent major depression 06/07/2022    Morbid obesity (H) 01/15/2013    OA (OSTEOARTHRITIS) OF KNEE - right 07/13/2010    TACHO (obstructive sleep apnea) 12/07/2012    Paroxysmal atrial fibrillation (H) 07/11/2018    had ablation procedure 2017; nuclear stress test neg for ischemia in 2017    Spinal stenosis of lumbar region with neurogenic claudication 02/17/2020    Stage 3a chronic kidney disease (H) 11/16/2020    Type 2 diabetes mellitus with diabetic nephropathy, without long-term current use of insulin (H) 11/16/2020       Past Surgical History:   Procedure Laterality Date    AS ESOPHAGOSCOPY, DIAGNOSTIC  07/2019    CARPAL TUNNEL RELEASE RT/LT      CATHETER, ABLATION  2017    for PAF    COLONOSCOPY  07/2019    HC LIG/DIV/EXCIS VARICOSE VEIN      TONSILLECTOMY      ZZC HAND/FINGER SURGERY UNLISTED      Z REMOVAL OF KIDNEY STONE      Z TOTAL KNEE ARTHROPLASTY  07/21/2010    left    ZZC TOTAL KNEE ARTHROPLASTY  12/12/2012    Right       Family History   Problem Relation Age of Onset    Glaucoma No family hx of     Macular Degeneration No family hx of        Social History     Tobacco Use    Smoking status: Never     Passive exposure: Never    Smokeless tobacco: Never   Substance Use Topics    Alcohol use: No         Exam:    Vitals: BP (!) 144/91   Pulse 54   Resp 16   Wt 84.8 kg (187 lb)   SpO2 94%   BMI 39.08 kg/m    BMI: Body mass index is 39.08 kg/m .  Height: Data Unavailable    Constitutional/ general:  Pt is in no apparent distress, appears well-nourished.  Cooperative with history and physical exam.  Seen  with daughter today who acts as .    Psych:  The patient answered questions appropriately.  Normal affect.  Seems to have reasonable expectations, in terms of treatment.     Lungs:  Non labored breathing, non labored speech. No cough.  No audible wheezing. Even, quiet breathing.       Vascular:  positive pedal pulses bilaterally for both the DP.  CFT < 3 sec.  positive ankle edema making it difficult to palpate posterior tibial arteries..  negative pedal hair growth.    Neuro:  Alert and oriented x 3. Coordinated gait.  Light touch sensation is intact to the L4, L5, S1 distributions. No obvious deficits.  No evidence of neurological-based weakness, spasticity, or contracture in the lower extremities.  Monofilament intact in all toes.    Derm: thin and shiny    Musculoskeletal:    Lower extremity muscle strength is normal.  Obvious forefoot or rear foot deformities noted.  Normal range of motion of all forefoot joints.    The right second toe dorsal lateral wound is now healed.   No edema or erythema on right second toe now.      Right heel lateral wound is now healed    Right heel plantar lateral there is an ulcer that in the past measured 19 x 15 mm, 21 x 22 mm, 21 x 22 mm, 15 x 15 mm, 16 x 15 mm, and today measures 13 x 8 mm.  The base is down to the subcutaneous tissue.  It is 80% granulation tissue and 20% fibrotic tissue.  There is no purulence or odor.  There is no erythema edema surrounding this suggestive of infection.                 Component  Ref Range & Units 3 mo ago  (10/4/24) 9 mo ago  (4/17/24) 2 yr ago  (9/13/22) 2 yr ago  (6/7/22) 3 yr ago  (12/23/21) 4 yr ago  (11/18/20) 5 yr ago  (9/20/19)    Estimated Average Glucose  <117 mg/dL 143 High           Hemoglobin A1C  0.0 - 5.6 % 6.6 High  7.7 High  CM 8.0 High  CM 8.2 High  CM 6.7 High  CM 7.0 High  R, CM 6.5 High  R,                Collected 1/24/2025  1:33 PM       Status: Final result       Visible to patient: No (inaccessible in  MyChart)       Dx: Cellulitis of foot, right; Diabetic u...    Specimen Information: Foot, Right; Wound   0 Result Notes  Culture 3+ Staphylococcus aureus Abnormal             Gram Stain Result  Abnormal   3+ Gram positive cocci   1+ Gram positive bacilli   1+ WBC seen                Susceptibility     Staphylococcus aureus     VINCENT     Clindamycin 0.25 ug/mL Susceptible     Daptomycin 0.25 ug/mL Susceptible     Doxycycline <=0.5 ug/mL Susceptible     Erythromycin <=0.25 ug/mL Susceptible     Gentamicin <=0.5 ug/mL Susceptible     Oxacillin <=0.25 ug/mL Susceptible 1     Tetracycline <=1 ug/mL Susceptible     Trimethoprim/Sulfamethoxazole <=0.5/9.5 u... Susceptible     Vancomycin 1 ug/mL Susceptible              1 Oxacillin susceptible isolates are susceptible to cephalosporins (example: cefazolin and cephalexin) and beta lactam combination agents. Oxacillin resistant isolates are resistant to these agents.            A:  Diabetes mellitus with peripheral neuropathy  Right plantar lateral heel decubitus ulcer    P: Discussed heel wound is smaller.  Base is increasingly healthier.  There is no signs of infection.  Will continue cleaning and dressing daily.  Return to clinic in 3 weeks.        Morro Lucero, SUSAN, FACFAS

## 2025-03-20 NOTE — LETTER
3/20/2025      Selvin Rockwell  [address] [facility] [address]      Dear Colleague,    Thank you for referring your patient, Selvin Rockwell, to the [facility]. Please see a copy of my visit note below.    Subjective:    1/24/25   Pt is seen today in consult from Dr. Ho with the c/c of right foot ulceration.  Has noticed a wound on right second toe.  Has had this for several days.  She did wrap a Band-Aid all around this toe.  There is now blistering around it.  They have noticed erythema.  They deny purulence or odor.  She denies fever or chills.  Also has wound on plantar lateral right heel.  Also noticed recently.  She cannot remember any history of trauma.  Cannot remember thermal injury.  She has not gotten this cold.  She denies putting a heating pad on this.  They cannot remember any constant pressure on this.  She is wearing a slip on shoe.  She also has a scab on the lateral portion of her right heel.  This is dry.  They believe it is healing.  These wounds are painful.  She does have diabetes.  States she has some numbness in her feet.  Patient is anticoagulated.    1/28/25 patient returns for right foot ulcers.  States generally foot feeling better.  Has been taking Keflex.  She is wondering about a smaller shoe.  Denies any new erythema edema or drainage.     2/13/25 patient returns for right lower extremity ulcers.  There is still continuing to improve.  They are finished taking the Keflex.  Minimal drainage.  They have no new complaints today.  She is wondering if she can wear her compression stockings again.     3/6/25 patient returns for right lower extremity ulcers.  She is no longer on antibiotic.  Since of last visit the plantar heel eschar has sloughed.  They are concerned about how it looks.  It is more moist now.  She denies any increasing pain.  She denies any purulence or odor.  No new swelling.        3/20/25   patient returns for right lower  extremity ulcers.  She has less pain now.  They state the wound on the lateral calcaneus is healed.  The toe continues to stay healed.  Less pain on plantar heel wound.  Denies no drainage or pain.    ROS:  see above         Allergies   Allergen Reactions     Gabapentin      Rash and itching     Nsaids      Other reaction(s): Gastrointestinal  Former PPI user, EGD 2/2018 showed LA Grade D esophagitis, plus duodenitis and gastritis.  Famotidine on med list but may not have been taking - rx was 3 years old.      Allopurinol Rash     Itchy red rash on feet (stopped immediately - failed to come to clinic for exam).   Itchy red rash on feet (stopped immediately - failed to come to clinic for exam).        Other Environmental Allergy Rash     RASH ALL OVER FOUND IN DR. RAMIREZ DICTATION OF 10/11/11     Sulfa Antibiotics Other (See Comments) and Rash     Per 11-4-13 H&P by Dr. Fei Arias.  RASH ALL OVER FOUND IN DR. RAMIREZ DICTATION OF 10/11/11  BACTRIM-  RASH ALL OVER  Per 11-4-13 H&P by Dr. Fei Arias.  RASH ALL OVER FOUND IN DR. RAMIREZ DICTATION OF 10/11/11  Per 11-4-13 H&P by Dr. Fei Arias.  RASH ALL OVER FOUND IN DR. RAMIREZ DICTATION OF 10/11/11  BACTRIM-  RASH ALL OVER       Trimethoprim Rash       Current Outpatient Medications   Medication Sig Dispense Refill     acetaminophen (TYLENOL) 500 MG tablet Take 500-1,000 mg by mouth every 6 hours as needed for mild pain       blood glucose (NO BRAND SPECIFIED) test strip Use to test blood sugar one time daily or as directed. To accompany: Blood Glucose Monitor Brands: per insurance. 100 strip 6     carvedilol (COREG) 6.25 MG tablet Take 1 tablet (6.25 mg) by mouth 2 times daily. 180 tablet 3     cephALEXin (KEFLEX) 500 MG capsule Take 1 capsule (500 mg) by mouth 3 times daily. 30 capsule 0     isosorbide mononitrate (IMDUR) 30 MG 24 hr tablet TAKE 1 TABLET BY MOUTH ONCE DAILY 90 tablet 3     metFORMIN (GLUCOPHAGE) 1000 MG tablet Take 1 tablet (1,000 mg) by  mouth 2 times daily (with meals). 180 tablet 3     pantoprazole (PROTONIX) 40 MG EC tablet Take 1 tablet (40 mg) by mouth daily. 90 tablet 3     pregabalin (LYRICA) 150 MG capsule Take 1 capsule (150 mg) by mouth 2 times daily. 180 capsule 1     QUEtiapine (SEROQUEL) 25 MG tablet Tale 1 tablet in am and 1 tablet at bedtime 180 tablet 3     sertraline (ZOLOFT) 25 MG tablet Take 1 tablet (25 mg) by mouth daily. 90 tablet 3     simvastatin (ZOCOR) 10 MG tablet TAKE 1 TABLET BY MOUTH ONCE DAILY AT BEDTIME 90 tablet 3     warfarin ANTICOAGULANT (COUMADIN) 2 MG tablet Take 3 mg every Tue, Thu, Sat; 2 mg all other days or As directed by Anticoagulation clinic 115 tablet 0     Ferrous Gluconate 240 (27 Fe) MG TABS Take 1 tablet by mouth 2 times daily (with meals) For restless legs (Patient not taking: Reported on 3/20/2025) 100 tablet 11       Patient Active Problem List   Diagnosis     Status Post Total Knee Replacement - bilateral     TACHO (obstructive sleep apnea)     Hyperlipidemia with target LDL less than 70     Hypertension goal BP (blood pressure) < 140/90     Morbid obesity (H)     Achalasia     Chronic low back pain     Gout     Paroxysmal atrial fibrillation (H)     Long term (current) use of anticoagulants     Anxiety     Spinal stenosis of lumbar region with neurogenic claudication     Glenohumeral arthritis, left     Type 2 diabetes mellitus with diabetic nephropathy, without long-term current use of insulin (H)     Stage 3a chronic kidney disease (H)     Anemia, iron deficiency     Falls frequently     Lewy body dementia, unspecified dementia severity, unspecified whether behavioral, psychotic, or mood disturbance or anxiety (H)     Heart failure, unspecified HF chronicity, unspecified heart failure type (H)     Moderate dementia with mood disturbance, unspecified dementia type (H)     Moderate episode of recurrent major depressive disorder (H)       Past Medical History:   Diagnosis Date     Achalasia  05/02/2018     Adrenal adenoma 06/16/2009    Overview:  Stable on FU MR [place] 3/31/09.  Stable on MRI [place] 3/2015, no further FU needed.      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  Xray foot shows overhanging edges pathognomic for gout.  Sx occurred on thiazides     HTN (hypertension) 12/05/2012     Hyperlipidemia with target LDL less than 70      Hypertension goal BP (blood pressure) < 140/90 01/15/2013     Iron deficiency anemia, unspecified iron deficiency anemia type 11/18/2020     Mild recurrent major depression 06/07/2022     Morbid obesity (H) 01/15/2013     OA (OSTEOARTHRITIS) OF KNEE - right 07/13/2010     TACHO (obstructive sleep apnea) 12/07/2012     Paroxysmal atrial fibrillation (H) 07/11/2018    had ablation procedure 2017; nuclear stress test neg for ischemia in 2017     Spinal stenosis of lumbar region with neurogenic claudication 02/17/2020     Stage 3a chronic kidney disease (H) 11/16/2020     Type 2 diabetes mellitus with diabetic nephropathy, without long-term current use of insulin (H) 11/16/2020       Past Surgical History:   Procedure Laterality Date     AS ESOPHAGOSCOPY, DIAGNOSTIC  07/2019     CARPAL TUNNEL RELEASE RT/LT       CATHETER, ABLATION  2017    for PAF     COLONOSCOPY  07/2019     HC LIG/DIV/EXCIS VARICOSE VEIN       TONSILLECTOMY       ZZC HAND/FINGER SURGERY UNLISTED       Z REMOVAL OF KIDNEY STONE       Z TOTAL KNEE ARTHROPLASTY  07/21/2010    left     ZC TOTAL KNEE ARTHROPLASTY  12/12/2012    Right       Family History   Problem Relation Age of Onset     Glaucoma No family hx of      Macular Degeneration No family hx of        Social History     Tobacco Use     Smoking status: Never     Passive exposure: Never     Smokeless tobacco: Never   Substance Use Topics     Alcohol  use: No         Exam:    Vitals: BP (!) 144/91   Pulse 54   Resp 16   Wt 84.8 kg (187 lb)   SpO2 94%   BMI 39.08 kg/m    BMI: Body mass index is 39.08 kg/m .  Height: Data Unavailable    Constitutional/ general:  Pt is in no apparent distress, appears well-nourished.  Cooperative with history and physical exam.  Seen with daughter today who acts as .    Psych:  The patient answered questions appropriately.  Normal affect.  Seems to have reasonable expectations, in terms of treatment.     Lungs:  Non labored breathing, non labored speech. No cough.  No audible wheezing. Even, quiet breathing.       Vascular:  positive pedal pulses bilaterally for both the DP.  CFT < 3 sec.  positive ankle edema making it difficult to palpate posterior tibial arteries..  negative pedal hair growth.    Neuro:  Alert and oriented x 3. Coordinated gait.  Light touch sensation is intact to the L4, L5, S1 distributions. No obvious deficits.  No evidence of neurological-based weakness, spasticity, or contracture in the lower extremities.  Monofilament intact in all toes.    Derm: thin and shiny    Musculoskeletal:    Lower extremity muscle strength is normal.  Obvious forefoot or rear foot deformities noted.  Normal range of motion of all forefoot joints.    The right second toe dorsal lateral wound is now healed.   No edema or erythema on right second toe now.      Right heel lateral wound is now healed    Right heel plantar lateral there is an ulcer that in the past measured 19 x 15 mm, 21 x 22 mm, 21 x 22 mm, 15 x 15 mm, 16 x 15 mm, and today measures 13 x 8 mm.  The base is down to the subcutaneous tissue.  It is 80% granulation tissue and 20% fibrotic tissue.  There is no purulence or odor.  There is no erythema edema surrounding this suggestive of infection.                 Component  Ref Range & Units 3 mo ago  (10/4/24) 9 mo ago  (4/17/24) 2 yr ago  (9/13/22) 2 yr ago  (6/7/22) 3 yr ago  (12/23/21) 4 yr  ago  (11/18/20) 5 yr ago  (9/20/19)    Estimated Average Glucose  <117 mg/dL 143 High           Hemoglobin A1C  0.0 - 5.6 % 6.6 High  7.7 High  CM 8.0 High  CM 8.2 High  CM 6.7 High  CM 7.0 High  R, CM 6.5 High  R,                Collected 1/24/2025  1:33 PM       Status: Final result       Visible to patient: No (inaccessible in MyChart)       Dx: Cellulitis of foot, right; Diabetic u...    Specimen Information: Foot, Right; Wound   0 Result Notes  Culture 3+ Staphylococcus aureus Abnormal             Gram Stain Result  Abnormal   3+ Gram positive cocci   1+ Gram positive bacilli   1+ WBC seen                Susceptibility     Staphylococcus aureus     VINCENT     Clindamycin 0.25 ug/mL Susceptible     Daptomycin 0.25 ug/mL Susceptible     Doxycycline <=0.5 ug/mL Susceptible     Erythromycin <=0.25 ug/mL Susceptible     Gentamicin <=0.5 ug/mL Susceptible     Oxacillin <=0.25 ug/mL Susceptible 1     Tetracycline <=1 ug/mL Susceptible     Trimethoprim/Sulfamethoxazole <=0.5/9.5 u... Susceptible     Vancomycin 1 ug/mL Susceptible              1 Oxacillin susceptible isolates are susceptible to cephalosporins (example: cefazolin and cephalexin) and beta lactam combination agents. Oxacillin resistant isolates are resistant to these agents.            A:  Diabetes mellitus with peripheral neuropathy  Right plantar lateral heel decubitus ulcer    P: Discussed heel wound is smaller.  Base is increasingly healthier.  There is no signs of infection.  Will continue cleaning and dressing daily.  Return to clinic in 3 weeks.        Morro Lucero DPM, FACFAS              Again, thank you for allowing me to participate in the care of your patient.        Sincerely,        Morro Lucero DPM    Electronically signed

## 2025-04-10 ENCOUNTER — OFFICE VISIT (OUTPATIENT)
Dept: PODIATRY | Facility: CLINIC | Age: 80
End: 2025-04-10
Payer: COMMERCIAL

## 2025-04-10 DIAGNOSIS — L97.411 DIABETIC ULCER OF RIGHT HEEL ASSOCIATED WITH TYPE 2 DIABETES MELLITUS, LIMITED TO BREAKDOWN OF SKIN (H): Primary | ICD-10-CM

## 2025-04-10 DIAGNOSIS — E11.21 TYPE 2 DIABETES MELLITUS WITH DIABETIC NEPHROPATHY, WITHOUT LONG-TERM CURRENT USE OF INSULIN (H): ICD-10-CM

## 2025-04-10 DIAGNOSIS — E11.621 DIABETIC ULCER OF RIGHT HEEL ASSOCIATED WITH TYPE 2 DIABETES MELLITUS, LIMITED TO BREAKDOWN OF SKIN (H): Primary | ICD-10-CM

## 2025-04-10 NOTE — PROGRESS NOTES
Subjective:    1/24/25   Pt is seen today in consult from Dr. Ho with the c/c of right foot ulceration.  Has noticed a wound on right second toe.  Has had this for several days.  She did wrap a Band-Aid all around this toe.  There is now blistering around it.  They have noticed erythema.  They deny purulence or odor.  She denies fever or chills.  Also has wound on plantar lateral right heel.  Also noticed recently.  She cannot remember any history of trauma.  Cannot remember thermal injury.  She has not gotten this cold.  She denies putting a heating pad on this.  They cannot remember any constant pressure on this.  She is wearing a slip on shoe.  She also has a scab on the lateral portion of her right heel.  This is dry.  They believe it is healing.  These wounds are painful.  She does have diabetes.  States she has some numbness in her feet.  Patient is anticoagulated.    1/28/25 patient returns for right foot ulcers.  States generally foot feeling better.  Has been taking Keflex.  She is wondering about a smaller shoe.  Denies any new erythema edema or drainage.     2/13/25 patient returns for right lower extremity ulcers.  There is still continuing to improve.  They are finished taking the Keflex.  Minimal drainage.  They have no new complaints today.  She is wondering if she can wear her compression stockings again.     3/6/25 patient returns for right lower extremity ulcers.  She is no longer on antibiotic.  Since of last visit the plantar heel eschar has sloughed.  They are concerned about how it looks.  It is more moist now.  She denies any increasing pain.  She denies any purulence or odor.  No new swelling.        3/20/25   patient returns for right lower extremity ulcers.  She has less pain now.  They state the wound on the lateral calcaneus is healed.  The toe continues to stay healed.  Less pain on plantar heel wound.  Denies no drainage or pain.    4/10/25    ROS:  see above         Allergies    Allergen Reactions    Gabapentin      Rash and itching    Nsaids      Other reaction(s): Gastrointestinal  Former PPI user, EGD 2/2018 showed LA Grade D esophagitis, plus duodenitis and gastritis.  Famotidine on med list but may not have been taking - rx was 2 years old.     Allopurinol Rash     Itchy red rash on feet (stopped immediately - failed to come to clinic for exam).   Itchy red rash on feet (stopped immediately - failed to come to clinic for exam).       Other Environmental Allergy Rash     RASH ALL OVER FOUND IN DR. RAMIREZ DICTATION OF 10/11/11    Sulfa Antibiotics Other (See Comments) and Rash     Per 11-4-13 H&P by Dr. Fei Arias.  RASH ALL OVER FOUND IN DR. RAMIREZ DICTATION OF 10/11/11  BACTRIM-  RASH ALL OVER  Per 11-4-13 H&P by Dr. Fei Arias.  RASH ALL OVER FOUND IN DR. RAMIREZ DICTATION OF 10/11/11  Per 11-4-13 H&P by Dr. Fei Arias.  RASH ALL OVER FOUND IN DR. RAMIREZ DICTATION OF 10/11/11  BACTRIM-  RASH ALL OVER      Trimethoprim Rash       Current Outpatient Medications   Medication Sig Dispense Refill    acetaminophen (TYLENOL) 500 MG tablet Take 500-1,000 mg by mouth every 6 hours as needed for mild pain      blood glucose (NO BRAND SPECIFIED) test strip Use to test blood sugar one time daily or as directed. To accompany: Blood Glucose Monitor Brands: per insurance. 100 strip 6    carvedilol (COREG) 6.25 MG tablet Take 1 tablet (6.25 mg) by mouth 2 times daily. 180 tablet 3    cephALEXin (KEFLEX) 500 MG capsule Take 1 capsule (500 mg) by mouth 3 times daily. 30 capsule 0    Ferrous Gluconate 240 (27 Fe) MG TABS Take 1 tablet by mouth 2 times daily (with meals) For restless legs (Patient not taking: Reported on 3/20/2025) 100 tablet 11    isosorbide mononitrate (IMDUR) 30 MG 24 hr tablet TAKE 1 TABLET BY MOUTH ONCE DAILY 90 tablet 3    metFORMIN (GLUCOPHAGE) 1000 MG tablet Take 1 tablet (1,000 mg) by mouth 2 times daily (with meals). 180 tablet 3    pantoprazole (PROTONIX) 40 MG  EC tablet Take 1 tablet (40 mg) by mouth daily. 90 tablet 3    pregabalin (LYRICA) 150 MG capsule Take 1 capsule (150 mg) by mouth 2 times daily. 180 capsule 1    QUEtiapine (SEROQUEL) 25 MG tablet Tale 1 tablet in am and 1 tablet at bedtime 180 tablet 3    sertraline (ZOLOFT) 25 MG tablet Take 1 tablet (25 mg) by mouth daily. 90 tablet 3    simvastatin (ZOCOR) 10 MG tablet TAKE 1 TABLET BY MOUTH ONCE DAILY AT BEDTIME 90 tablet 3    warfarin ANTICOAGULANT (COUMADIN) 2 MG tablet Take 3 mg every Tue, Thu, Sat; 2 mg all other days or As directed by Anticoagulation clinic 115 tablet 0       Patient Active Problem List   Diagnosis    Status Post Total Knee Replacement - bilateral    TACHO (obstructive sleep apnea)    Hyperlipidemia with target LDL less than 70    Hypertension goal BP (blood pressure) < 140/90    Morbid obesity (H)    Achalasia    Chronic low back pain    Gout    Paroxysmal atrial fibrillation (H)    Long term (current) use of anticoagulants    Anxiety    Spinal stenosis of lumbar region with neurogenic claudication    Glenohumeral arthritis, left    Type 2 diabetes mellitus with diabetic nephropathy, without long-term current use of insulin (H)    Stage 3a chronic kidney disease (H)    Anemia, iron deficiency    Falls frequently    Lewy body dementia, unspecified dementia severity, unspecified whether behavioral, psychotic, or mood disturbance or anxiety (H)    Heart failure, unspecified HF chronicity, unspecified heart failure type (H)    Moderate dementia with mood disturbance, unspecified dementia type (H)    Moderate episode of recurrent major depressive disorder (H)       Past Medical History:   Diagnosis Date    Achalasia 05/02/2018    Adrenal adenoma 06/16/2009    Overview:  Stable on FU MR [place] 3/31/09.  Stable on MRI [place] 3/2015, no further FU needed.     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  Xray foot shows overhanging edges pathognomic for gout.  Sx occurred on thiazides    HTN (hypertension) 12/05/2012    Hyperlipidemia with target LDL less than 70     Hypertension goal BP (blood pressure) < 140/90 01/15/2013    Iron deficiency anemia, unspecified iron deficiency anemia type 11/18/2020    Mild recurrent major depression 06/07/2022    Morbid obesity (H) 01/15/2013    OA (OSTEOARTHRITIS) OF KNEE - right 07/13/2010    TACHO (obstructive sleep apnea) 12/07/2012    Paroxysmal atrial fibrillation (H) 07/11/2018    had ablation procedure 2017; nuclear stress test neg for ischemia in 2017    Spinal stenosis of lumbar region with neurogenic claudication 02/17/2020    Stage 3a chronic kidney disease (H) 11/16/2020    Type 2 diabetes mellitus with diabetic nephropathy, without long-term current use of insulin (H) 11/16/2020       Past Surgical History:   Procedure Laterality Date    AS ESOPHAGOSCOPY, DIAGNOSTIC  07/2019    CARPAL TUNNEL RELEASE RT/LT      CATHETER, ABLATION  2017    for PAF    COLONOSCOPY  07/2019    [facility] LIG/DIV/EXCIS VARICOSE VEIN      TONSILLECTOMY      [facility] HAND/FINGER SURGERY UNLISTED      [facility] REMOVAL OF KIDNEY STONE      [facility] TOTAL KNEE ARTHROPLASTY  07/21/2010    left    [facility] TOTAL KNEE ARTHROPLASTY  12/12/2012    Right       Family History   Problem Relation Age of Onset    Glaucoma No family hx of     Macular Degeneration No family hx of        Social History     Tobacco Use    Smoking status: Never     Passive exposure: Never    Smokeless tobacco: Never   Substance Use Topics    Alcohol use: No         Exam:    Vitals: There were no vitals taken for this visit.  BMI: There is no height or weight on file to calculate BMI.  Height: Data Unavailable    Constitutional/ general:  Pt is in no apparent distress, appears well-nourished.  Cooperative with history and physical exam.  Seen with  daughter today who acts as .    Psych:  The patient answered questions appropriately.  Normal affect.  Seems to have reasonable expectations, in terms of treatment.     Lungs:  Non labored breathing, non labored speech. No cough.  No audible wheezing. Even, quiet breathing.       Vascular:  positive pedal pulses bilaterally for both the DP.  CFT < 3 sec.  positive ankle edema making it difficult to palpate posterior tibial arteries..  negative pedal hair growth.    Neuro:  Alert and oriented x 3. Coordinated gait.  Light touch sensation is intact to the L4, L5, S1 distributions. No obvious deficits.  No evidence of neurological-based weakness, spasticity, or contracture in the lower extremities.  Monofilament intact in all toes.    Derm: thin and shiny    Musculoskeletal:    Lower extremity muscle strength is normal.  Obvious forefoot or rear foot deformities noted.  Normal range of motion of all forefoot joints.    The right second toe dorsal lateral wound is now healed.   No edema or erythema on right second toe now.      Right heel lateral wound is now healed    Right heel plantar lateral there is an ulcer that in the past measured 19 x 15 mm, 21 x 22 mm, 21 x 22 mm, 15 x 15 mm, 16 x 15 mm, 13 x 8 mm and today measures 6 x 5 mm.  The base is down to the subcutaneous tissue.  Please somewhat dark discolored today almost as if was walking without dressings on foot and dirt got in wound.  There is no purulence or odor.  There is no erythema edema surrounding this suggestive of infection.                 Component  Ref Range & Units 3 mo ago  (10/4/24) 9 mo ago  (4/17/24) 2 yr ago  (9/13/22) 2 yr ago  (6/7/22) 3 yr ago  (12/23/21) 4 yr ago  (11/18/20) 5 yr ago  (9/20/19)    Estimated Average Glucose  <117 mg/dL 143 High           Hemoglobin A1C  0.0 - 5.6 % 6.6 High  7.7 High  CM 8.0 High  CM 8.2 High  CM 6.7 High  CM 7.0 High  R, CM 6.5 High  R,                Collected 1/24/2025  1:33 PM       Status:  Final result       Visible to patient: No (inaccessible in MyChart)       Dx: Cellulitis of foot, right; Diabetic u...    Specimen Information: Foot, Right; Wound   0 Result Notes  Culture 3+ Staphylococcus aureus Abnormal             Gram Stain Result  Abnormal   3+ Gram positive cocci   1+ Gram positive bacilli   1+ WBC seen                Susceptibility     Staphylococcus aureus     VINCENT     Clindamycin 0.25 ug/mL Susceptible     Daptomycin 0.25 ug/mL Susceptible     Doxycycline <=0.5 ug/mL Susceptible     Erythromycin <=0.25 ug/mL Susceptible     Gentamicin <=0.5 ug/mL Susceptible     Oxacillin <=0.25 ug/mL Susceptible 1     Tetracycline <=1 ug/mL Susceptible     Trimethoprim/Sulfamethoxazole <=0.5/9.5 u... Susceptible     Vancomycin 1 ug/mL Susceptible              1 Oxacillin susceptible isolates are susceptible to cephalosporins (example: cefazolin and cephalexin) and beta lactam combination agents. Oxacillin resistant isolates are resistant to these agents.            A:  Diabetes mellitus with peripheral neuropathy  Right plantar lateral heel decubitus ulcer    P: Discussed heel wound is smaller.  Base somewhat darker today.  We gently try to remove some of this.  We dressed with Band-Aids.  Will continue to clean and dress daily.  Gave more Medihoney.  Will prescription for diabetic shoes and inserts so she has good shoe to wear once all wounds healed.  Will return to clinic in 2 weeks to ensure improving.          Morro Lucero, SUSAN, FACFAS

## 2025-04-10 NOTE — PATIENT INSTRUCTIONS
We wish you continued good healing. If you have any questions or concerns, please do not hesitate to contact us at  [phone]    [facility] (secure e-mail communication and access to your chart) to send a message or to make an appointment.    Please remember to call and schedule a follow up appointment if one was recommended at your earliest convenience.     PODIATRY CLINIC HOURS  TELEPHONE NUMBER    Dr. Morro ARTPSARAH FACFAS        Clinics:  [facility]   [place]  Tuesday 1PM-6PM  [place]  Wednesday 745AM-330PM  [place]  Monday 2nd,4th  830AM-4PM  Thursday/Friday 745AM-230PM     [place] APPOINTMENTS  [phone]    [place] APPOINTMENTS  [phone]          If you need a medication refill, please contact us you may need lab work and/or a follow up visit prior to your refill (i.e. Antifungal medications).  If MRI needed please call Imaging at [phone]   HOW DO I GET MY KNEE SCOOTER? Knee scooters can be picked up at ANY Medical Supply stores with your knee scooter Prescription.  OR  Bring your signed prescription to an [facility] showroom.   Set up an appointment for your custom Orthotics. Call any Orthotics locations call [phone]

## 2025-04-10 NOTE — LETTER
4/10/2025      Selvin Rockwell  [address] [facility] [address]      Dear Colleague,    Thank you for referring your patient, Selvin Rockwell, to the [facility]. Please see a copy of my visit note below.    Subjective:    1/24/25   Pt is seen today in consult from Dr. Ho with the c/c of right foot ulceration.  Has noticed a wound on right second toe.  Has had this for several days.  She did wrap a Band-Aid all around this toe.  There is now blistering around it.  They have noticed erythema.  They deny purulence or odor.  She denies fever or chills.  Also has wound on plantar lateral right heel.  Also noticed recently.  She cannot remember any history of trauma.  Cannot remember thermal injury.  She has not gotten this cold.  She denies putting a heating pad on this.  They cannot remember any constant pressure on this.  She is wearing a slip on shoe.  She also has a scab on the lateral portion of her right heel.  This is dry.  They believe it is healing.  These wounds are painful.  She does have diabetes.  States she has some numbness in her feet.  Patient is anticoagulated.    1/28/25 patient returns for right foot ulcers.  States generally foot feeling better.  Has been taking Keflex.  She is wondering about a smaller shoe.  Denies any new erythema edema or drainage.     2/13/25 patient returns for right lower extremity ulcers.  There is still continuing to improve.  They are finished taking the Keflex.  Minimal drainage.  They have no new complaints today.  She is wondering if she can wear her compression stockings again.     3/6/25 patient returns for right lower extremity ulcers.  She is no longer on antibiotic.  Since of last visit the plantar heel eschar has sloughed.  They are concerned about how it looks.  It is more moist now.  She denies any increasing pain.  She denies any purulence or odor.  No new swelling.        3/20/25   patient returns for right lower  extremity ulcers.  She has less pain now.  They state the wound on the lateral calcaneus is healed.  The toe continues to stay healed.  Less pain on plantar heel wound.  Denies no drainage or pain.    4/10/25    ROS:  see above         Allergies   Allergen Reactions     Gabapentin      Rash and itching     Nsaids      Other reaction(s): Gastrointestinal  Former PPI user, EGD 2/2018 showed LA Grade D esophagitis, plus duodenitis and gastritis.  Famotidine on med list but may not have been taking - rx was 3 years old.      Allopurinol Rash     Itchy red rash on feet (stopped immediately - failed to come to clinic for exam).   Itchy red rash on feet (stopped immediately - failed to come to clinic for exam).        Other Environmental Allergy Rash     RASH ALL OVER FOUND IN DR. RAMIREZ DICTATION OF 10/11/11     Sulfa Antibiotics Other (See Comments) and Rash     Per 11-4-13 H&P by Dr. Fei Arias.  RASH ALL OVER FOUND IN DR. RAMIREZ DICTATION OF 10/11/11  BACTRIM-  RASH ALL OVER  Per 11-4-13 H&P by Dr. Fei Arias.  RASH ALL OVER FOUND IN DR. RAMIREZ DICTATION OF 10/11/11  Per 11-4-13 H&P by Dr. Fei Arias.  RASH ALL OVER FOUND IN DR. RAMIREZ DICTATION OF 10/11/11  BACTRIM-  RASH ALL OVER       Trimethoprim Rash       Current Outpatient Medications   Medication Sig Dispense Refill     acetaminophen (TYLENOL) 500 MG tablet Take 500-1,000 mg by mouth every 6 hours as needed for mild pain       blood glucose (NO BRAND SPECIFIED) test strip Use to test blood sugar one time daily or as directed. To accompany: Blood Glucose Monitor Brands: per insurance. 100 strip 6     carvedilol (COREG) 6.25 MG tablet Take 1 tablet (6.25 mg) by mouth 2 times daily. 180 tablet 3     cephALEXin (KEFLEX) 500 MG capsule Take 1 capsule (500 mg) by mouth 3 times daily. 30 capsule 0     Ferrous Gluconate 240 (27 Fe) MG TABS Take 1 tablet by mouth 2 times daily (with meals) For restless legs (Patient not taking: Reported on 3/20/2025) 100  tablet 11     isosorbide mononitrate (IMDUR) 30 MG 24 hr tablet TAKE 1 TABLET BY MOUTH ONCE DAILY 90 tablet 3     metFORMIN (GLUCOPHAGE) 1000 MG tablet Take 1 tablet (1,000 mg) by mouth 2 times daily (with meals). 180 tablet 3     pantoprazole (PROTONIX) 40 MG EC tablet Take 1 tablet (40 mg) by mouth daily. 90 tablet 3     pregabalin (LYRICA) 150 MG capsule Take 1 capsule (150 mg) by mouth 2 times daily. 180 capsule 1     QUEtiapine (SEROQUEL) 25 MG tablet Tale 1 tablet in am and 1 tablet at bedtime 180 tablet 3     sertraline (ZOLOFT) 25 MG tablet Take 1 tablet (25 mg) by mouth daily. 90 tablet 3     simvastatin (ZOCOR) 10 MG tablet TAKE 1 TABLET BY MOUTH ONCE DAILY AT BEDTIME 90 tablet 3     warfarin ANTICOAGULANT (COUMADIN) 2 MG tablet Take 3 mg every Tue, Thu, Sat; 2 mg all other days or As directed by Anticoagulation clinic 115 tablet 0       Patient Active Problem List   Diagnosis     Status Post Total Knee Replacement - bilateral     TACHO (obstructive sleep apnea)     Hyperlipidemia with target LDL less than 70     Hypertension goal BP (blood pressure) < 140/90     Morbid obesity (H)     Achalasia     Chronic low back pain     Gout     Paroxysmal atrial fibrillation (H)     Long term (current) use of anticoagulants     Anxiety     Spinal stenosis of lumbar region with neurogenic claudication     Glenohumeral arthritis, left     Type 2 diabetes mellitus with diabetic nephropathy, without long-term current use of insulin (H)     Stage 3a chronic kidney disease (H)     Anemia, iron deficiency     Falls frequently     Lewy body dementia, unspecified dementia severity, unspecified whether behavioral, psychotic, or mood disturbance or anxiety (H)     Heart failure, unspecified HF chronicity, unspecified heart failure type (H)     Moderate dementia with mood disturbance, unspecified dementia type (H)     Moderate episode of recurrent major depressive disorder (H)       Past Medical History:   Diagnosis Date      Achalasia 05/02/2018     Adrenal adenoma 06/16/2009    Overview:  Stable on FU MR [place] 3/31/09.  Stable on MRI [place] 3/2015, no further FU needed.      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  Xray foot shows overhanging edges pathognomic for gout.  Sx occurred on thiazides     HTN (hypertension) 12/05/2012     Hyperlipidemia with target LDL less than 70      Hypertension goal BP (blood pressure) < 140/90 01/15/2013     Iron deficiency anemia, unspecified iron deficiency anemia type 11/18/2020     Mild recurrent major depression 06/07/2022     Morbid obesity (H) 01/15/2013     OA (OSTEOARTHRITIS) OF KNEE - right 07/13/2010     TACHO (obstructive sleep apnea) 12/07/2012     Paroxysmal atrial fibrillation (H) 07/11/2018    had ablation procedure 2017; nuclear stress test neg for ischemia in 2017     Spinal stenosis of lumbar region with neurogenic claudication 02/17/2020     Stage 3a chronic kidney disease (H) 11/16/2020     Type 2 diabetes mellitus with diabetic nephropathy, without long-term current use of insulin (H) 11/16/2020       Past Surgical History:   Procedure Laterality Date     AS ESOPHAGOSCOPY, DIAGNOSTIC  07/2019     CARPAL TUNNEL RELEASE RT/LT       CATHETER, ABLATION  2017    for PAF     COLONOSCOPY  07/2019     HC LIG/DIV/EXCIS VARICOSE VEIN       TONSILLECTOMY       ZZC HAND/FINGER SURGERY UNLISTED       ZZC REMOVAL OF KIDNEY STONE       ZZC TOTAL KNEE ARTHROPLASTY  07/21/2010    left     ZZC TOTAL KNEE ARTHROPLASTY  12/12/2012    Right       Family History   Problem Relation Age of Onset     Glaucoma No family hx of      Macular Degeneration No family hx of        Social History     Tobacco Use     Smoking status: Never     Passive exposure: Never     Smokeless tobacco: Never   Substance Use Topics      Alcohol use: No         Exam:    Vitals: There were no vitals taken for this visit.  BMI: There is no height or weight on file to calculate BMI.  Height: Data Unavailable    Constitutional/ general:  Pt is in no apparent distress, appears well-nourished.  Cooperative with history and physical exam.  Seen with daughter today who acts as .    Psych:  The patient answered questions appropriately.  Normal affect.  Seems to have reasonable expectations, in terms of treatment.     Lungs:  Non labored breathing, non labored speech. No cough.  No audible wheezing. Even, quiet breathing.       Vascular:  positive pedal pulses bilaterally for both the DP.  CFT < 3 sec.  positive ankle edema making it difficult to palpate posterior tibial arteries..  negative pedal hair growth.    Neuro:  Alert and oriented x 3. Coordinated gait.  Light touch sensation is intact to the L4, L5, S1 distributions. No obvious deficits.  No evidence of neurological-based weakness, spasticity, or contracture in the lower extremities.  Monofilament intact in all toes.    Derm: thin and shiny    Musculoskeletal:    Lower extremity muscle strength is normal.  Obvious forefoot or rear foot deformities noted.  Normal range of motion of all forefoot joints.    The right second toe dorsal lateral wound is now healed.   No edema or erythema on right second toe now.      Right heel lateral wound is now healed    Right heel plantar lateral there is an ulcer that in the past measured 19 x 15 mm, 21 x 22 mm, 21 x 22 mm, 15 x 15 mm, 16 x 15 mm, 13 x 8 mm and today measures 6 x 5 mm.  The base is down to the subcutaneous tissue.  Please somewhat dark discolored today almost as if was walking without dressings on foot and dirt got in wound.  There is no purulence or odor.  There is no erythema edema surrounding this suggestive of infection.                 Component  Ref Range & Units 3 mo ago  (10/4/24) 9 mo ago  (4/17/24) 2 yr ago  (9/13/22) 2 yr  ago  (6/7/22) 3 yr ago  (12/23/21) 4 yr ago  (11/18/20) 5 yr ago  (9/20/19)    Estimated Average Glucose  <117 mg/dL 143 High           Hemoglobin A1C  0.0 - 5.6 % 6.6 High  7.7 High  CM 8.0 High  CM 8.2 High  CM 6.7 High  CM 7.0 High  R, CM 6.5 High  R,                Collected 1/24/2025  1:33 PM       Status: Final result       Visible to patient: No (inaccessible in MyChart)       Dx: Cellulitis of foot, right; Diabetic u...    Specimen Information: Foot, Right; Wound   0 Result Notes  Culture 3+ Staphylococcus aureus Abnormal             Gram Stain Result  Abnormal   3+ Gram positive cocci   1+ Gram positive bacilli   1+ WBC seen                Susceptibility     Staphylococcus aureus     VINCENT     Clindamycin 0.25 ug/mL Susceptible     Daptomycin 0.25 ug/mL Susceptible     Doxycycline <=0.5 ug/mL Susceptible     Erythromycin <=0.25 ug/mL Susceptible     Gentamicin <=0.5 ug/mL Susceptible     Oxacillin <=0.25 ug/mL Susceptible 1     Tetracycline <=1 ug/mL Susceptible     Trimethoprim/Sulfamethoxazole <=0.5/9.5 u... Susceptible     Vancomycin 1 ug/mL Susceptible              1 Oxacillin susceptible isolates are susceptible to cephalosporins (example: cefazolin and cephalexin) and beta lactam combination agents. Oxacillin resistant isolates are resistant to these agents.            A:  Diabetes mellitus with peripheral neuropathy  Right plantar lateral heel decubitus ulcer    P: Discussed heel wound is smaller.  Base somewhat darker today.  We gently try to remove some of this.  We dressed with Band-Aids.  Will continue to clean and dress daily.  Gave more Medihoney.  Will prescription for diabetic shoes and inserts so she has good shoe to wear once all wounds healed.  Will return to clinic in 2 weeks to ensure improving.          Morro Lucero DPM, FACFAS              Again, thank you for allowing me to participate in the care of your patient.        Sincerely,        Morro Lucero DPM    Electronically signed

## 2025-04-24 ENCOUNTER — TELEPHONE (OUTPATIENT)
Dept: ANTICOAGULATION | Facility: CLINIC | Age: 80
End: 2025-04-24

## 2025-04-24 ENCOUNTER — OFFICE VISIT (OUTPATIENT)
Dept: PODIATRY | Facility: CLINIC | Age: 80
End: 2025-04-24
Payer: COMMERCIAL

## 2025-04-24 DIAGNOSIS — L97.411 DIABETIC ULCER OF RIGHT HEEL ASSOCIATED WITH TYPE 2 DIABETES MELLITUS, LIMITED TO BREAKDOWN OF SKIN (H): Primary | ICD-10-CM

## 2025-04-24 DIAGNOSIS — E11.621 DIABETIC ULCER OF RIGHT HEEL ASSOCIATED WITH TYPE 2 DIABETES MELLITUS, LIMITED TO BREAKDOWN OF SKIN (H): Primary | ICD-10-CM

## 2025-04-24 DIAGNOSIS — E11.21 TYPE 2 DIABETES MELLITUS WITH DIABETIC NEPHROPATHY, WITHOUT LONG-TERM CURRENT USE OF INSULIN (H): ICD-10-CM

## 2025-04-24 NOTE — PROGRESS NOTES
Subjective:    1/24/25   Pt is seen today in consult from Dr. Ho with the c/c of right foot ulceration.  Has noticed a wound on right second toe.  Has had this for several days.  She did wrap a Band-Aid all around this toe.  There is now blistering around it.  They have noticed erythema.  They deny purulence or odor.  She denies fever or chills.  Also has wound on plantar lateral right heel.  Also noticed recently.  She cannot remember any history of trauma.  Cannot remember thermal injury.  She has not gotten this cold.  She denies putting a heating pad on this.  They cannot remember any constant pressure on this.  She is wearing a slip on shoe.  She also has a scab on the lateral portion of her right heel.  This is dry.  They believe it is healing.  These wounds are painful.  She does have diabetes.  States she has some numbness in her feet.  Patient is anticoagulated.    1/28/25 patient returns for right foot ulcers.  States generally foot feeling better.  Has been taking Keflex.  She is wondering about a smaller shoe.  Denies any new erythema edema or drainage.     2/13/25 patient returns for right lower extremity ulcers.  There is still continuing to improve.  They are finished taking the Keflex.  Minimal drainage.  They have no new complaints today.  She is wondering if she can wear her compression stockings again.     3/6/25 patient returns for right lower extremity ulcers.  She is no longer on antibiotic.  Since of last visit the plantar heel eschar has sloughed.  They are concerned about how it looks.  It is more moist now.  She denies any increasing pain.  She denies any purulence or odor.  No new swelling.        3/20/25   patient returns for right lower extremity ulcers.  She has less pain now.  They state the wound on the lateral calcaneus is healed.  The toe continues to stay healed.  Less pain on plantar heel wound.  Denies no drainage or pain.    4/10/25 patient returns for right heel ulcer.   Very minimal pain now.  No drainage purulence or odor.  She is just wearing a slip on shoe in the house now.  She likes these because she has unsteady gait and she is worried about falling.    ROS:  see above         Allergies   Allergen Reactions    Gabapentin      Rash and itching    Nsaids      Other reaction(s): Gastrointestinal  Former PPI user, EGD 2/2018 showed LA Grade D esophagitis, plus duodenitis and gastritis.  Famotidine on med list but may not have been taking - rx was 3 years old.     Allopurinol Rash     Itchy red rash on feet (stopped immediately - failed to come to clinic for exam).   Itchy red rash on feet (stopped immediately - failed to come to clinic for exam).       Other Environmental Allergy Rash     RASH ALL OVER FOUND IN DR. RAMIREZ DICTATION OF 10/11/11    Sulfa Antibiotics Other (See Comments) and Rash     Per 11-4-13 H&P by Dr. Fei Arias.  RASH ALL OVER FOUND IN DR. RAMIREZ DICTATION OF 10/11/11  BACTRIM-  RASH ALL OVER  Per 11-4-13 H&P by Dr. Fei Arias.  RASH ALL OVER FOUND IN DR. RAMIREZ DICTATION OF 10/11/11  Per 11-4-13 H&P by Dr. Fei Arias.  RASH ALL OVER FOUND IN DR. RAMIREZ DICTATION OF 10/11/11  BACTRIM-  RASH ALL OVER      Trimethoprim Rash       Current Outpatient Medications   Medication Sig Dispense Refill    acetaminophen (TYLENOL) 500 MG tablet Take 500-1,000 mg by mouth every 6 hours as needed for mild pain      blood glucose (NO BRAND SPECIFIED) test strip Use to test blood sugar one time daily or as directed. To accompany: Blood Glucose Monitor Brands: per insurance. 100 strip 6    carvedilol (COREG) 6.25 MG tablet Take 1 tablet (6.25 mg) by mouth 2 times daily. 180 tablet 3    isosorbide mononitrate (IMDUR) 30 MG 24 hr tablet TAKE 1 TABLET BY MOUTH ONCE DAILY 90 tablet 3    metFORMIN (GLUCOPHAGE) 1000 MG tablet Take 1 tablet (1,000 mg) by mouth 2 times daily (with meals). 180 tablet 3    pantoprazole (PROTONIX) 40 MG EC tablet Take 1 tablet (40 mg) by  mouth daily. 90 tablet 3    pregabalin (LYRICA) 150 MG capsule Take 1 capsule (150 mg) by mouth 2 times daily. 180 capsule 1    QUEtiapine (SEROQUEL) 25 MG tablet Tale 1 tablet in am and 1 tablet at bedtime 180 tablet 3    sertraline (ZOLOFT) 25 MG tablet Take 1 tablet (25 mg) by mouth daily. 90 tablet 3    simvastatin (ZOCOR) 10 MG tablet TAKE 1 TABLET BY MOUTH ONCE DAILY AT BEDTIME 90 tablet 3    warfarin ANTICOAGULANT (COUMADIN) 2 MG tablet Take 3 mg every Tue, Thu, Sat; 2 mg all other days or As directed by Anticoagulation clinic 115 tablet 0    Ferrous Gluconate 240 (27 Fe) MG TABS Take 1 tablet by mouth 2 times daily (with meals) For restless legs (Patient not taking: Reported on 1/22/2025) 100 tablet 11       Patient Active Problem List   Diagnosis    Status Post Total Knee Replacement - bilateral    TACHO (obstructive sleep apnea)    Hyperlipidemia with target LDL less than 70    Hypertension goal BP (blood pressure) < 140/90    Morbid obesity (H)    Achalasia    Chronic low back pain    Gout    Paroxysmal atrial fibrillation (H)    Long term (current) use of anticoagulants    Anxiety    Spinal stenosis of lumbar region with neurogenic claudication    Glenohumeral arthritis, left    Type 2 diabetes mellitus with diabetic nephropathy, without long-term current use of insulin (H)    Stage 3a chronic kidney disease (H)    Anemia, iron deficiency    Falls frequently    Lewy body dementia, unspecified dementia severity, unspecified whether behavioral, psychotic, or mood disturbance or anxiety (H)    Heart failure, unspecified HF chronicity, unspecified heart failure type (H)    Moderate dementia with mood disturbance, unspecified dementia type (H)    Moderate episode of recurrent major depressive disorder (H)       Past Medical History:   Diagnosis Date    Achalasia 05/02/2018    Adrenal adenoma 06/16/2009    Overview:  Stable on FU MR [place] 3/31/09.  Stable on MRI [place] 3/2015, no further FU needed.     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  Xray foot shows overhanging edges pathognomic for gout.  Sx occurred on thiazides    HTN (hypertension) 12/05/2012    Hyperlipidemia with target LDL less than 70     Hypertension goal BP (blood pressure) < 140/90 01/15/2013    Iron deficiency anemia, unspecified iron deficiency anemia type 11/18/2020    Mild recurrent major depression 06/07/2022    Morbid obesity (H) 01/15/2013    OA (OSTEOARTHRITIS) OF KNEE - right 07/13/2010    TACHO (obstructive sleep apnea) 12/07/2012    Paroxysmal atrial fibrillation (H) 07/11/2018    had ablation procedure 2017; nuclear stress test neg for ischemia in 2017    Spinal stenosis of lumbar region with neurogenic claudication 02/17/2020    Stage 3a chronic kidney disease (H) 11/16/2020    Type 2 diabetes mellitus with diabetic nephropathy, without long-term current use of insulin (H) 11/16/2020       Past Surgical History:   Procedure Laterality Date    AS ESOPHAGOSCOPY, DIAGNOSTIC  07/2019    CARPAL TUNNEL RELEASE RT/LT      CATHETER, ABLATION  2017    for PAF    COLONOSCOPY  07/2019    HC LIG/DIV/EXCIS VARICOSE VEIN      TONSILLECTOMY      ZZC HAND/FINGER SURGERY UNLISTED      Z REMOVAL OF KIDNEY STONE      Z TOTAL KNEE ARTHROPLASTY  07/21/2010    left    Z TOTAL KNEE ARTHROPLASTY  12/12/2012    Right       Family History   Problem Relation Age of Onset    Glaucoma No family hx of     Macular Degeneration No family hx of        Social History     Tobacco Use    Smoking status: Never     Passive exposure: Never    Smokeless tobacco: Never   Substance Use Topics    Alcohol use: No         Exam:    Vitals: There were no vitals taken for this visit.  BMI: There is no height or weight on file to calculate BMI.  Height: Data Unavailable    Constitutional/  general:  Pt is in no apparent distress, appears well-nourished.  Cooperative with history and physical exam.  Seen with daughter today who acts as .    Psych:  The patient answered questions appropriately.  Normal affect.  Seems to have reasonable expectations, in terms of treatment.     Lungs:  Non labored breathing, non labored speech. No cough.  No audible wheezing. Even, quiet breathing.       Vascular:  positive pedal pulses bilaterally for both the DP.  CFT < 3 sec.  positive ankle edema making it difficult to palpate posterior tibial arteries..  negative pedal hair growth.    Neuro:  Alert and oriented x 3. Coordinated gait.  Light touch sensation is intact to the L4, L5, S1 distributions. No obvious deficits.  No evidence of neurological-based weakness, spasticity, or contracture in the lower extremities.  Monofilament intact in all toes.    Derm: thin and shiny    Musculoskeletal:    Lower extremity muscle strength is normal.  Obvious forefoot or rear foot deformities noted.  Normal range of motion of all forefoot joints.    The right second toe dorsal lateral wound is now healed.   No edema or erythema on right second toe now.      Right heel lateral wound is now healed    Right heel plantar lateral there is an ulcer that in the past measured 19 x 15 mm, 21 x 22 mm, 21 x 22 mm, 15 x 15 mm, 16 x 15 mm, 13 x 8 mm, 6 x 5 mm, and today measures 6 x 6 mm.  The base is down to the subcutaneous tissue.  Please somewhat dark discolored today and we note 1 or 2 pieces of gravel in the wound along with other debris.  There is no purulence or odor.  There is no erythema edema surrounding this suggestive of infection.                 Component  Ref Range & Units 3 mo ago  (10/4/24) 9 mo ago  (4/17/24) 2 yr ago  (9/13/22) 2 yr ago  (6/7/22) 3 yr ago  (12/23/21) 4 yr ago  (11/18/20) 5 yr ago  (9/20/19)    Estimated Average Glucose  <117 mg/dL 143 High           Hemoglobin A1C  0.0 - 5.6 % 6.6 High  7.7 High   CM 8.0 High  CM 8.2 High  CM 6.7 High  CM 7.0 High  R, CM 6.5 High  R,                Collected 1/24/2025  1:33 PM       Status: Final result       Visible to patient: No (inaccessible in MyChart)       Dx: Cellulitis of foot, right; Diabetic u...    Specimen Information: Foot, Right; Wound   0 Result Notes  Culture 3+ Staphylococcus aureus Abnormal             Gram Stain Result  Abnormal   3+ Gram positive cocci   1+ Gram positive bacilli   1+ WBC seen                Susceptibility     Staphylococcus aureus     VINCENT     Clindamycin 0.25 ug/mL Susceptible     Daptomycin 0.25 ug/mL Susceptible     Doxycycline <=0.5 ug/mL Susceptible     Erythromycin <=0.25 ug/mL Susceptible     Gentamicin <=0.5 ug/mL Susceptible     Oxacillin <=0.25 ug/mL Susceptible 1     Tetracycline <=1 ug/mL Susceptible     Trimethoprim/Sulfamethoxazole <=0.5/9.5 u... Susceptible     Vancomycin 1 ug/mL Susceptible              1 Oxacillin susceptible isolates are susceptible to cephalosporins (example: cefazolin and cephalexin) and beta lactam combination agents. Oxacillin resistant isolates are resistant to these agents.            A:  Diabetes mellitus with peripheral neuropathy  Right plantar lateral heel decubitus ulcer    P: Discussed heel wound is unchanged in size and there is debris and hair.  We remove this.  Her shoe is somewhat filthy.  Will get a new pair of shoes that are clean.  Continue dressing and cleaning daily.  For additional offloading we gave her a silicone heel cup and trim this out to take pressure off wound.  Will return to clinic in 2 weeks to ensure improving.          Morro Lucero, SUSAN, FACFAS

## 2025-04-24 NOTE — PATIENT INSTRUCTIONS
We wish you continued good healing. If you have any questions or concerns, please do not hesitate to contact us at  [phone]    [employer] (secure e-mail communication and access to your chart) to send a message or to make an appointment.    Please remember to call and schedule a follow up appointment if one was recommended at your earliest convenience.     PODIATRY CLINIC HOURS  TELEPHONE NUMBER    Dr. Morro ARTPSARAH FACFAS        Clinics:  [facility]   [place]  Tuesday 1PM-6PM  [place]  Wednesday 745AM-330PM  [place]  Monday 2nd,4th  830AM-4PM  Thursday/Friday 745AM-230PM     [place] APPOINTMENTS  [phone]    [place] APPOINTMENTS  [phone]          If you need a medication refill, please contact us you may need lab work and/or a follow up visit prior to your refill (i.e. Antifungal medications).  If MRI needed please call Imaging at [phone]   HOW DO I GET MY KNEE SCOOTER? Knee scooters can be picked up at ANY Medical Supply stores with your knee scooter Prescription.  OR  Bring your signed prescription to an [facility] showroom.   Set up an appointment for your custom Orthotics. Call any Orthotics locations call [phone]

## 2025-04-24 NOTE — LETTER
4/24/2025      Selvin Rockwell  [address] [facility] [address]      Dear Colleague,    Thank you for referring your patient, Selvin Rockwell, to the [facility]. Please see a copy of my visit note below.    Subjective:    1/24/25   Pt is seen today in consult from Dr. Ho with the c/c of right foot ulceration.  Has noticed a wound on right second toe.  Has had this for several days.  She did wrap a Band-Aid all around this toe.  There is now blistering around it.  They have noticed erythema.  They deny purulence or odor.  She denies fever or chills.  Also has wound on plantar lateral right heel.  Also noticed recently.  She cannot remember any history of trauma.  Cannot remember thermal injury.  She has not gotten this cold.  She denies putting a heating pad on this.  They cannot remember any constant pressure on this.  She is wearing a slip on shoe.  She also has a scab on the lateral portion of her right heel.  This is dry.  They believe it is healing.  These wounds are painful.  She does have diabetes.  States she has some numbness in her feet.  Patient is anticoagulated.    1/28/25 patient returns for right foot ulcers.  States generally foot feeling better.  Has been taking Keflex.  She is wondering about a smaller shoe.  Denies any new erythema edema or drainage.     2/13/25 patient returns for right lower extremity ulcers.  There is still continuing to improve.  They are finished taking the Keflex.  Minimal drainage.  They have no new complaints today.  She is wondering if she can wear her compression stockings again.     3/6/25 patient returns for right lower extremity ulcers.  She is no longer on antibiotic.  Since of last visit the plantar heel eschar has sloughed.  They are concerned about how it looks.  It is more moist now.  She denies any increasing pain.  She denies any purulence or odor.  No new swelling.        3/20/25   patient returns for right lower  extremity ulcers.  She has less pain now.  They state the wound on the lateral calcaneus is healed.  The toe continues to stay healed.  Less pain on plantar heel wound.  Denies no drainage or pain.    4/10/25 patient returns for right heel ulcer.  Very minimal pain now.  No drainage purulence or odor.  She is just wearing a slip on shoe in the house now.  She likes these because she has unsteady gait and she is worried about falling.    ROS:  see above         Allergies   Allergen Reactions     Gabapentin      Rash and itching     Nsaids      Other reaction(s): Gastrointestinal  Former PPI user, EGD 2/2018 showed LA Grade D esophagitis, plus duodenitis and gastritis.  Famotidine on med list but may not have been taking - rx was 3 years old.      Allopurinol Rash     Itchy red rash on feet (stopped immediately - failed to come to clinic for exam).   Itchy red rash on feet (stopped immediately - failed to come to clinic for exam).        Other Environmental Allergy Rash     RASH ALL OVER FOUND IN DR. RAMIREZ DICTATION OF 10/11/11     Sulfa Antibiotics Other (See Comments) and Rash     Per 11-4-13 H&P by Dr. Fei Arias.  RASH ALL OVER FOUND IN DR. RAMIREZ DICTATION OF 10/11/11  BACTRIM-  RASH ALL OVER  Per 11-4-13 H&P by Dr. Fei Arias.  RASH ALL OVER FOUND IN DR. RAMIREZ DICTATION OF 10/11/11  Per 11-4-13 H&P by Dr. Fei Arias.  RASH ALL OVER FOUND IN DR. RAMIREZ DICTATION OF 10/11/11  BACTRIM-  RASH ALL OVER       Trimethoprim Rash       Current Outpatient Medications   Medication Sig Dispense Refill     acetaminophen (TYLENOL) 500 MG tablet Take 500-1,000 mg by mouth every 6 hours as needed for mild pain       blood glucose (NO BRAND SPECIFIED) test strip Use to test blood sugar one time daily or as directed. To accompany: Blood Glucose Monitor Brands: per insurance. 100 strip 6     carvedilol (COREG) 6.25 MG tablet Take 1 tablet (6.25 mg) by mouth 2 times daily. 180 tablet 3     isosorbide mononitrate  (IMDUR) 30 MG 24 hr tablet TAKE 1 TABLET BY MOUTH ONCE DAILY 90 tablet 3     metFORMIN (GLUCOPHAGE) 1000 MG tablet Take 1 tablet (1,000 mg) by mouth 2 times daily (with meals). 180 tablet 3     pantoprazole (PROTONIX) 40 MG EC tablet Take 1 tablet (40 mg) by mouth daily. 90 tablet 3     pregabalin (LYRICA) 150 MG capsule Take 1 capsule (150 mg) by mouth 2 times daily. 180 capsule 1     QUEtiapine (SEROQUEL) 25 MG tablet Tale 1 tablet in am and 1 tablet at bedtime 180 tablet 3     sertraline (ZOLOFT) 25 MG tablet Take 1 tablet (25 mg) by mouth daily. 90 tablet 3     simvastatin (ZOCOR) 10 MG tablet TAKE 1 TABLET BY MOUTH ONCE DAILY AT BEDTIME 90 tablet 3     warfarin ANTICOAGULANT (COUMADIN) 2 MG tablet Take 3 mg every Tue, Thu, Sat; 2 mg all other days or As directed by Anticoagulation clinic 115 tablet 0     Ferrous Gluconate 240 (27 Fe) MG TABS Take 1 tablet by mouth 2 times daily (with meals) For restless legs (Patient not taking: Reported on 1/22/2025) 100 tablet 11       Patient Active Problem List   Diagnosis     Status Post Total Knee Replacement - bilateral     TACHO (obstructive sleep apnea)     Hyperlipidemia with target LDL less than 70     Hypertension goal BP (blood pressure) < 140/90     Morbid obesity (H)     Achalasia     Chronic low back pain     Gout     Paroxysmal atrial fibrillation (H)     Long term (current) use of anticoagulants     Anxiety     Spinal stenosis of lumbar region with neurogenic claudication     Glenohumeral arthritis, left     Type 2 diabetes mellitus with diabetic nephropathy, without long-term current use of insulin (H)     Stage 3a chronic kidney disease (H)     Anemia, iron deficiency     Falls frequently     Lewy body dementia, unspecified dementia severity, unspecified whether behavioral, psychotic, or mood disturbance or anxiety (H)     Heart failure, unspecified HF chronicity, unspecified heart failure type (H)     Moderate dementia with mood disturbance, unspecified  dementia type (H)     Moderate episode of recurrent major depressive disorder (H)       Past Medical History:   Diagnosis Date     Achalasia 05/02/2018     Adrenal adenoma 06/16/2009    Overview:  Stable on FU MR [place] 3/31/09.  Stable on MRI [place] 3/2015, no further FU needed.      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  Xray foot shows overhanging edges pathognomic for gout.  Sx occurred on thiazides     HTN (hypertension) 12/05/2012     Hyperlipidemia with target LDL less than 70      Hypertension goal BP (blood pressure) < 140/90 01/15/2013     Iron deficiency anemia, unspecified iron deficiency anemia type 11/18/2020     Mild recurrent major depression 06/07/2022     Morbid obesity (H) 01/15/2013     OA (OSTEOARTHRITIS) OF KNEE - right 07/13/2010     TACHO (obstructive sleep apnea) 12/07/2012     Paroxysmal atrial fibrillation (H) 07/11/2018    had ablation procedure 2017; nuclear stress test neg for ischemia in 2017     Spinal stenosis of lumbar region with neurogenic claudication 02/17/2020     Stage 3a chronic kidney disease (H) 11/16/2020     Type 2 diabetes mellitus with diabetic nephropathy, without long-term current use of insulin (H) 11/16/2020       Past Surgical History:   Procedure Laterality Date     AS ESOPHAGOSCOPY, DIAGNOSTIC  07/2019     CARPAL TUNNEL RELEASE RT/LT       CATHETER, ABLATION  2017    for PAF     COLONOSCOPY  07/2019     HC LIG/DIV/EXCIS VARICOSE VEIN       TONSILLECTOMY       ZZC HAND/FINGER SURGERY UNLISTED       Z REMOVAL OF KIDNEY STONE       Z TOTAL KNEE ARTHROPLASTY  07/21/2010    left     ZC TOTAL KNEE ARTHROPLASTY  12/12/2012    Right       Family History   Problem Relation Age of Onset     Glaucoma No family hx of      Macular Degeneration No family hx of        Social  History     Tobacco Use     Smoking status: Never     Passive exposure: Never     Smokeless tobacco: Never   Substance Use Topics     Alcohol use: No         Exam:    Vitals: There were no vitals taken for this visit.  BMI: There is no height or weight on file to calculate BMI.  Height: Data Unavailable    Constitutional/ general:  Pt is in no apparent distress, appears well-nourished.  Cooperative with history and physical exam.  Seen with daughter today who acts as .    Psych:  The patient answered questions appropriately.  Normal affect.  Seems to have reasonable expectations, in terms of treatment.     Lungs:  Non labored breathing, non labored speech. No cough.  No audible wheezing. Even, quiet breathing.       Vascular:  positive pedal pulses bilaterally for both the DP.  CFT < 3 sec.  positive ankle edema making it difficult to palpate posterior tibial arteries..  negative pedal hair growth.    Neuro:  Alert and oriented x 3. Coordinated gait.  Light touch sensation is intact to the L4, L5, S1 distributions. No obvious deficits.  No evidence of neurological-based weakness, spasticity, or contracture in the lower extremities.  Monofilament intact in all toes.    Derm: thin and shiny    Musculoskeletal:    Lower extremity muscle strength is normal.  Obvious forefoot or rear foot deformities noted.  Normal range of motion of all forefoot joints.    The right second toe dorsal lateral wound is now healed.   No edema or erythema on right second toe now.      Right heel lateral wound is now healed    Right heel plantar lateral there is an ulcer that in the past measured 19 x 15 mm, 21 x 22 mm, 21 x 22 mm, 15 x 15 mm, 16 x 15 mm, 13 x 8 mm, 6 x 5 mm, and today measures 6 x 6 mm.  The base is down to the subcutaneous tissue.  Please somewhat dark discolored today and we note 1 or 2 pieces of gravel in the wound along with other debris.  There is no purulence or odor.  There is no erythema edema surrounding  this suggestive of infection.                 Component  Ref Range & Units 3 mo ago  (10/4/24) 9 mo ago  (4/17/24) 2 yr ago  (9/13/22) 2 yr ago  (6/7/22) 3 yr ago  (12/23/21) 4 yr ago  (11/18/20) 5 yr ago  (9/20/19)    Estimated Average Glucose  <117 mg/dL 143 High           Hemoglobin A1C  0.0 - 5.6 % 6.6 High  7.7 High  CM 8.0 High  CM 8.2 High  CM 6.7 High  CM 7.0 High  R, CM 6.5 High  R,                Collected 1/24/2025  1:33 PM       Status: Final result       Visible to patient: No (inaccessible in MyChart)       Dx: Cellulitis of foot, right; Diabetic u...    Specimen Information: Foot, Right; Wound   0 Result Notes  Culture 3+ Staphylococcus aureus Abnormal             Gram Stain Result  Abnormal   3+ Gram positive cocci   1+ Gram positive bacilli   1+ WBC seen                Susceptibility     Staphylococcus aureus     VINCENT     Clindamycin 0.25 ug/mL Susceptible     Daptomycin 0.25 ug/mL Susceptible     Doxycycline <=0.5 ug/mL Susceptible     Erythromycin <=0.25 ug/mL Susceptible     Gentamicin <=0.5 ug/mL Susceptible     Oxacillin <=0.25 ug/mL Susceptible 1     Tetracycline <=1 ug/mL Susceptible     Trimethoprim/Sulfamethoxazole <=0.5/9.5 u... Susceptible     Vancomycin 1 ug/mL Susceptible              1 Oxacillin susceptible isolates are susceptible to cephalosporins (example: cefazolin and cephalexin) and beta lactam combination agents. Oxacillin resistant isolates are resistant to these agents.            A:  Diabetes mellitus with peripheral neuropathy  Right plantar lateral heel decubitus ulcer    P: Discussed heel wound is unchanged in size and there is debris and hair.  We remove this.  Her shoe is somewhat filthy.  Will get a new pair of shoes that are clean.  Continue dressing and cleaning daily.  For additional offloading we gave her a silicone heel cup and trim this out to take pressure off wound.  Will return to clinic in 2 weeks to ensure improving.          Morro Lucero DPM,  VONDAFAS              Again, thank you for allowing me to participate in the care of your patient.        Sincerely,        Morro Lucero DPM    Electronically signed

## 2025-04-24 NOTE — TELEPHONE ENCOUNTER
ANTICOAGULATION     Selvin Rockwell is overdue for an INR check.     Left message for patient to call and schedule lab appointment as soon as possible. If returning call, please schedule.     Suma Sandoval, RN  4/24/2025  Anticoagulation Clinic  [facility] for routing messages: renee PEÑA  [facility] patient phone line: [phone]

## 2025-05-01 ENCOUNTER — TELEPHONE (OUTPATIENT)
Dept: ANTICOAGULATION | Facility: CLINIC | Age: 80
End: 2025-05-01
Payer: COMMERCIAL

## 2025-05-01 NOTE — TELEPHONE ENCOUNTER
ANTICOAGULATION     Selvin Rockwell is overdue for an INR check.     Left message for patient to call and schedule lab appointment as soon as possible. If returning call, please schedule.     Portia Coronado RN  5/1/2025  Anticoagulation Clinic  [facility] for routing messages: renee PEÑA  [facility] patient phone line: [phone]

## 2025-05-08 ENCOUNTER — DOCUMENTATION ONLY (OUTPATIENT)
Dept: ANTICOAGULATION | Facility: CLINIC | Age: 80
End: 2025-05-08

## 2025-05-08 NOTE — LETTER
"     [facility]  [address] [facility] [address]  Phone: [phone]  Fax: [phone]       May 8, 2025        Selvin Rockwell  [address] [place] [address]            Dear Selvin,    You are currently under the care of [facility] for your warfarin (Coumadin , Jantoven ) therapy.  We are contacting you because our records show you were due for an INR on 4/17/25.    There are potentially serious risks when taking warfarin without careful monitoring and we want to make sure you are safely managed.  Routine lab monitoring is required for warfarin refills.     Please schedule a lab appointment as soon as possible either by calling [phone] or scheduling directly in [employer]. To schedule in [employer] open the \"schedule an appointment\" section then select \"lab only\" as the reason you are coming in and \"INR\" as the lab test. If it is difficult for you to get to lab, please call us to discuss options.  If there has been a change in your care or other concerns, please let us know so we can help and/or update our records.         Sincerely,       [facility]   "

## 2025-05-08 NOTE — PROGRESS NOTES
ANTICOAGULATION     Selvin Rockwell is overdue for an INR check.     Reminder letter sent    Chitra Morrison RN  5/8/2025  Anticoagulation Clinic  [facility] for routing messages: renee PEÑA  [facility] patient phone line: [phone]

## 2025-05-22 ENCOUNTER — DOCUMENTATION ONLY (OUTPATIENT)
Dept: ANTICOAGULATION | Facility: CLINIC | Age: 80
End: 2025-05-22
Payer: COMMERCIAL

## 2025-05-22 NOTE — PROGRESS NOTES
Anticoagulation Clinic Notification    Gem, is past due for an INR. Their last result was 2.3 on 3/20/25 and was due to come back on 4/17/25.    she received phone calls and letters over the last several weeks in attempt to arrange follow up labs. Selvin Rockwell will be contacted again today.     Please contact patient directly to discuss compliance with monitoring or schedule visit to review ongoing anticoagulation therapy.    Thank you,     [facility]

## 2025-05-22 NOTE — PROGRESS NOTES
RN --  I am not sure if having another person contact them about this will be of any help, but since the anticoagulation clinic is requesting this, please contact the patient, or more specifically her daughter, Alicia, regarding the importance of having the patient come in for regular INR checks given her use of warfarin.  Thank you,    Gurinder Ho MD

## 2025-05-27 ENCOUNTER — TELEPHONE (OUTPATIENT)
Dept: FAMILY MEDICINE | Facility: CLINIC | Age: 80
End: 2025-05-27
Payer: COMMERCIAL

## 2025-05-27 DIAGNOSIS — I48.0 PAROXYSMAL ATRIAL FIBRILLATION (H): Primary | ICD-10-CM

## 2025-05-27 DIAGNOSIS — Z79.01 LONG TERM (CURRENT) USE OF ANTICOAGULANTS: ICD-10-CM

## 2025-05-27 LAB — INR (EXTERNAL): 3.3

## 2025-05-27 NOTE — TELEPHONE ENCOUNTER
FYI - Status Update    Who is Calling: family member, Alicia daughter    Update: Gem was ED [facility] 3:30am today. They did INR. INR on discharge paperwork says 3.3     They told Alicia that [facility] should have access to the labs from [facility].  Alicia wants to know if another INR appt is necessary for new dosing or if ED result is enough.     Does caller want a call/response back: Yes     Okay to leave a detailed message?: Yes at Other phone number:  call Alicia [phone]*

## 2025-05-27 NOTE — TELEPHONE ENCOUNTER
ANTICOAGULATION MANAGEMENT     Selvin Rockwell 80 year old female is on warfarin with supratherapeutic INR result. (Goal INR [unfilled])    Recent labs: (last 7 days)     05/27/25  1200   INR 3.3       ASSESSMENT     Source(s): Chart Review     Warfarin doses taken: Reviewed in chart  Diet: Nausea/vomiting may be affecting diet and INR  Medication/supplement changes: Zofran ordered prn in ED 5/27/25  New illness, injury, or hospitalization: In ED 5/27 for nausea and vomiting  Signs or symptoms of bleeding or clotting: No  Previous result: Therapeutic last 2(+) visits  Additional findings: Pt has been overdue for INR       PLAN     Recommended plan for temporary change(s) affecting INR     Dosing Instructions: partial hold then continue your current warfarin dose with next INR in 3 days       Summary  As of 5/27/2025      Full warfarin instructions:  5/27: 1 mg; Otherwise 2 mg every Mon, Wed, Fri; 3 mg all other days   Next INR check:  5/30/2025               Detailed voice message left for Alicia with dosing instructions and follow up date.     Check at provider office visit    Education provided: Please call back if any changes to your diet, medications or how you've been taking warfarin    Plan made per [facility] anticoagulation protocol    Keely Velasquez RN  5/27/2025  Anticoagulation Clinic  [employer] for routing messages: renee PEÑA  [facility] patient phone line: [phone]        _______________________________________________________________________     Anticoagulation Episode Summary       Current INR goal:  2.0-3.0   TTR:  57.4% (1 y)   Target end date:  Indefinite   Send INR reminders to:  JERRY PEÑA    Indications    Paroxysmal atrial fibrillation (H) [I48.0]  Long term (current) use of anticoagulants [Z79.01]             Comments:  --             Anticoagulation Care Providers       Provider Role Specialty Phone number    Gurinder Ho MD Referring Family Medicine [phone]

## 2025-05-30 ENCOUNTER — LAB (OUTPATIENT)
Dept: LAB | Facility: CLINIC | Age: 80
End: 2025-05-30
Payer: COMMERCIAL

## 2025-05-30 DIAGNOSIS — E11.21 TYPE 2 DIABETES MELLITUS WITH DIABETIC NEPHROPATHY, WITHOUT LONG-TERM CURRENT USE OF INSULIN (H): Primary | ICD-10-CM

## 2025-06-02 ENCOUNTER — DOCUMENTATION ONLY (OUTPATIENT)
Dept: ANTICOAGULATION | Facility: CLINIC | Age: 80
End: 2025-06-02
Payer: COMMERCIAL

## 2025-06-02 ENCOUNTER — ANTICOAGULATION THERAPY VISIT (OUTPATIENT)
Dept: ANTICOAGULATION | Facility: CLINIC | Age: 80
End: 2025-06-02
Payer: COMMERCIAL

## 2025-06-02 ENCOUNTER — TELEPHONE (OUTPATIENT)
Dept: PHARMACY | Facility: OTHER | Age: 80
End: 2025-06-02
Payer: COMMERCIAL

## 2025-06-02 ENCOUNTER — RESULTS FOLLOW-UP (OUTPATIENT)
Dept: ANTICOAGULATION | Facility: CLINIC | Age: 80
End: 2025-06-02

## 2025-06-02 ENCOUNTER — TELEPHONE (OUTPATIENT)
Dept: FAMILY MEDICINE | Facility: CLINIC | Age: 80
End: 2025-06-02
Payer: COMMERCIAL

## 2025-06-02 DIAGNOSIS — I48.0 PAROXYSMAL ATRIAL FIBRILLATION (H): Primary | ICD-10-CM

## 2025-06-02 DIAGNOSIS — Z79.01 LONG TERM (CURRENT) USE OF ANTICOAGULANTS: Primary | ICD-10-CM

## 2025-06-02 DIAGNOSIS — I48.0 PAROXYSMAL ATRIAL FIBRILLATION (H): ICD-10-CM

## 2025-06-02 DIAGNOSIS — Z79.01 LONG TERM (CURRENT) USE OF ANTICOAGULANTS: ICD-10-CM

## 2025-06-02 NOTE — TELEPHONE ENCOUNTER
6/2 Patient's daughter Alicia willard at 9:43 to sched an appt, I returned the call @ 10:07 am and left a  for a returned call;    Maddison Dior MT   [phone]

## 2025-06-02 NOTE — PROGRESS NOTES
"ANTICOAGULATION MANAGEMENT     Selvin Rockwell 80 year old female is on warfarin with therapeutic INR result. (Goal INR 2.0-3.0)    Recent labs: (last 7 days)     05/30/25  1224   INR 1.98*       ASSESSMENT     Source(s): Chart Review and Patient/Caregiver Call     Warfarin doses taken: Warfarin taken as instructed, less warfarin as directed  Diet: No new diet changes identified,  low appetite but this has been intermittently ongoing  Medication/supplement changes: ondansetron   New illness, injury, or hospitalization: Yes: ED visit at [facility], renal lesion, diarrhea   Pt is feeling \"somewhat better\", she has improved since ED visit (no further vomiting)   Diarrhea is intermittent, pt may be lactose intolerant   Last episode of diarrhea was this morning  Signs or symptoms of bleeding or clotting: No  Previous result: Supratherapeutic  Additional findings: None     PLAN     Recommended plan for temporary change(s) affecting INR     Dosing Instructions: Continue your current warfarin dose with next INR in 1 week       Summary  As of 6/2/2025      Full warfarin instructions:  2 mg every Mon, Wed, Fri; 3 mg all other days   Next INR check:  6/6/2025               Telephone call with Alicia who verbalizes understanding and agrees to plan    Contact [phone] to schedule and with any changes, questions or concerns.  Alicia was offered an appt and appt was declined.    Education provided: Please call back if any changes to your diet, medications or how you've been taking warfarin  Symptom monitoring: monitoring for bleeding signs and symptoms, monitoring for clotting signs and symptoms, and monitoring for stroke signs and symptoms    Plan made per [facility] anticoagulation protocol    Portia Coronado RN  6/2/2025  Anticoagulation Clinic  [facility] for routing messages: renee PEÑA  [facility] patient phone line: [phone]        _______________________________________________________________________ "     Anticoagulation Episode Summary       Current INR goal:  2.0-3.0   TTR:  56.9% (1 y)   Target end date:  Indefinite   Send INR reminders to:  JERRY PEÑA    Indications    Paroxysmal atrial fibrillation (H) [I48.0]  Long term (current) use of anticoagulants [Z79.01]             Comments:  --             Anticoagulation Care Providers       Provider Role Specialty Phone number    Gurinder Ho MD Referring [facility] Medicine [phone]            Voicemail left for patient to return call to Anticoagulation Clinic - [phone]. Dosing instructions not given to patient.  Tentative plan:   Portia Coronado RN on 6/2/2025 at 10:36 AM

## 2025-06-02 NOTE — TELEPHONE ENCOUNTER
General Call      Reason for Call:  INR results    What are your questions or concerns:  pts daughter is returning your call    Date of last appointment with provider:     Okay to leave a detailed message?: Yes at Other phone number:  [phone] pts daughter Alicia

## 2025-06-02 NOTE — CONFIDENTIAL NOTE
ANTICOAGULATION CLINIC REFERRAL RENEWAL REQUEST       An annual renewal order is required for all patients referred to [facility].?  Please review and sign the pended referral order for Selvin Rockwell.       ANTICOAGULATION SUMMARY      Warfarin indication(s)   Atrial Fibrillation    Mechanical heart valve present?  NO       Current goal range   INR: 2.0-3.0     Goal appropriate for indication? Goal INR 2-3, standard for indication(s) above     Time in Therapeutic Range (TTR)  (Goal > 60%) 56.9%       Office visit with referring provider's group within last year yes on 5/30/25       Portia Coronado RN  [facility]

## 2025-06-10 ENCOUNTER — VIRTUAL VISIT (OUTPATIENT)
Dept: PHARMACY | Facility: CLINIC | Age: 80
End: 2025-06-10
Payer: COMMERCIAL

## 2025-06-10 DIAGNOSIS — I10 HTN (HYPERTENSION), BENIGN: ICD-10-CM

## 2025-06-10 DIAGNOSIS — E11.21 TYPE 2 DIABETES MELLITUS WITH DIABETIC NEPHROPATHY, WITHOUT LONG-TERM CURRENT USE OF INSULIN (H): Primary | ICD-10-CM

## 2025-06-10 DIAGNOSIS — K21.9 GERD WITHOUT ESOPHAGITIS: ICD-10-CM

## 2025-06-10 DIAGNOSIS — E78.5 DYSLIPIDEMIA: ICD-10-CM

## 2025-06-10 DIAGNOSIS — F33.0 MILD RECURRENT MAJOR DEPRESSION: ICD-10-CM

## 2025-06-10 DIAGNOSIS — I48.0 PAROXYSMAL ATRIAL FIBRILLATION (H): ICD-10-CM

## 2025-06-10 DIAGNOSIS — R52 GENERALIZED PAIN: ICD-10-CM

## 2025-06-10 PROCEDURE — 99605 MTMS BY PHARM NP 15 MIN: CPT | Mod: 93

## 2025-06-10 PROCEDURE — 99607 MTMS BY PHARM ADDL 15 MIN: CPT | Mod: 93

## 2025-06-10 NOTE — Clinical Note
Met with Gem and her daughter today and am recommending the following based on our discussion: increasing sertraline to 50 mg to further address mental health needs as this can help with cognition, starting a cholinesterase inhibitor to help with cognition, increasing ondansetron to 8 mg to better manage nausea. I believe she would have great benefit too by having a referral to neurology or geriatric psychiatry for further evaluation. What do you think or have any other suggestions for Gem?

## 2025-06-10 NOTE — PROGRESS NOTES
Medication Therapy Management (MTM) Encounter    ASSESSMENT:                            Medication Adherence/Access: No issues identified.    Diabetes  Stable.  Patient is meeting A1c goal of < 7%.  Patient would benefit from updated A1c.      Hyperlipidemia   Stable.      GERD    No current recommendations. Continue current therapy    Mental Health   Depression with Dementia and Hx Hallucination  Not at goal.    Daughter reports that Jostin dementia and mental health has not improved. PharmD consulted with psych and neuro MTM pharmacists to aid in this assessment. After reviewing Jostin history can consider increasing her sertraline dose to 50 mg to help address her underlying depression/anxiety. This increase would likely not impact her to the degree it did in the past. With increasing the dose there would be an increased risk of serotonin syndrome. PharmD explained the signs and symptoms and plans of actions if serotonin syndrome does occur. If symptoms of feeling unbalanced can consider a switch to an alternative agent outside of the SSRI/SNRI class. Gem would also benefit from potentially starting a cholinesterase inhibitor as they can help with cognition. In all likelihood Gem would benefit from speaking with a neurologist and potentially a geriatric psychiatrist. Will reach out to her PCP about these next steps    Pain   No current recommendations.     Hypertension   Stable. Patient is meeting blood pressure goal of < 130/80mmHg.     Nausea  PharmD did discuss with Gem and her daughter that some patients do take up to 8 mg of ondansetron for breakthrough nausea however that it is dosed at every 12 hours. PharmD also informed that there is an increased risk of serotonin syndrome in addition to other serotonergic agents. PharmD will reach out to [facility] PCP to discuss if an increase is warranted.     Atrial Fibrillation  No recommendations. Being managed by anticoagulation    PLAN:                             PharmD will reach out to your PCP about some recommendations discussed today  Potentially increasing sertraline dose to 50 mg to address mental health  Potentially starting another medication to help address cognition  Increasing ondansetron to 8 mg to address nausea  Placing a referral for a neurologist or geriatric psychiatrist    Follow-up: 1-2 weeks by phone call  Appointments in Next Year      2025 4:40 PM  (Arrive by 4:10 PM)  CT RENAL MASS WO & W CONTRAST with UCSCCT1, VIRTUAL   [facility] [place] ([facility] ) [phone]            SUBJECTIVE/OBJECTIVE:                          Gem Rockwell is a 80 year old female seen for an initial visit. She was referred to me from Geovani Ontiveros.      Reason for visit: Medication therapy management.    Allergies/ADRs: Reviewed in chart  Past Medical History: Reviewed in chart  Tobacco: She reports that she has never smoked. She has never been exposed to tobacco smoke. She has never used smokeless tobacco.  Alcohol: not currently using    Medication Adherence/Access: no issues reported.    Diabetes   Metformin 1000 mg twice daily  Not taking aspirin due to warfarin  Patient is not experiencing side effects (aside from reflux below).  Current diabetes symptoms: none  Blood sugar monitorin time(s) daily; Ranges: (patient reported) Fasting- 120-130s   Diet/Exercise: Not discussed in detail     Eye exam is up to date  Foot exam: due    Hyperlipidemia   Simvastatin 10 mg daily  Patient reports no significant myalgias or other side effects.       GERD    Pantoprazole 40 mg once daily     No reported side effects         Mental Health   Depression with Dementia and Hx Hallucination  Sertraline 25mg once daily (decreased from 100mg)  Quetiapine 25mg daily at bedtime (decreased from 100mg)    Daughter has concerns that [facility] medications aren't effective for her. Was seen in the  past by other pharmacists with a similar concern. Not seeing psychiatry or neurology currently. Does report some drowsiness. Was on increased doses of sertraline and quetiapine, both 100 mg. Felt her balance was off which is why she had abruptly stopped her medications. Daughter does report that [facility] mental health treatment does not seem to be effective.         Pain   Acetaminophen 500 mg once a day  Pregabalin 150 mg twice daily    Pain type/location: general pain  Patient feels that current therapy is effective.   Patient reports the following side effects: no medication side effects         Hypertension  Carvedilol 6.25 mg twice daily  Isosorbide mononitrate 30 mg once a day    No reported side effects. Does check blood pressures at home and are usually <130/80    Nausea  Ondansetron ODT 4 mg every 8 hours as needed    No side effects, but does not feel the medication is effective    Atrial Fibrillation  Warfarin 2 mg, 3 mg T/Th. 2 mg all other days    No reported side effects or concerns.      Today's Vitals: There were no vitals taken for this visit.  ----------------      I spent 40 minutes with this patient today. All changes were made via collaborative practice agreement with Gurinder Ho MD.     A summary of these recommendations was mailed to the patient.    Linda Gardiner, Basilio    Telemedicine Visit Details  The patient's medications can be safely assessed via a telemedicine encounter.  Type of service:  Telephone visit  Originating Location (pt. Location): Home    Distant Location (provider location):  On-site  Start Time: 2:03 PM  End Time: 2:43 PM     Medication Therapy Recommendations  Mild recurrent major depression   1 Current Medication: sertraline (ZOLOFT) 25 MG tablet   Current Medication Sig: Take 1 tablet (25 mg) by mouth daily.   Rationale: Dose too low - Dosage too low - Effectiveness   Recommendation: Increase Dose   Status: Contact Provider - Awaiting Response   Identified Date:  6/10/2025

## 2025-06-10 NOTE — LETTER
June 11, 2025  Selvin Rockwell  [address]. \[facility].\"" [id number]    Dear JORGE Robles [facility] FAYE     Thank you for talking with me on Levar 10, 2025 about your health and medications. As a follow-up to our conversation, I have included two documents:      Your Recommended To-Do List has steps you should take to get the best results from your medications.  Your Medication List will help you keep track of your medications and how to take them.    If you want to talk about these documents, please call Linda Gardiner RPH at phone: [phone], Monday-Friday 8-4:30pm.    I look forward to working with you and your doctors to make sure your medications work well for you.    Sincerely,  Linda Gardiner RPH  [facility] Pharmacist, [facility]

## 2025-06-10 NOTE — LETTER
"Recommended To-Do List      Prepared on: Levar 10, 2025       You can get the best results from your medications by completing the items on this \"To-Do List.\"      Bring your To-Do List when you go to your doctor. And, share it with your family or caregivers.    My To-Do List:  What we talked about: What I should do:   Your medications    Pharmacist will reach out to your provider about the following: Increasing dose of sertraline, increasing dose of ondansetron, another medication for cognition, and a referral for a specialist          What we talked about: What I should do:                     "

## 2025-06-10 NOTE — LETTER
_  Medication List        Prepared on: Levar 10, 2025     Bring your Medication List when you go to the doctor, hospital, or   emergency room. And, share it with your family or caregivers.     Note any changes to how you take your medications.  Cross out medications when you no longer use them.    Medication How I take it Why I use it Prescriber   acetaminophen (TYLENOL) 500 MG tablet Take 500-1,000 mg by mouth every 6 hours as needed for mild pain  General pain Patient Reported   blood glucose (NO BRAND SPECIFIED) test strip Use to test blood sugar one time daily or as directed. To accompany: Blood Glucose Monitor Brands: per insurance. Type 2 diabetes mellitus with diabetic nephropathy, without long-term current use of insulin (H) Gurinder Ho MD   carvedilol (COREG) 6.25 MG tablet Take 1 tablet (6.25 mg) by mouth 2 times daily. Hypertension Goal BP (Blood Pressure) < 140/90; Paroxysmal Atrial Fibrillation (H) Gurinder Ho MD   isosorbide mononitrate (IMDUR) 30 MG 24 hr tablet TAKE 1 TABLET BY MOUTH ONCE DAILY Hypertension Goal BP (Blood Pressure) < 140/90 Gurinder Ho MD   metFORMIN (GLUCOPHAGE) 1000 MG tablet Take 1 tablet (1,000 mg) by mouth 2 times daily (with meals). Type 2 diabetes mellitus with diabetic nephropathy, without long-term current use of insulin (H) Gurinder Ho MD   ondansetron (ZOFRAN ODT) 4 MG ODT tab Place 4 mg under the tongue every 8 hours as needed for nausea or vomiting.  Nausea Patient Reported   pantoprazole (PROTONIX) 40 MG EC tablet Take 1 tablet (40 mg) by mouth daily. Gastroesophageal Reflux Disease without Esophagitis Gurinder Ho MD   pregabalin (LYRICA) 150 MG capsule Take 1 capsule (150 mg) by mouth 2 times daily. Spinal stenosis of lumbar region with neurogenic claudication Gurinder Ho MD   QUEtiapine (SEROQUEL) 25 MG tablet Tale 1 tablet in am and 1 tablet at bedtime Agitation Gurinder Ho MD   sertraline (ZOLOFT) 25 MG tablet Take 1 tablet (25 mg) by mouth  daily. Depression, unspecified depression type Gurinder Ho MD   simvastatin (ZOCOR) 10 MG tablet TAKE 1 TABLET BY MOUTH ONCE DAILY AT BEDTIME Hyperlipidemia with Target LDL Less than 100 Gurinder Ho MD   warfarin ANTICOAGULANT (COUMADIN) 2 MG tablet Take 3 mg every Tue, Thu, Sat; 2 mg all other days or As directed by Anticoagulation clinic Paroxysmal Atrial Fibrillation (H); Encounter for Long-Term (Current) Use of Anticoagulants Gurinder Ho MD         Add new medications, over-the-counter drugs, herbals, vitamins, or  minerals in the blank rows below.    Medication How I take it Why I use it Prescriber                                      Allergies:      - Gabapentin  - Nsaids  - Allopurinol - Rash  - Other Environmental Allergy - Rash  - Sulfa Antibiotics - Other (See Comments), Rash  - Trimethoprim - Rash        Side effects I have had:      Not on File        Other Information:              My notes and questions:

## 2025-06-11 DIAGNOSIS — F32.A DEPRESSION, UNSPECIFIED DEPRESSION TYPE: ICD-10-CM

## 2025-06-11 NOTE — PROGRESS NOTES
We will increase her sertraline from 25 mg daily to 50 mg daily to try to better address her depression.    Gurinder Ho MD

## 2025-06-11 NOTE — PATIENT INSTRUCTIONS
"Recommendations from today's MTM visit:                                                    MTM (medication therapy management) is a service provided by a clinical pharmacist designed to help you get the most of out of your medicines.        PharmD will reach out to your PCP about some recommendations discussed today  Potentially increasing sertraline dose to 50 mg to address mental health  Potentially starting another medication to help address cognition  Increasing ondansetron to 8 mg to address nausea  Placing a referral for a neurologist or geriatric psychiatrist    Follow-up: 1-2 weeks by phone call  Appointments in Next Year      Jun 17, 2025 4:40 PM  (Arrive by 4:10 PM)  CT RENAL MASS WO & W CONTRAST with UCSCCT1, VIRTUAL   [facility] [place] ([facility] ) [phone]            It was great speaking with you today.  I value your experience and would be very thankful for your time in providing feedback in our clinic survey. In the next few days, you may receive an email or text message from [employer] with a link to a survey related to your  clinical pharmacist.\"     To schedule another MTM appointment, please call the clinic directly or you may call the MTM scheduling line at [phone] or toll-free at [phone].     My Clinical Pharmacist's contact information:                                                      Please feel free to contact me with any questions or concerns you have.      Linda Gardiner, PharmD   "

## 2025-06-12 ENCOUNTER — TELEPHONE (OUTPATIENT)
Dept: PHARMACY | Facility: CLINIC | Age: 80
End: 2025-06-12
Payer: COMMERCIAL

## 2025-06-12 DIAGNOSIS — F33.0 MILD RECURRENT MAJOR DEPRESSION: Primary | ICD-10-CM

## 2025-06-12 NOTE — TELEPHONE ENCOUNTER
Spoke with daughter and relayed plan of increasing sertraline dose to 50 mg once a day. If she is able to tolerate can consider starting donepezil.

## 2025-06-17 ENCOUNTER — ANCILLARY PROCEDURE (OUTPATIENT)
Dept: CT IMAGING | Facility: CLINIC | Age: 80
End: 2025-06-17
Payer: COMMERCIAL

## 2025-06-17 DIAGNOSIS — N28.89 KIDNEY MASS: ICD-10-CM

## 2025-06-17 LAB
CREAT BLD-MCNC: 1.2 MG/DL (ref 0.5–1)
EGFRCR SERPLBLD CKD-EPI 2021: 46 ML/MIN/1.73M2

## 2025-06-17 PROCEDURE — 82565 ASSAY OF CREATININE: CPT | Performed by: PATHOLOGY

## 2025-06-17 RX ORDER — IOPAMIDOL 755 MG/ML
89 INJECTION, SOLUTION INTRAVASCULAR ONCE
Status: COMPLETED | OUTPATIENT
Start: 2025-06-17 | End: 2025-06-17

## 2025-06-17 RX ADMIN — IOPAMIDOL 89 ML: 755 INJECTION, SOLUTION INTRAVASCULAR at 17:56

## 2025-06-17 NOTE — DISCHARGE INSTRUCTIONS

## 2025-07-09 ENCOUNTER — DOCUMENTATION ONLY (OUTPATIENT)
Dept: ANTICOAGULATION | Facility: CLINIC | Age: 80
End: 2025-07-09
Payer: COMMERCIAL

## 2025-07-09 NOTE — LETTER
[facility]  [address] [facility] [address]  Phone: [phone]  Fax: [phone]     July 9, 2025        Selvin Rockwell  [address] [place] [id number]            Dear Selvin,    You are currently under the care of [facility] for your warfarin (Coumadin , Jantoven ) therapy.  We are contacting you because our records show you were due for an INR on 6/6/25.    There are potentially serious risks when taking warfarin without careful monitoring and we want to make sure you are safely managed.  Routine lab monitoring is required for warfarin refills.     Please schedule an appointment at your local lab as soon as possible. If you recently tested, but have not yet heard from us, please reply or give us a call at [phone] so we can work with you and your local lab to obtain the results. If there has been a change in your care or other concerns, please let us know so we can help and/or update our records.         Sincerely,       [facility]

## 2025-07-09 NOTE — PROGRESS NOTES
Anticoagulation Clinic Notification    Gem, is past due for an INR. Their last result was 1.98 on 5/30/25 and was due to come back on 6/6/25.    She received phone calls and letters over the last several weeks in attempt to arrange follow up labs. Selvin Rockwell will be contacted again today.     Please contact patient directly to discuss compliance with monitoring or schedule visit to review ongoing anticoagulation therapy.    Thank you,     [facility]

## 2025-07-09 NOTE — PROGRESS NOTES
RN --  Please contact patient/daughter on my behalf to remind them of the importance of having her come in for regular INR checks while she is on the warfarin.    Gurinder Ho MD

## 2025-07-10 NOTE — PROGRESS NOTES
RN called patient/family and relayed provider's message. Patient/family verbalized understanding. Did not want assistance scheduling an appointment for INR.    Kecia GARCIA RN, BSN  [facility]: [facility]

## 2025-07-14 ENCOUNTER — TELEPHONE (OUTPATIENT)
Dept: FAMILY MEDICINE | Facility: CLINIC | Age: 80
End: 2025-07-14
Payer: COMMERCIAL

## 2025-07-14 NOTE — TELEPHONE ENCOUNTER
Contacted pts dtr Alicia to notify her INR order has been faxed to [facility] as requested no answer left detailed informing them the order was faxed.

## 2025-07-14 NOTE — TELEPHONE ENCOUNTER
Daughter call, consent to comm on file, stated she tried to get patient into [facility] for her INR check but they stated there is no order in place that they can find through care everywhere. Patient is overdue for her INR recheck. [facility] Fax number is [phone]. Standing lab order printed and faxed.    Team please let the patient's dtr know INR lab faxed to [facility] as requested.     Detailed voice mail okay per dtr.     Thanks,  SERA Bryant  [facility]

## 2025-07-14 NOTE — TELEPHONE ENCOUNTER
[facility] called stating they have not received the fax for the lab yet. Wrong fax number was provided. Correct fax number is [phone].     Able to fax INR again?    Kecia Agrawal RN

## 2025-07-15 LAB — INR (EXTERNAL): 3.9

## 2025-07-17 ENCOUNTER — TELEPHONE (OUTPATIENT)
Dept: FAMILY MEDICINE | Facility: CLINIC | Age: 80
End: 2025-07-17
Payer: COMMERCIAL

## 2025-07-17 ENCOUNTER — ANTICOAGULATION THERAPY VISIT (OUTPATIENT)
Dept: ANTICOAGULATION | Facility: CLINIC | Age: 80
End: 2025-07-17
Payer: COMMERCIAL

## 2025-07-17 DIAGNOSIS — I48.0 PAROXYSMAL ATRIAL FIBRILLATION (H): Primary | ICD-10-CM

## 2025-07-17 DIAGNOSIS — Z79.01 LONG TERM (CURRENT) USE OF ANTICOAGULANTS: ICD-10-CM

## 2025-07-17 NOTE — TELEPHONE ENCOUNTER
See anticoag encounter for 7/17/25    Chitra Morrison RN   [facility]ticoagulation Clinic

## 2025-07-17 NOTE — TELEPHONE ENCOUNTER
See other TE from today.    Chitra Morrison RN   [facility] Anticoagulation [facility]

## 2025-07-17 NOTE — TELEPHONE ENCOUNTER
FYI - Status Update    Who is Calling: family member, Alicia (daughter)    Update: Wanted to let INR team know that hospital is faxing INR results over. They would like a call back to go over dosing.     Does caller want a call/response back: Yes     Okay to leave a detailed message?: Yes at Cell number on file:    Telephone Information:   Mobile [phone]

## 2025-07-17 NOTE — PROGRESS NOTES
ANTICOAGULATION MANAGEMENT     Selvin Rockwell 80 year old female is on warfarin with supratherapeutic INR result. (Goal INR 2.0-3.0)    Recent labs: (last 7 days)     07/15/25  1000   INR 3.9       ASSESSMENT     Source(s): Chart Review  Previous INR was Subtherapeutic  Medication, diet, health changes since last INR: chart reviewed; none identified         PLAN     Unable to reach Alicia today.    LMTCB    Follow up required to confirm warfarin dose taken and assess for changes and discuss out of range result     Chitra Morrison RN  7/17/2025  Anticoagulation Clinic  [facility] for routing messages: renee PEÑA  [facility] patient phone line: [phone]

## 2025-07-17 NOTE — TELEPHONE ENCOUNTER
General Call      Reason for Call:  RETURN CALL FROM INR NURSE ABOUT DOSING INSTRUCTIONS     What are your questions or concerns:  SEE ABOVE    Date of last appointment with provider: 7/17/25    Okay to leave a detailed message?: Yes at DAUGHTER KATERIN Cell number on file:    Telephone Information:   Mobile [phone]

## 2025-07-17 NOTE — PROGRESS NOTES
ANTICOAGULATION MANAGEMENT     Selvin Rockwell 80 year old female is on warfarin with supratherapeutic INR result. (Goal INR 2.0-3.0)    Recent labs: (last 7 days)     07/15/25  1000   INR 3.9       ASSESSMENT     Source(s): Chart Review and Patient/Caregiver Call     Warfarin doses taken: Warfarin taken as instructed  Diet: No new diet changes identified  Medication/supplement changes: None noted  New illness, injury, or hospitalization: No  Signs or symptoms of bleeding or clotting: No  Previous result: Subtherapeutic  Additional findings: None       PLAN     Recommended plan for no diet, medication or health factor changes affecting INR     Dosing Instructions: hold dose then decrease your warfarin dose (11.1% change) with next INR in 2 weeks       Summary  As of 7/17/2025      Full warfarin instructions:  7/17: Hold; Otherwise 3 mg every Sun, Thu; 2 mg all other days   Next INR check:  7/29/2025               Telephone call with Alicia who verbalizes understanding and agrees to plan    Patient using outside facility for labs    Education provided: Goal range and lab monitoring: goal range and significance of current result    Plan made per [facility] anticoagulation protocol    Chitra Morrison RN  7/17/2025  Anticoagulation Clinic  [employer] for routing messages: renee PEÑA  [facility] patient phone line: [phone]        _______________________________________________________________________     Anticoagulation Episode Summary       Current INR goal:  2.0-3.0   TTR:  52.3% (1 y)   Target end date:  Indefinite   Send INR reminders to:  JERRY PEÑA    Indications    Paroxysmal atrial fibrillation (H) [I48.0]  Long term (current) use of anticoagulants [Z79.01]             Comments:  --             Anticoagulation Care Providers       Provider Role Specialty Phone number    Gurinder Ho MD Referring Family Medicine [phone]

## 2025-07-28 ENCOUNTER — TELEPHONE (OUTPATIENT)
Dept: PODIATRY | Facility: CLINIC | Age: 80
End: 2025-07-28
Payer: COMMERCIAL

## 2025-07-28 DIAGNOSIS — L97.511 DIABETIC ULCER OF TOE OF RIGHT FOOT ASSOCIATED WITH TYPE 2 DIABETES MELLITUS, LIMITED TO BREAKDOWN OF SKIN (H): Primary | ICD-10-CM

## 2025-07-28 DIAGNOSIS — E11.621 DIABETIC ULCER OF TOE OF RIGHT FOOT ASSOCIATED WITH TYPE 2 DIABETES MELLITUS, LIMITED TO BREAKDOWN OF SKIN (H): Primary | ICD-10-CM

## 2025-07-28 NOTE — TELEPHONE ENCOUNTER
Other: Daughter calling asking for an Rx for compression stockings      Could we send this information to you in [employer] or would you prefer to receive a phone call?:   Patient would prefer a phone call   Okay to leave a detailed message?: Yes at Cell number on file:    Telephone Information:   Mobile [phone]         hx of MS base line able to walk with walker - last 2 weeks patient reports weakness. fell today  unable to standno head injury no loc. patient has foul smell of urine noted upon triage.

## 2025-07-29 NOTE — TELEPHONE ENCOUNTER
MD partida order   Order signed and tried to call Daughter to inform her of Rx for stockings. Left message    Esther Landers, [facility] [address] [facility]      MEDICAL SUPPLY STORES  [facility] [place]  Phone [phone]  Fax [phone]    [place]  [phone]    [place] ([place]  [phone]    [place]  [phone]    [place]  [phone]    [place]  [phone]    [place]  [phone]    [facility]  GARY Currie  [address] [place] [address]  Phone: [phone]  Hours: Monday - Friday: 8:30-5    [place], [place]  [address] [place] [address]  Phone: [phone]  Fax: [phone]  Hours: Monday - Friday: 8-5:30, Saturday: 9-2    [employer]  [address]  [place] [address]   [facility]   [phone]    [facility]  [address] [id number] [phone]    [place]  [address] [facility]  [place] [address] [phone]

## 2025-07-30 ENCOUNTER — TELEPHONE (OUTPATIENT)
Dept: ANTICOAGULATION | Facility: CLINIC | Age: 80
End: 2025-07-30
Payer: COMMERCIAL

## 2025-07-30 NOTE — TELEPHONE ENCOUNTER
ANTICOAGULATION     Selvin Rockwell is overdue for an INR check.     Care Everywhere updated: yes    Left message for patient to call and schedule lab appointment as soon as possible. If returning call, please schedule.     Chitra Morrison RN  7/30/2025  Anticoagulation Clinic  [facility] for routing messages: renee PEÑA  [facility] patient phone line: [phone]

## 2025-08-04 ENCOUNTER — TELEPHONE (OUTPATIENT)
Dept: PODIATRY | Facility: CLINIC | Age: 80
End: 2025-08-04
Payer: COMMERCIAL

## 2025-08-04 DIAGNOSIS — R60.0 LOWER EXTREMITY EDEMA: Primary | ICD-10-CM

## 2025-08-05 ENCOUNTER — TELEPHONE (OUTPATIENT)
Dept: OPTOMETRY | Facility: CLINIC | Age: 80
End: 2025-08-05
Payer: COMMERCIAL

## 2025-08-06 ENCOUNTER — TELEPHONE (OUTPATIENT)
Dept: ANTICOAGULATION | Facility: CLINIC | Age: 80
End: 2025-08-06
Payer: COMMERCIAL

## 2025-08-11 ENCOUNTER — VIRTUAL VISIT (OUTPATIENT)
Dept: PHARMACY | Facility: CLINIC | Age: 80
End: 2025-08-11
Payer: COMMERCIAL

## 2025-08-11 DIAGNOSIS — F02.80 LEWY BODY DEMENTIA, UNSPECIFIED DEMENTIA SEVERITY, UNSPECIFIED WHETHER BEHAVIORAL, PSYCHOTIC, OR MOOD DISTURBANCE OR ANXIETY (H): ICD-10-CM

## 2025-08-11 DIAGNOSIS — I48.0 PAROXYSMAL ATRIAL FIBRILLATION (H): ICD-10-CM

## 2025-08-11 DIAGNOSIS — G31.83 LEWY BODY DEMENTIA, UNSPECIFIED DEMENTIA SEVERITY, UNSPECIFIED WHETHER BEHAVIORAL, PSYCHOTIC, OR MOOD DISTURBANCE OR ANXIETY (H): ICD-10-CM

## 2025-08-11 DIAGNOSIS — F33.0 MILD RECURRENT MAJOR DEPRESSION: Primary | ICD-10-CM

## 2025-08-11 PROCEDURE — 99607 MTMS BY PHARM ADDL 15 MIN: CPT | Mod: 93

## 2025-08-11 PROCEDURE — 99606 MTMS BY PHARM EST 15 MIN: CPT | Mod: 93

## 2025-08-11 RX ORDER — SERTRALINE HYDROCHLORIDE 25 MG/1
25 TABLET, FILM COATED ORAL DAILY
Qty: 30 TABLET | Refills: 3 | Status: SHIPPED | OUTPATIENT
Start: 2025-08-11

## 2025-08-12 ENCOUNTER — OFFICE VISIT (OUTPATIENT)
Dept: OPTOMETRY | Facility: CLINIC | Age: 80
End: 2025-08-12
Payer: COMMERCIAL

## 2025-08-12 DIAGNOSIS — H25.813 COMBINED FORMS OF AGE-RELATED CATARACT OF BOTH EYES: ICD-10-CM

## 2025-08-12 DIAGNOSIS — Z01.01 ENCOUNTER FOR EXAMINATION OF EYES AND VISION WITH ABNORMAL FINDINGS: Primary | ICD-10-CM

## 2025-08-12 DIAGNOSIS — E11.9 TYPE 2 DIABETES MELLITUS WITHOUT RETINOPATHY (H): ICD-10-CM

## 2025-08-12 DIAGNOSIS — H52.01 HYPERMETROPIA, RIGHT: ICD-10-CM

## 2025-08-12 DIAGNOSIS — H52.12 MYOPIA, LEFT: ICD-10-CM

## 2025-08-12 DIAGNOSIS — H04.123 DRY EYES: ICD-10-CM

## 2025-08-12 DIAGNOSIS — H52.4 PRESBYOPIA: ICD-10-CM

## 2025-08-12 DIAGNOSIS — H02.403 ACQUIRED INVOLUTIONAL PTOSIS OF BOTH EYELIDS: ICD-10-CM

## 2025-08-12 PROCEDURE — 92015 DETERMINE REFRACTIVE STATE: CPT | Performed by: OPTOMETRIST

## 2025-08-12 PROCEDURE — 92014 COMPRE OPH EXAM EST PT 1/>: CPT | Performed by: OPTOMETRIST

## 2025-08-12 PROCEDURE — 2023F DILAT RTA XM W/O RTNOPTHY: CPT | Performed by: OPTOMETRIST

## 2025-08-12 RX ORDER — POLYETHYLENE GLYCOL 400 AND PROPYLENE GLYCOL 4; 3 MG/ML; MG/ML
1 SOLUTION/ DROPS OPHTHALMIC 4 TIMES DAILY PRN
Qty: 10 ML | Refills: 11 | Status: SHIPPED | OUTPATIENT
Start: 2025-08-12

## 2025-08-12 RX ORDER — DONEPEZIL HYDROCHLORIDE 5 MG/1
5 TABLET, FILM COATED ORAL AT BEDTIME
Qty: 30 TABLET | Refills: 1 | Status: SHIPPED | OUTPATIENT
Start: 2025-08-12

## 2025-08-12 ASSESSMENT — CONF VISUAL FIELD
OD_SUPERIOR_TEMPORAL_RESTRICTION: 0
OD_INFERIOR_NASAL_RESTRICTION: 0
METHOD: COUNTING FINGERS
OS_SUPERIOR_TEMPORAL_RESTRICTION: 0
OD_SUPERIOR_NASAL_RESTRICTION: 0
OS_SUPERIOR_NASAL_RESTRICTION: 0
OD_NORMAL: 1
OS_INFERIOR_TEMPORAL_RESTRICTION: 0
OS_INFERIOR_NASAL_RESTRICTION: 0
OS_NORMAL: 1
OD_INFERIOR_TEMPORAL_RESTRICTION: 0

## 2025-08-12 ASSESSMENT — REFRACTION_WEARINGRX
OD_SPHERE: +1.00
OD_CYLINDER: SPHERE
OS_ADD: +2.50
OD_ADD: +2.50
OS_SPHERE: +1.50
SPECS_TYPE: BIFOCAL
OS_CYLINDER: SPHERE

## 2025-08-12 ASSESSMENT — REFRACTION_MANIFEST
OS_CYLINDER: SPHERE
OD_CYLINDER: SPHERE
OS_SPHERE: -1.25
OD_SPHERE: +1.50

## 2025-08-12 ASSESSMENT — SLIT LAMP EXAM - LIDS: COMMENTS: HEAVY DERMATOCHALASIS RESTING ON LASHES, TRUE PTOSIS

## 2025-08-12 ASSESSMENT — TONOMETRY
OD_IOP_MMHG: 22
OS_IOP_MMHG: 20
IOP_METHOD: APPLANATION

## 2025-08-12 ASSESSMENT — EXTERNAL EXAM - RIGHT EYE: OD_EXAM: NORMAL

## 2025-08-12 ASSESSMENT — VISUAL ACUITY
OS_CC: 20/100
OD_CC: 20/40
OS_CC: 20/200
METHOD: NUMBERS - LINEAR
CORRECTION_TYPE: GLASSES
OD_CC: 20/50

## 2025-08-12 ASSESSMENT — CUP TO DISC RATIO
OD_RATIO: 0.2
OS_RATIO: 0.2

## 2025-08-13 ENCOUNTER — PATIENT OUTREACH (OUTPATIENT)
Dept: CARE COORDINATION | Facility: CLINIC | Age: 80
End: 2025-08-13
Payer: COMMERCIAL

## 2025-08-13 ENCOUNTER — TELEPHONE (OUTPATIENT)
Dept: ANTICOAGULATION | Facility: CLINIC | Age: 80
End: 2025-08-13
Payer: COMMERCIAL

## 2025-08-18 ENCOUNTER — OFFICE VISIT (OUTPATIENT)
Dept: FAMILY MEDICINE | Facility: CLINIC | Age: 80
End: 2025-08-18
Payer: COMMERCIAL

## 2025-08-18 ENCOUNTER — ANTICOAGULATION THERAPY VISIT (OUTPATIENT)
Dept: ANTICOAGULATION | Facility: CLINIC | Age: 80
End: 2025-08-18

## 2025-08-18 VITALS
HEART RATE: 89 BPM | HEIGHT: 58 IN | WEIGHT: 175 LBS | RESPIRATION RATE: 12 BRPM | SYSTOLIC BLOOD PRESSURE: 133 MMHG | OXYGEN SATURATION: 96 % | TEMPERATURE: 97.6 F | BODY MASS INDEX: 36.73 KG/M2 | DIASTOLIC BLOOD PRESSURE: 89 MMHG

## 2025-08-18 DIAGNOSIS — Z79.01 LONG TERM (CURRENT) USE OF ANTICOAGULANTS: ICD-10-CM

## 2025-08-18 DIAGNOSIS — N18.31 STAGE 3A CHRONIC KIDNEY DISEASE (H): ICD-10-CM

## 2025-08-18 DIAGNOSIS — K21.9 GASTROESOPHAGEAL REFLUX DISEASE WITHOUT ESOPHAGITIS: ICD-10-CM

## 2025-08-18 DIAGNOSIS — G31.83 LEWY BODY DEMENTIA, UNSPECIFIED DEMENTIA SEVERITY, UNSPECIFIED WHETHER BEHAVIORAL, PSYCHOTIC, OR MOOD DISTURBANCE OR ANXIETY (H): Primary | ICD-10-CM

## 2025-08-18 DIAGNOSIS — E11.21 TYPE 2 DIABETES MELLITUS WITH DIABETIC NEPHROPATHY, WITHOUT LONG-TERM CURRENT USE OF INSULIN (H): ICD-10-CM

## 2025-08-18 DIAGNOSIS — I48.0 PAROXYSMAL ATRIAL FIBRILLATION (H): ICD-10-CM

## 2025-08-18 DIAGNOSIS — I48.0 PAROXYSMAL ATRIAL FIBRILLATION (H): Primary | ICD-10-CM

## 2025-08-18 DIAGNOSIS — F02.80 LEWY BODY DEMENTIA, UNSPECIFIED DEMENTIA SEVERITY, UNSPECIFIED WHETHER BEHAVIORAL, PSYCHOTIC, OR MOOD DISTURBANCE OR ANXIETY (H): Primary | ICD-10-CM

## 2025-08-18 LAB
EST. AVERAGE GLUCOSE BLD GHB EST-MCNC: 146 MG/DL
HBA1C MFR BLD: 6.7 % (ref 0–5.6)
INR BLD: 2.1 (ref 0.9–1.1)

## 2025-08-18 PROCEDURE — 3075F SYST BP GE 130 - 139MM HG: CPT | Performed by: FAMILY MEDICINE

## 2025-08-18 PROCEDURE — 84075 ASSAY ALKALINE PHOSPHATASE: CPT | Performed by: FAMILY MEDICINE

## 2025-08-18 PROCEDURE — 84155 ASSAY OF PROTEIN SERUM: CPT | Performed by: FAMILY MEDICINE

## 2025-08-18 PROCEDURE — 3044F HG A1C LEVEL LT 7.0%: CPT | Performed by: FAMILY MEDICINE

## 2025-08-18 PROCEDURE — 1126F AMNT PAIN NOTED NONE PRSNT: CPT | Performed by: FAMILY MEDICINE

## 2025-08-18 PROCEDURE — 82247 BILIRUBIN TOTAL: CPT | Performed by: FAMILY MEDICINE

## 2025-08-18 PROCEDURE — 3079F DIAST BP 80-89 MM HG: CPT | Performed by: FAMILY MEDICINE

## 2025-08-18 PROCEDURE — 80048 BASIC METABOLIC PNL TOTAL CA: CPT | Performed by: FAMILY MEDICINE

## 2025-08-18 PROCEDURE — 85610 PROTHROMBIN TIME: CPT | Performed by: FAMILY MEDICINE

## 2025-08-18 PROCEDURE — 36415 COLL VENOUS BLD VENIPUNCTURE: CPT | Performed by: FAMILY MEDICINE

## 2025-08-18 PROCEDURE — 83036 HEMOGLOBIN GLYCOSYLATED A1C: CPT | Performed by: FAMILY MEDICINE

## 2025-08-18 PROCEDURE — 82040 ASSAY OF SERUM ALBUMIN: CPT | Performed by: FAMILY MEDICINE

## 2025-08-18 PROCEDURE — 99214 OFFICE O/P EST MOD 30 MIN: CPT | Performed by: FAMILY MEDICINE

## 2025-08-18 PROCEDURE — 84450 TRANSFERASE (AST) (SGOT): CPT | Performed by: FAMILY MEDICINE

## 2025-08-18 PROCEDURE — G2211 COMPLEX E/M VISIT ADD ON: HCPCS | Performed by: FAMILY MEDICINE

## 2025-08-18 RX ORDER — PANTOPRAZOLE SODIUM 40 MG/1
40 TABLET, DELAYED RELEASE ORAL 2 TIMES DAILY
Qty: 180 TABLET | Refills: 1 | Status: SHIPPED | OUTPATIENT
Start: 2025-08-18

## 2025-08-18 ASSESSMENT — PATIENT HEALTH QUESTIONNAIRE - PHQ9
SUM OF ALL RESPONSES TO PHQ QUESTIONS 1-9: 15
SUM OF ALL RESPONSES TO PHQ QUESTIONS 1-9: 15
10. IF YOU CHECKED OFF ANY PROBLEMS, HOW DIFFICULT HAVE THESE PROBLEMS MADE IT FOR YOU TO DO YOUR WORK, TAKE CARE OF THINGS AT HOME, OR GET ALONG WITH OTHER PEOPLE: SOMEWHAT DIFFICULT

## 2025-08-18 ASSESSMENT — ANXIETY QUESTIONNAIRES
GAD7 TOTAL SCORE: 16
2. NOT BEING ABLE TO STOP OR CONTROL WORRYING: NEARLY EVERY DAY
5. BEING SO RESTLESS THAT IT IS HARD TO SIT STILL: MORE THAN HALF THE DAYS
7. FEELING AFRAID AS IF SOMETHING AWFUL MIGHT HAPPEN: MORE THAN HALF THE DAYS
6. BECOMING EASILY ANNOYED OR IRRITABLE: MORE THAN HALF THE DAYS
1. FEELING NERVOUS, ANXIOUS, OR ON EDGE: MORE THAN HALF THE DAYS
8. IF YOU CHECKED OFF ANY PROBLEMS, HOW DIFFICULT HAVE THESE MADE IT FOR YOU TO DO YOUR WORK, TAKE CARE OF THINGS AT HOME, OR GET ALONG WITH OTHER PEOPLE?: SOMEWHAT DIFFICULT
IF YOU CHECKED OFF ANY PROBLEMS ON THIS QUESTIONNAIRE, HOW DIFFICULT HAVE THESE PROBLEMS MADE IT FOR YOU TO DO YOUR WORK, TAKE CARE OF THINGS AT HOME, OR GET ALONG WITH OTHER PEOPLE: SOMEWHAT DIFFICULT
4. TROUBLE RELAXING: MORE THAN HALF THE DAYS
GAD7 TOTAL SCORE: 16
GAD7 TOTAL SCORE: 16
7. FEELING AFRAID AS IF SOMETHING AWFUL MIGHT HAPPEN: MORE THAN HALF THE DAYS
3. WORRYING TOO MUCH ABOUT DIFFERENT THINGS: NEARLY EVERY DAY

## 2025-08-18 ASSESSMENT — PAIN SCALES - GENERAL: PAINLEVEL_OUTOF10: NO PAIN (0)

## 2025-08-18 ASSESSMENT — ENCOUNTER SYMPTOMS: NERVOUS/ANXIOUS: 1

## 2025-08-19 ENCOUNTER — RESULTS FOLLOW-UP (OUTPATIENT)
Dept: FAMILY MEDICINE | Facility: CLINIC | Age: 80
End: 2025-08-19
Payer: COMMERCIAL

## 2025-08-19 LAB
ALBUMIN SERPL BCG-MCNC: 4.1 G/DL (ref 3.5–5.2)
ALP SERPL-CCNC: 77 U/L (ref 40–150)
ALT SERPL W P-5'-P-CCNC: ABNORMAL U/L
ANION GAP SERPL CALCULATED.3IONS-SCNC: 15 MMOL/L (ref 7–15)
AST SERPL W P-5'-P-CCNC: 48 U/L (ref 0–45)
BILIRUB SERPL-MCNC: 0.6 MG/DL
BUN SERPL-MCNC: 19.9 MG/DL (ref 8–23)
CALCIUM SERPL-MCNC: 9.9 MG/DL (ref 8.8–10.4)
CHLORIDE SERPL-SCNC: 109 MMOL/L (ref 98–107)
CREAT SERPL-MCNC: 1.12 MG/DL (ref 0.51–0.95)
EGFRCR SERPLBLD CKD-EPI 2021: 50 ML/MIN/1.73M2
GLUCOSE SERPL-MCNC: 133 MG/DL (ref 70–99)
HCO3 SERPL-SCNC: 20 MMOL/L (ref 22–29)
POTASSIUM SERPL-SCNC: 4.9 MMOL/L (ref 3.4–5.3)
PROT SERPL-MCNC: 8.2 G/DL (ref 6.4–8.3)
SODIUM SERPL-SCNC: 144 MMOL/L (ref 135–145)

## 2025-08-25 ENCOUNTER — TELEPHONE (OUTPATIENT)
Dept: FAMILY MEDICINE | Facility: CLINIC | Age: 80
End: 2025-08-25
Payer: COMMERCIAL

## 2025-08-25 ENCOUNTER — TELEPHONE (OUTPATIENT)
Dept: GASTROENTEROLOGY | Facility: CLINIC | Age: 80
End: 2025-08-25
Payer: COMMERCIAL

## 2025-08-25 DIAGNOSIS — K21.9 GASTROESOPHAGEAL REFLUX DISEASE, UNSPECIFIED WHETHER ESOPHAGITIS PRESENT: Primary | ICD-10-CM

## 2025-08-27 ENCOUNTER — TELEPHONE (OUTPATIENT)
Dept: FAMILY MEDICINE | Facility: CLINIC | Age: 80
End: 2025-08-27
Payer: COMMERCIAL

## 2025-09-03 ENCOUNTER — VIRTUAL VISIT (OUTPATIENT)
Dept: PHARMACY | Facility: CLINIC | Age: 80
End: 2025-09-03
Payer: COMMERCIAL

## 2025-09-03 ENCOUNTER — TELEPHONE (OUTPATIENT)
Dept: PHARMACY | Facility: CLINIC | Age: 80
End: 2025-09-03
Payer: COMMERCIAL

## 2025-09-03 DIAGNOSIS — F02.80 LEWY BODY DEMENTIA, UNSPECIFIED DEMENTIA SEVERITY, UNSPECIFIED WHETHER BEHAVIORAL, PSYCHOTIC, OR MOOD DISTURBANCE OR ANXIETY (H): Primary | ICD-10-CM

## 2025-09-03 DIAGNOSIS — G31.83 LEWY BODY DEMENTIA, UNSPECIFIED DEMENTIA SEVERITY, UNSPECIFIED WHETHER BEHAVIORAL, PSYCHOTIC, OR MOOD DISTURBANCE OR ANXIETY (H): Primary | ICD-10-CM

## 2025-09-03 DIAGNOSIS — I48.0 PAROXYSMAL ATRIAL FIBRILLATION (H): ICD-10-CM

## 2025-09-03 DIAGNOSIS — K21.9 GERD WITHOUT ESOPHAGITIS: ICD-10-CM

## 2025-09-03 DIAGNOSIS — F33.0 MILD RECURRENT MAJOR DEPRESSION: ICD-10-CM

## 2025-09-03 PROCEDURE — 99607 MTMS BY PHARM ADDL 15 MIN: CPT | Mod: 93 | Performed by: PHARMACIST

## 2025-09-03 PROCEDURE — 99606 MTMS BY PHARM EST 15 MIN: CPT | Mod: 93 | Performed by: PHARMACIST

## 2025-09-03 RX ORDER — FAMOTIDINE 20 MG/1
20 TABLET, FILM COATED ORAL 2 TIMES DAILY
Qty: 60 TABLET | Refills: 3 | Status: SHIPPED | OUTPATIENT
Start: 2025-09-03

## 2025-09-04 ENCOUNTER — TELEPHONE (OUTPATIENT)
Dept: FAMILY MEDICINE | Facility: CLINIC | Age: 80
End: 2025-09-04
Payer: COMMERCIAL

## (undated) RX ORDER — LIDOCAINE HYDROCHLORIDE 10 MG/ML
INJECTION, SOLUTION EPIDURAL; INFILTRATION; INTRACAUDAL; PERINEURAL
Status: DISPENSED
Start: 2020-09-22

## (undated) RX ORDER — METHYLPREDNISOLONE ACETATE 80 MG/ML
INJECTION, SUSPENSION INTRA-ARTICULAR; INTRALESIONAL; INTRAMUSCULAR; SOFT TISSUE
Status: DISPENSED
Start: 2020-06-23

## (undated) RX ORDER — LIDOCAINE HYDROCHLORIDE 10 MG/ML
INJECTION, SOLUTION EPIDURAL; INFILTRATION; INTRACAUDAL; PERINEURAL
Status: DISPENSED
Start: 2020-06-23

## (undated) RX ORDER — BUPIVACAINE HYDROCHLORIDE 5 MG/ML
INJECTION, SOLUTION EPIDURAL; INTRACAUDAL
Status: DISPENSED
Start: 2020-09-22

## (undated) RX ORDER — BUPIVACAINE HYDROCHLORIDE 2.5 MG/ML
INJECTION, SOLUTION INFILTRATION; PERINEURAL
Status: DISPENSED
Start: 2020-09-22